# Patient Record
Sex: FEMALE | Race: WHITE | Employment: OTHER | URBAN - METROPOLITAN AREA
[De-identification: names, ages, dates, MRNs, and addresses within clinical notes are randomized per-mention and may not be internally consistent; named-entity substitution may affect disease eponyms.]

---

## 2017-02-28 DIAGNOSIS — E03.9 HYPOTHYROIDISM, UNSPECIFIED TYPE: ICD-10-CM

## 2017-02-28 RX ORDER — LEVOTHYROXINE SODIUM 0.05 MG/1
TABLET ORAL
Qty: 30 TABLET | Refills: 2 | Status: SHIPPED | OUTPATIENT
Start: 2017-02-28 | End: 2017-05-23 | Stop reason: SDUPTHER

## 2017-03-10 RX ORDER — PANTOPRAZOLE SODIUM 40 MG/1
TABLET, DELAYED RELEASE ORAL
Qty: 90 TABLET | Refills: 3 | Status: SHIPPED | OUTPATIENT
Start: 2017-03-10 | End: 2018-01-03 | Stop reason: SDUPTHER

## 2017-03-17 DIAGNOSIS — M54.16 LUMBAR RADICULOPATHY: ICD-10-CM

## 2017-03-17 RX ORDER — GABAPENTIN 600 MG/1
TABLET ORAL
Qty: 270 TABLET | Refills: 3 | Status: SHIPPED | OUTPATIENT
Start: 2017-03-17 | End: 2017-06-27 | Stop reason: SDUPTHER

## 2017-03-27 RX ORDER — VALSARTAN AND HYDROCHLOROTHIAZIDE 160; 12.5 MG/1; MG/1
TABLET, FILM COATED ORAL
Qty: 90 TABLET | Refills: 2 | Status: SHIPPED | OUTPATIENT
Start: 2017-03-27 | End: 2017-11-29 | Stop reason: SDUPTHER

## 2017-03-31 ENCOUNTER — OFFICE VISIT (OUTPATIENT)
Dept: FAMILY MEDICINE CLINIC | Age: 82
End: 2017-03-31

## 2017-03-31 VITALS
RESPIRATION RATE: 16 BRPM | HEIGHT: 59 IN | HEART RATE: 68 BPM | DIASTOLIC BLOOD PRESSURE: 52 MMHG | TEMPERATURE: 97.9 F | WEIGHT: 139.7 LBS | SYSTOLIC BLOOD PRESSURE: 102 MMHG | BODY MASS INDEX: 28.16 KG/M2

## 2017-03-31 DIAGNOSIS — N30.01 ACUTE CYSTITIS WITH HEMATURIA: Primary | ICD-10-CM

## 2017-03-31 LAB
BILIRUBIN, POC: NEGATIVE
BLOOD URINE, POC: ABNORMAL
CLARITY, POC: ABNORMAL
COLOR, POC: YELLOW
GLUCOSE URINE, POC: NEGATIVE
KETONES, POC: NEGATIVE
LEUKOCYTE EST, POC: ABNORMAL
NITRITE, POC: NEGATIVE
PH, POC: 6
PROTEIN, POC: NEGATIVE
SPECIFIC GRAVITY, POC: 1.01
UROBILINOGEN, POC: 0.2

## 2017-03-31 PROCEDURE — G8427 DOCREV CUR MEDS BY ELIG CLIN: HCPCS | Performed by: NURSE PRACTITIONER

## 2017-03-31 PROCEDURE — 1123F ACP DISCUSS/DSCN MKR DOCD: CPT | Performed by: NURSE PRACTITIONER

## 2017-03-31 PROCEDURE — 99213 OFFICE O/P EST LOW 20 MIN: CPT | Performed by: NURSE PRACTITIONER

## 2017-03-31 PROCEDURE — G8400 PT W/DXA NO RESULTS DOC: HCPCS | Performed by: NURSE PRACTITIONER

## 2017-03-31 PROCEDURE — 81003 URINALYSIS AUTO W/O SCOPE: CPT | Performed by: NURSE PRACTITIONER

## 2017-03-31 PROCEDURE — 1036F TOBACCO NON-USER: CPT | Performed by: NURSE PRACTITIONER

## 2017-03-31 PROCEDURE — G8484 FLU IMMUNIZE NO ADMIN: HCPCS | Performed by: NURSE PRACTITIONER

## 2017-03-31 PROCEDURE — G8420 CALC BMI NORM PARAMETERS: HCPCS | Performed by: NURSE PRACTITIONER

## 2017-03-31 PROCEDURE — 4040F PNEUMOC VAC/ADMIN/RCVD: CPT | Performed by: NURSE PRACTITIONER

## 2017-03-31 PROCEDURE — 1090F PRES/ABSN URINE INCON ASSESS: CPT | Performed by: NURSE PRACTITIONER

## 2017-03-31 RX ORDER — CIPROFLOXACIN 250 MG/1
250 TABLET, FILM COATED ORAL 2 TIMES DAILY
Qty: 10 TABLET | Refills: 0 | Status: SHIPPED | OUTPATIENT
Start: 2017-03-31 | End: 2017-04-05

## 2017-03-31 ASSESSMENT — ENCOUNTER SYMPTOMS
RESPIRATORY NEGATIVE: 1
GASTROINTESTINAL NEGATIVE: 1

## 2017-03-31 ASSESSMENT — PATIENT HEALTH QUESTIONNAIRE - PHQ9
1. LITTLE INTEREST OR PLEASURE IN DOING THINGS: 0
SUM OF ALL RESPONSES TO PHQ QUESTIONS 1-9: 0
SUM OF ALL RESPONSES TO PHQ9 QUESTIONS 1 & 2: 0
2. FEELING DOWN, DEPRESSED OR HOPELESS: 0

## 2017-04-03 LAB — URINE CULTURE, ROUTINE: NORMAL

## 2017-04-12 RX ORDER — PRAVASTATIN SODIUM 20 MG
TABLET ORAL
Qty: 90 TABLET | Refills: 2 | Status: SHIPPED | OUTPATIENT
Start: 2017-04-12 | End: 2017-11-29 | Stop reason: SDUPTHER

## 2017-05-23 DIAGNOSIS — E03.9 HYPOTHYROIDISM, UNSPECIFIED TYPE: ICD-10-CM

## 2017-05-23 RX ORDER — LEVOTHYROXINE SODIUM 0.05 MG/1
TABLET ORAL
Qty: 30 TABLET | Refills: 2 | Status: SHIPPED | OUTPATIENT
Start: 2017-05-23 | End: 2017-09-11 | Stop reason: SDUPTHER

## 2017-06-27 DIAGNOSIS — M54.16 LUMBAR RADICULOPATHY: ICD-10-CM

## 2017-06-27 RX ORDER — GABAPENTIN 600 MG/1
TABLET ORAL
Qty: 270 TABLET | Refills: 3 | Status: SHIPPED | OUTPATIENT
Start: 2017-06-27 | End: 2018-02-28 | Stop reason: SDUPTHER

## 2017-08-08 ENCOUNTER — HOSPITAL ENCOUNTER (OUTPATIENT)
Dept: MRI IMAGING | Age: 82
Discharge: HOME | End: 2017-08-08
Payer: MEDICARE

## 2017-08-08 DIAGNOSIS — M54.16 RADICULOPATHY, LUMBAR REGION: ICD-10-CM

## 2017-08-08 PROCEDURE — 72148 MRI LUMBAR SPINE W/O DYE: CPT

## 2017-09-11 DIAGNOSIS — E03.9 HYPOTHYROIDISM, UNSPECIFIED TYPE: ICD-10-CM

## 2017-09-11 RX ORDER — LEVOTHYROXINE SODIUM 0.05 MG/1
TABLET ORAL
Qty: 30 TABLET | Refills: 2 | Status: SHIPPED | OUTPATIENT
Start: 2017-09-11 | End: 2017-11-29 | Stop reason: SDUPTHER

## 2017-10-08 ENCOUNTER — HOSPITAL ENCOUNTER (EMERGENCY)
Age: 82
Discharge: HOME | End: 2017-10-08
Payer: MEDICARE

## 2017-10-08 VITALS
HEIGHT: 59 IN | WEIGHT: 135 LBS | HEART RATE: 87 BPM | OXYGEN SATURATION: 98 % | DIASTOLIC BLOOD PRESSURE: 86 MMHG | SYSTOLIC BLOOD PRESSURE: 129 MMHG | TEMPERATURE: 98.1 F | BODY MASS INDEX: 27.21 KG/M2 | RESPIRATION RATE: 16 BRPM

## 2017-10-08 DIAGNOSIS — N30.01 ACUTE CYSTITIS WITH HEMATURIA: Primary | ICD-10-CM

## 2017-10-08 LAB
BILIRUBIN URINE: ABNORMAL
BLOOD, URINE: ABNORMAL
CHARACTER, URINE: ABNORMAL
COLOR: ABNORMAL
GLUCOSE, URINE: NEGATIVE MG/DL
KETONES, URINE: NEGATIVE
LEUKOCYTES, UA: ABNORMAL
NITRATE, UA: NEGATIVE
PH UA: 5.5 (ref 5–9)
PROTEIN UA: >= 300 MG/DL
REFLEX TO URINE C & S: ABNORMAL
SPECIFIC GRAVITY UA: >= 1.03 (ref 1–1.03)
UROBILINOGEN, URINE: 1 EU/DL (ref 0–1)

## 2017-10-08 PROCEDURE — 99213 OFFICE O/P EST LOW 20 MIN: CPT

## 2017-10-08 PROCEDURE — 81003 URINALYSIS AUTO W/O SCOPE: CPT

## 2017-10-08 PROCEDURE — 87086 URINE CULTURE/COLONY COUNT: CPT

## 2017-10-08 PROCEDURE — 99213 OFFICE O/P EST LOW 20 MIN: CPT | Performed by: NURSE PRACTITIONER

## 2017-10-08 RX ORDER — PHENAZOPYRIDINE HYDROCHLORIDE 200 MG/1
200 TABLET, FILM COATED ORAL 3 TIMES DAILY PRN
Qty: 6 TABLET | Refills: 0 | Status: SHIPPED | OUTPATIENT
Start: 2017-10-08 | End: 2017-10-10

## 2017-10-08 RX ORDER — CIPROFLOXACIN 250 MG/1
250 TABLET, FILM COATED ORAL 2 TIMES DAILY
Qty: 10 TABLET | Refills: 0 | Status: SHIPPED | OUTPATIENT
Start: 2017-10-08 | End: 2017-10-13

## 2017-10-08 ASSESSMENT — ENCOUNTER SYMPTOMS
DIARRHEA: 0
TROUBLE SWALLOWING: 0
CONSTIPATION: 0
NAUSEA: 0
COUGH: 0
SORE THROAT: 0
WHEEZING: 0
EYE ITCHING: 0
CHEST TIGHTNESS: 0
RHINORRHEA: 0
EYE REDNESS: 0
BACK PAIN: 0
VOMITING: 0
EYE DISCHARGE: 0
EYE PAIN: 0
ABDOMINAL PAIN: 0
SHORTNESS OF BREATH: 0
SINUS PRESSURE: 0

## 2017-10-08 NOTE — ED TRIAGE NOTES
PT arrives ambulatory by self  to room with complaint of freq urination with burning symptoms started 3 days ago.

## 2017-10-08 NOTE — ED PROVIDER NOTES
[facility]  Urgent Care Encounter      CHIEF COMPLAINT       Chief Complaint   Patient presents with    Dysuria       Nurses Notes reviewed and I agree except as noted in the HPI. HISTORY OF PRESENT ILLNESS   Carissa Ugarte is a 80 y.o. female who presents To urgent care complaints of dysuria. Patient states her symptoms started 3 days ago and appeared to be worsening. She reports having urinary frequency. She denies fever, chills, abdominal pain, nausea, vomiting, diarrhea. She denies decreased urination and hematuria. She states that she has had several UTIs in the past and this feels similar. She denies any worsening or alleviating factors. REVIEW OF SYSTEMS     Review of Systems   Constitutional: Positive for chills. Negative for activity change, appetite change, fatigue and fever. HENT: Negative for congestion, ear discharge, ear pain, hearing loss, postnasal drip, rhinorrhea, sinus pressure, sore throat and trouble swallowing. Eyes: Negative for pain, discharge, redness and itching. Respiratory: Negative for cough, chest tightness, shortness of breath and wheezing. Cardiovascular: Negative for chest pain, palpitations and leg swelling. Gastrointestinal: Negative for abdominal pain, constipation, diarrhea, nausea and vomiting. Endocrine: Negative. Genitourinary: Positive for dysuria and frequency. Negative for difficulty urinating, flank pain and hematuria. Musculoskeletal: Negative for arthralgias, back pain, joint swelling and myalgias. Skin: Negative. Neurological: Negative for dizziness, light-headedness and headaches. Hematological: Negative. PAST MEDICAL HISTORY         Diagnosis Date    GERD (gastroesophageal reflux disease)     Hyperlipidemia     Hypertension     Hypothyroidism     Osteoarthritis        SURGICAL HISTORY     Patient  has a past surgical history that includes back surgery (2003); Neck surgery (1980s);  Knee arthroscopy Normal range of motion. Neck supple. No JVD present. No tracheal deviation present. No thyromegaly present. Cardiovascular: Normal rate, regular rhythm and normal heart sounds. Exam reveals no gallop and no friction rub. No murmur heard. Pulmonary/Chest: Effort normal and breath sounds normal. No stridor. No respiratory distress. She has no wheezes. She has no rales. She exhibits no tenderness. Abdominal: Soft. Bowel sounds are normal. There is no tenderness. No CVA tenderness   Lymphadenopathy:     She has no cervical adenopathy. Neurological: She is alert and oriented to person, place, and time. Skin: Skin is warm and dry. Psychiatric: She has a normal mood and affect. Her behavior is normal. Judgment normal.   Nursing note and vitals reviewed. DIAGNOSTIC RESULTS   Labs:  Results for orders placed or performed during the hospital encounter of 10/08/17   UA without Microscopic, Reflex C&S   Result Value Ref Range    Glucose, Urine Negative NEGATIVE mg/dl    Bilirubin Urine Small (A) NEGATIVE    Ketones, Urine Negative NEGATIVE    Specific Gravity, UA >=1.030 1.002 - 1.03    Blood, Urine Large (A) NEGATIVE    pH, UA 5.50 5.0 - 9.0    Protein, UA >= 300 (A) NEGATIVE mg/dl    Urobilinogen, Urine 1.00 0.0 - 1.0 eu/dl    Nitrate, UA Negative NEGATIVE    LEUKOCYTES, UA Small (A) NEGATIVE    Color, UA Dark yellow (A) STRAW-YELL    Character, Urine Cloudy (A) CLEAR-SL C    REFLEX TO URINE C & S INDICATED        IMAGING:  No orders to display     URGENT CARE COURSE:     Vitals:    10/08/17 1104   BP: 129/86   Pulse: 87   Resp: 16   Temp: 98.1 °F (36.7 °C)   TempSrc: Oral   SpO2: 98%   Weight: 135 lb (61.2 kg)   Height: 4' 11\" (1.499 m)       Medications - No data to display  PROCEDURES:  None  FINAL IMPRESSION      1. Acute cystitis with hematuria        DISPOSITION/PLAN   DISPOSITION   Discussed with the patient the results of her urine test in detail. She is to increase her fluids.   Take medications as prescribed. She can take Tylenol for pain as needed.   Follow-up with her family doctor if symptoms worsen or go to the emergency room if she develops new or worsening symptoms  PATIENT REFERRED TO:  Cooper Sagastume [address] [place] [address]  [phone]    In 2 days  If symptoms worsen    DISCHARGE MEDICATIONS:  New Prescriptions    CIPROFLOXACIN (CIPRO) 250 MG TABLET    Take 1 tablet by mouth 2 times daily for 5 days    PHENAZOPYRIDINE (PYRIDIUM) 200 MG TABLET    Take 1 tablet by mouth 3 times daily as needed for Pain     Current Discharge Medication List          Chuck Frias, [address], [facility]  10/08/17 2205

## 2017-10-09 ENCOUNTER — CARE COORDINATION (OUTPATIENT)
Dept: CASE MANAGEMENT | Age: 82
End: 2017-10-09

## 2017-10-10 ENCOUNTER — IMMUNIZATION (OUTPATIENT)
Dept: FAMILY MEDICINE CLINIC | Age: 82
End: 2017-10-10
Payer: MEDICARE

## 2017-10-10 DIAGNOSIS — Z23 INFLUENZA VACCINE NEEDED: Primary | ICD-10-CM

## 2017-10-10 LAB — URINE CULTURE REFLEX: NORMAL

## 2017-10-10 PROCEDURE — 90662 IIV NO PRSV INCREASED AG IM: CPT | Performed by: NURSE PRACTITIONER

## 2017-10-10 PROCEDURE — G0008 ADMIN INFLUENZA VIRUS VAC: HCPCS | Performed by: NURSE PRACTITIONER

## 2017-10-13 ENCOUNTER — TELEPHONE (OUTPATIENT)
Dept: FAMILY MEDICINE CLINIC | Age: 82
End: 2017-10-13

## 2017-10-13 NOTE — TELEPHONE ENCOUNTER
Patient called was seen at [address] was told to call office for results of urine culture. Patient would like a call back with advice.

## 2017-11-29 DIAGNOSIS — E03.9 HYPOTHYROIDISM, UNSPECIFIED TYPE: ICD-10-CM

## 2017-11-29 RX ORDER — LEVOTHYROXINE SODIUM 0.05 MG/1
TABLET ORAL
Qty: 90 TABLET | Refills: 3 | Status: SHIPPED | OUTPATIENT
Start: 2017-11-29 | End: 2018-10-24 | Stop reason: SDUPTHER

## 2017-11-29 RX ORDER — VALSARTAN AND HYDROCHLOROTHIAZIDE 160; 12.5 MG/1; MG/1
TABLET, FILM COATED ORAL
Qty: 90 TABLET | Refills: 3 | Status: SHIPPED | OUTPATIENT
Start: 2017-11-29 | End: 2018-05-02 | Stop reason: SDUPTHER

## 2017-11-29 RX ORDER — PRAVASTATIN SODIUM 20 MG
TABLET ORAL
Qty: 90 TABLET | Refills: 3 | Status: SHIPPED | OUTPATIENT
Start: 2017-11-29 | End: 2018-02-12 | Stop reason: SDUPTHER

## 2017-11-29 NOTE — TELEPHONE ENCOUNTER
Max Aquino called requesting a refill on the following medications:  Requested Prescriptions     Pending Prescriptions Disp Refills    pravastatin (PRAVACHOL) 20 MG tablet 90 tablet 2    levothyroxine (SYNTHROID) 50 MCG tablet 30 tablet 2    valsartan-hydrochlorothiazide (DIOVAN-HCT) 160-12.5 MG per tablet 90 tablet 2     Pharmacy verified:  .pv    90 day supply    Date of last visit: 03/31/17  Date of next visit (if applicable): Visit date not found    [address] [place] [address]

## 2017-12-13 ENCOUNTER — OFFICE VISIT (OUTPATIENT)
Dept: FAMILY MEDICINE CLINIC | Age: 82
End: 2017-12-13
Payer: MEDICARE

## 2017-12-13 VITALS
SYSTOLIC BLOOD PRESSURE: 126 MMHG | OXYGEN SATURATION: 96 % | BODY MASS INDEX: 26.21 KG/M2 | HEIGHT: 59 IN | RESPIRATION RATE: 14 BRPM | HEART RATE: 70 BPM | DIASTOLIC BLOOD PRESSURE: 56 MMHG | WEIGHT: 130 LBS

## 2017-12-13 DIAGNOSIS — M48.061 SPINAL STENOSIS OF LUMBAR REGION AT MULTIPLE LEVELS: Primary | ICD-10-CM

## 2017-12-13 DIAGNOSIS — M54.16 LUMBAR RADICULOPATHY, CHRONIC: ICD-10-CM

## 2017-12-13 PROCEDURE — 99213 OFFICE O/P EST LOW 20 MIN: CPT | Performed by: FAMILY MEDICINE

## 2017-12-13 PROCEDURE — 1036F TOBACCO NON-USER: CPT | Performed by: FAMILY MEDICINE

## 2017-12-13 PROCEDURE — 1123F ACP DISCUSS/DSCN MKR DOCD: CPT | Performed by: FAMILY MEDICINE

## 2017-12-13 PROCEDURE — G8427 DOCREV CUR MEDS BY ELIG CLIN: HCPCS | Performed by: FAMILY MEDICINE

## 2017-12-13 PROCEDURE — G8419 CALC BMI OUT NRM PARAM NOF/U: HCPCS | Performed by: FAMILY MEDICINE

## 2017-12-13 PROCEDURE — 4040F PNEUMOC VAC/ADMIN/RCVD: CPT | Performed by: FAMILY MEDICINE

## 2017-12-13 PROCEDURE — 1090F PRES/ABSN URINE INCON ASSESS: CPT | Performed by: FAMILY MEDICINE

## 2017-12-13 PROCEDURE — G8400 PT W/DXA NO RESULTS DOC: HCPCS | Performed by: FAMILY MEDICINE

## 2017-12-13 PROCEDURE — G8484 FLU IMMUNIZE NO ADMIN: HCPCS | Performed by: FAMILY MEDICINE

## 2017-12-13 RX ORDER — ASPIRIN 81 MG/1
81 TABLET ORAL DAILY
COMMUNITY

## 2017-12-13 ASSESSMENT — ENCOUNTER SYMPTOMS
GASTROINTESTINAL NEGATIVE: 1
RESPIRATORY NEGATIVE: 1
BACK PAIN: 1

## 2017-12-13 NOTE — PROGRESS NOTES
Multiple Vitamins-Minerals (THERAPEUTIC MULTIVITAMIN-MINERALS) tablet Take 1 tablet by mouth daily. Yes Historical Provider, MD   Cholecalciferol (VITAMIN D) 2000 UNITS CAPS capsule Take 1 capsule by mouth daily. Yes Historical Provider, MD         Review of Systems   HENT: Negative. Respiratory: Negative. Cardiovascular: Negative. Gastrointestinal: Negative. Musculoskeletal: Positive for back pain and gait problem. Neurological: Positive for weakness and numbness. All other systems reviewed and are negative. Objective:   Physical Exam   Constitutional: She is oriented to person, place, and time. She appears well-developed and well-nourished. HENT:   Head: Normocephalic and atraumatic. Right Ear: Tympanic membrane normal.   Left Ear: Tympanic membrane normal.   Mouth/Throat: Oropharynx is clear and moist and mucous membranes are normal.   Cardiovascular: Normal rate, regular rhythm and normal heart sounds. No murmur heard. Pulmonary/Chest: Effort normal and breath sounds normal.   Abdominal: Soft. Bowel sounds are normal.   Musculoskeletal: She exhibits no edema. Lumbar back: She exhibits decreased range of motion, tenderness and pain. She exhibits no spasm. Back:    Neurological: She is alert and oriented to person, place, and time. She displays atrophy. No sensory deficit. Gait (antalgic gait) abnormal.   Reflex Scores:       Patellar reflexes are 1+ on the right side and 1+ on the left side. Achilles reflexes are 1+ on the right side and 1+ on the left side. Skin: Skin is warm and dry. Psychiatric: She has a normal mood and affect. Her behavior is normal.   Nursing note and vitals reviewed. Assessment:      1. Spinal stenosis of lumbar region at multiple levels  External Referral To Orthopedic Surgery    CANCELED: External Referral To Orthopedic Surgery   2.  Lumbar radiculopathy, chronic  External Referral To Orthopedic Surgery    CANCELED: External

## 2017-12-18 RX ORDER — VALSARTAN AND HYDROCHLOROTHIAZIDE 160; 12.5 MG/1; MG/1
TABLET, FILM COATED ORAL
Qty: 90 TABLET | Refills: 2 | Status: ON HOLD | OUTPATIENT
Start: 2017-12-18 | End: 2019-03-12

## 2018-01-03 RX ORDER — PANTOPRAZOLE SODIUM 40 MG/1
TABLET, DELAYED RELEASE ORAL
Qty: 90 TABLET | Refills: 3 | Status: SHIPPED | OUTPATIENT
Start: 2018-01-03 | End: 2018-04-03 | Stop reason: SDUPTHER

## 2018-02-12 RX ORDER — PRAVASTATIN SODIUM 20 MG
TABLET ORAL
Qty: 90 TABLET | Refills: 3 | Status: SHIPPED | OUTPATIENT
Start: 2018-02-12 | End: 2018-06-14

## 2018-02-28 ENCOUNTER — OFFICE VISIT (OUTPATIENT)
Dept: FAMILY MEDICINE CLINIC | Age: 83
End: 2018-02-28
Payer: MEDICARE

## 2018-02-28 VITALS
DIASTOLIC BLOOD PRESSURE: 60 MMHG | BODY MASS INDEX: 25.04 KG/M2 | HEART RATE: 76 BPM | RESPIRATION RATE: 12 BRPM | HEIGHT: 59 IN | SYSTOLIC BLOOD PRESSURE: 112 MMHG | WEIGHT: 124.2 LBS

## 2018-02-28 DIAGNOSIS — M54.16 LUMBAR RADICULOPATHY: ICD-10-CM

## 2018-02-28 DIAGNOSIS — M48.062 SPINAL STENOSIS OF LUMBAR REGION WITH NEUROGENIC CLAUDICATION: Primary | ICD-10-CM

## 2018-02-28 PROCEDURE — 99213 OFFICE O/P EST LOW 20 MIN: CPT | Performed by: FAMILY MEDICINE

## 2018-02-28 PROCEDURE — G8427 DOCREV CUR MEDS BY ELIG CLIN: HCPCS | Performed by: FAMILY MEDICINE

## 2018-02-28 PROCEDURE — G8419 CALC BMI OUT NRM PARAM NOF/U: HCPCS | Performed by: FAMILY MEDICINE

## 2018-02-28 PROCEDURE — 1036F TOBACCO NON-USER: CPT | Performed by: FAMILY MEDICINE

## 2018-02-28 PROCEDURE — 1090F PRES/ABSN URINE INCON ASSESS: CPT | Performed by: FAMILY MEDICINE

## 2018-02-28 PROCEDURE — G8484 FLU IMMUNIZE NO ADMIN: HCPCS | Performed by: FAMILY MEDICINE

## 2018-02-28 PROCEDURE — 4040F PNEUMOC VAC/ADMIN/RCVD: CPT | Performed by: FAMILY MEDICINE

## 2018-02-28 PROCEDURE — 1123F ACP DISCUSS/DSCN MKR DOCD: CPT | Performed by: FAMILY MEDICINE

## 2018-02-28 PROCEDURE — G8400 PT W/DXA NO RESULTS DOC: HCPCS | Performed by: FAMILY MEDICINE

## 2018-02-28 RX ORDER — GABAPENTIN 600 MG/1
TABLET ORAL
Qty: 360 TABLET | Refills: 3 | Status: SHIPPED | OUTPATIENT
Start: 2018-02-28 | End: 2018-10-24 | Stop reason: DRUGHIGH

## 2018-02-28 ASSESSMENT — ENCOUNTER SYMPTOMS: BACK PAIN: 1

## 2018-02-28 NOTE — PROGRESS NOTES
Subjective:      Patient ID: Ge Kapadia is a 80 y.o. female. HPI:    Chief Complaint   Patient presents with   Jona Seen Dr. Jeovany Belle and he cannot help her    Leg Pain     Pt here for follow up on her back. Was seen by Dr. Jeovany Belle who did not feel that she was a good surgical candidate. At that time, pt states that her pain was mainly at night. She did not stress her radicular symptoms during the day. Patient Active Problem List   Diagnosis    Hypertension    Hyperlipidemia    GERD (gastroesophageal reflux disease)    Osteoarthritis    Hypothyroidism    Vertebro basilar insufficiency    Cervical spondylosis with myelopathy    Light headed    Stenosis, spinal, lumbar    TIA involving vertebral artery     Past Surgical History:   Procedure Laterality Date    BACK SURGERY  2003    JOINT REPLACEMENT      left    KNEE ARTHROSCOPY  2010    left    NECK SURGERY  1980s     Prior to Admission medications    Medication Sig Start Date End Date Taking? Authorizing Provider   pravastatin (PRAVACHOL) 20 MG tablet TAKE 1 TABLET BY MOUTH NIGHTLY 2/12/18  Yes Elfego Zamora DO   pantoprazole (PROTONIX) 40 MG tablet TAKE 1 TABLET BY MOUTH DAILY.  1/3/18  Yes Elfego Zamora DO   valsartan-hydrochlorothiazide (DIOVAN-HCT) 160-12.5 MG per tablet TAKE 1 TABLET BY MOUTH EVERY DAY 12/18/17  Yes Elfego Zamora DO   aspirin 81 MG EC tablet Take 81 mg by mouth daily   Yes Historical Provider, MD   levothyroxine (SYNTHROID) 50 MCG tablet TAKE 1 TABLET BY MOUTH DAILY 11/29/17  Yes Elfeog Zamora DO   valsartan-hydrochlorothiazide (DIOVAN-HCT) 160-12.5 MG per tablet TAKE 1 TABLET BY MOUTH EVERY DAY 11/29/17  Yes Elfego Zamora DO   gabapentin (NEURONTIN) 600 MG tablet TAKE 1 TABLET BY MOUTH 3 TIMES DAILY 6/27/17  Yes Elfego Zamora DO   Acetaminophen (TYLENOL ARTHRITIS EXT RELIEF PO) Take by mouth   Yes Historical Provider, MD   calcium carbonate-vitamin D (CALCIUM 600+D) 600-200 MG-UNIT TABS Take 1 tablet by mouth daily. Yes Historical Provider, MD   Multiple Vitamins-Minerals (THERAPEUTIC MULTIVITAMIN-MINERALS) tablet Take 1 tablet by mouth daily. Yes Historical Provider, MD   Cholecalciferol (VITAMIN D) 2000 UNITS CAPS capsule Take 1 capsule by mouth daily. Yes Historical Provider, MD          Review of Systems   Musculoskeletal: Positive for back pain (with radiation to back of legs). Neurological: Negative for weakness. Psychiatric/Behavioral: Positive for sleep disturbance. Objective:   Physical Exam   Constitutional: She is oriented to person, place, and time. She appears well-developed and well-nourished. HENT:   Head: Normocephalic and atraumatic. Right Ear: Tympanic membrane normal.   Left Ear: Tympanic membrane normal.   Mouth/Throat: Oropharynx is clear and moist and mucous membranes are normal.   Cardiovascular: Normal rate, regular rhythm and normal heart sounds. No murmur heard. Pulmonary/Chest: Effort normal and breath sounds normal.   Abdominal: Soft. Bowel sounds are normal.   Musculoskeletal: She exhibits no edema. Lumbar back: She exhibits decreased range of motion, tenderness and pain. Back:    Neurological: She is alert and oriented to person, place, and time. She displays atrophy. No sensory deficit. Gait (antalgic gait) abnormal.   Skin: Skin is warm and dry. Psychiatric: She has a normal mood and affect. Her behavior is normal.   Nursing note and vitals reviewed. Assessment:      1. Spinal stenosis of lumbar region with neurogenic claudication  Judy Taylor MD   2.  Lumbar radiculopathy  gabapentin (NEURONTIN) 600 MG tablet           Plan:      -  Orders above  -  Will increase gabapentin  -  RTO prn

## 2018-04-03 RX ORDER — PANTOPRAZOLE SODIUM 40 MG/1
TABLET, DELAYED RELEASE ORAL
Qty: 90 TABLET | Refills: 3 | Status: SHIPPED | OUTPATIENT
Start: 2018-04-03 | End: 2018-06-25

## 2018-05-02 ENCOUNTER — OFFICE VISIT (OUTPATIENT)
Dept: PHYSICAL MEDICINE AND REHAB | Age: 83
End: 2018-05-02
Payer: MEDICARE

## 2018-05-02 ENCOUNTER — TELEPHONE (OUTPATIENT)
Dept: PHYSICAL MEDICINE AND REHAB | Age: 83
End: 2018-05-02

## 2018-05-02 VITALS
HEART RATE: 74 BPM | WEIGHT: 123.2 LBS | BODY MASS INDEX: 24.84 KG/M2 | SYSTOLIC BLOOD PRESSURE: 108 MMHG | DIASTOLIC BLOOD PRESSURE: 58 MMHG | HEIGHT: 59 IN

## 2018-05-02 DIAGNOSIS — M48.02 CERVICAL SPINAL STENOSIS: ICD-10-CM

## 2018-05-02 DIAGNOSIS — M48.061 SPINAL STENOSIS OF LUMBAR REGION WITHOUT NEUROGENIC CLAUDICATION: ICD-10-CM

## 2018-05-02 DIAGNOSIS — M47.816 SPONDYLOSIS OF LUMBAR REGION WITHOUT MYELOPATHY OR RADICULOPATHY: Primary | ICD-10-CM

## 2018-05-02 DIAGNOSIS — M46.1 SI (SACROILIAC) JOINT INFLAMMATION (HCC): ICD-10-CM

## 2018-05-02 DIAGNOSIS — G89.4 CHRONIC PAIN SYNDROME: ICD-10-CM

## 2018-05-02 DIAGNOSIS — M47.22 CERVICAL SPONDYLOSIS WITH RADICULOPATHY: ICD-10-CM

## 2018-05-02 PROCEDURE — G8427 DOCREV CUR MEDS BY ELIG CLIN: HCPCS | Performed by: NURSE PRACTITIONER

## 2018-05-02 PROCEDURE — 99215 OFFICE O/P EST HI 40 MIN: CPT | Performed by: NURSE PRACTITIONER

## 2018-05-02 PROCEDURE — 1090F PRES/ABSN URINE INCON ASSESS: CPT | Performed by: NURSE PRACTITIONER

## 2018-05-02 PROCEDURE — 4040F PNEUMOC VAC/ADMIN/RCVD: CPT | Performed by: NURSE PRACTITIONER

## 2018-05-02 PROCEDURE — G8400 PT W/DXA NO RESULTS DOC: HCPCS | Performed by: NURSE PRACTITIONER

## 2018-05-02 PROCEDURE — 1123F ACP DISCUSS/DSCN MKR DOCD: CPT | Performed by: NURSE PRACTITIONER

## 2018-05-02 PROCEDURE — 1036F TOBACCO NON-USER: CPT | Performed by: NURSE PRACTITIONER

## 2018-05-02 PROCEDURE — G8420 CALC BMI NORM PARAMETERS: HCPCS | Performed by: NURSE PRACTITIONER

## 2018-05-02 ASSESSMENT — ENCOUNTER SYMPTOMS
ABDOMINAL PAIN: 0
VOMITING: 0
PHOTOPHOBIA: 0
WHEEZING: 0
RHINORRHEA: 0
CHEST TIGHTNESS: 0
SINUS PRESSURE: 0
EYE PAIN: 0
NAUSEA: 0
CONSTIPATION: 0
SHORTNESS OF BREATH: 0
SORE THROAT: 0
COLOR CHANGE: 0
DIARRHEA: 0
BACK PAIN: 1
COUGH: 0

## 2018-05-07 ENCOUNTER — TELEPHONE (OUTPATIENT)
Dept: FAMILY MEDICINE CLINIC | Age: 83
End: 2018-05-07

## 2018-05-22 ENCOUNTER — HOSPITAL ENCOUNTER (OUTPATIENT)
Age: 83
Setting detail: OUTPATIENT SURGERY
Discharge: HOME | End: 2018-05-22
Attending: PAIN MEDICINE | Admitting: PAIN MEDICINE
Payer: MEDICARE

## 2018-05-22 ENCOUNTER — ANESTHESIA EVENT (OUTPATIENT)
Dept: OPERATING ROOM | Age: 83
End: 2018-05-22
Payer: MEDICARE

## 2018-05-22 ENCOUNTER — APPOINTMENT (OUTPATIENT)
Dept: GENERAL RADIOLOGY | Age: 83
End: 2018-05-22
Attending: PAIN MEDICINE
Payer: MEDICARE

## 2018-05-22 ENCOUNTER — ANESTHESIA (OUTPATIENT)
Dept: OPERATING ROOM | Age: 83
End: 2018-05-22
Payer: MEDICARE

## 2018-05-22 VITALS
RESPIRATION RATE: 16 BRPM | DIASTOLIC BLOOD PRESSURE: 56 MMHG | TEMPERATURE: 98.6 F | SYSTOLIC BLOOD PRESSURE: 115 MMHG | WEIGHT: 118.6 LBS | HEART RATE: 56 BPM | BODY MASS INDEX: 23.91 KG/M2 | OXYGEN SATURATION: 96 % | HEIGHT: 59 IN

## 2018-05-22 VITALS
RESPIRATION RATE: 8 BRPM | SYSTOLIC BLOOD PRESSURE: 137 MMHG | DIASTOLIC BLOOD PRESSURE: 63 MMHG | OXYGEN SATURATION: 99 %

## 2018-05-22 PROCEDURE — 7100000010 HC PHASE II RECOVERY - FIRST 15 MIN: Performed by: PAIN MEDICINE

## 2018-05-22 PROCEDURE — 3700000000 HC ANESTHESIA ATTENDED CARE: Performed by: PAIN MEDICINE

## 2018-05-22 PROCEDURE — 3600000056 HC PAIN LEVEL 4 BASE: Performed by: PAIN MEDICINE

## 2018-05-22 PROCEDURE — 3209999900 FLUORO FOR SURGICAL PROCEDURES

## 2018-05-22 PROCEDURE — 3600000057 HC PAIN LEVEL 4 ADDL 15 MIN: Performed by: PAIN MEDICINE

## 2018-05-22 PROCEDURE — 3700000001 HC ADD 15 MINUTES (ANESTHESIA): Performed by: PAIN MEDICINE

## 2018-05-22 PROCEDURE — 2500000003 HC RX 250 WO HCPCS: Performed by: PAIN MEDICINE

## 2018-05-22 PROCEDURE — 6360000002 HC RX W HCPCS: Performed by: PAIN MEDICINE

## 2018-05-22 PROCEDURE — 64635 DESTROY LUMB/SAC FACET JNT: CPT | Performed by: PAIN MEDICINE

## 2018-05-22 PROCEDURE — 7100000011 HC PHASE II RECOVERY - ADDTL 15 MIN: Performed by: PAIN MEDICINE

## 2018-05-22 PROCEDURE — 6360000002 HC RX W HCPCS: Performed by: NURSE ANESTHETIST, CERTIFIED REGISTERED

## 2018-05-22 PROCEDURE — 64636 DESTROY L/S FACET JNT ADDL: CPT | Performed by: PAIN MEDICINE

## 2018-05-22 RX ORDER — BETAMETHASONE SODIUM PHOSPHATE AND BETAMETHASONE ACETATE 3; 3 MG/ML; MG/ML
INJECTION, SUSPENSION INTRA-ARTICULAR; INTRALESIONAL; INTRAMUSCULAR; SOFT TISSUE PRN
Status: DISCONTINUED | OUTPATIENT
Start: 2018-05-22 | End: 2018-05-22 | Stop reason: HOSPADM

## 2018-05-22 RX ORDER — BUPIVACAINE HYDROCHLORIDE 2.5 MG/ML
INJECTION, SOLUTION EPIDURAL; INFILTRATION; INTRACAUDAL PRN
Status: DISCONTINUED | OUTPATIENT
Start: 2018-05-22 | End: 2018-05-22 | Stop reason: HOSPADM

## 2018-05-22 RX ORDER — LIDOCAINE HYDROCHLORIDE 10 MG/ML
INJECTION, SOLUTION EPIDURAL; INFILTRATION; INTRACAUDAL; PERINEURAL PRN
Status: DISCONTINUED | OUTPATIENT
Start: 2018-05-22 | End: 2018-05-22 | Stop reason: HOSPADM

## 2018-05-22 RX ORDER — FENTANYL CITRATE 50 UG/ML
INJECTION, SOLUTION INTRAMUSCULAR; INTRAVENOUS PRN
Status: DISCONTINUED | OUTPATIENT
Start: 2018-05-22 | End: 2018-05-22 | Stop reason: SDUPTHER

## 2018-05-22 RX ORDER — PROPOFOL 10 MG/ML
INJECTION, EMULSION INTRAVENOUS PRN
Status: DISCONTINUED | OUTPATIENT
Start: 2018-05-22 | End: 2018-05-22 | Stop reason: SDUPTHER

## 2018-05-22 RX ADMIN — PROPOFOL 10 MG: 10 INJECTION, EMULSION INTRAVENOUS at 12:05

## 2018-05-22 RX ADMIN — FENTANYL CITRATE 50 MCG: 50 INJECTION INTRAMUSCULAR; INTRAVENOUS at 12:05

## 2018-05-22 RX ADMIN — PROPOFOL 30 MG: 10 INJECTION, EMULSION INTRAVENOUS at 12:02

## 2018-05-22 RX ADMIN — FENTANYL CITRATE 25 MCG: 50 INJECTION INTRAMUSCULAR; INTRAVENOUS at 11:45

## 2018-05-22 RX ADMIN — FENTANYL CITRATE 25 MCG: 50 INJECTION INTRAMUSCULAR; INTRAVENOUS at 11:47

## 2018-05-22 ASSESSMENT — PULMONARY FUNCTION TESTS
PIF_VALUE: 0

## 2018-05-22 ASSESSMENT — PAIN - FUNCTIONAL ASSESSMENT: PAIN_FUNCTIONAL_ASSESSMENT: 0-10

## 2018-05-22 ASSESSMENT — PAIN SCALES - GENERAL: PAINLEVEL_OUTOF10: 0

## 2018-05-22 ASSESSMENT — PAIN DESCRIPTION - DESCRIPTORS: DESCRIPTORS: ACHING

## 2018-06-13 ENCOUNTER — OFFICE VISIT (OUTPATIENT)
Dept: PHYSICAL MEDICINE AND REHAB | Age: 83
End: 2018-06-13
Payer: MEDICARE

## 2018-06-13 ENCOUNTER — TELEPHONE (OUTPATIENT)
Dept: PHYSICAL MEDICINE AND REHAB | Age: 83
End: 2018-06-13

## 2018-06-13 VITALS
HEART RATE: 73 BPM | SYSTOLIC BLOOD PRESSURE: 129 MMHG | BODY MASS INDEX: 23.79 KG/M2 | WEIGHT: 118 LBS | HEIGHT: 59 IN | DIASTOLIC BLOOD PRESSURE: 70 MMHG

## 2018-06-13 DIAGNOSIS — G89.4 CHRONIC PAIN SYNDROME: ICD-10-CM

## 2018-06-13 DIAGNOSIS — M46.1 SI (SACROILIAC) JOINT INFLAMMATION (HCC): ICD-10-CM

## 2018-06-13 DIAGNOSIS — M47.22 CERVICAL SPONDYLOSIS WITH RADICULOPATHY: ICD-10-CM

## 2018-06-13 DIAGNOSIS — M48.02 CERVICAL SPINAL STENOSIS: ICD-10-CM

## 2018-06-13 DIAGNOSIS — M47.816 SPONDYLOSIS OF LUMBAR REGION WITHOUT MYELOPATHY OR RADICULOPATHY: Primary | ICD-10-CM

## 2018-06-13 DIAGNOSIS — M48.061 SPINAL STENOSIS OF LUMBAR REGION WITHOUT NEUROGENIC CLAUDICATION: ICD-10-CM

## 2018-06-13 PROCEDURE — G8420 CALC BMI NORM PARAMETERS: HCPCS | Performed by: NURSE PRACTITIONER

## 2018-06-13 PROCEDURE — G8427 DOCREV CUR MEDS BY ELIG CLIN: HCPCS | Performed by: NURSE PRACTITIONER

## 2018-06-13 PROCEDURE — G8400 PT W/DXA NO RESULTS DOC: HCPCS | Performed by: NURSE PRACTITIONER

## 2018-06-13 PROCEDURE — 99213 OFFICE O/P EST LOW 20 MIN: CPT | Performed by: NURSE PRACTITIONER

## 2018-06-13 PROCEDURE — 1036F TOBACCO NON-USER: CPT | Performed by: NURSE PRACTITIONER

## 2018-06-13 PROCEDURE — 1090F PRES/ABSN URINE INCON ASSESS: CPT | Performed by: NURSE PRACTITIONER

## 2018-06-13 PROCEDURE — 4040F PNEUMOC VAC/ADMIN/RCVD: CPT | Performed by: NURSE PRACTITIONER

## 2018-06-13 PROCEDURE — 1123F ACP DISCUSS/DSCN MKR DOCD: CPT | Performed by: NURSE PRACTITIONER

## 2018-06-13 ASSESSMENT — ENCOUNTER SYMPTOMS
SHORTNESS OF BREATH: 0
CHEST TIGHTNESS: 0
BACK PAIN: 1
NAUSEA: 0
SINUS PRESSURE: 0
WHEEZING: 0
RHINORRHEA: 0
SORE THROAT: 0
ABDOMINAL PAIN: 0
DIARRHEA: 0
EYE PAIN: 0
PHOTOPHOBIA: 0
COLOR CHANGE: 0
COUGH: 0
CONSTIPATION: 0
VOMITING: 0

## 2018-06-14 RX ORDER — LOVASTATIN 20 MG/1
20 TABLET ORAL NIGHTLY
Qty: 90 TABLET | Refills: 3 | Status: SHIPPED | OUTPATIENT
Start: 2018-06-14 | End: 2019-06-15 | Stop reason: SDUPTHER

## 2018-06-19 ENCOUNTER — HOSPITAL ENCOUNTER (OUTPATIENT)
Dept: WOMENS IMAGING | Age: 83
Discharge: HOME | End: 2018-06-19
Payer: MEDICARE

## 2018-06-19 DIAGNOSIS — Z13.9 VISIT FOR SCREENING: ICD-10-CM

## 2018-06-19 PROCEDURE — 77063 BREAST TOMOSYNTHESIS BI: CPT

## 2018-06-25 ENCOUNTER — TELEPHONE (OUTPATIENT)
Dept: FAMILY MEDICINE CLINIC | Age: 83
End: 2018-06-25

## 2018-06-25 ENCOUNTER — ANESTHESIA (OUTPATIENT)
Dept: OPERATING ROOM | Age: 83
End: 2018-06-25
Payer: MEDICARE

## 2018-06-25 ENCOUNTER — APPOINTMENT (OUTPATIENT)
Dept: GENERAL RADIOLOGY | Age: 83
End: 2018-06-25
Attending: PAIN MEDICINE
Payer: MEDICARE

## 2018-06-25 ENCOUNTER — HOSPITAL ENCOUNTER (OUTPATIENT)
Age: 83
Setting detail: OUTPATIENT SURGERY
Discharge: HOME | End: 2018-06-25
Attending: PAIN MEDICINE | Admitting: PAIN MEDICINE
Payer: MEDICARE

## 2018-06-25 ENCOUNTER — ANESTHESIA EVENT (OUTPATIENT)
Dept: OPERATING ROOM | Age: 83
End: 2018-06-25
Payer: MEDICARE

## 2018-06-25 VITALS
TEMPERATURE: 97.5 F | RESPIRATION RATE: 16 BRPM | WEIGHT: 116 LBS | SYSTOLIC BLOOD PRESSURE: 146 MMHG | BODY MASS INDEX: 23.39 KG/M2 | OXYGEN SATURATION: 97 % | DIASTOLIC BLOOD PRESSURE: 68 MMHG | HEART RATE: 64 BPM | HEIGHT: 59 IN

## 2018-06-25 VITALS
OXYGEN SATURATION: 99 % | DIASTOLIC BLOOD PRESSURE: 63 MMHG | SYSTOLIC BLOOD PRESSURE: 140 MMHG | RESPIRATION RATE: 6 BRPM

## 2018-06-25 PROCEDURE — 2500000003 HC RX 250 WO HCPCS: Performed by: PAIN MEDICINE

## 2018-06-25 PROCEDURE — 6360000002 HC RX W HCPCS: Performed by: REGISTERED NURSE

## 2018-06-25 PROCEDURE — 7100000011 HC PHASE II RECOVERY - ADDTL 15 MIN: Performed by: PAIN MEDICINE

## 2018-06-25 PROCEDURE — 3600000056 HC PAIN LEVEL 4 BASE: Performed by: PAIN MEDICINE

## 2018-06-25 PROCEDURE — 3700000001 HC ADD 15 MINUTES (ANESTHESIA): Performed by: PAIN MEDICINE

## 2018-06-25 PROCEDURE — 6360000002 HC RX W HCPCS: Performed by: PAIN MEDICINE

## 2018-06-25 PROCEDURE — 3209999900 FLUORO FOR SURGICAL PROCEDURES

## 2018-06-25 PROCEDURE — 7100000010 HC PHASE II RECOVERY - FIRST 15 MIN: Performed by: PAIN MEDICINE

## 2018-06-25 PROCEDURE — 64635 DESTROY LUMB/SAC FACET JNT: CPT | Performed by: PAIN MEDICINE

## 2018-06-25 PROCEDURE — 64636 DESTROY L/S FACET JNT ADDL: CPT | Performed by: PAIN MEDICINE

## 2018-06-25 PROCEDURE — 3600000057 HC PAIN LEVEL 4 ADDL 15 MIN: Performed by: PAIN MEDICINE

## 2018-06-25 PROCEDURE — 3700000000 HC ANESTHESIA ATTENDED CARE: Performed by: PAIN MEDICINE

## 2018-06-25 RX ORDER — OMEPRAZOLE 40 MG/1
40 CAPSULE, DELAYED RELEASE ORAL
Qty: 90 CAPSULE | Refills: 3 | Status: SHIPPED | OUTPATIENT
Start: 2018-06-25 | End: 2019-06-28 | Stop reason: SDUPTHER

## 2018-06-25 RX ORDER — LIDOCAINE HYDROCHLORIDE 10 MG/ML
INJECTION, SOLUTION EPIDURAL; INFILTRATION; INTRACAUDAL; PERINEURAL PRN
Status: DISCONTINUED | OUTPATIENT
Start: 2018-06-25 | End: 2018-06-25 | Stop reason: HOSPADM

## 2018-06-25 RX ORDER — PROPOFOL 10 MG/ML
INJECTION, EMULSION INTRAVENOUS PRN
Status: DISCONTINUED | OUTPATIENT
Start: 2018-06-25 | End: 2018-06-25 | Stop reason: SDUPTHER

## 2018-06-25 RX ORDER — FENTANYL CITRATE 50 UG/ML
INJECTION, SOLUTION INTRAMUSCULAR; INTRAVENOUS PRN
Status: DISCONTINUED | OUTPATIENT
Start: 2018-06-25 | End: 2018-06-25 | Stop reason: SDUPTHER

## 2018-06-25 RX ORDER — BUPIVACAINE HYDROCHLORIDE 2.5 MG/ML
INJECTION, SOLUTION EPIDURAL; INFILTRATION; INTRACAUDAL PRN
Status: DISCONTINUED | OUTPATIENT
Start: 2018-06-25 | End: 2018-06-25 | Stop reason: HOSPADM

## 2018-06-25 RX ORDER — BETAMETHASONE SODIUM PHOSPHATE AND BETAMETHASONE ACETATE 3; 3 MG/ML; MG/ML
INJECTION, SUSPENSION INTRA-ARTICULAR; INTRALESIONAL; INTRAMUSCULAR; SOFT TISSUE PRN
Status: DISCONTINUED | OUTPATIENT
Start: 2018-06-25 | End: 2018-06-25 | Stop reason: HOSPADM

## 2018-06-25 RX ADMIN — PROPOFOL 30 MG: 10 INJECTION, EMULSION INTRAVENOUS at 11:55

## 2018-06-25 RX ADMIN — FENTANYL CITRATE 50 MCG: 50 INJECTION INTRAMUSCULAR; INTRAVENOUS at 11:48

## 2018-06-25 RX ADMIN — FENTANYL CITRATE 50 MCG: 50 INJECTION INTRAMUSCULAR; INTRAVENOUS at 11:43

## 2018-06-25 ASSESSMENT — PAIN - FUNCTIONAL ASSESSMENT: PAIN_FUNCTIONAL_ASSESSMENT: 0-10

## 2018-06-26 ENCOUNTER — APPOINTMENT (OUTPATIENT)
Dept: WOMENS IMAGING | Age: 83
End: 2018-06-26
Payer: MEDICARE

## 2018-06-26 ENCOUNTER — HOSPITAL ENCOUNTER (OUTPATIENT)
Dept: WOMENS IMAGING | Age: 83
Discharge: HOME | End: 2018-06-26
Payer: MEDICARE

## 2018-06-26 DIAGNOSIS — R92.2 BREAST DENSITY: ICD-10-CM

## 2018-06-26 PROCEDURE — 77065 DX MAMMO INCL CAD UNI: CPT

## 2018-07-31 ENCOUNTER — OFFICE VISIT (OUTPATIENT)
Dept: PHYSICAL MEDICINE AND REHAB | Age: 83
End: 2018-07-31
Payer: MEDICARE

## 2018-07-31 VITALS
SYSTOLIC BLOOD PRESSURE: 133 MMHG | BODY MASS INDEX: 23.79 KG/M2 | DIASTOLIC BLOOD PRESSURE: 66 MMHG | WEIGHT: 118 LBS | HEIGHT: 59 IN | HEART RATE: 71 BPM

## 2018-07-31 DIAGNOSIS — M47.22 CERVICAL SPONDYLOSIS WITH RADICULOPATHY: ICD-10-CM

## 2018-07-31 DIAGNOSIS — M48.02 CERVICAL SPINAL STENOSIS: ICD-10-CM

## 2018-07-31 DIAGNOSIS — G89.4 CHRONIC PAIN SYNDROME: ICD-10-CM

## 2018-07-31 DIAGNOSIS — M48.061 SPINAL STENOSIS OF LUMBAR REGION WITHOUT NEUROGENIC CLAUDICATION: ICD-10-CM

## 2018-07-31 DIAGNOSIS — M47.816 SPONDYLOSIS OF LUMBAR REGION WITHOUT MYELOPATHY OR RADICULOPATHY: ICD-10-CM

## 2018-07-31 DIAGNOSIS — M46.1 SI (SACROILIAC) JOINT INFLAMMATION (HCC): Primary | ICD-10-CM

## 2018-07-31 PROCEDURE — G8427 DOCREV CUR MEDS BY ELIG CLIN: HCPCS | Performed by: NURSE PRACTITIONER

## 2018-07-31 PROCEDURE — 1123F ACP DISCUSS/DSCN MKR DOCD: CPT | Performed by: NURSE PRACTITIONER

## 2018-07-31 PROCEDURE — G8420 CALC BMI NORM PARAMETERS: HCPCS | Performed by: NURSE PRACTITIONER

## 2018-07-31 PROCEDURE — 1090F PRES/ABSN URINE INCON ASSESS: CPT | Performed by: NURSE PRACTITIONER

## 2018-07-31 PROCEDURE — 99214 OFFICE O/P EST MOD 30 MIN: CPT | Performed by: NURSE PRACTITIONER

## 2018-07-31 PROCEDURE — G8400 PT W/DXA NO RESULTS DOC: HCPCS | Performed by: NURSE PRACTITIONER

## 2018-07-31 PROCEDURE — 1101F PT FALLS ASSESS-DOCD LE1/YR: CPT | Performed by: NURSE PRACTITIONER

## 2018-07-31 PROCEDURE — 4040F PNEUMOC VAC/ADMIN/RCVD: CPT | Performed by: NURSE PRACTITIONER

## 2018-07-31 PROCEDURE — 1036F TOBACCO NON-USER: CPT | Performed by: NURSE PRACTITIONER

## 2018-07-31 ASSESSMENT — ENCOUNTER SYMPTOMS
RHINORRHEA: 0
EYE PAIN: 0
CHEST TIGHTNESS: 0
CONSTIPATION: 0
SHORTNESS OF BREATH: 0
COUGH: 0
NAUSEA: 0
WHEEZING: 0
VOMITING: 0
BACK PAIN: 1
COLOR CHANGE: 0
SINUS PRESSURE: 0
PHOTOPHOBIA: 0
DIARRHEA: 0
ABDOMINAL PAIN: 0
SORE THROAT: 0

## 2018-07-31 NOTE — PROGRESS NOTES
reflux disease); Hyperlipidemia; Hypertension; Hypothyroidism; and Osteoarthritis. Past Surgical History  The patient  has a past surgical history that includes back surgery (2003); Neck surgery (1980s); Knee arthroscopy (2010); joint replacement; Colonoscopy; Endoscopy, colon, diagnostic; other surgical history; pr inject rx other periph nerve (Right, 5/22/2018); and pr inject rx other periph nerve (Left, 6/25/2018). Family History  This patient's family history includes Cancer in her mother; Heart Disease in her father; Stroke in her sister. Social History  Venkata Trevino  reports that she has never smoked. She has never used smokeless tobacco. She reports that she does not drink alcohol or use drugs. Medications    Current Outpatient Prescriptions:     omeprazole (PRILOSEC) 40 MG delayed release capsule, Take 1 capsule by mouth daily (with breakfast), Disp: 90 capsule, Rfl: 3    lovastatin (MEVACOR) 20 MG tablet, Take 1 tablet by mouth nightly, Disp: 90 tablet, Rfl: 3    gabapentin (NEURONTIN) 600 MG tablet, TAKE 1 TABLET BY MOUTH IN THE AM AND AFTERNOON, 2 AT BEDTIME. (Patient taking differently: TAKE 1 TABLET BY MOUTH IN THE AM AND AFTERNOON, 1 AT BEDTIME.), Disp: 360 tablet, Rfl: 3    valsartan-hydrochlorothiazide (DIOVAN-HCT) 160-12.5 MG per tablet, TAKE 1 TABLET BY MOUTH EVERY DAY, Disp: 90 tablet, Rfl: 2    aspirin 81 MG EC tablet, Take 81 mg by mouth daily, Disp: , Rfl:     levothyroxine (SYNTHROID) 50 MCG tablet, TAKE 1 TABLET BY MOUTH DAILY, Disp: 90 tablet, Rfl: 3    Acetaminophen (TYLENOL ARTHRITIS EXT RELIEF PO), Take by mouth, Disp: , Rfl:     calcium carbonate-vitamin D (CALCIUM 600+D) 600-200 MG-UNIT TABS, Take 1 tablet by mouth daily. , Disp: , Rfl:     Multiple Vitamins-Minerals (THERAPEUTIC MULTIVITAMIN-MINERALS) tablet, Take 1 tablet by mouth daily. , Disp: , Rfl:     Cholecalciferol (VITAMIN D) 2000 UNITS CAPS capsule, Take 1 capsule by mouth daily. , Disp: , Rfl:     Subjective: pain syndrome            Plan:      · OARRS reviewed. · Discussed long term side effects of medications. · Discussed tolerance, dependency and addiction. · Previous UDS reviewed  · UDS preformed today for compliance. · Patient told can not receive any pain medications from any other source. · Discussed with patient that they may not be pain free. · No evidence of abuse, diversion or aberrant behavior. · Medications and/or procedures improve function and quality of life. · Plan Bilateral SI RFA right side first, risk and procedure reviewed. Has had before at [address] [place] [address]  · Lumbar MRI reviewed  · Discussed LESI if need. · Continue Neurontin per PCP  · Continue tylenol and Motrin    Meds. Prescribed:   No orders of the defined types were placed in this encounter. Return Bilateral SI RFA RIGHT SIDE FIRST, for Follow up after procedure.          Electronically signed by Lawton Mcburney, APRN - CNP on 7/31/2018 at 10:26 AM

## 2018-08-21 ENCOUNTER — OFFICE VISIT (OUTPATIENT)
Dept: FAMILY MEDICINE CLINIC | Age: 83
End: 2018-08-21
Payer: MEDICARE

## 2018-08-21 VITALS
DIASTOLIC BLOOD PRESSURE: 60 MMHG | OXYGEN SATURATION: 98 % | HEIGHT: 58 IN | HEART RATE: 68 BPM | WEIGHT: 115.6 LBS | TEMPERATURE: 97.9 F | BODY MASS INDEX: 24.27 KG/M2 | SYSTOLIC BLOOD PRESSURE: 120 MMHG | RESPIRATION RATE: 14 BRPM

## 2018-08-21 DIAGNOSIS — Z23 NEED FOR VACCINATION WITH 13-POLYVALENT PNEUMOCOCCAL CONJUGATE VACCINE: ICD-10-CM

## 2018-08-21 DIAGNOSIS — M54.2 CERVICALGIA: Primary | ICD-10-CM

## 2018-08-21 DIAGNOSIS — I10 ESSENTIAL HYPERTENSION: ICD-10-CM

## 2018-08-21 DIAGNOSIS — K21.9 GASTROESOPHAGEAL REFLUX DISEASE, ESOPHAGITIS PRESENCE NOT SPECIFIED: ICD-10-CM

## 2018-08-21 DIAGNOSIS — M43.6 ACUTE MUSCLE STIFFNESS OF NECK: ICD-10-CM

## 2018-08-21 DIAGNOSIS — E78.49 OTHER HYPERLIPIDEMIA: ICD-10-CM

## 2018-08-21 DIAGNOSIS — E03.9 HYPOTHYROIDISM, UNSPECIFIED TYPE: ICD-10-CM

## 2018-08-21 DIAGNOSIS — R73.01 IFG (IMPAIRED FASTING GLUCOSE): ICD-10-CM

## 2018-08-21 PROCEDURE — 4040F PNEUMOC VAC/ADMIN/RCVD: CPT | Performed by: FAMILY MEDICINE

## 2018-08-21 PROCEDURE — 1090F PRES/ABSN URINE INCON ASSESS: CPT | Performed by: FAMILY MEDICINE

## 2018-08-21 PROCEDURE — 1123F ACP DISCUSS/DSCN MKR DOCD: CPT | Performed by: FAMILY MEDICINE

## 2018-08-21 PROCEDURE — G8420 CALC BMI NORM PARAMETERS: HCPCS | Performed by: FAMILY MEDICINE

## 2018-08-21 PROCEDURE — 90670 PCV13 VACCINE IM: CPT | Performed by: FAMILY MEDICINE

## 2018-08-21 PROCEDURE — G0009 ADMIN PNEUMOCOCCAL VACCINE: HCPCS | Performed by: FAMILY MEDICINE

## 2018-08-21 PROCEDURE — 1101F PT FALLS ASSESS-DOCD LE1/YR: CPT | Performed by: FAMILY MEDICINE

## 2018-08-21 PROCEDURE — G8400 PT W/DXA NO RESULTS DOC: HCPCS | Performed by: FAMILY MEDICINE

## 2018-08-21 PROCEDURE — 1036F TOBACCO NON-USER: CPT | Performed by: FAMILY MEDICINE

## 2018-08-21 PROCEDURE — 99214 OFFICE O/P EST MOD 30 MIN: CPT | Performed by: FAMILY MEDICINE

## 2018-08-21 PROCEDURE — G8427 DOCREV CUR MEDS BY ELIG CLIN: HCPCS | Performed by: FAMILY MEDICINE

## 2018-08-21 RX ORDER — NAPROXEN 500 MG/1
500 TABLET ORAL 2 TIMES DAILY WITH MEALS
Qty: 20 TABLET | Refills: 0 | Status: SHIPPED | OUTPATIENT
Start: 2018-08-21 | End: 2018-10-24 | Stop reason: ALTCHOICE

## 2018-08-21 RX ORDER — ALPRAZOLAM 0.25 MG/1
0.25 TABLET ORAL NIGHTLY PRN
Qty: 5 TABLET | Refills: 0 | Status: SHIPPED | OUTPATIENT
Start: 2018-08-21 | End: 2018-08-26

## 2018-08-21 ASSESSMENT — ENCOUNTER SYMPTOMS
GASTROINTESTINAL NEGATIVE: 1
PHOTOPHOBIA: 0
VOMITING: 0
RESPIRATORY NEGATIVE: 1
NAUSEA: 0

## 2018-08-21 ASSESSMENT — PATIENT HEALTH QUESTIONNAIRE - PHQ9
SUM OF ALL RESPONSES TO PHQ9 QUESTIONS 1 & 2: 0
SUM OF ALL RESPONSES TO PHQ QUESTIONS 1-9: 0
2. FEELING DOWN, DEPRESSED OR HOPELESS: 0
1. LITTLE INTEREST OR PLEASURE IN DOING THINGS: 0
SUM OF ALL RESPONSES TO PHQ QUESTIONS 1-9: 0

## 2018-08-21 NOTE — PATIENT INSTRUCTIONS
You may receive a survey about your visit with us today. The feedback from our patients helps us identify what is working well and where the service to all patients can be enhanced. Thank you! Patient Education        Neck Spasm: Exercises  Your Care Instructions  Here are some examples of typical rehabilitation exercises for your condition. Start each exercise slowly. Ease off the exercise if you start to have pain. Your doctor or physical therapist will tell you when you can start these exercises and which ones will work best for you. How to do the exercises  Levator scapula stretch    1. Sit in a firm chair, or stand up straight. 2. Gently tilt your head toward your left shoulder. 3. Turn your head to look down into your armpit, bending your head slightly forward. Let the weight of your head stretch your neck muscles. 4. Hold for 15 to 30 seconds. 5. Return to your starting position. 6. Follow the same instructions above, but tilt your head toward your right shoulder. 7. Repeat 2 to 4 times toward each shoulder. Upper trapezius stretch    1. Sit in a firm chair, or stand up straight. 2. This stretch works best if you keep your shoulder down as you lean away from it. To help you remember to do this, start by relaxing your shoulders and lightly holding on to your thighs or your chair. 3. Tilt your head toward your shoulder and hold for 15 to 30 seconds. Let the weight of your head stretch your muscles. 4. If you would like a little added stretch, place your arm behind your back. Use the arm opposite of the direction you are tilting your head. For example, if you are tilting your head to the left, place your right arm behind your back. 5. Repeat 2 to 4 times toward each shoulder. Neck rotation    1. Sit in a firm chair, or stand up straight. 2. Keeping your chin level, turn your head to the right, and hold for 15 to 30 seconds.   3. Turn your head to the left, and hold for 15 to 30 seconds. 4. Repeat 2 to 4 times to each side. Chin tuck    1. Lie on the floor with a rolled-up towel under your neck. Your head should be touching the floor. 2. Slowly bring your chin toward the front of your neck. 3. Hold for a count of 6, and then relax for up to 10 seconds. 4. Repeat 8 to 12 times. Forward neck flexion    1. Sit in a firm chair, or stand up straight. 2. Bend your head forward. 3. Hold for 15 to 30 seconds, then return to your starting position. 4. Repeat 2 to 4 times. Follow-up care is a key part of your treatment and safety. Be sure to make and go to all appointments, and call your doctor if you are having problems. It's also a good idea to know your test results and keep a list of the medicines you take. Where can you learn more? Go to [email or web address] org and sign in to your [employer] account. Enter P962 in the [employer] box to learn more about \"Neck Spasm: Exercises. \"     If you do not have an account, please click on the \"Sign Up Now\" link. Current as of: November 29, 2017  Content Version: 11.7  © 5649-7327 [employer]. Care instructions adapted under license by [facility] ([facility]). If you have questions about a medical condition or this instruction, always ask your healthcare professional. [place] [address] any warranty or liability for your use of this information.

## 2018-08-21 NOTE — PROGRESS NOTES
Subjective:      Patient ID: Caitlin Noland is a 80 y.o. female. HPI:    Chief Complaint   Patient presents with    Neck Pain     radiates forward into top of head to forehead, stiffness  present for 1 day  using heat     Nasal Congestion     present for 3 months OTC Generic allergy relief     Other     patient will be seeing Dr Jonathan Gibbs next week for eye exam     Immunizations     discuss Shingrix, Prevnar 13     Other     discuss Potassium, Creatinine monitoring, TSH testing      Pt here with c/o stiff neck and HA that started yesterday. NKI. Just woke up yesterday with these symptoms. No NV. She does admit to some blurred vision for the last few weeks, has appt with Dr. Jonathan Gibbs next week. Also has some sinus congestion, taking generic allergy medication. Has also tried Tylenol and Motrin which does not seem to help. Patient Active Problem List   Diagnosis    Hypertension    Hyperlipidemia    GERD (gastroesophageal reflux disease)    Lumbar spondylosis    Hypothyroidism    Vertebro basilar insufficiency    Cervical spondylosis with myelopathy    Light headed    Stenosis, spinal, lumbar    TIA involving vertebral artery     Past Surgical History:   Procedure Laterality Date    BACK SURGERY  2003    COLONOSCOPY      ENDOSCOPY, COLON, DIAGNOSTIC      JOINT REPLACEMENT      left    KNEE ARTHROSCOPY  2010    left    NECK SURGERY  1980s    OTHER SURGICAL HISTORY      [facility] 5/22/2018    LUMBAR RFA RIGHT SIDE FIRST L3-4,4-5,5-S1 performed by Ines Pérez MD at [address] [facility] Left 6/25/2018    LUMBAR RFA  LEFT SIDE @ L3-4,4-5,5-S1, performed by Ines Pérez MD at [facility]     Prior to Admission medications    Medication Sig Start Date End Date Taking?  Authorizing Provider   omeprazole (PRILOSEC) 40 MG delayed release capsule Take 1 capsule by mouth daily (with breakfast) 6/25/18  Yes Gurmeet Quevedo DO   lovastatin (MEVACOR) 20 MG tablet Take 1 tablet by mouth nightly 6/14/18  Yes Gurmeet Quevedo DO   gabapentin (NEURONTIN) 600 MG tablet TAKE 1 TABLET BY MOUTH IN THE AM AND AFTERNOON, 2 AT BEDTIME. Patient taking differently: TAKE 1 TABLET BY MOUTH IN THE AM AND AFTERNOON, 1 AT BEDTIME. 2/28/18 2/28/19 Yes Gurmeet Quevedo DO   valsartan-hydrochlorothiazide (DIOVAN-HCT) 160-12.5 MG per tablet TAKE 1 TABLET BY MOUTH EVERY DAY 12/18/17  Yes Gurmeet Quevedo DO   aspirin 81 MG EC tablet Take 81 mg by mouth daily   Yes Historical Provider, MD   levothyroxine (SYNTHROID) 50 MCG tablet TAKE 1 TABLET BY MOUTH DAILY 11/29/17  Yes Gurmeet Quevedo DO   Acetaminophen (TYLENOL ARTHRITIS EXT RELIEF PO) Take by mouth   Yes Historical Provider, MD   calcium carbonate-vitamin D (CALCIUM 600+D) 600-200 MG-UNIT TABS Take 1 tablet by mouth daily. Yes Historical Provider, MD   Multiple Vitamins-Minerals (THERAPEUTIC MULTIVITAMIN-MINERALS) tablet Take 1 tablet by mouth daily. Yes Historical Provider, MD   Cholecalciferol (VITAMIN D) 2000 UNITS CAPS capsule Take 1 capsule by mouth daily. Yes Historical Provider, MD         Review of Systems   Constitutional: Negative. HENT: Positive for congestion. Eyes: Negative for photophobia. Respiratory: Negative. Cardiovascular: Negative. Gastrointestinal: Negative. Negative for nausea and vomiting. Musculoskeletal: Positive for neck pain and neck stiffness. Neurological: Positive for headaches. All other systems reviewed and are negative. Objective:   Physical Exam   Constitutional: She is oriented to person, place, and time. She appears well-developed and well-nourished. HENT:   Head: Normocephalic and atraumatic.    Right Ear: Tympanic membrane normal.   Left Ear: Tympanic membrane normal.   Mouth/Throat: Oropharynx is clear and moist and mucous membranes are normal.

## 2018-08-27 ENCOUNTER — APPOINTMENT (OUTPATIENT)
Dept: GENERAL RADIOLOGY | Age: 83
End: 2018-08-27
Attending: PAIN MEDICINE
Payer: MEDICARE

## 2018-08-27 ENCOUNTER — ANESTHESIA (OUTPATIENT)
Dept: OPERATING ROOM | Age: 83
End: 2018-08-27
Payer: MEDICARE

## 2018-08-27 ENCOUNTER — ANESTHESIA EVENT (OUTPATIENT)
Dept: OPERATING ROOM | Age: 83
End: 2018-08-27
Payer: MEDICARE

## 2018-08-27 ENCOUNTER — HOSPITAL ENCOUNTER (OUTPATIENT)
Age: 83
Setting detail: OUTPATIENT SURGERY
Discharge: HOME | End: 2018-08-27
Attending: PAIN MEDICINE | Admitting: PAIN MEDICINE
Payer: MEDICARE

## 2018-08-27 VITALS
SYSTOLIC BLOOD PRESSURE: 107 MMHG | HEIGHT: 59 IN | HEART RATE: 76 BPM | RESPIRATION RATE: 16 BRPM | BODY MASS INDEX: 23.43 KG/M2 | WEIGHT: 116.2 LBS | OXYGEN SATURATION: 97 % | TEMPERATURE: 97.5 F | DIASTOLIC BLOOD PRESSURE: 53 MMHG

## 2018-08-27 VITALS
DIASTOLIC BLOOD PRESSURE: 55 MMHG | OXYGEN SATURATION: 99 % | SYSTOLIC BLOOD PRESSURE: 115 MMHG | RESPIRATION RATE: 6 BRPM

## 2018-08-27 PROCEDURE — 6360000002 HC RX W HCPCS: Performed by: NURSE ANESTHETIST, CERTIFIED REGISTERED

## 2018-08-27 PROCEDURE — 3600000057 HC PAIN LEVEL 4 ADDL 15 MIN: Performed by: PAIN MEDICINE

## 2018-08-27 PROCEDURE — 2580000003 HC RX 258: Performed by: NURSE ANESTHETIST, CERTIFIED REGISTERED

## 2018-08-27 PROCEDURE — 2720000010 HC SURG SUPPLY STERILE: Performed by: PAIN MEDICINE

## 2018-08-27 PROCEDURE — 3700000001 HC ADD 15 MINUTES (ANESTHESIA): Performed by: PAIN MEDICINE

## 2018-08-27 PROCEDURE — 64635 DESTROY LUMB/SAC FACET JNT: CPT | Performed by: PAIN MEDICINE

## 2018-08-27 PROCEDURE — 3209999900 FLUORO FOR SURGICAL PROCEDURES

## 2018-08-27 PROCEDURE — 6360000002 HC RX W HCPCS: Performed by: PAIN MEDICINE

## 2018-08-27 PROCEDURE — 64640 INJECTION TREATMENT OF NERVE: CPT | Performed by: PAIN MEDICINE

## 2018-08-27 PROCEDURE — 2500000003 HC RX 250 WO HCPCS: Performed by: NURSE ANESTHETIST, CERTIFIED REGISTERED

## 2018-08-27 PROCEDURE — 2709999900 HC NON-CHARGEABLE SUPPLY: Performed by: PAIN MEDICINE

## 2018-08-27 PROCEDURE — 3700000000 HC ANESTHESIA ATTENDED CARE: Performed by: PAIN MEDICINE

## 2018-08-27 PROCEDURE — 3600000056 HC PAIN LEVEL 4 BASE: Performed by: PAIN MEDICINE

## 2018-08-27 PROCEDURE — 7100000011 HC PHASE II RECOVERY - ADDTL 15 MIN: Performed by: PAIN MEDICINE

## 2018-08-27 PROCEDURE — 7100000010 HC PHASE II RECOVERY - FIRST 15 MIN: Performed by: PAIN MEDICINE

## 2018-08-27 PROCEDURE — 2500000003 HC RX 250 WO HCPCS: Performed by: PAIN MEDICINE

## 2018-08-27 RX ORDER — DEXAMETHASONE SODIUM PHOSPHATE 4 MG/ML
INJECTION, SOLUTION INTRA-ARTICULAR; INTRALESIONAL; INTRAMUSCULAR; INTRAVENOUS; SOFT TISSUE PRN
Status: DISCONTINUED | OUTPATIENT
Start: 2018-08-27 | End: 2018-08-27 | Stop reason: HOSPADM

## 2018-08-27 RX ORDER — 0.9 % SODIUM CHLORIDE 0.9 %
VIAL (ML) INJECTION PRN
Status: DISCONTINUED | OUTPATIENT
Start: 2018-08-27 | End: 2018-08-27 | Stop reason: SDUPTHER

## 2018-08-27 RX ORDER — FENTANYL CITRATE 50 UG/ML
INJECTION, SOLUTION INTRAMUSCULAR; INTRAVENOUS PRN
Status: DISCONTINUED | OUTPATIENT
Start: 2018-08-27 | End: 2018-08-27 | Stop reason: SDUPTHER

## 2018-08-27 RX ORDER — LIDOCAINE HYDROCHLORIDE 10 MG/ML
INJECTION, SOLUTION INFILTRATION; PERINEURAL PRN
Status: DISCONTINUED | OUTPATIENT
Start: 2018-08-27 | End: 2018-08-27 | Stop reason: SDUPTHER

## 2018-08-27 RX ORDER — PROPOFOL 10 MG/ML
INJECTION, EMULSION INTRAVENOUS PRN
Status: DISCONTINUED | OUTPATIENT
Start: 2018-08-27 | End: 2018-08-27 | Stop reason: SDUPTHER

## 2018-08-27 RX ORDER — EPHEDRINE SULFATE 50 MG/ML
INJECTION INTRAVENOUS PRN
Status: DISCONTINUED | OUTPATIENT
Start: 2018-08-27 | End: 2018-08-27 | Stop reason: SDUPTHER

## 2018-08-27 RX ORDER — LIDOCAINE HYDROCHLORIDE 10 MG/ML
INJECTION, SOLUTION EPIDURAL; INFILTRATION; INTRACAUDAL; PERINEURAL PRN
Status: DISCONTINUED | OUTPATIENT
Start: 2018-08-27 | End: 2018-08-27 | Stop reason: HOSPADM

## 2018-08-27 RX ORDER — BUPIVACAINE HYDROCHLORIDE 2.5 MG/ML
INJECTION, SOLUTION EPIDURAL; INFILTRATION; INTRACAUDAL PRN
Status: DISCONTINUED | OUTPATIENT
Start: 2018-08-27 | End: 2018-08-27 | Stop reason: HOSPADM

## 2018-08-27 RX ADMIN — SODIUM CHLORIDE 5 ML: 9 INJECTION, SOLUTION INTRAMUSCULAR; INTRAVENOUS; SUBCUTANEOUS at 11:09

## 2018-08-27 RX ADMIN — SODIUM CHLORIDE 5 ML: 9 INJECTION, SOLUTION INTRAMUSCULAR; INTRAVENOUS; SUBCUTANEOUS at 11:19

## 2018-08-27 RX ADMIN — PROPOFOL 30 MG: 10 INJECTION, EMULSION INTRAVENOUS at 11:19

## 2018-08-27 RX ADMIN — PROPOFOL 50 MG: 10 INJECTION, EMULSION INTRAVENOUS at 11:04

## 2018-08-27 RX ADMIN — LIDOCAINE HYDROCHLORIDE 20 MG: 10 INJECTION, SOLUTION INFILTRATION; PERINEURAL at 10:57

## 2018-08-27 RX ADMIN — EPHEDRINE SULFATE 10 MG: 50 INJECTION, SOLUTION INTRAVENOUS at 11:09

## 2018-08-27 RX ADMIN — FENTANYL CITRATE 50 MCG: 50 INJECTION INTRAMUSCULAR; INTRAVENOUS at 11:12

## 2018-08-27 RX ADMIN — SODIUM CHLORIDE 5 ML: 9 INJECTION, SOLUTION INTRAMUSCULAR; INTRAVENOUS; SUBCUTANEOUS at 11:04

## 2018-08-27 RX ADMIN — PROPOFOL 50 MG: 10 INJECTION, EMULSION INTRAVENOUS at 10:57

## 2018-08-27 RX ADMIN — SODIUM CHLORIDE 5 ML: 9 INJECTION, SOLUTION INTRAMUSCULAR; INTRAVENOUS; SUBCUTANEOUS at 10:51

## 2018-08-27 RX ADMIN — SODIUM CHLORIDE 5 ML: 9 INJECTION, SOLUTION INTRAMUSCULAR; INTRAVENOUS; SUBCUTANEOUS at 11:12

## 2018-08-27 RX ADMIN — FENTANYL CITRATE 50 MCG: 50 INJECTION INTRAMUSCULAR; INTRAVENOUS at 10:51

## 2018-08-27 RX ADMIN — SODIUM CHLORIDE 5 ML: 9 INJECTION, SOLUTION INTRAMUSCULAR; INTRAVENOUS; SUBCUTANEOUS at 10:57

## 2018-08-27 ASSESSMENT — PULMONARY FUNCTION TESTS
PIF_VALUE: 0

## 2018-08-27 ASSESSMENT — PAIN DESCRIPTION - PAIN TYPE
TYPE: CHRONIC PAIN
TYPE: CHRONIC PAIN

## 2018-08-27 ASSESSMENT — PAIN SCALES - GENERAL
PAINLEVEL_OUTOF10: 8
PAINLEVEL_OUTOF10: 3

## 2018-08-27 ASSESSMENT — PAIN DESCRIPTION - LOCATION
LOCATION: BACK
LOCATION: BACK

## 2018-08-27 ASSESSMENT — PAIN DESCRIPTION - ORIENTATION
ORIENTATION: LOWER
ORIENTATION: LOWER

## 2018-08-27 ASSESSMENT — PAIN DESCRIPTION - DIRECTION: RADIATING_TOWARDS: BIL LEGS

## 2018-08-27 NOTE — ANESTHESIA POSTPROCEDURE EVALUATION
Department of Anesthesiology  Postprocedure Note    Patient: Say Levine  MRN: [id number]  YOB: 1934  Date of evaluation: 8/27/2018  Time:  1:34 PM     Procedure Summary     Date:  08/27/18 Room / Location:  [facility] 03  [address] [facility]    Anesthesia Start:  1048 Anesthesia Stop:  1124    Procedure:  SI RFA  RIGHT SIDE (Right ) Diagnosis:  (SI JOINT INFLAMMATION)    Surgeon:  Angel Jones MD Responsible Provider:  Katherine Blair MD    Anesthesia Type:  MAC ASA Status:  3          Anesthesia Type: MAC    Magnus Phase I:      Magnus Phase II: Magnus Score: 10    Last vitals: Reviewed and per EMR flowsheets. Anesthesia Post Evaluation    Patient location during evaluation: PACU  Patient participation: complete - patient participated  Level of consciousness: awake and alert  Airway patency: patent  Nausea & Vomiting: no nausea and no vomiting  Complications: no  Cardiovascular status: hemodynamically stable  Respiratory status: acceptable  Hydration status: euvolemic      [address] [facility]  POST-ANESTHESIA NOTE       Name:  Say Levine                                         Age:  80 y.o.   MRN:  [id number]      Last Vitals:  BP (!) 107/53   Pulse 76   Temp 97.5 °F (36.4 °C) (Temporal)   Resp 16   Ht 4' 11\" (1.499 m)   Wt 116 lb 3.2 oz (52.7 kg)   LMP  (Exact Date)   SpO2 97%   BMI 23.47 kg/m²   Patient Vitals for the past 4 hrs:   BP Temp Temp src Pulse Resp SpO2   08/27/18 1126 (!) 107/53 97.5 °F (36.4 °C) Temporal 76 16 97 %       Level of Consciousness:  Awake    Respiratory:  Stable    Oxygen Saturation:  Stable    Cardiovascular:  Stable    Hydration:  Adequate    PONV:  Stable    Post-op Pain:  Adequate analgesia    Post-op Assessment:  No apparent anesthetic complications    Additional Follow-Up / Treatment / Comment:  Jitendra Brown MD  August 27, 2018   1:34 PM

## 2018-08-27 NOTE — ANESTHESIA PRE PROCEDURE
BMI:   Wt Readings from Last 3 Encounters:   08/27/18 116 lb 3.2 oz (52.7 kg)   08/21/18 115 lb 9.6 oz (52.4 kg)   07/31/18 118 lb (53.5 kg)     There is no height or weight on file to calculate BMI.    CBC:   Lab Results   Component Value Date    WBC 5.9 03/21/2015    RBC 4.69 03/21/2015    RBC 4.87 03/06/2012    HGB 11.7 03/21/2015    HCT 37.3 03/21/2015    MCV 79.4 03/21/2015    RDW 16.7 03/21/2015     03/21/2015       CMP:   Lab Results   Component Value Date     03/21/2015    K 4.0 03/21/2015     03/21/2015    CO2 26 03/21/2015    BUN 22 03/21/2015    CREATININE 0.8 03/21/2015    LABGLOM 69 03/21/2015    GLUCOSE 104 03/21/2015    PROT 7.7 03/06/2012    CALCIUM 9.9 03/21/2015    BILITOT 0.5 03/06/2012    ALKPHOS 102 03/06/2012    AST 29 03/06/2012    ALT 22 03/06/2012       POC Tests: No results for input(s): POCGLU, POCNA, POCK, POCCL, POCBUN, POCHEMO, POCHCT in the last 72 hours. Coags: No results found for: PROTIME, INR, APTT    HCG (If Applicable): No results found for: PREGTESTUR, PREGSERUM, HCG, HCGQUANT     ABGs: No results found for: PHART, PO2ART, XVX7XNA, HKF3NIL, BEART, F4TSGHTX     Type & Screen (If Applicable):  No results found for: LABABO, [address]    Anesthesia Evaluation  Patient summary reviewed  Airway: Mallampati: II       Mouth opening: > = 3 FB Dental:          Pulmonary:       (-) COPD                           Cardiovascular:    (+) hypertension:,                   Neuro/Psych:   (+) CVA:,             GI/Hepatic/Renal:   (+) GERD:,           Endo/Other:    (+) hypothyroidism::., .                 Abdominal:           Vascular:                                          Anesthesia Plan      MAC     ASA 3             Anesthetic plan and risks discussed with patient. Plan discussed with CRNA.                   Adina Chao MD   8/27/2018

## 2018-08-27 NOTE — H&P
[address] [address]  History and Physical Update    Pt Name: Song Durham  MRN: [id number]  YOB: 1934  Date of evaluation: 8/27/2018      I have examined the patient and reviewed the H&P/Consult and there are no changes to the patient or plans.         Electronically signed by Jeremiah Molina MD on 8/27/2018 at 9:41 AM

## 2018-08-29 LAB
AVERAGE GLUCOSE: NORMAL
CHOLESTEROL, TOTAL: 148 MG/DL
CHOLESTEROL/HDL RATIO: NORMAL
HBA1C MFR BLD: 5.6 %
HDLC SERPL-MCNC: 57 MG/DL (ref 35–70)
LDL CHOLESTEROL CALCULATED: 54 MG/DL (ref 0–160)
TRIGL SERPL-MCNC: 183 MG/DL
TSH SERPL DL<=0.05 MIU/L-ACNC: 0.83 UIU/ML
VLDLC SERPL CALC-MCNC: 37 MG/DL

## 2018-09-11 ENCOUNTER — ANESTHESIA EVENT (OUTPATIENT)
Dept: OPERATING ROOM | Age: 83
End: 2018-09-11
Payer: MEDICARE

## 2018-09-11 ENCOUNTER — APPOINTMENT (OUTPATIENT)
Dept: GENERAL RADIOLOGY | Age: 83
End: 2018-09-11
Attending: PAIN MEDICINE
Payer: MEDICARE

## 2018-09-11 ENCOUNTER — ANESTHESIA (OUTPATIENT)
Dept: OPERATING ROOM | Age: 83
End: 2018-09-11
Payer: MEDICARE

## 2018-09-11 ENCOUNTER — HOSPITAL ENCOUNTER (OUTPATIENT)
Age: 83
Setting detail: OUTPATIENT SURGERY
Discharge: HOME | End: 2018-09-11
Attending: PAIN MEDICINE | Admitting: PAIN MEDICINE
Payer: MEDICARE

## 2018-09-11 VITALS
DIASTOLIC BLOOD PRESSURE: 46 MMHG | SYSTOLIC BLOOD PRESSURE: 84 MMHG | OXYGEN SATURATION: 99 % | RESPIRATION RATE: 9 BRPM

## 2018-09-11 VITALS
TEMPERATURE: 96.4 F | OXYGEN SATURATION: 98 % | HEART RATE: 62 BPM | WEIGHT: 117 LBS | HEIGHT: 59 IN | RESPIRATION RATE: 15 BRPM | BODY MASS INDEX: 23.59 KG/M2 | DIASTOLIC BLOOD PRESSURE: 52 MMHG | SYSTOLIC BLOOD PRESSURE: 100 MMHG

## 2018-09-11 PROCEDURE — 2500000003 HC RX 250 WO HCPCS: Performed by: PAIN MEDICINE

## 2018-09-11 PROCEDURE — 64635 DESTROY LUMB/SAC FACET JNT: CPT | Performed by: PAIN MEDICINE

## 2018-09-11 PROCEDURE — 64640 INJECTION TREATMENT OF NERVE: CPT | Performed by: PAIN MEDICINE

## 2018-09-11 PROCEDURE — 3700000000 HC ANESTHESIA ATTENDED CARE: Performed by: PAIN MEDICINE

## 2018-09-11 PROCEDURE — 2709999900 HC NON-CHARGEABLE SUPPLY: Performed by: PAIN MEDICINE

## 2018-09-11 PROCEDURE — 6360000002 HC RX W HCPCS: Performed by: NURSE ANESTHETIST, CERTIFIED REGISTERED

## 2018-09-11 PROCEDURE — 2500000003 HC RX 250 WO HCPCS: Performed by: NURSE ANESTHETIST, CERTIFIED REGISTERED

## 2018-09-11 PROCEDURE — 2720000010 HC SURG SUPPLY STERILE: Performed by: PAIN MEDICINE

## 2018-09-11 PROCEDURE — 3600000056 HC PAIN LEVEL 4 BASE: Performed by: PAIN MEDICINE

## 2018-09-11 PROCEDURE — 3600000057 HC PAIN LEVEL 4 ADDL 15 MIN: Performed by: PAIN MEDICINE

## 2018-09-11 PROCEDURE — 3700000001 HC ADD 15 MINUTES (ANESTHESIA): Performed by: PAIN MEDICINE

## 2018-09-11 PROCEDURE — 7100000011 HC PHASE II RECOVERY - ADDTL 15 MIN: Performed by: PAIN MEDICINE

## 2018-09-11 PROCEDURE — 6360000002 HC RX W HCPCS: Performed by: PAIN MEDICINE

## 2018-09-11 PROCEDURE — 7100000010 HC PHASE II RECOVERY - FIRST 15 MIN: Performed by: PAIN MEDICINE

## 2018-09-11 PROCEDURE — 3209999900 FLUORO FOR SURGICAL PROCEDURES

## 2018-09-11 RX ORDER — BETAMETHASONE SODIUM PHOSPHATE AND BETAMETHASONE ACETATE 3; 3 MG/ML; MG/ML
INJECTION, SUSPENSION INTRA-ARTICULAR; INTRALESIONAL; INTRAMUSCULAR; SOFT TISSUE PRN
Status: DISCONTINUED | OUTPATIENT
Start: 2018-09-11 | End: 2018-09-11 | Stop reason: HOSPADM

## 2018-09-11 RX ORDER — BUPIVACAINE HYDROCHLORIDE 2.5 MG/ML
INJECTION, SOLUTION EPIDURAL; INFILTRATION; INTRACAUDAL PRN
Status: DISCONTINUED | OUTPATIENT
Start: 2018-09-11 | End: 2018-09-11 | Stop reason: HOSPADM

## 2018-09-11 RX ORDER — PROPOFOL 10 MG/ML
INJECTION, EMULSION INTRAVENOUS PRN
Status: DISCONTINUED | OUTPATIENT
Start: 2018-09-11 | End: 2018-09-11 | Stop reason: SDUPTHER

## 2018-09-11 RX ORDER — FENTANYL CITRATE 50 UG/ML
INJECTION, SOLUTION INTRAMUSCULAR; INTRAVENOUS PRN
Status: DISCONTINUED | OUTPATIENT
Start: 2018-09-11 | End: 2018-09-11 | Stop reason: SDUPTHER

## 2018-09-11 RX ORDER — LIDOCAINE HYDROCHLORIDE 10 MG/ML
INJECTION, SOLUTION EPIDURAL; INFILTRATION; INTRACAUDAL; PERINEURAL PRN
Status: DISCONTINUED | OUTPATIENT
Start: 2018-09-11 | End: 2018-09-11 | Stop reason: HOSPADM

## 2018-09-11 RX ORDER — LIDOCAINE HYDROCHLORIDE 20 MG/ML
INJECTION, SOLUTION INFILTRATION; PERINEURAL PRN
Status: DISCONTINUED | OUTPATIENT
Start: 2018-09-11 | End: 2018-09-11 | Stop reason: SDUPTHER

## 2018-09-11 RX ADMIN — PROPOFOL 20 MG: 10 INJECTION, EMULSION INTRAVENOUS at 10:55

## 2018-09-11 RX ADMIN — PROPOFOL 50 MG: 10 INJECTION, EMULSION INTRAVENOUS at 11:00

## 2018-09-11 RX ADMIN — LIDOCAINE HYDROCHLORIDE 20 MG: 20 INJECTION, SOLUTION INFILTRATION; PERINEURAL at 11:00

## 2018-09-11 RX ADMIN — FENTANYL CITRATE 50 MCG: 50 INJECTION INTRAMUSCULAR; INTRAVENOUS at 10:48

## 2018-09-11 RX ADMIN — PROPOFOL 20 MG: 10 INJECTION, EMULSION INTRAVENOUS at 11:10

## 2018-09-11 ASSESSMENT — PULMONARY FUNCTION TESTS
PIF_VALUE: 0

## 2018-09-11 ASSESSMENT — PAIN - FUNCTIONAL ASSESSMENT: PAIN_FUNCTIONAL_ASSESSMENT: 0-10

## 2018-09-11 ASSESSMENT — PAIN DESCRIPTION - PAIN TYPE: TYPE: CHRONIC PAIN

## 2018-09-11 ASSESSMENT — PAIN SCALES - GENERAL: PAINLEVEL_OUTOF10: 2

## 2018-09-11 ASSESSMENT — PAIN DESCRIPTION - DESCRIPTORS: DESCRIPTORS: ACHING

## 2018-09-11 NOTE — PROGRESS NOTES
1119 Friend to side, drowsy. Enc DB-does well, denies pain or needs. 1125 Snacks given. [address] for discharge. 1151 Discharge instructions given, understanding voiced, questions answered.

## 2018-09-11 NOTE — ANESTHESIA PRE PROCEDURE
Department of Anesthesiology  Preprocedure Note       Name:  Kirstin Millard   Age:  80 y.o.  :  1934                                          MRN:  [id number]         Date:  2018      Surgeon: Whitney Stevens):  Jackalyn Galeazzi, MD    Procedure: Procedure(s):  SI RFA  LEFT SIDE    Medications prior to admission:   Prior to Admission medications    Medication Sig Start Date End Date Taking? Authorizing Provider   omeprazole (PRILOSEC) 40 MG delayed release capsule Take 1 capsule by mouth daily (with breakfast) 18  Yes Noel Abdullahi, DO   lovastatin (MEVACOR) 20 MG tablet Take 1 tablet by mouth nightly 18  Yes Noel Abdullahi DO   gabapentin (NEURONTIN) 600 MG tablet TAKE 1 TABLET BY MOUTH IN THE AM AND AFTERNOON, 2 AT BEDTIME. Patient taking differently: TAKE 1 TABLET BY MOUTH IN THE AM AND AFTERNOON, 1 AT BEDTIME. 18 Yes Noel Abdullahi DO   valsartan-hydrochlorothiazide (DIOVAN-HCT) 160-12.5 MG per tablet TAKE 1 TABLET BY MOUTH EVERY DAY 17  Yes Noel Abdullahi DO   aspirin 81 MG EC tablet Take 81 mg by mouth daily   Yes Historical Provider, MD   levothyroxine (SYNTHROID) 50 MCG tablet TAKE 1 TABLET BY MOUTH DAILY 17  Yes Noel Abdullahi DO   Acetaminophen (TYLENOL ARTHRITIS EXT RELIEF PO) Take by mouth   Yes Historical Provider, MD   naproxen (NAPROSYN) 500 MG tablet Take 1 tablet by mouth 2 times daily (with meals) 18   Noel Abdullahi DO   calcium carbonate-vitamin D (CALCIUM 600+D) 600-200 MG-UNIT TABS Take 1 tablet by mouth daily. Historical Provider, MD   Multiple Vitamins-Minerals (THERAPEUTIC MULTIVITAMIN-MINERALS) tablet Take 1 tablet by mouth daily. Historical Provider, MD   Cholecalciferol (VITAMIN D) 2000 UNITS CAPS capsule Take 1 capsule by mouth daily. Historical Provider, MD       Current medications:    No current facility-administered medications for this encounter. Allergies:     Allergies   Allergen Reactions    Cataflam [Diclofenac Potassium] Shortness Of Breath    Phenergan [Promethazine Hcl]      Confusion \"didn't know where she was at\"       Problem List:    Patient Active Problem List   Diagnosis Code    Hypertension I10    Hyperlipidemia E78.5    GERD (gastroesophageal reflux disease) K21.9    Lumbar spondylosis M47.816    Hypothyroidism E03.9    Vertebro basilar insufficiency G45.0    Cervical spondylosis with myelopathy M47.12    Light headed R42    Stenosis, spinal, lumbar M48.061    TIA involving vertebral artery G45.0    Sacroiliac inflammation ([facility] M46.1       Past Medical History:        Diagnosis Date    Cerebral artery occlusion with cerebral infarction ([facility] 2012    tia    GERD (gastroesophageal reflux disease)     Hyperlipidemia     Hypertension     Hypothyroidism     Osteoarthritis        Past Surgical History:        Procedure Laterality Date    BACK SURGERY  2003    COLONOSCOPY      ENDOSCOPY, COLON, DIAGNOSTIC      JOINT REPLACEMENT      left    KNEE ARTHROSCOPY  2010    left    NECK SURGERY  1980s    NERVE SURGERY Right 08/27/2018    SI RFA    OTHER SURGICAL HISTORY      previous nerve blocks at [address] Right 5/22/2018    LUMBAR RFA RIGHT SIDE FIRST L3-4,4-5,5-S1 performed by Natan Martin MD at [address] [facility] [address] Left 6/25/2018    LUMBAR RFA  LEFT SIDE @ L3-4,4-5,5-S1, performed by Natan Martin MD at [address] [place] [address] IMAGE GUIDANCE, [facility] Right 8/27/2018    SI RFA  RIGHT SIDE performed by Natan Martin MD at [address] [facility]       Social History:    Social History   Substance Use Topics    Smoking status: Never Smoker    Smokeless tobacco: Never Used    Alcohol use No                                Counseling given: Not Answered      Vital Signs (Current):   Vitals:    09/11/18 1010 BP: 132/60   Pulse: 70   Resp: 16   Temp: 97 °F (36.1 °C)   TempSrc: Temporal   SpO2: 95%   Weight: 117 lb (53.1 kg)   Height: 4' 11\" (1.499 m)                                              BP Readings from Last 3 Encounters:   09/11/18 132/60   08/27/18 (!) 107/53   08/27/18 (!) 115/55       NPO Status: Time of last liquid consumption: 0800 (sip of water with med)                        Time of last solid consumption: 2000                        Date of last liquid consumption: 09/11/18                        Date of last solid food consumption: 09/10/18    BMI:   Wt Readings from Last 3 Encounters:   09/11/18 117 lb (53.1 kg)   08/27/18 116 lb 3.2 oz (52.7 kg)   08/21/18 115 lb 9.6 oz (52.4 kg)     Body mass index is 23.63 kg/m². CBC:   Lab Results   Component Value Date    WBC 5.9 03/21/2015    RBC 4.69 03/21/2015    RBC 4.87 03/06/2012    HGB 11.7 03/21/2015    HCT 37.3 03/21/2015    MCV 79.4 03/21/2015    RDW 16.7 03/21/2015     03/21/2015       CMP:   Lab Results   Component Value Date     03/21/2015    K 4.0 03/21/2015     03/21/2015    CO2 26 03/21/2015    BUN 22 03/21/2015    CREATININE 0.8 03/21/2015    LABGLOM 69 03/21/2015    GLUCOSE 104 03/21/2015    PROT 7.7 03/06/2012    CALCIUM 9.9 03/21/2015    BILITOT 0.5 03/06/2012    ALKPHOS 102 03/06/2012    AST 29 03/06/2012    ALT 22 03/06/2012       POC Tests: No results for input(s): POCGLU, POCNA, POCK, POCCL, POCBUN, POCHEMO, POCHCT in the last 72 hours.     Coags: No results found for: PROTIME, INR, APTT    HCG (If Applicable): No results found for: PREGTESTUR, PREGSERUM, HCG, HCGQUANT     ABGs: No results found for: PHART, PO2ART, ANS8MYO, DXX4MLI, BEART, I0XRXTFV     Type & Screen (If Applicable):  No results found for: LABABO, LABRH    Anesthesia Evaluation    Airway: Mallampati: II       Mouth opening: > = 3 FB Dental:          Pulmonary:                              Cardiovascular:    (+) hypertension:, Neuro/Psych:   (+) CVA:, TIA,             GI/Hepatic/Renal:   (+) GERD:,           Endo/Other:    (+) hypothyroidism: arthritis:., .                 Abdominal:           Vascular:                                        Anesthesia Plan      MAC     ASA 4             Anesthetic plan and risks discussed with patient. Plan discussed with CRNA.                   Malik Correia MD   9/11/2018

## 2018-09-11 NOTE — H&P
[address] [address]  History and Physical Update    Pt Name: Dane Fitzgerald  MRN: [id number]  YOB: 1934  Date of evaluation: 9/11/2018      I have examined the patient and reviewed the H&P/Consult and there are no changes to the patient or plans.         Electronically signed by Owen Negrete MD on 9/11/2018 at 10:18 AM

## 2018-09-11 NOTE — OP NOTE
consent was obtained.     Procedure: Left side  The patient was brought into the OR and carefully assisted into the prone position on the table and allowed to adjust to a position of comfort. A grounding pad was placed on the right thigh. The low back and buttocks were widely prepped with chloroprep solution, allowed to air dry and draped in the standard sterile surgical fashion. Local anesthesia was provided by 8 ml of 1% Xylocaine delivered with a 25 g needle. PROCEDURE #1: Radiofrequency Ablation of Doral Ramus of L5, medical branch of L4. A 17g 100mm radiofrequency introducer needle was placed to the planned anatomic target, guided with intermittent fluoroscopy with a perpendicular approach, to terminally place at the left sacral ala and the superior articular process at the transverse process for the left L4 medial branch nerve. The stylets were removed and the radiofrequency probes with a 4 mm active tip were then inserted. Needle tip position of the probes were verified in the AP, oblique and lateral views. At each site, the medical branch nerve was stimulated at [facility] to a maximum of 1-2 volts determined to finalize safe needle and electrode placement. The patient was awake and responsive during this portion of the procedure. Each target was anesthetized with 1ml of 0.25% marcaine anesthesia for lesioning and then each target was lesioned at 80 degrees Celsius for 2 minutes and 30 seconds. Tissue impedance were noted to be between 250 and 500 Ohms. PROCEDURE #2: Radiofrequency Ablation of S1, S2, S3 Lateral Branches. Using the AP fluoroscopic view for visualization of the lateral PSFA as defined by the pre-placed 27-gauge Quincke needles, appropriate skin starting positions were defined. Using the PSFA as a \"clock-face\", the positions were:     S1:left = 1 o'clock, 3 o'clock, and 5 o'clock. S2:left = 1 o'clock, 3 o'clock, and 5 o'clock. S3: left = 1 o'clock and 5 o'clock.     Using fluoroscopic guidance, a 17g introducer needle was inserted Sequentially into the target positions described above until the introducer tip touched the bony surface of the sacrum. The stylet was withdrawn from the introducer and the radiofrequency probe with a 4 mm active tip was fully inserted into the introducer. A lateral view was obtained for standard reference. At each of the targets, needle placement was verified with the use of the multi-planar fluoroscopy. The needle tip position was approximately 7-10 mm lateral to the PSFA as determined by using the Epsilon ruler. At each site, the lateral branch nerve was stimulated at 2Hz to a maximum of 1-2 volts determined to finalize safe needle and electrode placement. The patient was awake and responsive during this portion of the procedure. Each electrode was anesthetized with 1-2 ml of 1% Xylocaine anesthesia for lesioning and then each target was lesioned at 80 degrees Celsius for 2 minutes and 30 seconds. Tissues impedance were noted to be between 250-500 Ohms. At the conclusion of the lesioning the needles were removed and bandages placed over the needle placement sites and the patient returned to the supine position on a stretcher and transported to the recovery room without hemodynamic, neurologic, or allergic reactions. Patient's vital signs and neurological status remained stable throughout the procedure and post procedural period. The patient tolerated the procedure well and was discharged home in stable condition.      Electronically signed by . MARIXA

## 2018-09-13 NOTE — H&P
AFTERNOON, 1 AT BEDTIME.), Disp: 360 tablet, Rfl: 3    valsartan-hydrochlorothiazide (DIOVAN-HCT) 160-12.5 MG per tablet, TAKE 1 TABLET BY MOUTH EVERY DAY, Disp: 90 tablet, Rfl: 2    aspirin 81 MG EC tablet, Take 81 mg by mouth daily, Disp: , Rfl:     levothyroxine (SYNTHROID) 50 MCG tablet, TAKE 1 TABLET BY MOUTH DAILY, Disp: 90 tablet, Rfl: 3    Acetaminophen (TYLENOL ARTHRITIS EXT RELIEF PO), Take by mouth, Disp: , Rfl:     calcium carbonate-vitamin D (CALCIUM 600+D) 600-200 MG-UNIT TABS, Take 1 tablet by mouth daily. , Disp: , Rfl:     Multiple Vitamins-Minerals (THERAPEUTIC MULTIVITAMIN-MINERALS) tablet, Take 1 tablet by mouth daily. , Disp: , Rfl:     Cholecalciferol (VITAMIN D) 2000 UNITS CAPS capsule, Take 1 capsule by mouth daily. , Disp: , Rfl:         Subjective:      Review of Systems   Constitutional: Positive for activity change. Negative for appetite change, chills, diaphoresis, fatigue, fever and unexpected weight change. HENT: Negative for congestion, ear pain, hearing loss, mouth sores, nosebleeds, rhinorrhea, sinus pressure and sore throat. Eyes: Negative for photophobia, pain and visual disturbance. Respiratory: Negative for cough, chest tightness, shortness of breath and wheezing. Cardiovascular: Negative for chest pain and palpitations. Gastrointestinal: Negative for abdominal pain, constipation, diarrhea, nausea and vomiting. Endocrine: Negative for cold intolerance, heat intolerance, polydipsia, polyphagia and polyuria. Genitourinary: Negative for decreased urine volume, difficulty urinating, frequency and hematuria. Musculoskeletal: Positive for arthralgias, back pain, gait problem, joint swelling, myalgias, neck pain and neck stiffness. Skin: Negative for color change and rash. Allergic/Immunologic: Negative for food allergies and immunocompromised state.    Neurological: Negative for dizziness, tremors, seizures, syncope, facial asymmetry, speech difficulty, weakness, light-headedness, numbness and headaches. Hematological: Does not bruise/bleed easily. Psychiatric/Behavioral: Negative for agitation, behavioral problems, confusion, decreased concentration, dysphoric mood, hallucinations, self-injury, sleep disturbance and suicidal ideas. The patient is not nervous/anxious and is not hyperactive.          Objective:      Vitals       Vitals:       BP: 133/66   Pulse: 71   Weight: 118 lb (53.5 kg)   Height: 4' 11\" (1.499 m)            Physical Exam   Constitutional: She is oriented to person, place, and time. She appears well-developed and well-nourished. No distress. HENT:   Head: Normocephalic and atraumatic. Right Ear: External ear normal.   Left Ear: External ear normal.   Nose: Nose normal.   Mouth/Throat: Oropharynx is clear and moist. No oropharyngeal exudate. Eyes: Conjunctivae and EOM are normal. Pupils are equal, round, and reactive to light. Right eye exhibits no discharge. Left eye exhibits no discharge. No scleral icterus. Neck: Normal range of motion and full passive range of motion without pain. Neck supple. No muscular tenderness present. No neck rigidity. No edema, no erythema and normal range of motion present. No thyromegaly present. Cardiovascular: Normal rate, regular rhythm, normal heart sounds and intact distal pulses. Exam reveals no gallop and no friction rub. No murmur heard. Pulmonary/Chest: Effort normal and breath sounds normal. No respiratory distress. She has no wheezes. She has no rales. She exhibits no tenderness. Abdominal: Soft. Bowel sounds are normal. She exhibits no distension. There is no tenderness. There is no rebound and no guarding. Musculoskeletal: She exhibits tenderness. Right shoulder: Normal.        Left shoulder: Normal.        Right elbow: Normal.       Left elbow: Normal.        Right wrist: Normal.        Left wrist: Normal.        Right hip: She exhibits tenderness and bony tenderness.

## 2018-10-24 ENCOUNTER — OFFICE VISIT (OUTPATIENT)
Dept: FAMILY MEDICINE CLINIC | Age: 83
End: 2018-10-24
Payer: MEDICARE

## 2018-10-24 VITALS
OXYGEN SATURATION: 98 % | SYSTOLIC BLOOD PRESSURE: 120 MMHG | WEIGHT: 115.1 LBS | BODY MASS INDEX: 23.2 KG/M2 | DIASTOLIC BLOOD PRESSURE: 60 MMHG | HEART RATE: 64 BPM | RESPIRATION RATE: 16 BRPM | HEIGHT: 59 IN

## 2018-10-24 DIAGNOSIS — M47.816 LUMBAR SPONDYLOSIS: ICD-10-CM

## 2018-10-24 DIAGNOSIS — M46.1 SACROILIAC INFLAMMATION (HCC): ICD-10-CM

## 2018-10-24 DIAGNOSIS — M54.16 LUMBAR RADICULOPATHY: Primary | ICD-10-CM

## 2018-10-24 DIAGNOSIS — Z23 NEED FOR INFLUENZA VACCINATION: ICD-10-CM

## 2018-10-24 PROCEDURE — 1036F TOBACCO NON-USER: CPT | Performed by: FAMILY MEDICINE

## 2018-10-24 PROCEDURE — 1090F PRES/ABSN URINE INCON ASSESS: CPT | Performed by: FAMILY MEDICINE

## 2018-10-24 PROCEDURE — 1101F PT FALLS ASSESS-DOCD LE1/YR: CPT | Performed by: FAMILY MEDICINE

## 2018-10-24 PROCEDURE — 99214 OFFICE O/P EST MOD 30 MIN: CPT | Performed by: FAMILY MEDICINE

## 2018-10-24 PROCEDURE — G8400 PT W/DXA NO RESULTS DOC: HCPCS | Performed by: FAMILY MEDICINE

## 2018-10-24 PROCEDURE — G8420 CALC BMI NORM PARAMETERS: HCPCS | Performed by: FAMILY MEDICINE

## 2018-10-24 PROCEDURE — 1123F ACP DISCUSS/DSCN MKR DOCD: CPT | Performed by: FAMILY MEDICINE

## 2018-10-24 PROCEDURE — 90662 IIV NO PRSV INCREASED AG IM: CPT | Performed by: FAMILY MEDICINE

## 2018-10-24 PROCEDURE — G8482 FLU IMMUNIZE ORDER/ADMIN: HCPCS | Performed by: FAMILY MEDICINE

## 2018-10-24 PROCEDURE — 4040F PNEUMOC VAC/ADMIN/RCVD: CPT | Performed by: FAMILY MEDICINE

## 2018-10-24 PROCEDURE — G0008 ADMIN INFLUENZA VIRUS VAC: HCPCS | Performed by: FAMILY MEDICINE

## 2018-10-24 PROCEDURE — G8427 DOCREV CUR MEDS BY ELIG CLIN: HCPCS | Performed by: FAMILY MEDICINE

## 2018-10-24 RX ORDER — TRAMADOL HYDROCHLORIDE 50 MG/1
50 TABLET ORAL EVERY 8 HOURS PRN
Qty: 21 TABLET | Refills: 0 | Status: SHIPPED | OUTPATIENT
Start: 2018-10-24 | End: 2018-10-31

## 2018-10-24 RX ORDER — GABAPENTIN 600 MG/1
TABLET ORAL
Qty: 360 TABLET | Refills: 3 | Status: SHIPPED
Start: 2018-10-24 | End: 2019-03-13 | Stop reason: SDUPTHER

## 2018-10-24 RX ORDER — LEVOTHYROXINE SODIUM 0.05 MG/1
TABLET ORAL
Qty: 90 TABLET | Refills: 3 | Status: SHIPPED | OUTPATIENT
Start: 2018-10-24 | End: 2019-10-28 | Stop reason: SDUPTHER

## 2018-10-24 ASSESSMENT — ENCOUNTER SYMPTOMS
BACK PAIN: 1
RESPIRATORY NEGATIVE: 1
GASTROINTESTINAL NEGATIVE: 1

## 2018-10-24 NOTE — PROGRESS NOTES
and mucous membranes are normal.   Cardiovascular: Normal rate, regular rhythm and normal heart sounds. No murmur heard. Pulmonary/Chest: Effort normal and breath sounds normal.   Abdominal: Soft. Bowel sounds are normal.   Musculoskeletal: She exhibits no edema. Lumbar back: She exhibits decreased range of motion, tenderness and pain. Back:    Neurological: She is alert and oriented to person, place, and time. She has normal strength. No sensory deficit. Gait (antalgic gait) abnormal.   Reflex Scores:       Patellar reflexes are 1+ on the right side and 1+ on the left side. Achilles reflexes are 1+ on the right side and 1+ on the left side. Skin: Skin is warm and dry. Psychiatric: She has a normal mood and affect. Her behavior is normal.   Nursing note and vitals reviewed. Assessment:       Diagnosis Orders   1. Lumbar radiculopathy  gabapentin (NEURONTIN) 600 MG tablet   2. Sacroiliac inflammation ([address]     3. Lumbar spondylosis  traMADol (ULTRAM) 50 MG tablet   4.  Need for influenza vaccination  INFLUENZA, HIGH DOSE, 65 YRS +, IM, PF, PREFILL SYR, 0.5ML (FLUZONE HD)           Plan:      -  Continue gabapentin  -  rx tramadol given to take prn  -  Flu shot today  -  Discussed PT for which she declines  -  Recommend re-eval by Dr. Parada Remedies if no improvement with the tramadol        Daylin He, DO

## 2018-11-14 ENCOUNTER — OFFICE VISIT (OUTPATIENT)
Dept: PHYSICAL MEDICINE AND REHAB | Age: 83
End: 2018-11-14
Payer: MEDICARE

## 2018-11-14 VITALS
SYSTOLIC BLOOD PRESSURE: 128 MMHG | DIASTOLIC BLOOD PRESSURE: 68 MMHG | BODY MASS INDEX: 23.2 KG/M2 | HEART RATE: 74 BPM | WEIGHT: 115.08 LBS | HEIGHT: 59 IN

## 2018-11-14 DIAGNOSIS — M48.061 SPINAL STENOSIS OF LUMBAR REGION WITHOUT NEUROGENIC CLAUDICATION: Primary | ICD-10-CM

## 2018-11-14 DIAGNOSIS — M47.26 OSTEOARTHRITIS OF SPINE WITH RADICULOPATHY, LUMBAR REGION: ICD-10-CM

## 2018-11-14 DIAGNOSIS — M46.1 SI (SACROILIAC) JOINT INFLAMMATION (HCC): ICD-10-CM

## 2018-11-14 DIAGNOSIS — G89.4 CHRONIC PAIN SYNDROME: ICD-10-CM

## 2018-11-14 PROCEDURE — 1123F ACP DISCUSS/DSCN MKR DOCD: CPT | Performed by: NURSE PRACTITIONER

## 2018-11-14 PROCEDURE — G8482 FLU IMMUNIZE ORDER/ADMIN: HCPCS | Performed by: NURSE PRACTITIONER

## 2018-11-14 PROCEDURE — 99214 OFFICE O/P EST MOD 30 MIN: CPT | Performed by: NURSE PRACTITIONER

## 2018-11-14 PROCEDURE — G8427 DOCREV CUR MEDS BY ELIG CLIN: HCPCS | Performed by: NURSE PRACTITIONER

## 2018-11-14 PROCEDURE — 1090F PRES/ABSN URINE INCON ASSESS: CPT | Performed by: NURSE PRACTITIONER

## 2018-11-14 PROCEDURE — G8400 PT W/DXA NO RESULTS DOC: HCPCS | Performed by: NURSE PRACTITIONER

## 2018-11-14 PROCEDURE — 4040F PNEUMOC VAC/ADMIN/RCVD: CPT | Performed by: NURSE PRACTITIONER

## 2018-11-14 PROCEDURE — 1036F TOBACCO NON-USER: CPT | Performed by: NURSE PRACTITIONER

## 2018-11-14 PROCEDURE — 1101F PT FALLS ASSESS-DOCD LE1/YR: CPT | Performed by: NURSE PRACTITIONER

## 2018-11-14 PROCEDURE — G8420 CALC BMI NORM PARAMETERS: HCPCS | Performed by: NURSE PRACTITIONER

## 2018-11-14 RX ORDER — TRAMADOL HYDROCHLORIDE 50 MG/1
50 TABLET ORAL EVERY 6 HOURS PRN
COMMUNITY
End: 2019-07-16 | Stop reason: ALTCHOICE

## 2018-11-14 ASSESSMENT — ENCOUNTER SYMPTOMS
CONSTIPATION: 0
ABDOMINAL PAIN: 0
VOMITING: 0
SINUS PRESSURE: 0
BACK PAIN: 1
CHEST TIGHTNESS: 0
SORE THROAT: 0
NAUSEA: 0
PHOTOPHOBIA: 0
COUGH: 0
WHEEZING: 0
EYE PAIN: 0
RHINORRHEA: 0
COLOR CHANGE: 0
SHORTNESS OF BREATH: 0
DIARRHEA: 0

## 2018-11-14 NOTE — PROGRESS NOTES
[facility] [address] [facility] [place]  [address] [facility] [address]  Dept: [phone]  Dept Fax: [phone]: [phone]    Visit Date: 11/14/2018    Functionality Assessment/Goals Worksheet     On a scale of 0 (Does not Interfere) to 10 (Completely Interferes)     1. Which number describes how during the past week pain has interfered with       the following:  A. General Activity:  6  B. Mood: 2  C. Walking Ability:  3  D. Normal Work (Includes both work outside the home and housework):  2  E. Relations with Other People:   8  F. Sleep:   3  G. Enjoyment of Life:   3    2. Patient Prefers to Take their Pain Medications:     [x]  On a regular basis   []  Only when necessary    []  Does not take pain medications    3. What are the Patient's Goals/Expectations for Visiting Pain Management? [x]  Learn about my pain    [x]  Receive Medication   []  Physical Therapy     []  Treat Depression   []  Receive Injections    []  Treat Sleep   [x]  Deal with Anxiety and Stress   []  Treat Opoid Dependence/Addiction   []  Other:    HPI:   Uyen Braun is a 80 y.o. female is here today for    Chief Complaint: Low back pain  SI pain bilateral leg pain  HPI   F/U Bilateral SI RFA Left 9/11/2018 and right 8/27/2018 states  She did not receive any relief or benefit at all. S/P Lumbar RFA Bilateral L3-S1  May and June 2018 only lasted 4 weeks. She continues to have low back pain that radiates down both legs and into her feet at times. The pain increases with long term sitting standing walking reaching and even laying flat. . She continues to take Tramadol and Neurontin per PCP. She denies any ER visits since last visit. Pain scale with out pain medications is 8/10. Pain scale with pain medications is  3/10. Lumbar MRI  FINDINGS:           There is a levoscoliosis. There are degenerative marrow changes.  This includes bone marrow DAY, Disp: 90 tablet, Rfl: 2    aspirin 81 MG EC tablet, Take 81 mg by mouth daily, Disp: , Rfl:     Acetaminophen (TYLENOL ARTHRITIS EXT RELIEF PO), Take by mouth, Disp: , Rfl:     calcium carbonate-vitamin D (CALCIUM 600+D) 600-200 MG-UNIT TABS, Take 1 tablet by mouth daily. , Disp: , Rfl:     Multiple Vitamins-Minerals (THERAPEUTIC MULTIVITAMIN-MINERALS) tablet, Take 1 tablet by mouth daily. , Disp: , Rfl:     Cholecalciferol (VITAMIN D) 2000 UNITS CAPS capsule, Take 1 capsule by mouth daily. , Disp: , Rfl:     Subjective:      Review of Systems   Constitutional: Positive for activity change. Negative for appetite change, chills, diaphoresis, fatigue, fever and unexpected weight change. HENT: Negative for congestion, ear pain, hearing loss, mouth sores, nosebleeds, rhinorrhea, sinus pressure and sore throat. Eyes: Negative for photophobia, pain and visual disturbance. Respiratory: Negative for cough, chest tightness, shortness of breath and wheezing. Cardiovascular: Negative for chest pain and palpitations. Gastrointestinal: Negative for abdominal pain, constipation, diarrhea, nausea and vomiting. Endocrine: Negative for cold intolerance, heat intolerance, polydipsia, polyphagia and polyuria. Genitourinary: Negative for decreased urine volume, difficulty urinating, frequency and hematuria. Musculoskeletal: Positive for arthralgias, back pain, gait problem, joint swelling, myalgias, neck pain and neck stiffness. Skin: Negative for color change and rash. Allergic/Immunologic: Negative for food allergies and immunocompromised state. Neurological: Negative for dizziness, tremors, seizures, syncope, facial asymmetry, speech difficulty, weakness, light-headedness, numbness and headaches. Hematological: Does not bruise/bleed easily.    Psychiatric/Behavioral: Negative for agitation, behavioral problems, confusion, decreased concentration, dysphoric mood, hallucinations, self-injury, sleep

## 2018-12-06 ENCOUNTER — PROCEDURE VISIT (OUTPATIENT)
Dept: NEUROLOGY | Age: 83
End: 2018-12-06
Payer: MEDICARE

## 2018-12-06 DIAGNOSIS — M54.16 LUMBAR RADICULOPATHY: Primary | ICD-10-CM

## 2018-12-06 DIAGNOSIS — M79.604 BILATERAL LEG PAIN: ICD-10-CM

## 2018-12-06 DIAGNOSIS — M54.30 BACK PAIN WITH SCIATICA: ICD-10-CM

## 2018-12-06 DIAGNOSIS — M54.9 BACK PAIN WITH SCIATICA: ICD-10-CM

## 2018-12-06 DIAGNOSIS — M79.605 BILATERAL LEG PAIN: ICD-10-CM

## 2018-12-06 PROCEDURE — 95910 NRV CNDJ TEST 7-8 STUDIES: CPT | Performed by: PSYCHIATRY & NEUROLOGY

## 2018-12-06 PROCEDURE — 95886 MUSC TEST DONE W/N TEST COMP: CPT | Performed by: PSYCHIATRY & NEUROLOGY

## 2018-12-11 ENCOUNTER — ANESTHESIA EVENT (OUTPATIENT)
Dept: OPERATING ROOM | Age: 83
End: 2018-12-11
Payer: MEDICARE

## 2018-12-11 ENCOUNTER — APPOINTMENT (OUTPATIENT)
Dept: GENERAL RADIOLOGY | Age: 83
End: 2018-12-11
Attending: PAIN MEDICINE
Payer: MEDICARE

## 2018-12-11 ENCOUNTER — ANESTHESIA (OUTPATIENT)
Dept: OPERATING ROOM | Age: 83
End: 2018-12-11
Payer: MEDICARE

## 2018-12-11 ENCOUNTER — HOSPITAL ENCOUNTER (OUTPATIENT)
Age: 83
Setting detail: OUTPATIENT SURGERY
Discharge: HOME | End: 2018-12-11
Attending: PAIN MEDICINE | Admitting: PAIN MEDICINE
Payer: MEDICARE

## 2018-12-11 VITALS
SYSTOLIC BLOOD PRESSURE: 117 MMHG | WEIGHT: 113 LBS | BODY MASS INDEX: 22.78 KG/M2 | DIASTOLIC BLOOD PRESSURE: 87 MMHG | HEART RATE: 83 BPM | TEMPERATURE: 97.6 F | HEIGHT: 59 IN | RESPIRATION RATE: 16 BRPM | OXYGEN SATURATION: 96 %

## 2018-12-11 VITALS
RESPIRATION RATE: 12 BRPM | SYSTOLIC BLOOD PRESSURE: 127 MMHG | DIASTOLIC BLOOD PRESSURE: 58 MMHG | OXYGEN SATURATION: 100 %

## 2018-12-11 PROCEDURE — 2580000003 HC RX 258: Performed by: PAIN MEDICINE

## 2018-12-11 PROCEDURE — 62323 NJX INTERLAMINAR LMBR/SAC: CPT | Performed by: PAIN MEDICINE

## 2018-12-11 PROCEDURE — 6360000004 HC RX CONTRAST MEDICATION: Performed by: PAIN MEDICINE

## 2018-12-11 PROCEDURE — 3700000000 HC ANESTHESIA ATTENDED CARE: Performed by: PAIN MEDICINE

## 2018-12-11 PROCEDURE — 6360000002 HC RX W HCPCS: Performed by: NURSE ANESTHETIST, CERTIFIED REGISTERED

## 2018-12-11 PROCEDURE — 6360000002 HC RX W HCPCS: Performed by: PAIN MEDICINE

## 2018-12-11 PROCEDURE — 3600000054 HC PAIN LEVEL 3 BASE: Performed by: PAIN MEDICINE

## 2018-12-11 PROCEDURE — 7100000011 HC PHASE II RECOVERY - ADDTL 15 MIN: Performed by: PAIN MEDICINE

## 2018-12-11 PROCEDURE — 2709999900 HC NON-CHARGEABLE SUPPLY: Performed by: PAIN MEDICINE

## 2018-12-11 PROCEDURE — 3209999900 FLUORO FOR SURGICAL PROCEDURES

## 2018-12-11 PROCEDURE — 7100000010 HC PHASE II RECOVERY - FIRST 15 MIN: Performed by: PAIN MEDICINE

## 2018-12-11 PROCEDURE — 2500000003 HC RX 250 WO HCPCS: Performed by: PAIN MEDICINE

## 2018-12-11 RX ORDER — DEXAMETHASONE SODIUM PHOSPHATE 4 MG/ML
INJECTION, SOLUTION INTRA-ARTICULAR; INTRALESIONAL; INTRAMUSCULAR; INTRAVENOUS; SOFT TISSUE PRN
Status: DISCONTINUED | OUTPATIENT
Start: 2018-12-11 | End: 2018-12-11 | Stop reason: HOSPADM

## 2018-12-11 RX ORDER — 0.9 % SODIUM CHLORIDE 0.9 %
VIAL (ML) INJECTION PRN
Status: DISCONTINUED | OUTPATIENT
Start: 2018-12-11 | End: 2018-12-11 | Stop reason: HOSPADM

## 2018-12-11 RX ORDER — LIDOCAINE HYDROCHLORIDE 10 MG/ML
INJECTION, SOLUTION INFILTRATION; PERINEURAL PRN
Status: DISCONTINUED | OUTPATIENT
Start: 2018-12-11 | End: 2018-12-11 | Stop reason: HOSPADM

## 2018-12-11 RX ORDER — PROPOFOL 10 MG/ML
INJECTION, EMULSION INTRAVENOUS PRN
Status: DISCONTINUED | OUTPATIENT
Start: 2018-12-11 | End: 2018-12-11 | Stop reason: SDUPTHER

## 2018-12-11 RX ADMIN — PROPOFOL 70 MG: 10 INJECTION, EMULSION INTRAVENOUS at 12:55

## 2018-12-11 ASSESSMENT — PAIN DESCRIPTION - DESCRIPTORS: DESCRIPTORS: CONSTANT

## 2018-12-11 ASSESSMENT — PAIN - FUNCTIONAL ASSESSMENT: PAIN_FUNCTIONAL_ASSESSMENT: 0-10

## 2018-12-11 ASSESSMENT — PAIN SCALES - GENERAL: PAINLEVEL_OUTOF10: 0

## 2018-12-11 NOTE — H&P
[address] [address]  History and Physical Update    Pt Name: Basim Kim  MRN: [id number]  YOB: 1934  Date of evaluation: 12/11/2018      I have examined the patient and reviewed the H&P/Consult and there are no changes to the patient or plans.         Electronically signed by Susana Mccann MD on 12/11/2018 at 11:50 AM

## 2018-12-11 NOTE — PROGRESS NOTES
1308  Pt received into phase 2 recovery via cart in supine position, pt awake, alert and oriented, no c/o pain or nausea, connected to monitor, nbp and pulse ox  Report received from surgical RN INT site without redness or edema, MAEx4 no c/o numbness or tingling     1311  pts daughter brought back to the pts bedside, pt requested muffin and coffee, given     1325  Snack finished, INT removed without diff, psi held band aid applied, pt hunter well discharge instructions given to pt and daughter, clothes given for dressing,     1330  Sent daughter to go get the car    80  RN escorts pt out to private car, remains pain free gait steady

## 2018-12-24 RX ORDER — VALSARTAN AND HYDROCHLOROTHIAZIDE 160; 12.5 MG/1; MG/1
TABLET, FILM COATED ORAL
Qty: 90 TABLET | Refills: 3 | Status: SHIPPED | OUTPATIENT
Start: 2018-12-24 | End: 2019-01-23 | Stop reason: ALTCHOICE

## 2018-12-27 ENCOUNTER — HOSPITAL ENCOUNTER (OUTPATIENT)
Dept: WOMENS IMAGING | Age: 83
Discharge: HOME | End: 2018-12-27
Payer: MEDICARE

## 2018-12-27 DIAGNOSIS — Z09 FOLLOW-UP EXAM, 3-6 MONTHS SINCE PREVIOUS EXAM: ICD-10-CM

## 2018-12-27 DIAGNOSIS — R92.2 BREAST DENSITY: ICD-10-CM

## 2018-12-27 PROCEDURE — G0279 TOMOSYNTHESIS, MAMMO: HCPCS

## 2019-01-23 ENCOUNTER — TELEPHONE (OUTPATIENT)
Dept: FAMILY MEDICINE CLINIC | Age: 84
End: 2019-01-23

## 2019-01-23 DIAGNOSIS — I10 ESSENTIAL HYPERTENSION: Primary | ICD-10-CM

## 2019-01-23 RX ORDER — OLMESARTAN MEDOXOMIL AND HYDROCHLOROTHIAZIDE 20/12.5 20; 12.5 MG/1; MG/1
1 TABLET ORAL DAILY
Qty: 90 TABLET | Refills: 3 | Status: SHIPPED | OUTPATIENT
Start: 2019-01-23 | End: 2019-01-25

## 2019-01-25 ENCOUNTER — TELEPHONE (OUTPATIENT)
Dept: FAMILY MEDICINE CLINIC | Age: 84
End: 2019-01-25

## 2019-01-25 RX ORDER — VALSARTAN AND HYDROCHLOROTHIAZIDE 160; 12.5 MG/1; MG/1
TABLET, FILM COATED ORAL
Qty: 90 TABLET | Refills: 3
Start: 2019-01-25 | End: 2019-09-24

## 2019-02-06 ENCOUNTER — OFFICE VISIT (OUTPATIENT)
Dept: PHYSICAL MEDICINE AND REHAB | Age: 84
End: 2019-02-06
Payer: MEDICARE

## 2019-02-06 VITALS
HEART RATE: 72 BPM | HEIGHT: 59 IN | WEIGHT: 113 LBS | DIASTOLIC BLOOD PRESSURE: 64 MMHG | BODY MASS INDEX: 22.78 KG/M2 | SYSTOLIC BLOOD PRESSURE: 118 MMHG

## 2019-02-06 DIAGNOSIS — M46.1 SI (SACROILIAC) JOINT INFLAMMATION (HCC): ICD-10-CM

## 2019-02-06 DIAGNOSIS — M48.02 CERVICAL SPINAL STENOSIS: ICD-10-CM

## 2019-02-06 DIAGNOSIS — M25.551 BILATERAL HIP PAIN: ICD-10-CM

## 2019-02-06 DIAGNOSIS — M48.061 SPINAL STENOSIS OF LUMBAR REGION WITHOUT NEUROGENIC CLAUDICATION: Primary | ICD-10-CM

## 2019-02-06 DIAGNOSIS — M47.22 CERVICAL SPONDYLOSIS WITH RADICULOPATHY: ICD-10-CM

## 2019-02-06 DIAGNOSIS — M25.552 BILATERAL HIP PAIN: ICD-10-CM

## 2019-02-06 DIAGNOSIS — M47.816 SPONDYLOSIS OF LUMBAR REGION WITHOUT MYELOPATHY OR RADICULOPATHY: ICD-10-CM

## 2019-02-06 PROCEDURE — 1101F PT FALLS ASSESS-DOCD LE1/YR: CPT | Performed by: NURSE PRACTITIONER

## 2019-02-06 PROCEDURE — G8427 DOCREV CUR MEDS BY ELIG CLIN: HCPCS | Performed by: NURSE PRACTITIONER

## 2019-02-06 PROCEDURE — G8400 PT W/DXA NO RESULTS DOC: HCPCS | Performed by: NURSE PRACTITIONER

## 2019-02-06 PROCEDURE — 1123F ACP DISCUSS/DSCN MKR DOCD: CPT | Performed by: NURSE PRACTITIONER

## 2019-02-06 PROCEDURE — 4040F PNEUMOC VAC/ADMIN/RCVD: CPT | Performed by: NURSE PRACTITIONER

## 2019-02-06 PROCEDURE — 99214 OFFICE O/P EST MOD 30 MIN: CPT | Performed by: NURSE PRACTITIONER

## 2019-02-06 PROCEDURE — 1090F PRES/ABSN URINE INCON ASSESS: CPT | Performed by: NURSE PRACTITIONER

## 2019-02-06 PROCEDURE — 1036F TOBACCO NON-USER: CPT | Performed by: NURSE PRACTITIONER

## 2019-02-06 PROCEDURE — G8420 CALC BMI NORM PARAMETERS: HCPCS | Performed by: NURSE PRACTITIONER

## 2019-02-06 PROCEDURE — G8482 FLU IMMUNIZE ORDER/ADMIN: HCPCS | Performed by: NURSE PRACTITIONER

## 2019-02-06 ASSESSMENT — ENCOUNTER SYMPTOMS
COUGH: 0
SORE THROAT: 0
DIARRHEA: 0
RHINORRHEA: 0
EYE PAIN: 0
SINUS PRESSURE: 0
PHOTOPHOBIA: 0
ABDOMINAL PAIN: 0
WHEEZING: 0
BACK PAIN: 1
CONSTIPATION: 0
VOMITING: 0
SHORTNESS OF BREATH: 0
CHEST TIGHTNESS: 0
COLOR CHANGE: 0
NAUSEA: 0

## 2019-02-13 ENCOUNTER — HOSPITAL ENCOUNTER (OUTPATIENT)
Age: 84
Discharge: HOME | End: 2019-02-13
Payer: MEDICARE

## 2019-02-13 ENCOUNTER — HOSPITAL ENCOUNTER (OUTPATIENT)
Dept: GENERAL RADIOLOGY | Age: 84
Discharge: HOME | End: 2019-02-13
Payer: MEDICARE

## 2019-02-13 DIAGNOSIS — M25.552 BILATERAL HIP PAIN: ICD-10-CM

## 2019-02-13 DIAGNOSIS — M25.551 BILATERAL HIP PAIN: ICD-10-CM

## 2019-02-13 PROCEDURE — 73523 X-RAY EXAM HIPS BI 5/> VIEWS: CPT

## 2019-02-19 ENCOUNTER — PREP FOR PROCEDURE (OUTPATIENT)
Dept: PHYSICAL MEDICINE AND REHAB | Age: 84
End: 2019-02-19

## 2019-03-12 ENCOUNTER — HOSPITAL ENCOUNTER (OUTPATIENT)
Age: 84
Setting detail: OUTPATIENT SURGERY
Discharge: HOME | End: 2019-03-12
Attending: PAIN MEDICINE | Admitting: PAIN MEDICINE
Payer: MEDICARE

## 2019-03-12 ENCOUNTER — APPOINTMENT (OUTPATIENT)
Dept: GENERAL RADIOLOGY | Age: 84
End: 2019-03-12
Attending: PAIN MEDICINE
Payer: MEDICARE

## 2019-03-12 ENCOUNTER — ANESTHESIA EVENT (OUTPATIENT)
Dept: OPERATING ROOM | Age: 84
End: 2019-03-12
Payer: MEDICARE

## 2019-03-12 ENCOUNTER — ANESTHESIA (OUTPATIENT)
Dept: OPERATING ROOM | Age: 84
End: 2019-03-12
Payer: MEDICARE

## 2019-03-12 VITALS
SYSTOLIC BLOOD PRESSURE: 111 MMHG | WEIGHT: 113 LBS | DIASTOLIC BLOOD PRESSURE: 56 MMHG | TEMPERATURE: 97.9 F | RESPIRATION RATE: 16 BRPM | HEART RATE: 67 BPM | HEIGHT: 60 IN | OXYGEN SATURATION: 98 % | BODY MASS INDEX: 22.19 KG/M2

## 2019-03-12 VITALS
OXYGEN SATURATION: 100 % | DIASTOLIC BLOOD PRESSURE: 63 MMHG | SYSTOLIC BLOOD PRESSURE: 135 MMHG | RESPIRATION RATE: 14 BRPM

## 2019-03-12 PROCEDURE — 62323 NJX INTERLAMINAR LMBR/SAC: CPT | Performed by: PAIN MEDICINE

## 2019-03-12 PROCEDURE — 3700000000 HC ANESTHESIA ATTENDED CARE: Performed by: PAIN MEDICINE

## 2019-03-12 PROCEDURE — 3209999900 FLUORO FOR SURGICAL PROCEDURES

## 2019-03-12 PROCEDURE — 2580000003 HC RX 258: Performed by: PAIN MEDICINE

## 2019-03-12 PROCEDURE — 7100000010 HC PHASE II RECOVERY - FIRST 15 MIN: Performed by: PAIN MEDICINE

## 2019-03-12 PROCEDURE — 6360000002 HC RX W HCPCS: Performed by: NURSE ANESTHETIST, CERTIFIED REGISTERED

## 2019-03-12 PROCEDURE — 2500000003 HC RX 250 WO HCPCS: Performed by: PAIN MEDICINE

## 2019-03-12 PROCEDURE — 2709999900 HC NON-CHARGEABLE SUPPLY: Performed by: PAIN MEDICINE

## 2019-03-12 PROCEDURE — 6360000002 HC RX W HCPCS: Performed by: PAIN MEDICINE

## 2019-03-12 PROCEDURE — 3600000054 HC PAIN LEVEL 3 BASE: Performed by: PAIN MEDICINE

## 2019-03-12 PROCEDURE — 6360000004 HC RX CONTRAST MEDICATION: Performed by: PAIN MEDICINE

## 2019-03-12 PROCEDURE — 7100000011 HC PHASE II RECOVERY - ADDTL 15 MIN: Performed by: PAIN MEDICINE

## 2019-03-12 RX ORDER — PROPOFOL 10 MG/ML
INJECTION, EMULSION INTRAVENOUS PRN
Status: DISCONTINUED | OUTPATIENT
Start: 2019-03-12 | End: 2019-03-12 | Stop reason: SDUPTHER

## 2019-03-12 RX ORDER — DEXAMETHASONE SODIUM PHOSPHATE 4 MG/ML
INJECTION, SOLUTION INTRA-ARTICULAR; INTRALESIONAL; INTRAMUSCULAR; INTRAVENOUS; SOFT TISSUE PRN
Status: DISCONTINUED | OUTPATIENT
Start: 2019-03-12 | End: 2019-03-12 | Stop reason: ALTCHOICE

## 2019-03-12 RX ORDER — LIDOCAINE HYDROCHLORIDE 10 MG/ML
INJECTION, SOLUTION INFILTRATION; PERINEURAL PRN
Status: DISCONTINUED | OUTPATIENT
Start: 2019-03-12 | End: 2019-03-12 | Stop reason: ALTCHOICE

## 2019-03-12 RX ORDER — 0.9 % SODIUM CHLORIDE 0.9 %
VIAL (ML) INJECTION PRN
Status: DISCONTINUED | OUTPATIENT
Start: 2019-03-12 | End: 2019-03-12 | Stop reason: ALTCHOICE

## 2019-03-12 RX ADMIN — PROPOFOL 50 MG: 10 INJECTION, EMULSION INTRAVENOUS at 14:12

## 2019-03-12 ASSESSMENT — PULMONARY FUNCTION TESTS
PIF_VALUE: 0

## 2019-03-12 ASSESSMENT — PAIN SCALES - GENERAL
PAINLEVEL_OUTOF10: 8
PAINLEVEL_OUTOF10: 0

## 2019-03-12 ASSESSMENT — PAIN DESCRIPTION - PAIN TYPE: TYPE: CHRONIC PAIN

## 2019-03-12 ASSESSMENT — PAIN DESCRIPTION - DIRECTION: RADIATING_TOWARDS: BIL LEGS

## 2019-03-12 ASSESSMENT — PAIN DESCRIPTION - ORIENTATION: ORIENTATION: LOWER

## 2019-03-12 ASSESSMENT — PAIN DESCRIPTION - LOCATION: LOCATION: BACK

## 2019-03-13 DIAGNOSIS — M54.16 LUMBAR RADICULOPATHY: ICD-10-CM

## 2019-03-13 RX ORDER — GABAPENTIN 600 MG/1
TABLET ORAL
Qty: 360 TABLET | Refills: 3 | Status: SHIPPED | OUTPATIENT
Start: 2019-03-13 | End: 2020-04-29 | Stop reason: SDUPTHER

## 2019-03-26 ENCOUNTER — OFFICE VISIT (OUTPATIENT)
Dept: PHYSICAL MEDICINE AND REHAB | Age: 84
End: 2019-03-26
Payer: MEDICARE

## 2019-03-26 VITALS
BODY MASS INDEX: 22.19 KG/M2 | HEART RATE: 84 BPM | DIASTOLIC BLOOD PRESSURE: 63 MMHG | HEIGHT: 60 IN | SYSTOLIC BLOOD PRESSURE: 112 MMHG | WEIGHT: 113 LBS

## 2019-03-26 DIAGNOSIS — M48.02 CERVICAL SPINAL STENOSIS: ICD-10-CM

## 2019-03-26 DIAGNOSIS — M70.61 TROCHANTERIC BURSITIS OF RIGHT HIP: Primary | ICD-10-CM

## 2019-03-26 DIAGNOSIS — M47.22 CERVICAL SPONDYLOSIS WITH RADICULOPATHY: ICD-10-CM

## 2019-03-26 DIAGNOSIS — M47.816 SPONDYLOSIS OF LUMBAR REGION WITHOUT MYELOPATHY OR RADICULOPATHY: ICD-10-CM

## 2019-03-26 DIAGNOSIS — G89.4 CHRONIC PAIN SYNDROME: ICD-10-CM

## 2019-03-26 DIAGNOSIS — M48.061 SPINAL STENOSIS OF LUMBAR REGION WITHOUT NEUROGENIC CLAUDICATION: ICD-10-CM

## 2019-03-26 DIAGNOSIS — M25.551 BILATERAL HIP PAIN: ICD-10-CM

## 2019-03-26 DIAGNOSIS — M25.552 BILATERAL HIP PAIN: ICD-10-CM

## 2019-03-26 DIAGNOSIS — M46.1 SI (SACROILIAC) JOINT INFLAMMATION (HCC): ICD-10-CM

## 2019-03-26 PROCEDURE — G8482 FLU IMMUNIZE ORDER/ADMIN: HCPCS | Performed by: NURSE PRACTITIONER

## 2019-03-26 PROCEDURE — 99214 OFFICE O/P EST MOD 30 MIN: CPT | Performed by: NURSE PRACTITIONER

## 2019-03-26 PROCEDURE — G8427 DOCREV CUR MEDS BY ELIG CLIN: HCPCS | Performed by: NURSE PRACTITIONER

## 2019-03-26 PROCEDURE — G8420 CALC BMI NORM PARAMETERS: HCPCS | Performed by: NURSE PRACTITIONER

## 2019-03-26 PROCEDURE — 1036F TOBACCO NON-USER: CPT | Performed by: NURSE PRACTITIONER

## 2019-03-26 PROCEDURE — G8400 PT W/DXA NO RESULTS DOC: HCPCS | Performed by: NURSE PRACTITIONER

## 2019-03-26 PROCEDURE — 1123F ACP DISCUSS/DSCN MKR DOCD: CPT | Performed by: NURSE PRACTITIONER

## 2019-03-26 PROCEDURE — 1090F PRES/ABSN URINE INCON ASSESS: CPT | Performed by: NURSE PRACTITIONER

## 2019-03-26 PROCEDURE — 4040F PNEUMOC VAC/ADMIN/RCVD: CPT | Performed by: NURSE PRACTITIONER

## 2019-03-26 ASSESSMENT — ENCOUNTER SYMPTOMS
PHOTOPHOBIA: 0
COLOR CHANGE: 0
BACK PAIN: 1
RHINORRHEA: 0
NAUSEA: 0
DIARRHEA: 0
ABDOMINAL PAIN: 0
VOMITING: 0
SINUS PRESSURE: 0
COUGH: 0
CHEST TIGHTNESS: 0
WHEEZING: 0
CONSTIPATION: 0
SORE THROAT: 0
RESPIRATORY NEGATIVE: 1
SHORTNESS OF BREATH: 0
EYE PAIN: 0

## 2019-04-02 ENCOUNTER — PREP FOR PROCEDURE (OUTPATIENT)
Dept: PHYSICAL MEDICINE AND REHAB | Age: 84
End: 2019-04-02

## 2019-04-02 NOTE — H&P
concentration, dysphoric mood, hallucinations, self-injury, sleep disturbance and suicidal ideas. The patient is not nervous/anxious and is not hyperactive. Objective:      Vitals                                  Weight: 113 lb (51.3 kg)   Height: 5' (1.524 m)            Physical Exam   Constitutional: She is oriented to person, place, and time. She appears well-developed and well-nourished. No distress. HENT:   Head: Normocephalic and atraumatic. Right Ear: External ear normal.   Left Ear: External ear normal.   Nose: Nose normal.   Mouth/Throat: Oropharynx is clear and moist. No oropharyngeal exudate. Eyes: Pupils are equal, round, and reactive to light. Conjunctivae and EOM are normal. Right eye exhibits no discharge. Left eye exhibits no discharge. No scleral icterus. Neck: Normal range of motion and full passive range of motion without pain. Neck supple. No muscular tenderness present. No neck rigidity. No edema, no erythema and normal range of motion present. No thyromegaly present. Cardiovascular: Normal rate, regular rhythm, normal heart sounds and intact distal pulses. Exam reveals no gallop and no friction rub. No murmur heard. Pulmonary/Chest: Effort normal and breath sounds normal. No respiratory distress. She has no wheezes. She has no rales. She exhibits no tenderness. Abdominal: Soft. Bowel sounds are normal. She exhibits no distension. There is no tenderness. There is no rebound and no guarding. Musculoskeletal: She exhibits tenderness. Right shoulder: Normal.        Left shoulder: Normal.        Right elbow: Normal.       Left elbow: Normal.        Right wrist: Normal.        Left wrist: Normal.        Right hip: She exhibits decreased range of motion, decreased strength, tenderness and bony tenderness. Left hip: She exhibits decreased range of motion, decreased strength, tenderness and bony tenderness. Right knee: She exhibits swelling.  Tenderness found. Left knee: She exhibits swelling. Tenderness found. Right ankle: Normal.        Left ankle: Normal.        Cervical back: She exhibits bony tenderness, pain and spasm. Thoracic back: She exhibits tenderness. Lumbar back: She exhibits tenderness, pain and spasm. Back:         Right hand: Normal.        Left hand: Normal.        Right upper leg: She exhibits tenderness. Left upper leg: She exhibits tenderness. Right lower leg: She exhibits tenderness. Left lower leg: She exhibits tenderness. Right foot: Normal.        Left foot: Normal.   Neurological: She is alert and oriented to person, place, and time. She has normal strength. She is not disoriented. She displays no atrophy. No cranial nerve deficit or sensory deficit. She exhibits normal muscle tone. She displays a negative Romberg sign. Coordination and gait normal. She displays no Babinski's sign on the right side. Reflex Scores:       Tricep reflexes are 1+ on the right side and 1+ on the left side. Bicep reflexes are 1+ on the right side and 1+ on the left side. Brachioradialis reflexes are 1+ on the right side and 1+ on the left side. Patellar reflexes are 1+ on the right side and 1+ on the left side. Achilles reflexes are 1+ on the right side and 1+ on the left side. SLR-  Motor 5/5 BLE BUE   Skin: Skin is warm. No rash noted. She is not diaphoretic. No erythema. No pallor. Psychiatric: She has a normal mood and affect. Her speech is normal and behavior is normal. Judgment and thought content normal. Her mood appears not anxious. Her affect is not angry, not blunt, not labile and not inappropriate. She is not agitated, not aggressive, not hyperactive, not slowed, not withdrawn, not actively hallucinating and not combative. Thought content is not paranoid and not delusional. Cognition and memory are normal. Cognition and memory are not impaired.  She does not express impulsivity or inappropriate judgment. She does not exhibit a depressed mood. She expresses no homicidal and no suicidal ideation. She expresses no suicidal plans and no homicidal plans. She exhibits normal recent memory and normal remote memory. She is attentive. Nursing note and vitals reviewed. HANNAH test:positive bilateral  Yeomans test: positive bilateral  Gaenslen test: positive bilateral  Assessment:      1. Trochanteric bursitis of right hip    2. SI (sacroiliac) joint inflammation (HCC)    3. Spondylosis of lumbar region without myelopathy or radiculopathy    4. Spinal stenosis of lumbar region without neurogenic claudication    5. Cervical spinal stenosis    6. Cervical spondylosis with radiculopathy    7. Bilateral hip pain    8.  Chronic pain syndrome          Plan:  Right hip bursa steroid injection NO SEDATION       TANNER De La Fuente - CNP  4/2/2019

## 2019-04-02 NOTE — H&P (VIEW-ONLY)
Carmen Parkinson is a 80 y.o. female is here today for     Chief Complaint:  right hip pain       The patientis allergic to cataflam [diclofenac potassium] and phenergan [promethazine hcl]. Past Medical History  Jayden Jc  has a past medical history of Cerebral artery occlusion with cerebral infarction ([facility] [address] GERD (gastroesophageal reflux disease), Hyperlipidemia, Hypertension, Hypothyroidism, and Osteoarthritis. Past Surgical History  The patient  has a past surgical history that includes back surgery (2003); Neck surgery (1980s); Knee arthroscopy (2010); joint replacement; Colonoscopy; Endoscopy, colon, diagnostic; other surgical history; pr inject rx other periph nerve (Right, 5/22/2018); pr inject rx other periph nerve (Left, 6/25/2018); Nerve Surgery (Right, 08/27/2018); pr radiofrequency neurotomy lumbar or sacral, w image guidance, single (Right, 8/27/2018); Nerve Surgery (Left, 09/11/2018); pr radiofrequency neurotomy lumbar or sacral, w image guidance, single (Left, 9/11/2018); lumbar nerve block (N/A, 12/11/2018); and lumbar nerve block (N/A, 3/12/2019). Family History  This patient's family history includes Cancer in her mother; Heart Disease in her father; Stroke in her sister. Social History  Jayden Jc  reports that she has never smoked. She has never used smokeless tobacco. She reports that she does not drink alcohol or use drugs. Medications    Current Medication      Current Outpatient Medications:     gabapentin (NEURONTIN) 600 MG tablet, TAKE 1 TABLET IN  THE      MORNING, 1 TABLET IN THE   AFTERNOON,  AND2 TABLETS  AT BEDTIME, Disp: 360 tablet, Rfl: 3    valsartan-hydrochlorothiazide (DIOVAN-HCT) 160-12.5 MG per tablet, TAKE 1 TABLET BY MOUTH EVERY DAY, Disp: 90 tablet, Rfl: 3    traMADol (ULTRAM) 50 MG tablet, Take 50 mg by mouth every 6 hours as needed for Pain. ., Disp: , Rfl:     levothyroxine (SYNTHROID) 50 MCG tablet, TAKE 1 TABLET BY MOUTH DAILY, Disp: 90 tablet, Rfl: 3   found. Left knee: She exhibits swelling. Tenderness found. Right ankle: Normal.        Left ankle: Normal.        Cervical back: She exhibits bony tenderness, pain and spasm. Thoracic back: She exhibits tenderness. Lumbar back: She exhibits tenderness, pain and spasm. Back:         Right hand: Normal.        Left hand: Normal.        Right upper leg: She exhibits tenderness. Left upper leg: She exhibits tenderness. Right lower leg: She exhibits tenderness. Left lower leg: She exhibits tenderness. Right foot: Normal.        Left foot: Normal.   Neurological: She is alert and oriented to person, place, and time. She has normal strength. She is not disoriented. She displays no atrophy. No cranial nerve deficit or sensory deficit. She exhibits normal muscle tone. She displays a negative Romberg sign. Coordination and gait normal. She displays no Babinski's sign on the right side. Reflex Scores:       Tricep reflexes are 1+ on the right side and 1+ on the left side. Bicep reflexes are 1+ on the right side and 1+ on the left side. Brachioradialis reflexes are 1+ on the right side and 1+ on the left side. Patellar reflexes are 1+ on the right side and 1+ on the left side. Achilles reflexes are 1+ on the right side and 1+ on the left side. SLR-  Motor 5/5 BLE BUE   Skin: Skin is warm. No rash noted. She is not diaphoretic. No erythema. No pallor. Psychiatric: She has a normal mood and affect. Her speech is normal and behavior is normal. Judgment and thought content normal. Her mood appears not anxious. Her affect is not angry, not blunt, not labile and not inappropriate. She is not agitated, not aggressive, not hyperactive, not slowed, not withdrawn, not actively hallucinating and not combative. Thought content is not paranoid and not delusional. Cognition and memory are normal. Cognition and memory are not impaired.  She does not express impulsivity or inappropriate judgment. She does not exhibit a depressed mood. She expresses no homicidal and no suicidal ideation. She expresses no suicidal plans and no homicidal plans. She exhibits normal recent memory and normal remote memory. She is attentive. Nursing note and vitals reviewed. HANNAH test:positive bilateral  Yeomans test: positive bilateral  Gaenslen test: positive bilateral  Assessment:      1. Trochanteric bursitis of right hip    2. SI (sacroiliac) joint inflammation (HCC)    3. Spondylosis of lumbar region without myelopathy or radiculopathy    4. Spinal stenosis of lumbar region without neurogenic claudication    5. Cervical spinal stenosis    6. Cervical spondylosis with radiculopathy    7. Bilateral hip pain    8.  Chronic pain syndrome          Plan:  Right hip bursa steroid injection NO SEDATION       TANNER Colon - CNP  4/2/2019

## 2019-04-12 ENCOUNTER — HOSPITAL ENCOUNTER (OUTPATIENT)
Age: 84
Setting detail: OUTPATIENT SURGERY
Discharge: HOME | End: 2019-04-12
Attending: PAIN MEDICINE | Admitting: PAIN MEDICINE
Payer: MEDICARE

## 2019-04-12 ENCOUNTER — APPOINTMENT (OUTPATIENT)
Dept: GENERAL RADIOLOGY | Age: 84
End: 2019-04-12
Attending: PAIN MEDICINE
Payer: MEDICARE

## 2019-04-12 VITALS
DIASTOLIC BLOOD PRESSURE: 56 MMHG | OXYGEN SATURATION: 97 % | TEMPERATURE: 98.2 F | SYSTOLIC BLOOD PRESSURE: 117 MMHG | WEIGHT: 112.6 LBS | HEIGHT: 60 IN | HEART RATE: 64 BPM | RESPIRATION RATE: 19 BRPM | BODY MASS INDEX: 22.1 KG/M2

## 2019-04-12 PROCEDURE — 3209999900 FLUORO FOR SURGICAL PROCEDURES

## 2019-04-12 PROCEDURE — 3600000052 HC PAIN LEVEL 2 BASE: Performed by: PAIN MEDICINE

## 2019-04-12 PROCEDURE — 2500000003 HC RX 250 WO HCPCS: Performed by: PAIN MEDICINE

## 2019-04-12 PROCEDURE — 2709999900 HC NON-CHARGEABLE SUPPLY: Performed by: PAIN MEDICINE

## 2019-04-12 PROCEDURE — 7100000010 HC PHASE II RECOVERY - FIRST 15 MIN: Performed by: PAIN MEDICINE

## 2019-04-12 PROCEDURE — 77002 NEEDLE LOCALIZATION BY XRAY: CPT | Performed by: PAIN MEDICINE

## 2019-04-12 PROCEDURE — 7100000011 HC PHASE II RECOVERY - ADDTL 15 MIN: Performed by: PAIN MEDICINE

## 2019-04-12 PROCEDURE — 20610 DRAIN/INJ JOINT/BURSA W/O US: CPT | Performed by: PAIN MEDICINE

## 2019-04-12 PROCEDURE — 6360000002 HC RX W HCPCS: Performed by: PAIN MEDICINE

## 2019-04-12 RX ORDER — ROPIVACAINE HYDROCHLORIDE 2 MG/ML
INJECTION, SOLUTION EPIDURAL; INFILTRATION; PERINEURAL PRN
Status: DISCONTINUED | OUTPATIENT
Start: 2019-04-12 | End: 2019-04-12 | Stop reason: ALTCHOICE

## 2019-04-12 RX ORDER — METHYLPREDNISOLONE ACETATE 80 MG/ML
INJECTION, SUSPENSION INTRA-ARTICULAR; INTRALESIONAL; INTRAMUSCULAR; SOFT TISSUE PRN
Status: DISCONTINUED | OUTPATIENT
Start: 2019-04-12 | End: 2019-04-12 | Stop reason: ALTCHOICE

## 2019-04-12 RX ORDER — LIDOCAINE HYDROCHLORIDE 10 MG/ML
INJECTION, SOLUTION INFILTRATION; PERINEURAL PRN
Status: DISCONTINUED | OUTPATIENT
Start: 2019-04-12 | End: 2019-04-12 | Stop reason: ALTCHOICE

## 2019-04-12 ASSESSMENT — PAIN DESCRIPTION - DESCRIPTORS: DESCRIPTORS: ACHING

## 2019-04-12 ASSESSMENT — PAIN - FUNCTIONAL ASSESSMENT: PAIN_FUNCTIONAL_ASSESSMENT: 0-10

## 2019-04-12 ASSESSMENT — PAIN SCALES - GENERAL: PAINLEVEL_OUTOF10: 0

## 2019-04-12 NOTE — OP NOTE
Pre-Procedure Note    Patient Name: Kaleb Hyde   YOB: 1934  Medical Record Number: [id number]  Date: 3/26/2019       Indication:  Right  Hip pain    Consent: On file. Vital Signs:   Vitals:    04/12/19 1053   BP: (!) 103/51   Pulse: 74   Resp: 13   Temp:    SpO2: 97%       Past Medical History:   has a past medical history of Cerebral artery occlusion with cerebral infarction ([address] GERD (gastroesophageal reflux disease), Hyperlipidemia, Hypertension, Hypothyroidism, and Osteoarthritis. Past Surgical History:   has a past surgical history that includes back surgery (2003); Neck surgery (1980s); Knee arthroscopy (2010); joint replacement; Colonoscopy; Endoscopy, colon, diagnostic; other surgical history; pr inject rx other periph nerve (Right, 5/22/2018); pr inject rx other periph nerve (Left, 6/25/2018); Nerve Surgery (Right, 08/27/2018); pr radiofrequency neurotomy lumbar or sacral, w image guidance, single (Right, 8/27/2018); Nerve Surgery (Left, 09/11/2018); pr radiofrequency neurotomy lumbar or sacral, w image guidance, single (Left, 9/11/2018); lumbar nerve block (N/A, 12/11/2018); and lumbar nerve block (N/A, 3/12/2019). Pre-Sedation Documentation and Exam:   Vital signs have been reviewed (see flow sheet for vitals). Sedation/ Anesthesia Plan:   LOCAL    Patient is an appropriate candidate for plan of sedation: yes    Preoperative Diagnosis:   .  1.  right greater trochantric bursitis. Postoperative Diagnosis:   As above    Procedure Performed:  1..  right greater trochantric bursa injection under fluroscopic guidance. Indication for the Procedure: The patient is complaining of pain in the right hip area of longstanding duration. Patient's pain is not responding to conservative measures and is interfering with activities of daily living. Physical examination revealed tenderness over the joint and movements of the joint are painful.   Deshawn's test is positive with

## 2019-04-12 NOTE — INTERVAL H&P NOTE
H&P Update    Patient's History and Physical from April 2, 2019   was reviewed. Patient examined. There has been no change.     Víctor Blum

## 2019-05-01 ENCOUNTER — OFFICE VISIT (OUTPATIENT)
Dept: PHYSICAL MEDICINE AND REHAB | Age: 84
End: 2019-05-01
Payer: MEDICARE

## 2019-05-01 VITALS
SYSTOLIC BLOOD PRESSURE: 116 MMHG | HEIGHT: 60 IN | WEIGHT: 112 LBS | BODY MASS INDEX: 21.99 KG/M2 | HEART RATE: 74 BPM | DIASTOLIC BLOOD PRESSURE: 60 MMHG

## 2019-05-01 DIAGNOSIS — M25.552 BILATERAL HIP PAIN: ICD-10-CM

## 2019-05-01 DIAGNOSIS — G89.4 CHRONIC PAIN SYNDROME: ICD-10-CM

## 2019-05-01 DIAGNOSIS — M48.061 SPINAL STENOSIS OF LUMBAR REGION WITHOUT NEUROGENIC CLAUDICATION: ICD-10-CM

## 2019-05-01 DIAGNOSIS — M70.61 TROCHANTERIC BURSITIS OF RIGHT HIP: ICD-10-CM

## 2019-05-01 DIAGNOSIS — M25.551 BILATERAL HIP PAIN: ICD-10-CM

## 2019-05-01 DIAGNOSIS — M47.816 SPONDYLOSIS OF LUMBAR REGION WITHOUT MYELOPATHY OR RADICULOPATHY: ICD-10-CM

## 2019-05-01 DIAGNOSIS — M46.1 SI (SACROILIAC) JOINT INFLAMMATION (HCC): Primary | ICD-10-CM

## 2019-05-01 PROCEDURE — G8420 CALC BMI NORM PARAMETERS: HCPCS | Performed by: NURSE PRACTITIONER

## 2019-05-01 PROCEDURE — G8400 PT W/DXA NO RESULTS DOC: HCPCS | Performed by: NURSE PRACTITIONER

## 2019-05-01 PROCEDURE — 1123F ACP DISCUSS/DSCN MKR DOCD: CPT | Performed by: NURSE PRACTITIONER

## 2019-05-01 PROCEDURE — 1090F PRES/ABSN URINE INCON ASSESS: CPT | Performed by: NURSE PRACTITIONER

## 2019-05-01 PROCEDURE — G8427 DOCREV CUR MEDS BY ELIG CLIN: HCPCS | Performed by: NURSE PRACTITIONER

## 2019-05-01 PROCEDURE — 1036F TOBACCO NON-USER: CPT | Performed by: NURSE PRACTITIONER

## 2019-05-01 PROCEDURE — 4040F PNEUMOC VAC/ADMIN/RCVD: CPT | Performed by: NURSE PRACTITIONER

## 2019-05-01 PROCEDURE — 99213 OFFICE O/P EST LOW 20 MIN: CPT | Performed by: NURSE PRACTITIONER

## 2019-05-01 ASSESSMENT — ENCOUNTER SYMPTOMS
SINUS PRESSURE: 0
RHINORRHEA: 0
BACK PAIN: 1
DIARRHEA: 0
SHORTNESS OF BREATH: 0
CONSTIPATION: 0
VOMITING: 0
SORE THROAT: 0
NAUSEA: 0
COUGH: 0
WHEEZING: 0
CHEST TIGHTNESS: 0
RESPIRATORY NEGATIVE: 1
PHOTOPHOBIA: 0
EYE PAIN: 0
ABDOMINAL PAIN: 0
COLOR CHANGE: 0

## 2019-05-01 NOTE — PROGRESS NOTES
[address] [facility]  [address] [facility] [address]  Dept: [phone]  Dept Fax: [phone]  Loc: [phone]    Visit Date: 5/1/2019    Functionality Assessment/Goals Worksheet     On a scale of 0 (Does not Interfere) to 10 (Completely Interferes)     1. Which number describes how during the past week pain has interfered with       the following:  A. General Activity:  4  B. Mood: 4  C. Walking Ability:  3  D. Normal Work (Includes both work outside the home and housework):  3  E. Relations with Other People:   1  F. Sleep:   5  G. Enjoyment of Life:   3    2. Patient Prefers to Take their Pain Medications:     [x]  On a regular basis   []  Only when necessary    []  Does not take pain medications    3. What are the Patient's Goals/Expectations for Visiting Pain Management? [x]  Learn about my pain    []  Receive Medication   []  Physical Therapy     []  Treat Depression   []  Receive Injections    []  Treat Sleep   []  Deal with Anxiety and Stress   []  Treat Opoid Dependence/Addiction   []  Other:      HPI:   Bharti Farr is a 80 y.o. female is here today for    Chief Complaint: Low back pain  SI hip pain  HPI   F/U Right hip bursa steroid injection 4/12/2019 states it was painful at first and now it is starting to kick in. She is receiving about 50% pain relief or benefit. She continues to have low back pain and bilateral SI pain. EMG showed L5 left radiculopathy have discussed TFLESI if need. She denies BLE radicular s/s. Her pain pain complaint is her bilateral So pain but states it is not that bad right now. She has had Bilateral SI RFA 4 years ago at [facility] with great relief. She has had Lumbar RFA in past L3-4 in Spring 2018 with 75% pain relief or benefit for 5-6 months. She continues to take Neurontin Tylenol ES. Medications reviewed. Patient denies side effects with medications.  Patient states she is taking medications as prescribed. Shedenies receiving pain medications from other sources. She denies any ER visits since last visit. Pain scale with out pain medications or at its worst is 6/10. Pain scale with pain medications or at its best is 2/10. The patientis allergic to cataflam [diclofenac potassium] and phenergan [promethazine hcl]. Past Medical History  Jossy Minor  has a past medical history of Cerebral artery occlusion with cerebral infarction ([facility] [address] GERD (gastroesophageal reflux disease), Hyperlipidemia, Hypertension, Hypothyroidism, and Osteoarthritis. Past Surgical History  The patient  has a past surgical history that includes back surgery (2003); Neck surgery (1980s); Knee arthroscopy (2010); joint replacement; Colonoscopy; Endoscopy, colon, diagnostic; other surgical history; pr inject rx other periph nerve (Right, 5/22/2018); pr inject rx other periph nerve (Left, 6/25/2018); Nerve Surgery (Right, 08/27/2018); pr radiofrequency neurotomy lumbar or sacral, w image guidance, single (Right, 8/27/2018); Nerve Surgery (Left, 09/11/2018); pr radiofrequency neurotomy lumbar or sacral, w image guidance, single (Left, 9/11/2018); lumbar nerve block (N/A, 12/11/2018); lumbar nerve block (N/A, 3/12/2019); and arthrocentesis (Right, 4/12/2019). Family History  This patient's family history includes Cancer in her mother; Heart Disease in her father; Stroke in her sister. Social History  Jossy Minor  reports that she has never smoked. She has never used smokeless tobacco. She reports that she does not drink alcohol or use drugs.     Medications    Current Outpatient Medications:     gabapentin (NEURONTIN) 600 MG tablet, TAKE 1 TABLET IN  THE      MORNING, 1 TABLET IN THE   AFTERNOON,  AND2 TABLETS  AT BEDTIME, Disp: 360 tablet, Rfl: 3    valsartan-hydrochlorothiazide (DIOVAN-HCT) 160-12.5 MG per tablet, TAKE 1 TABLET BY MOUTH EVERY DAY, Disp: 90 tablet, Rfl: 3    traMADol (ULTRAM) 50 MG tablet, Take 50 mg by mouth every 6 hours as needed for Pain. ., Disp: , Rfl:     levothyroxine (SYNTHROID) 50 MCG tablet, TAKE 1 TABLET BY MOUTH DAILY, Disp: 90 tablet, Rfl: 3    omeprazole (PRILOSEC) 40 MG delayed release capsule, Take 1 capsule by mouth daily (with breakfast), Disp: 90 capsule, Rfl: 3    lovastatin (MEVACOR) 20 MG tablet, Take 1 tablet by mouth nightly, Disp: 90 tablet, Rfl: 3    aspirin 81 MG EC tablet, Take 81 mg by mouth daily, Disp: , Rfl:     Acetaminophen (TYLENOL ARTHRITIS EXT RELIEF PO), Take by mouth, Disp: , Rfl:     calcium carbonate-vitamin D (CALCIUM 600+D) 600-200 MG-UNIT TABS, Take 1 tablet by mouth daily. , Disp: , Rfl:     Multiple Vitamins-Minerals (THERAPEUTIC MULTIVITAMIN-MINERALS) tablet, Take 1 tablet by mouth daily. , Disp: , Rfl:     Cholecalciferol (VITAMIN D) 2000 UNITS CAPS capsule, Take 1 capsule by mouth daily. , Disp: , Rfl:     Subjective:      Review of Systems   Constitutional: Positive for activity change. Negative for appetite change, chills, diaphoresis, fatigue, fever and unexpected weight change. HENT: Negative for congestion, ear pain, hearing loss, mouth sores, nosebleeds, rhinorrhea, sinus pressure and sore throat. Eyes: Negative for photophobia, pain and visual disturbance. Respiratory: Negative. Negative for cough, chest tightness, shortness of breath and wheezing. Cardiovascular: Negative. Negative for chest pain and palpitations. Gastrointestinal: Negative for abdominal pain, constipation, diarrhea, nausea and vomiting. Endocrine: Negative for cold intolerance, heat intolerance, polydipsia, polyphagia and polyuria. Genitourinary: Negative for decreased urine volume, difficulty urinating, frequency and hematuria. Musculoskeletal: Positive for arthralgias, back pain, gait problem, joint swelling, myalgias, neck pain and neck stiffness. Skin: Negative for color change and rash.    Allergic/Immunologic: Negative for food allergies and immunocompromised state. Hematological: Does not bruise/bleed easily. Psychiatric/Behavioral: Negative for agitation, behavioral problems, confusion, decreased concentration, dysphoric mood, hallucinations, self-injury, sleep disturbance and suicidal ideas. The patient is not nervous/anxious and is not hyperactive. Objective:     Vitals:    05/01/19 0959   BP: 116/60   Site: Left Upper Arm   Position: Sitting   Cuff Size: Medium Adult   Pulse: 74   Weight: 112 lb (50.8 kg)   Height: 5' (1.524 m)       Physical Exam   Constitutional: She is oriented to person, place, and time. She appears well-developed and well-nourished. No distress. HENT:   Head: Normocephalic and atraumatic. Right Ear: External ear normal.   Left Ear: External ear normal.   Nose: Nose normal.   Mouth/Throat: Oropharynx is clear and moist. No oropharyngeal exudate. Eyes: Pupils are equal, round, and reactive to light. Conjunctivae and EOM are normal. Right eye exhibits no discharge. Left eye exhibits no discharge. No scleral icterus. Neck: Normal range of motion and full passive range of motion without pain. Neck supple. No muscular tenderness present. No neck rigidity. No edema, no erythema and normal range of motion present. No thyromegaly present. Cardiovascular: Normal rate, regular rhythm, normal heart sounds and intact distal pulses. Exam reveals no gallop and no friction rub. No murmur heard. Pulmonary/Chest: Effort normal and breath sounds normal. No respiratory distress. She has no wheezes. She has no rales. She exhibits no tenderness. Abdominal: Soft. Bowel sounds are normal. She exhibits no distension. There is no tenderness. There is no rebound and no guarding. Musculoskeletal: She exhibits tenderness.         Right shoulder: Normal.        Left shoulder: Normal.        Right elbow: Normal.       Left elbow: Normal.        Right wrist: Normal.        Left wrist: Normal.        Right hip: She exhibits decreased range of motion, decreased strength, tenderness and bony tenderness. Left hip: She exhibits decreased range of motion, decreased strength, tenderness and bony tenderness. Right knee: She exhibits swelling. Tenderness found. Left knee: She exhibits swelling. Tenderness found. Right ankle: Normal.        Left ankle: Normal.        Cervical back: She exhibits bony tenderness, pain and spasm. Thoracic back: She exhibits tenderness. Lumbar back: She exhibits tenderness, bony tenderness, pain and spasm. Back:         Right hand: Normal.        Left hand: Normal.        Right upper leg: She exhibits tenderness. Left upper leg: She exhibits tenderness. Right lower leg: She exhibits tenderness. Left lower leg: She exhibits tenderness. Right foot: Normal.        Left foot: Normal.   Neurological: She is alert and oriented to person, place, and time. She has normal strength. She is not disoriented. She displays no atrophy. No cranial nerve deficit or sensory deficit. She exhibits normal muscle tone. She displays a negative Romberg sign. Coordination and gait normal. She displays no Babinski's sign on the right side. Reflex Scores:       Tricep reflexes are 1+ on the right side and 1+ on the left side. Bicep reflexes are 1+ on the right side and 1+ on the left side. Brachioradialis reflexes are 1+ on the right side and 1+ on the left side. Patellar reflexes are 1+ on the right side and 1+ on the left side. Achilles reflexes are 1+ on the right side and 1+ on the left side. SLR-  Motor 5/5 BLE BUE   Skin: Skin is warm. No rash noted. She is not diaphoretic. No erythema. No pallor. Psychiatric: She has a normal mood and affect. Her speech is normal and behavior is normal. Judgment and thought content normal. Her mood appears not anxious. Her affect is not angry, not blunt, not labile and not inappropriate.  She bleeding.  Medications:Continue Neurontin Tylenol ES      Meds. Prescribed:   No orders of the defined types were placed in this encounter. Return in about 3 months (around 8/1/2019) for Follow up after procedure.          Electronically signed by TANNER Grewal CNP on5/1/2019 at 10:16 AM

## 2019-06-17 RX ORDER — LOVASTATIN 20 MG/1
20 TABLET ORAL NIGHTLY
Qty: 90 TABLET | Refills: 3 | Status: SHIPPED | OUTPATIENT
Start: 2019-06-17 | End: 2020-06-08

## 2019-06-28 RX ORDER — OMEPRAZOLE 40 MG/1
40 CAPSULE, DELAYED RELEASE ORAL
Qty: 90 CAPSULE | Refills: 3 | Status: SHIPPED | OUTPATIENT
Start: 2019-06-28 | End: 2020-06-22

## 2019-07-16 ENCOUNTER — OFFICE VISIT (OUTPATIENT)
Dept: FAMILY MEDICINE CLINIC | Age: 84
End: 2019-07-16
Payer: MEDICARE

## 2019-07-16 VITALS
BODY MASS INDEX: 21.91 KG/M2 | SYSTOLIC BLOOD PRESSURE: 116 MMHG | OXYGEN SATURATION: 98 % | HEART RATE: 68 BPM | RESPIRATION RATE: 16 BRPM | WEIGHT: 112.2 LBS | DIASTOLIC BLOOD PRESSURE: 58 MMHG

## 2019-07-16 DIAGNOSIS — M51.36 DDD (DEGENERATIVE DISC DISEASE), LUMBAR: Primary | ICD-10-CM

## 2019-07-16 DIAGNOSIS — M54.16 LUMBAR RADICULOPATHY: ICD-10-CM

## 2019-07-16 PROCEDURE — G8420 CALC BMI NORM PARAMETERS: HCPCS | Performed by: FAMILY MEDICINE

## 2019-07-16 PROCEDURE — 1090F PRES/ABSN URINE INCON ASSESS: CPT | Performed by: FAMILY MEDICINE

## 2019-07-16 PROCEDURE — G8400 PT W/DXA NO RESULTS DOC: HCPCS | Performed by: FAMILY MEDICINE

## 2019-07-16 PROCEDURE — 1036F TOBACCO NON-USER: CPT | Performed by: FAMILY MEDICINE

## 2019-07-16 PROCEDURE — 4040F PNEUMOC VAC/ADMIN/RCVD: CPT | Performed by: FAMILY MEDICINE

## 2019-07-16 PROCEDURE — 99213 OFFICE O/P EST LOW 20 MIN: CPT | Performed by: FAMILY MEDICINE

## 2019-07-16 PROCEDURE — 1123F ACP DISCUSS/DSCN MKR DOCD: CPT | Performed by: FAMILY MEDICINE

## 2019-07-16 PROCEDURE — G8427 DOCREV CUR MEDS BY ELIG CLIN: HCPCS | Performed by: FAMILY MEDICINE

## 2019-07-16 RX ORDER — CELECOXIB 200 MG/1
200 CAPSULE ORAL DAILY
Qty: 90 CAPSULE | Refills: 3 | Status: SHIPPED | OUTPATIENT
Start: 2019-07-16 | End: 2020-10-08

## 2019-07-16 ASSESSMENT — PATIENT HEALTH QUESTIONNAIRE - PHQ9
1. LITTLE INTEREST OR PLEASURE IN DOING THINGS: 0
SUM OF ALL RESPONSES TO PHQ QUESTIONS 1-9: 0
SUM OF ALL RESPONSES TO PHQ9 QUESTIONS 1 & 2: 0
2. FEELING DOWN, DEPRESSED OR HOPELESS: 0
SUM OF ALL RESPONSES TO PHQ QUESTIONS 1-9: 0

## 2019-07-16 ASSESSMENT — ENCOUNTER SYMPTOMS
GASTROINTESTINAL NEGATIVE: 1
BACK PAIN: 1
RESPIRATORY NEGATIVE: 1

## 2019-07-16 NOTE — PROGRESS NOTES
Subjective:      Patient ID: Florinda Wood is a 80 y.o. female. HPI:    Chief Complaint   Patient presents with    Back Pain     nothing working     Pt here to discuss her back pain. She is having pain shooting down both legs, R > L. Continues to follow with Pain Management but does not seem to be helping. Last seen in May. No b-b changes. Has seen Dr. Lila Richardson in the past, not a good surgical candidate at that time but radicular symptoms are worse now.       Patient Active Problem List   Diagnosis    Hypertension    Hyperlipidemia    GERD (gastroesophageal reflux disease)    Lumbar spondylosis    Hypothyroidism    Vertebro basilar insufficiency    Cervical spondylosis with myelopathy    Light headed    Stenosis, spinal, lumbar    TIA involving vertebral artery    Sacroiliac inflammation ([facility] [address]    Trochanteric bursitis of right hip     Past Surgical History:   Procedure Laterality Date    ARTHROCENTESIS Right 4/12/2019    Right hip bursa steroid INJECTION  NO SEDATION performed by Albertina Kaur MD at [address] [place] [address]  2003    COLONOSCOPY      ENDOSCOPY, COLON, DIAGNOSTIC      JOINT REPLACEMENT      left    KNEE ARTHROSCOPY  2010    left    LUMBAR NERVE BLOCK N/A 12/11/2018    LUMBAR INTER LAMINAR DAYANARA LESI #1@ L5 performed by [facility]ina Kaur [facility] at [facility] N/A 3/12/2019    LUMBAR INTER LAMINAR DAYANARA LESI @L5 performed by Albertina Kaur MD at [address] [place] [address]    NERVE SURGERY Right 08/27/2018    SI RFA    NERVE SURGERY Left 09/11/2018    SI RFA    OTHER SURGICAL HISTORY      [facility]    LUMBAR RFA RIGHT SIDE FIRST L3-4,4-5,5-S1 performed by Albertina Kaur MD at [address] [facility] Left 6/25/2018    LUMBAR RFA  LEFT SIDE @ L3-4,4-5,5-S1, performed by

## 2019-07-31 ENCOUNTER — OFFICE VISIT (OUTPATIENT)
Dept: PHYSICAL MEDICINE AND REHAB | Age: 84
End: 2019-07-31
Payer: MEDICARE

## 2019-07-31 VITALS
SYSTOLIC BLOOD PRESSURE: 116 MMHG | BODY MASS INDEX: 21.64 KG/M2 | WEIGHT: 110.23 LBS | HEART RATE: 70 BPM | HEIGHT: 60 IN | DIASTOLIC BLOOD PRESSURE: 60 MMHG

## 2019-07-31 DIAGNOSIS — M48.061 SPINAL STENOSIS OF LUMBAR REGION WITHOUT NEUROGENIC CLAUDICATION: ICD-10-CM

## 2019-07-31 DIAGNOSIS — M46.1 SI (SACROILIAC) JOINT INFLAMMATION (HCC): Primary | ICD-10-CM

## 2019-07-31 DIAGNOSIS — M47.816 SPONDYLOSIS OF LUMBAR REGION WITHOUT MYELOPATHY OR RADICULOPATHY: ICD-10-CM

## 2019-07-31 DIAGNOSIS — G89.4 CHRONIC PAIN SYNDROME: ICD-10-CM

## 2019-07-31 DIAGNOSIS — M48.02 CERVICAL SPINAL STENOSIS: ICD-10-CM

## 2019-07-31 DIAGNOSIS — M70.61 TROCHANTERIC BURSITIS OF RIGHT HIP: ICD-10-CM

## 2019-07-31 DIAGNOSIS — M47.22 CERVICAL SPONDYLOSIS WITH RADICULOPATHY: ICD-10-CM

## 2019-07-31 PROCEDURE — 4040F PNEUMOC VAC/ADMIN/RCVD: CPT | Performed by: NURSE PRACTITIONER

## 2019-07-31 PROCEDURE — 99213 OFFICE O/P EST LOW 20 MIN: CPT | Performed by: NURSE PRACTITIONER

## 2019-07-31 PROCEDURE — 1090F PRES/ABSN URINE INCON ASSESS: CPT | Performed by: NURSE PRACTITIONER

## 2019-07-31 PROCEDURE — 1123F ACP DISCUSS/DSCN MKR DOCD: CPT | Performed by: NURSE PRACTITIONER

## 2019-07-31 PROCEDURE — G8400 PT W/DXA NO RESULTS DOC: HCPCS | Performed by: NURSE PRACTITIONER

## 2019-07-31 PROCEDURE — G8427 DOCREV CUR MEDS BY ELIG CLIN: HCPCS | Performed by: NURSE PRACTITIONER

## 2019-07-31 PROCEDURE — G8420 CALC BMI NORM PARAMETERS: HCPCS | Performed by: NURSE PRACTITIONER

## 2019-07-31 PROCEDURE — 1036F TOBACCO NON-USER: CPT | Performed by: NURSE PRACTITIONER

## 2019-07-31 ASSESSMENT — ENCOUNTER SYMPTOMS
CONSTIPATION: 0
DIARRHEA: 0
VOMITING: 0
EYE PAIN: 0
SORE THROAT: 0
PHOTOPHOBIA: 0
SHORTNESS OF BREATH: 0
COLOR CHANGE: 0
ABDOMINAL PAIN: 0
COUGH: 0
CHEST TIGHTNESS: 0
BACK PAIN: 1
RHINORRHEA: 0
NAUSEA: 0
WHEEZING: 0
SINUS PRESSURE: 0
RESPIRATORY NEGATIVE: 1

## 2019-07-31 NOTE — PROGRESS NOTES
smokeless tobacco. She reports that she does not drink alcohol or use drugs. Medications    Current Outpatient Medications:     celecoxib (CELEBREX) 200 MG capsule, Take 1 capsule by mouth daily, Disp: 90 capsule, Rfl: 3    omeprazole (PRILOSEC) 40 MG delayed release capsule, TAKE 1 CAPSULE BY MOUTH DAILY (WITH BREAKFAST), Disp: 90 capsule, Rfl: 3    lovastatin (MEVACOR) 20 MG tablet, TAKE 1 TABLET BY MOUTH NIGHTLY, Disp: 90 tablet, Rfl: 3    gabapentin (NEURONTIN) 600 MG tablet, TAKE 1 TABLET IN  THE      MORNING, 1 TABLET IN THE   AFTERNOON,  AND2 TABLETS  AT BEDTIME, Disp: 360 tablet, Rfl: 3    valsartan-hydrochlorothiazide (DIOVAN-HCT) 160-12.5 MG per tablet, TAKE 1 TABLET BY MOUTH EVERY DAY, Disp: 90 tablet, Rfl: 3    levothyroxine (SYNTHROID) 50 MCG tablet, TAKE 1 TABLET BY MOUTH DAILY, Disp: 90 tablet, Rfl: 3    aspirin 81 MG EC tablet, Take 81 mg by mouth daily, Disp: , Rfl:     Acetaminophen (TYLENOL ARTHRITIS EXT RELIEF PO), Take by mouth, Disp: , Rfl:     calcium carbonate-vitamin D (CALCIUM 600+D) 600-200 MG-UNIT TABS, Take 1 tablet by mouth daily. , Disp: , Rfl:     Multiple Vitamins-Minerals (THERAPEUTIC MULTIVITAMIN-MINERALS) tablet, Take 1 tablet by mouth daily. , Disp: , Rfl:     Cholecalciferol (VITAMIN D) 2000 UNITS CAPS capsule, Take 1 capsule by mouth daily. , Disp: , Rfl:     Subjective:      Review of Systems   Constitutional: Positive for activity change. Negative for appetite change, chills, diaphoresis, fatigue, fever and unexpected weight change. HENT: Negative for congestion, ear pain, hearing loss, mouth sores, nosebleeds, rhinorrhea, sinus pressure and sore throat. Eyes: Negative for photophobia, pain and visual disturbance. Respiratory: Negative. Negative for cough, chest tightness, shortness of breath and wheezing. Cardiovascular: Negative. Negative for chest pain and palpitations.    Gastrointestinal: Negative for abdominal pain, constipation, diarrhea, nausea

## 2019-08-15 ENCOUNTER — TELEPHONE (OUTPATIENT)
Dept: FAMILY MEDICINE CLINIC | Age: 84
End: 2019-08-15

## 2019-08-27 ENCOUNTER — PREP FOR PROCEDURE (OUTPATIENT)
Dept: PHYSICAL MEDICINE AND REHAB | Age: 84
End: 2019-08-27

## 2019-08-27 NOTE — H&P (VIEW-ONLY)
drugs.     Medications    Current Medication      Current Outpatient Medications:     celecoxib (CELEBREX) 200 MG capsule, Take 1 capsule by mouth daily, Disp: 90 capsule, Rfl: 3    omeprazole (PRILOSEC) 40 MG delayed release capsule, TAKE 1 CAPSULE BY MOUTH DAILY (WITH BREAKFAST), Disp: 90 capsule, Rfl: 3    lovastatin (MEVACOR) 20 MG tablet, TAKE 1 TABLET BY MOUTH NIGHTLY, Disp: 90 tablet, Rfl: 3    gabapentin (NEURONTIN) 600 MG tablet, TAKE 1 TABLET IN  THE      MORNING, 1 TABLET IN THE   AFTERNOON,  AND2 TABLETS  AT BEDTIME, Disp: 360 tablet, Rfl: 3    valsartan-hydrochlorothiazide (DIOVAN-HCT) 160-12.5 MG per tablet, TAKE 1 TABLET BY MOUTH EVERY DAY, Disp: 90 tablet, Rfl: 3    levothyroxine (SYNTHROID) 50 MCG tablet, TAKE 1 TABLET BY MOUTH DAILY, Disp: 90 tablet, Rfl: 3    aspirin 81 MG EC tablet, Take 81 mg by mouth daily, Disp: , Rfl:     Acetaminophen (TYLENOL ARTHRITIS EXT RELIEF PO), Take by mouth, Disp: , Rfl:     calcium carbonate-vitamin D (CALCIUM 600+D) 600-200 MG-UNIT TABS, Take 1 tablet by mouth daily. , Disp: , Rfl:     Multiple Vitamins-Minerals (THERAPEUTIC MULTIVITAMIN-MINERALS) tablet, Take 1 tablet by mouth daily. , Disp: , Rfl:     Cholecalciferol (VITAMIN D) 2000 UNITS CAPS capsule, Take 1 capsule by mouth daily. , Disp: , Rfl:         Subjective:      Review of Systems   Constitutional: Positive for activity change. Negative for appetite change, chills, diaphoresis, fatigue, fever and unexpected weight change. HENT: Negative for congestion, ear pain, hearing loss, mouth sores, nosebleeds, rhinorrhea, sinus pressure and sore throat. Eyes: Negative for photophobia, pain and visual disturbance. Respiratory: Negative. Negative for cough, chest tightness, shortness of breath and wheezing. Cardiovascular: Negative. Negative for chest pain and palpitations. Gastrointestinal: Negative for abdominal pain, constipation, diarrhea, nausea and vomiting.    Endocrine: Negative for

## 2019-08-30 ENCOUNTER — ANESTHESIA (OUTPATIENT)
Dept: OPERATING ROOM | Age: 84
End: 2019-08-30
Payer: MEDICARE

## 2019-08-30 ENCOUNTER — HOSPITAL ENCOUNTER (OUTPATIENT)
Age: 84
Setting detail: OUTPATIENT SURGERY
Discharge: HOME | End: 2019-08-30
Attending: PAIN MEDICINE | Admitting: PAIN MEDICINE
Payer: MEDICARE

## 2019-08-30 ENCOUNTER — ANESTHESIA EVENT (OUTPATIENT)
Dept: OPERATING ROOM | Age: 84
End: 2019-08-30
Payer: MEDICARE

## 2019-08-30 ENCOUNTER — APPOINTMENT (OUTPATIENT)
Dept: GENERAL RADIOLOGY | Age: 84
End: 2019-08-30
Attending: PAIN MEDICINE
Payer: MEDICARE

## 2019-08-30 VITALS
HEART RATE: 68 BPM | TEMPERATURE: 97.9 F | BODY MASS INDEX: 21.55 KG/M2 | DIASTOLIC BLOOD PRESSURE: 52 MMHG | WEIGHT: 109.8 LBS | SYSTOLIC BLOOD PRESSURE: 101 MMHG | RESPIRATION RATE: 16 BRPM | HEIGHT: 60 IN | OXYGEN SATURATION: 95 %

## 2019-08-30 VITALS
SYSTOLIC BLOOD PRESSURE: 86 MMHG | DIASTOLIC BLOOD PRESSURE: 42 MMHG | OXYGEN SATURATION: 98 % | RESPIRATION RATE: 11 BRPM

## 2019-08-30 PROCEDURE — 7100000010 HC PHASE II RECOVERY - FIRST 15 MIN: Performed by: PAIN MEDICINE

## 2019-08-30 PROCEDURE — 2709999900 HC NON-CHARGEABLE SUPPLY: Performed by: PAIN MEDICINE

## 2019-08-30 PROCEDURE — 6360000002 HC RX W HCPCS: Performed by: REGISTERED NURSE

## 2019-08-30 PROCEDURE — 3600000056 HC PAIN LEVEL 4 BASE: Performed by: PAIN MEDICINE

## 2019-08-30 PROCEDURE — 3600000057 HC PAIN LEVEL 4 ADDL 15 MIN: Performed by: PAIN MEDICINE

## 2019-08-30 PROCEDURE — 2500000003 HC RX 250 WO HCPCS: Performed by: PAIN MEDICINE

## 2019-08-30 PROCEDURE — 3209999900 FLUORO FOR SURGICAL PROCEDURES

## 2019-08-30 PROCEDURE — 6360000002 HC RX W HCPCS: Performed by: PAIN MEDICINE

## 2019-08-30 PROCEDURE — 3700000001 HC ADD 15 MINUTES (ANESTHESIA): Performed by: PAIN MEDICINE

## 2019-08-30 PROCEDURE — 2720000010 HC SURG SUPPLY STERILE: Performed by: PAIN MEDICINE

## 2019-08-30 PROCEDURE — 3700000000 HC ANESTHESIA ATTENDED CARE: Performed by: PAIN MEDICINE

## 2019-08-30 PROCEDURE — 64640 INJECTION TREATMENT OF NERVE: CPT | Performed by: PAIN MEDICINE

## 2019-08-30 PROCEDURE — 7100000011 HC PHASE II RECOVERY - ADDTL 15 MIN: Performed by: PAIN MEDICINE

## 2019-08-30 RX ORDER — LIDOCAINE HYDROCHLORIDE 10 MG/ML
INJECTION, SOLUTION EPIDURAL; INFILTRATION; INTRACAUDAL; PERINEURAL PRN
Status: DISCONTINUED | OUTPATIENT
Start: 2019-08-30 | End: 2019-08-30 | Stop reason: ALTCHOICE

## 2019-08-30 RX ORDER — ROPIVACAINE HYDROCHLORIDE 2 MG/ML
INJECTION, SOLUTION EPIDURAL; INFILTRATION; PERINEURAL PRN
Status: DISCONTINUED | OUTPATIENT
Start: 2019-08-30 | End: 2019-08-30 | Stop reason: ALTCHOICE

## 2019-08-30 RX ORDER — PROPOFOL 10 MG/ML
INJECTION, EMULSION INTRAVENOUS PRN
Status: DISCONTINUED | OUTPATIENT
Start: 2019-08-30 | End: 2019-08-30 | Stop reason: SDUPTHER

## 2019-08-30 RX ORDER — FENTANYL CITRATE 50 UG/ML
INJECTION, SOLUTION INTRAMUSCULAR; INTRAVENOUS PRN
Status: DISCONTINUED | OUTPATIENT
Start: 2019-08-30 | End: 2019-08-30 | Stop reason: SDUPTHER

## 2019-08-30 RX ADMIN — PROPOFOL 40 MG: 10 INJECTION, EMULSION INTRAVENOUS at 11:22

## 2019-08-30 RX ADMIN — FENTANYL CITRATE 25 MCG: 50 INJECTION INTRAMUSCULAR; INTRAVENOUS at 11:18

## 2019-08-30 RX ADMIN — PROPOFOL 10 MG: 10 INJECTION, EMULSION INTRAVENOUS at 11:27

## 2019-08-30 RX ADMIN — PROPOFOL 20 MG: 10 INJECTION, EMULSION INTRAVENOUS at 11:30

## 2019-08-30 RX ADMIN — FENTANYL CITRATE 50 MCG: 50 INJECTION INTRAMUSCULAR; INTRAVENOUS at 11:16

## 2019-08-30 RX ADMIN — FENTANYL CITRATE 25 MCG: 50 INJECTION INTRAMUSCULAR; INTRAVENOUS at 11:22

## 2019-08-30 RX ADMIN — PROPOFOL 10 MG: 10 INJECTION, EMULSION INTRAVENOUS at 11:33

## 2019-08-30 ASSESSMENT — PAIN DESCRIPTION - LOCATION: LOCATION: BACK

## 2019-08-30 ASSESSMENT — PAIN DESCRIPTION - DESCRIPTORS
DESCRIPTORS: ACHING
DESCRIPTORS: ACHING

## 2019-08-30 ASSESSMENT — PAIN SCALES - GENERAL: PAINLEVEL_OUTOF10: 4

## 2019-08-30 ASSESSMENT — PAIN DESCRIPTION - PAIN TYPE: TYPE: CHRONIC PAIN

## 2019-08-30 ASSESSMENT — PAIN DESCRIPTION - ORIENTATION: ORIENTATION: LOWER;RIGHT

## 2019-08-30 ASSESSMENT — PAIN - FUNCTIONAL ASSESSMENT: PAIN_FUNCTIONAL_ASSESSMENT: 0-10

## 2019-08-30 NOTE — OP NOTE
Patient Name: Kendal Query  YOB: 1934  Medical Record Number: [id number]  Date: 8/30/19     Indication: Lower back and Right SI pain   Consent: On file. Vital Signs:   @     Past Medical History:    has a past medical history of Cerebral artery occlusion with cerebral infarction ([facility] [address] GERD (gastroesophageal reflux disease), Hyperlipidemia, Hypertension, Hypothyroidism, and Osteoarthritis. Past Surgical History:   has a past surgical history that includes back surgery (2003); Neck surgery (1980s); Knee arthroscopy (2010); joint replacement; Colonoscopy; Endoscopy, colon, diagnostic; other surgical history; pr inject rx other periph nerve (Right, 5/22/2018); pr inject rx other periph nerve (Left, 6/25/2018); Nerve Surgery (Right, 08/27/2018); pr radiofrequency neurotomy lumbar or sacral, w image guidance, single (Right, 8/27/2018); Nerve Surgery (Left, 09/11/2018); pr radiofrequency neurotomy lumbar or sacral, w image guidance, single (Left, 9/11/2018); lumbar nerve block (N/A, 12/11/2018); lumbar nerve block (N/A, 3/12/2019); and arthrocentesis (Right, 4/12/2019). Pre-Sedation Documentation and Exam:   Vital signs have been reviewed (see flow sheet for vitals). Sedation/ Anesthesia Plan:   MAC     Patient is an appropriate candidate for plan of sedation: yes        Preoperative Diagnosis: L-spondylosis,Right Sacroiliac Inflammation     Post-Op Dx: as above     Procedure Performed: :Right Radiofrequency ablation of median branches at the levels of SI  under fluoroscopic guidance      Indication for the Procedure: The patient has ahistory of chronic left SI pain that is not responding well to the conservative treatment. Pain is interfering with the activities of daily living. Patient had undergone SI joint injections with pain relief that lasted for only a short period of time and had greater than 70% pain relief with confirmatory .  Hence we decided to do radiofrequency abalation for long term pain relief. The procedure and risks were discussed with the patient and an informed consent was obtained.     Procedure: Right side  The patient was brought into the OR and carefully assisted into the prone position on the table and allowed to adjust to a position of comfort. A grounding pad was placed on the right thigh. The low back and buttocks were widely prepped with chloroprep solution, allowed to air dry and draped in the standard sterile surgical fashion. Local anesthesia was provided by 8 ml of 1% Xylocaine delivered with a 25 g needle.       PROCEDURE #1: Radiofrequency Ablation of Doral Ramus of L5, medical branch of L4. A 17g 75mm radiofrequency introducer needle was placed to the planned anatomic target, guided with intermittent fluoroscopy with a perpendicular approach, to terminally place at the right sacral ala and the superior articular process at the transverse process for the right L4 medial branch nerve. The stylets were removed and the radiofrequency probes with a 4 mm active tip were then inserted. Needle tip position of the probes were verified in the AP, oblique and lateral views. At each site, the medical branch nerve was stimulated at [facility] to a maximum of 1-2 volts determined to finalize safe needle and electrode placement. The patient was awake and responsive during this portion of the procedure. Each target was anesthetized with 1ml of 0.25% marcaine anesthesia for lesioning and then each target was lesioned at 80 degrees Celsius for 2 minutes and 30 seconds. Tissue impedance were noted to be between 250 and 500 Ohms.     PROCEDURE #2: Radiofrequency Ablation of S1, S2, S3 Lateral Branches. Using the AP fluoroscopic view for visualization of the lateral PSFA as defined by the pre-placed 27-gauge Quincke needles, appropriate skin starting positions were defined.   Using the PSFA as a \"clock-face\", the positions were:      S1:RIGHT = 1 o'clock, 3 o'clock, and 5

## 2019-08-30 NOTE — ANESTHESIA PRE PROCEDURE
Department of Anesthesiology  Preprocedure Note       Name:  Edward Cody   Age:  80 y.o.  :  1934                                          MRN:  [id number]         Date:  2019      Surgeon: Justa Chairez):  Toshia Guo MD    Procedure: SI RFA  RIGHT SIDE (Right )    Medications prior to admission:   Prior to Admission medications    Medication Sig Start Date End Date Taking? Authorizing Provider   celecoxib (CELEBREX) 200 MG capsule Take 1 capsule by mouth daily 19   Marilia Fay, DO   omeprazole (PRILOSEC) 40 MG delayed release capsule TAKE 1 CAPSULE BY MOUTH DAILY (WITH BREAKFAST) 19   Marilia Fay, DO   lovastatin (MEVACOR) 20 MG tablet TAKE 1 TABLET BY MOUTH NIGHTLY 19   Marilia Fay, DO   gabapentin (NEURONTIN) 600 MG tablet TAKE 1 TABLET IN  THE      MORNING, 1 TABLET IN THE   AFTERNOON,  AND2 TABLETS  AT BEDTIME 3/13/19 3/13/20  Marilia Fay, DO   valsartan-hydrochlorothiazide (DIOVAN-HCT) 160-12.5 MG per tablet TAKE 1 TABLET BY MOUTH EVERY DAY 19   Marilia Fay, DO   levothyroxine (SYNTHROID) 50 MCG tablet TAKE 1 TABLET BY MOUTH DAILY 10/24/18   Marilia Fay, DO   aspirin 81 MG EC tablet Take 81 mg by mouth daily    Historical Provider, MD   Acetaminophen (TYLENOL ARTHRITIS EXT RELIEF PO) Take by mouth    Historical Provider, MD   calcium carbonate-vitamin D (CALCIUM 600+D) 600-200 MG-UNIT TABS Take 1 tablet by mouth daily. Historical Provider, MD   Multiple Vitamins-Minerals (THERAPEUTIC MULTIVITAMIN-MINERALS) tablet Take 1 tablet by mouth daily. Historical Provider, MD   Cholecalciferol (VITAMIN D) 2000 UNITS CAPS capsule Take 1 capsule by mouth daily. Historical Provider, MD       Current medications:    No current outpatient medications on file. No current facility-administered medications for this visit. Allergies:     Allergies   Allergen Reactions    Cataflam [Diclofenac Potassium] Shortness Of

## 2019-08-30 NOTE — INTERVAL H&P NOTE
H&P Update    Patient's History and Physical from August 27, 2019 was reviewed. Patient examined. There has been no change.     Electronically signed by Fletcher Denis MD on 8/30/19 at 10:05 AM

## 2019-09-17 ENCOUNTER — OFFICE VISIT (OUTPATIENT)
Dept: PHYSICAL MEDICINE AND REHAB | Age: 84
End: 2019-09-17
Payer: MEDICARE

## 2019-09-17 VITALS
WEIGHT: 108.03 LBS | SYSTOLIC BLOOD PRESSURE: 104 MMHG | BODY MASS INDEX: 21.21 KG/M2 | HEIGHT: 60 IN | HEART RATE: 70 BPM | DIASTOLIC BLOOD PRESSURE: 58 MMHG

## 2019-09-17 DIAGNOSIS — M48.02 CERVICAL SPINAL STENOSIS: ICD-10-CM

## 2019-09-17 DIAGNOSIS — M46.1 SI (SACROILIAC) JOINT INFLAMMATION (HCC): Primary | ICD-10-CM

## 2019-09-17 DIAGNOSIS — M48.061 SPINAL STENOSIS OF LUMBAR REGION WITHOUT NEUROGENIC CLAUDICATION: ICD-10-CM

## 2019-09-17 DIAGNOSIS — M47.816 SPONDYLOSIS OF LUMBAR REGION WITHOUT MYELOPATHY OR RADICULOPATHY: ICD-10-CM

## 2019-09-17 DIAGNOSIS — M70.61 TROCHANTERIC BURSITIS OF RIGHT HIP: ICD-10-CM

## 2019-09-17 DIAGNOSIS — M47.22 CERVICAL SPONDYLOSIS WITH RADICULOPATHY: ICD-10-CM

## 2019-09-17 DIAGNOSIS — G89.4 CHRONIC PAIN SYNDROME: ICD-10-CM

## 2019-09-17 PROCEDURE — 4040F PNEUMOC VAC/ADMIN/RCVD: CPT | Performed by: NURSE PRACTITIONER

## 2019-09-17 PROCEDURE — 1090F PRES/ABSN URINE INCON ASSESS: CPT | Performed by: NURSE PRACTITIONER

## 2019-09-17 PROCEDURE — G8420 CALC BMI NORM PARAMETERS: HCPCS | Performed by: NURSE PRACTITIONER

## 2019-09-17 PROCEDURE — G8427 DOCREV CUR MEDS BY ELIG CLIN: HCPCS | Performed by: NURSE PRACTITIONER

## 2019-09-17 PROCEDURE — G8400 PT W/DXA NO RESULTS DOC: HCPCS | Performed by: NURSE PRACTITIONER

## 2019-09-17 PROCEDURE — 1036F TOBACCO NON-USER: CPT | Performed by: NURSE PRACTITIONER

## 2019-09-17 PROCEDURE — 99213 OFFICE O/P EST LOW 20 MIN: CPT | Performed by: NURSE PRACTITIONER

## 2019-09-17 PROCEDURE — 1123F ACP DISCUSS/DSCN MKR DOCD: CPT | Performed by: NURSE PRACTITIONER

## 2019-09-17 ASSESSMENT — ENCOUNTER SYMPTOMS
SHORTNESS OF BREATH: 0
SINUS PRESSURE: 0
COUGH: 0
PHOTOPHOBIA: 0
RESPIRATORY NEGATIVE: 1
RHINORRHEA: 0
NAUSEA: 0
VOMITING: 0
BACK PAIN: 1
ABDOMINAL PAIN: 0
WHEEZING: 0
CHEST TIGHTNESS: 0
COLOR CHANGE: 0
SORE THROAT: 0
CONSTIPATION: 0
EYE PAIN: 0
DIARRHEA: 0

## 2019-09-17 NOTE — PROGRESS NOTES
and is not hyperactive. Objective:     Vitals:    09/17/19 1455   BP: (!) 104/58   Site: Left Upper Arm   Position: Sitting   Cuff Size: Medium Adult   Pulse: 70   Weight: 108 lb 0.4 oz (49 kg)   Height: 5' (1.524 m)       Physical Exam   Constitutional: She is oriented to person, place, and time. She appears well-developed and well-nourished. No distress. HENT:   Head: Normocephalic and atraumatic. Right Ear: External ear normal.   Left Ear: External ear normal.   Nose: Nose normal.   Mouth/Throat: Oropharynx is clear and moist. No oropharyngeal exudate. Eyes: Pupils are equal, round, and reactive to light. Conjunctivae and EOM are normal. Right eye exhibits no discharge. Left eye exhibits no discharge. No scleral icterus. Neck: Normal range of motion and full passive range of motion without pain. Neck supple. No muscular tenderness present. No neck rigidity. No edema, no erythema and normal range of motion present. No thyromegaly present. Cardiovascular: Normal rate, regular rhythm, normal heart sounds and intact distal pulses. Exam reveals no gallop and no friction rub. No murmur heard. Pulmonary/Chest: Effort normal and breath sounds normal. No respiratory distress. She has no wheezes. She has no rales. She exhibits no tenderness. Abdominal: Soft. Bowel sounds are normal. She exhibits no distension. There is no tenderness. There is no rebound and no guarding. Musculoskeletal: She exhibits tenderness. Right shoulder: Normal.        Left shoulder: Normal.        Right elbow: Normal.       Left elbow: Normal.        Right wrist: Normal.        Left wrist: Normal.        Right hip: She exhibits decreased range of motion, decreased strength, tenderness and bony tenderness. Left hip: She exhibits decreased range of motion, decreased strength, tenderness and bony tenderness. Right knee: She exhibits swelling. Tenderness found. Left knee: She exhibits swelling. inappropriate judgment. She does not exhibit a depressed mood. She expresses no homicidal and no suicidal ideation. She expresses no suicidal plans and no homicidal plans. She exhibits normal recent memory and normal remote memory. She is attentive. Nursing note and vitals reviewed. HANNAH test: +  Yeomans test: +  Gaenslen test: +     Assessment:     1. SI (sacroiliac) joint inflammation (HCC)    2. Trochanteric bursitis of right hip    3. Spondylosis of lumbar region without myelopathy or radiculopathy    4. Spinal stenosis of lumbar region without neurogenic claudication    5. Cervical spinal stenosis    6. Cervical spondylosis with radiculopathy    7. Chronic pain syndrome            Plan:      · OARRS reviewed. Current MED: 1  · Patient was not offered naloxone for home. · Discussed long term side effects of medications, tolerance, dependency and addiction. · Previous UDS reviewed  · UDS preformed today for compliance. · Patient told can not receive any pain medications from any other source. · No evidence of abuse, diversion or aberrant behavior.  Medications and/or procedures to improve function and quality of life- patient understanding with this and that may not be pain free   Discussed with patient about safe storage of medications at home   Discussed possible weaning of medication dosing dependent on treatment/procedure results.  Testing:none. Discussed EMG for possible TFLESI if need    Procedures: Left Si RFA   Discussed with patient about risks with procedure including infection, reaction to medication, increased pain, or bleeding.  Medications:Neurontin Tylenol ES from PCP   May repeat Hip steroid injection or Lumbar RFA anytime. Meds. Prescribed:   No orders of the defined types were placed in this encounter. Return Left SI RFA already scheduled.          Electronically signed by TANNER Conrad CNP on9/17/2019 at 3:13 PM

## 2019-09-24 ENCOUNTER — OFFICE VISIT (OUTPATIENT)
Dept: FAMILY MEDICINE CLINIC | Age: 84
End: 2019-09-24
Payer: MEDICARE

## 2019-09-24 VITALS
RESPIRATION RATE: 16 BRPM | WEIGHT: 112.8 LBS | DIASTOLIC BLOOD PRESSURE: 54 MMHG | SYSTOLIC BLOOD PRESSURE: 104 MMHG | BODY MASS INDEX: 22.03 KG/M2 | HEART RATE: 72 BPM

## 2019-09-24 DIAGNOSIS — I95.2 HYPOTENSION DUE TO DRUGS: Primary | ICD-10-CM

## 2019-09-24 PROCEDURE — 1036F TOBACCO NON-USER: CPT | Performed by: FAMILY MEDICINE

## 2019-09-24 PROCEDURE — G8420 CALC BMI NORM PARAMETERS: HCPCS | Performed by: FAMILY MEDICINE

## 2019-09-24 PROCEDURE — 99213 OFFICE O/P EST LOW 20 MIN: CPT | Performed by: FAMILY MEDICINE

## 2019-09-24 PROCEDURE — 1123F ACP DISCUSS/DSCN MKR DOCD: CPT | Performed by: FAMILY MEDICINE

## 2019-09-24 PROCEDURE — G8427 DOCREV CUR MEDS BY ELIG CLIN: HCPCS | Performed by: FAMILY MEDICINE

## 2019-09-24 PROCEDURE — 4040F PNEUMOC VAC/ADMIN/RCVD: CPT | Performed by: FAMILY MEDICINE

## 2019-09-24 PROCEDURE — 1090F PRES/ABSN URINE INCON ASSESS: CPT | Performed by: FAMILY MEDICINE

## 2019-09-24 PROCEDURE — G8400 PT W/DXA NO RESULTS DOC: HCPCS | Performed by: FAMILY MEDICINE

## 2019-09-24 ASSESSMENT — ENCOUNTER SYMPTOMS
GASTROINTESTINAL NEGATIVE: 1
RESPIRATORY NEGATIVE: 1

## 2019-09-24 NOTE — PROGRESS NOTES
MD Tawnya at [address] Left 6/25/2018    LUMBAR RFA  LEFT SIDE @ L3-4,4-5,5-S1, performed by Su Torres MD at [address] [place] [address] IMAGE GUIDANCE, SINGLE Right 8/27/2018    SI RFA  RIGHT SIDE performed by Su Torres MD at [address] [place] [address] IMAGE GUIDANCE, SINGLE Left 9/11/2018    SI RFA  LEFT SIDE performed by Su Torres MD at [address] [facility]     Prior to Admission medications    Medication Sig Start Date End Date Taking? Authorizing Provider   celecoxib (CELEBREX) 200 MG capsule Take 1 capsule by mouth daily 7/16/19  Yes Josesito Chicas DO   omeprazole (PRILOSEC) 40 MG delayed release capsule TAKE 1 CAPSULE BY MOUTH DAILY (WITH BREAKFAST) 6/28/19  Yes Josesito Chicas DO   lovastatin (MEVACOR) 20 MG tablet TAKE 1 TABLET BY MOUTH NIGHTLY 6/17/19  Yes Josesito Chicas DO   gabapentin (NEURONTIN) 600 MG tablet TAKE 1 TABLET IN  THE      MORNING, 1 TABLET IN THE   AFTERNOON,  AND2 TABLETS  AT BEDTIME 3/13/19 3/13/20 Yes Josesito Chicas DO   valsartan-hydrochlorothiazide (DIOVAN-HCT) 160-12.5 MG per tablet TAKE 1 TABLET BY MOUTH EVERY DAY 1/25/19  Yes Josesito Chicas DO   levothyroxine (SYNTHROID) 50 MCG tablet TAKE 1 TABLET BY MOUTH DAILY 10/24/18  Yes Josesito Chicas DO   aspirin 81 MG EC tablet Take 81 mg by mouth daily   Yes Historical Provider, MD   Acetaminophen (TYLENOL ARTHRITIS EXT RELIEF PO) Take by mouth   Yes Historical Provider, MD   calcium carbonate-vitamin D (CALCIUM 600+D) 600-200 MG-UNIT TABS Take 1 tablet by mouth daily. Yes Historical Provider, MD   Multiple Vitamins-Minerals (THERAPEUTIC MULTIVITAMIN-MINERALS) tablet Take 1 tablet by mouth daily. Yes Historical Provider, MD   Cholecalciferol (VITAMIN D) 2000 UNITS CAPS capsule Take 1 capsule by mouth daily.    Yes

## 2019-09-26 ENCOUNTER — PREP FOR PROCEDURE (OUTPATIENT)
Dept: PHYSICAL MEDICINE AND REHAB | Age: 84
End: 2019-09-26

## 2019-09-26 NOTE — H&P
Donald Aguilar is a 80 y.o. female is here today for     Chief Complaint:  SI pain             The patientis allergic to cataflam [diclofenac potassium] and phenergan [promethazine hcl].     Past Medical History  Vitaliy Harley  has a past medical history of Cerebral artery occlusion with cerebral infarction ([facility] [address] GERD (gastroesophageal reflux disease), Hyperlipidemia, Hypertension, Hypothyroidism, and Osteoarthritis.     Past Surgical History  The patient  has a past surgical history that includes back surgery (2003); Neck surgery (1980s); Knee arthroscopy (2010); joint replacement; Colonoscopy; Endoscopy, colon, diagnostic; other surgical history; pr inject rx other periph nerve (Right, 5/22/2018); pr inject rx other periph nerve (Left, 6/25/2018); Nerve Surgery (Right, 08/27/2018); pr radiofrequency neurotomy lumbar or sacral, w image guidance, single (Right, 8/27/2018); Nerve Surgery (Left, 09/11/2018); pr radiofrequency neurotomy lumbar or sacral, w image guidance, single (Left, 9/11/2018); lumbar nerve block (N/A, 12/11/2018); lumbar nerve block (N/A, 3/12/2019); arthrocentesis (Right, 4/12/2019); and Lumbar spine surgery (Right, 8/30/2019).    Family History  This patient's family history includes Cancer in her mother; Heart Disease in her father; Stroke in her sister.  [address]  reports that she has never smoked.  She has never used smokeless tobacco. She reports that she does not drink alcohol or use drugs.     Medications    Current Medication      Current Outpatient Medications:     celecoxib (CELEBREX) 200 MG capsule, Take 1 capsule by mouth daily, Disp: 90 capsule, Rfl: 3    omeprazole (PRILOSEC) 40 MG delayed release capsule, TAKE 1 CAPSULE BY MOUTH DAILY (WITH BREAKFAST), Disp: 90 capsule, Rfl: 3    lovastatin (MEVACOR) 20 MG tablet, TAKE 1 TABLET BY MOUTH NIGHTLY, Disp: 90 tablet, Rfl: 3    gabapentin (NEURONTIN) 600 MG tablet, TAKE 1 TABLET IN  THE      MORNING, 1 TABLET IN THE AFTERNOON,  AND2 TABLETS  AT BEDTIME, Disp: 360 tablet, Rfl: 3    valsartan-hydrochlorothiazide (DIOVAN-HCT) 160-12.5 MG per tablet, TAKE 1 TABLET BY MOUTH EVERY DAY, Disp: 90 tablet, Rfl: 3    levothyroxine (SYNTHROID) 50 MCG tablet, TAKE 1 TABLET BY MOUTH DAILY, Disp: 90 tablet, Rfl: 3    aspirin 81 MG EC tablet, Take 81 mg by mouth daily, Disp: , Rfl:     Acetaminophen (TYLENOL ARTHRITIS EXT RELIEF PO), Take by mouth, Disp: , Rfl:     calcium carbonate-vitamin D (CALCIUM 600+D) 600-200 MG-UNIT TABS, Take 1 tablet by mouth daily. , Disp: , Rfl:     Multiple Vitamins-Minerals (THERAPEUTIC MULTIVITAMIN-MINERALS) tablet, Take 1 tablet by mouth daily. , Disp: , Rfl:     Cholecalciferol (VITAMIN D) 2000 UNITS CAPS capsule, Take 1 capsule by mouth daily. , Disp: , Rfl:         Subjective:      Review of Systems   Constitutional: Positive for activity change. Negative for appetite change, chills, diaphoresis, fatigue, fever and unexpected weight change. HENT: Negative for congestion, ear pain, hearing loss, mouth sores, nosebleeds, rhinorrhea, sinus pressure and sore throat. Eyes: Negative for photophobia, pain and visual disturbance. Respiratory: Negative. Negative for cough, chest tightness, shortness of breath and wheezing. Cardiovascular: Negative. Negative for chest pain and palpitations. Gastrointestinal: Negative for abdominal pain, constipation, diarrhea, nausea and vomiting. Endocrine: Negative for cold intolerance, heat intolerance, polydipsia, polyphagia and polyuria. Genitourinary: Negative for decreased urine volume, difficulty urinating, frequency and hematuria. Musculoskeletal: Positive for arthralgias, back pain, gait problem, joint swelling, myalgias, neck pain and neck stiffness. Skin: Negative for color change and rash. Allergic/Immunologic: Negative for food allergies and immunocompromised state. Hematological: Does not bruise/bleed easily.    Psychiatric/Behavioral: Negative for agitation, behavioral problems, confusion, decreased concentration, dysphoric mood, hallucinations, self-injury, sleep disturbance and suicidal ideas. The patient is not nervous/anxious and is not hyperactive.          Objective:      Vitals                                  Weight: 108 lb 0.4 oz (49 kg)   Height: 5' (1.524 m)            Physical Exam   Constitutional: She is oriented to person, place, and time. She appears well-developed and well-nourished. No distress. HENT:   Head: Normocephalic and atraumatic. Right Ear: External ear normal.   Left Ear: External ear normal.   Nose: Nose normal.   Mouth/Throat: Oropharynx is clear and moist. No oropharyngeal exudate. Eyes: Pupils are equal, round, and reactive to light. Conjunctivae and EOM are normal. Right eye exhibits no discharge. Left eye exhibits no discharge. No scleral icterus. Neck: Normal range of motion and full passive range of motion without pain. Neck supple. No muscular tenderness present. No neck rigidity. No edema, no erythema and normal range of motion present. No thyromegaly present. Cardiovascular: Normal rate, regular rhythm, normal heart sounds and intact distal pulses. Exam reveals no gallop and no friction rub. No murmur heard. Pulmonary/Chest: Effort normal and breath sounds normal. No respiratory distress. She has no wheezes. She has no rales. She exhibits no tenderness. Abdominal: Soft. Bowel sounds are normal. She exhibits no distension. There is no tenderness. There is no rebound and no guarding. Musculoskeletal: She exhibits tenderness. Right shoulder: Normal.        Left shoulder: Normal.        Right elbow: Normal.       Left elbow: Normal.        Right wrist: Normal.        Left wrist: Normal.        Right hip: She exhibits decreased range of motion, decreased strength, tenderness and bony tenderness.         Left hip: She exhibits decreased range of motion, decreased strength, tenderness and bony tenderness. Right knee: She exhibits swelling. Tenderness found. Left knee: She exhibits swelling. Tenderness found. Right ankle: Normal.        Left ankle: Normal.        Cervical back: She exhibits bony tenderness, pain and spasm. Thoracic back: She exhibits tenderness. Lumbar back: She exhibits tenderness, bony tenderness, pain and spasm. Back:         Right hand: Normal.        Left hand: Normal.        Right upper leg: She exhibits tenderness. Left upper leg: She exhibits tenderness. Right lower leg: She exhibits tenderness. Left lower leg: She exhibits tenderness. Right foot: Normal.        Left foot: Normal.   Neurological: She is alert and oriented to person, place, and time. She has normal strength. She is not disoriented. She displays no atrophy. No cranial nerve deficit or sensory deficit. She exhibits normal muscle tone. She displays a negative Romberg sign. Coordination and gait normal. She displays no Babinski's sign on the right side. Reflex Scores:       Tricep reflexes are 1+ on the right side and 1+ on the left side. Bicep reflexes are 1+ on the right side and 1+ on the left side. Brachioradialis reflexes are 1+ on the right side and 1+ on the left side. Patellar reflexes are 1+ on the right side and 1+ on the left side. Achilles reflexes are 1+ on the right side and 1+ on the left side. SLR-  Motor 5/5 BLE BUE   Skin: Skin is warm. No rash noted. She is not diaphoretic. No erythema. No pallor. Psychiatric: She has a normal mood and affect. Her speech is normal and behavior is normal. Judgment and thought content normal. Her mood appears not anxious. Her affect is not angry, not blunt, not labile and not inappropriate. She is not agitated, not aggressive, not hyperactive, not slowed, not withdrawn, not actively hallucinating and not combative.  Thought content is not paranoid and not

## 2019-10-01 ENCOUNTER — HOSPITAL ENCOUNTER (OUTPATIENT)
Age: 84
Setting detail: OUTPATIENT SURGERY
Discharge: HOME | End: 2019-10-01
Attending: PAIN MEDICINE | Admitting: PAIN MEDICINE
Payer: MEDICARE

## 2019-10-01 ENCOUNTER — ANESTHESIA (OUTPATIENT)
Dept: OPERATING ROOM | Age: 84
End: 2019-10-01
Payer: MEDICARE

## 2019-10-01 ENCOUNTER — APPOINTMENT (OUTPATIENT)
Dept: GENERAL RADIOLOGY | Age: 84
End: 2019-10-01
Attending: PAIN MEDICINE
Payer: MEDICARE

## 2019-10-01 ENCOUNTER — ANESTHESIA EVENT (OUTPATIENT)
Dept: OPERATING ROOM | Age: 84
End: 2019-10-01
Payer: MEDICARE

## 2019-10-01 VITALS
RESPIRATION RATE: 15 BRPM | HEART RATE: 60 BPM | HEIGHT: 60 IN | TEMPERATURE: 98.7 F | OXYGEN SATURATION: 98 % | SYSTOLIC BLOOD PRESSURE: 99 MMHG | BODY MASS INDEX: 21.83 KG/M2 | WEIGHT: 111.2 LBS | DIASTOLIC BLOOD PRESSURE: 48 MMHG

## 2019-10-01 VITALS
RESPIRATION RATE: 10 BRPM | DIASTOLIC BLOOD PRESSURE: 52 MMHG | OXYGEN SATURATION: 99 % | SYSTOLIC BLOOD PRESSURE: 102 MMHG

## 2019-10-01 PROCEDURE — 6360000002 HC RX W HCPCS: Performed by: REGISTERED NURSE

## 2019-10-01 PROCEDURE — 7100000011 HC PHASE II RECOVERY - ADDTL 15 MIN: Performed by: PAIN MEDICINE

## 2019-10-01 PROCEDURE — 6360000002 HC RX W HCPCS: Performed by: PAIN MEDICINE

## 2019-10-01 PROCEDURE — 2709999900 HC NON-CHARGEABLE SUPPLY: Performed by: PAIN MEDICINE

## 2019-10-01 PROCEDURE — 3600000057 HC PAIN LEVEL 4 ADDL 15 MIN: Performed by: PAIN MEDICINE

## 2019-10-01 PROCEDURE — 3209999900 FLUORO FOR SURGICAL PROCEDURES

## 2019-10-01 PROCEDURE — 3600000056 HC PAIN LEVEL 4 BASE: Performed by: PAIN MEDICINE

## 2019-10-01 PROCEDURE — 2720000010 HC SURG SUPPLY STERILE: Performed by: PAIN MEDICINE

## 2019-10-01 PROCEDURE — 7100000010 HC PHASE II RECOVERY - FIRST 15 MIN: Performed by: PAIN MEDICINE

## 2019-10-01 PROCEDURE — 2500000003 HC RX 250 WO HCPCS: Performed by: PAIN MEDICINE

## 2019-10-01 PROCEDURE — 64640 INJECTION TREATMENT OF NERVE: CPT | Performed by: PAIN MEDICINE

## 2019-10-01 PROCEDURE — 3700000001 HC ADD 15 MINUTES (ANESTHESIA): Performed by: PAIN MEDICINE

## 2019-10-01 PROCEDURE — 3700000000 HC ANESTHESIA ATTENDED CARE: Performed by: PAIN MEDICINE

## 2019-10-01 RX ORDER — KETOROLAC TROMETHAMINE 30 MG/ML
INJECTION, SOLUTION INTRAMUSCULAR; INTRAVENOUS PRN
Status: DISCONTINUED | OUTPATIENT
Start: 2019-10-01 | End: 2019-10-01 | Stop reason: SDUPTHER

## 2019-10-01 RX ORDER — LIDOCAINE HYDROCHLORIDE 10 MG/ML
INJECTION, SOLUTION EPIDURAL; INFILTRATION; INTRACAUDAL; PERINEURAL PRN
Status: DISCONTINUED | OUTPATIENT
Start: 2019-10-01 | End: 2019-10-01 | Stop reason: ALTCHOICE

## 2019-10-01 RX ORDER — PROPOFOL 10 MG/ML
INJECTION, EMULSION INTRAVENOUS PRN
Status: DISCONTINUED | OUTPATIENT
Start: 2019-10-01 | End: 2019-10-01 | Stop reason: SDUPTHER

## 2019-10-01 RX ORDER — ROPIVACAINE HYDROCHLORIDE 2 MG/ML
INJECTION, SOLUTION EPIDURAL; INFILTRATION; PERINEURAL PRN
Status: DISCONTINUED | OUTPATIENT
Start: 2019-10-01 | End: 2019-10-01 | Stop reason: ALTCHOICE

## 2019-10-01 RX ADMIN — PROPOFOL 150 MG: 10 INJECTION, EMULSION INTRAVENOUS at 11:13

## 2019-10-01 RX ADMIN — KETOROLAC TROMETHAMINE 15 MG: 30 INJECTION, SOLUTION INTRAMUSCULAR; INTRAVENOUS at 11:30

## 2019-10-01 ASSESSMENT — PULMONARY FUNCTION TESTS
PIF_VALUE: 0

## 2019-10-01 ASSESSMENT — PAIN DESCRIPTION - DESCRIPTORS: DESCRIPTORS: ACHING

## 2019-10-01 ASSESSMENT — PAIN - FUNCTIONAL ASSESSMENT: PAIN_FUNCTIONAL_ASSESSMENT: 0-10

## 2019-10-01 ASSESSMENT — PAIN DESCRIPTION - PAIN TYPE: TYPE: CHRONIC PAIN

## 2019-10-01 ASSESSMENT — PAIN DESCRIPTION - ORIENTATION: ORIENTATION: LOWER

## 2019-10-01 ASSESSMENT — PAIN DESCRIPTION - LOCATION: LOCATION: BACK

## 2019-10-01 ASSESSMENT — PAIN SCALES - GENERAL: PAINLEVEL_OUTOF10: 7

## 2019-10-02 ENCOUNTER — TELEPHONE (OUTPATIENT)
Dept: FAMILY MEDICINE CLINIC | Age: 84
End: 2019-10-02

## 2019-10-28 RX ORDER — LEVOTHYROXINE SODIUM 0.05 MG/1
TABLET ORAL
Qty: 90 TABLET | Refills: 3 | Status: SHIPPED | OUTPATIENT
Start: 2019-10-28 | End: 2020-10-20

## 2019-10-29 ENCOUNTER — OFFICE VISIT (OUTPATIENT)
Dept: FAMILY MEDICINE CLINIC | Age: 84
End: 2019-10-29
Payer: MEDICARE

## 2019-10-29 VITALS
WEIGHT: 113.2 LBS | BODY MASS INDEX: 22.11 KG/M2 | OXYGEN SATURATION: 96 % | HEART RATE: 74 BPM | DIASTOLIC BLOOD PRESSURE: 68 MMHG | SYSTOLIC BLOOD PRESSURE: 130 MMHG | RESPIRATION RATE: 16 BRPM

## 2019-10-29 DIAGNOSIS — Z23 NEED FOR INFLUENZA VACCINATION: ICD-10-CM

## 2019-10-29 DIAGNOSIS — R26.81 UNSTABLE GAIT: ICD-10-CM

## 2019-10-29 DIAGNOSIS — M48.062 LUMBAR STENOSIS WITH NEUROGENIC CLAUDICATION: Primary | ICD-10-CM

## 2019-10-29 PROCEDURE — 90653 IIV ADJUVANT VACCINE IM: CPT | Performed by: FAMILY MEDICINE

## 2019-10-29 PROCEDURE — G0008 ADMIN INFLUENZA VIRUS VAC: HCPCS | Performed by: FAMILY MEDICINE

## 2019-10-29 PROCEDURE — G8427 DOCREV CUR MEDS BY ELIG CLIN: HCPCS | Performed by: FAMILY MEDICINE

## 2019-10-29 PROCEDURE — G8420 CALC BMI NORM PARAMETERS: HCPCS | Performed by: FAMILY MEDICINE

## 2019-10-29 PROCEDURE — 1123F ACP DISCUSS/DSCN MKR DOCD: CPT | Performed by: FAMILY MEDICINE

## 2019-10-29 PROCEDURE — 99213 OFFICE O/P EST LOW 20 MIN: CPT | Performed by: FAMILY MEDICINE

## 2019-10-29 PROCEDURE — G8400 PT W/DXA NO RESULTS DOC: HCPCS | Performed by: FAMILY MEDICINE

## 2019-10-29 PROCEDURE — 1036F TOBACCO NON-USER: CPT | Performed by: FAMILY MEDICINE

## 2019-10-29 PROCEDURE — 1090F PRES/ABSN URINE INCON ASSESS: CPT | Performed by: FAMILY MEDICINE

## 2019-10-29 PROCEDURE — 4040F PNEUMOC VAC/ADMIN/RCVD: CPT | Performed by: FAMILY MEDICINE

## 2019-10-29 PROCEDURE — G8482 FLU IMMUNIZE ORDER/ADMIN: HCPCS | Performed by: FAMILY MEDICINE

## 2019-10-29 RX ORDER — VALSARTAN AND HYDROCHLOROTHIAZIDE 160; 12.5 MG/1; MG/1
TABLET, FILM COATED ORAL
COMMUNITY
Start: 2019-01-25 | End: 2019-10-29 | Stop reason: ALTCHOICE

## 2019-10-29 ASSESSMENT — ENCOUNTER SYMPTOMS
GASTROINTESTINAL NEGATIVE: 1
BACK PAIN: 1
RESPIRATORY NEGATIVE: 1

## 2019-11-26 ENCOUNTER — OFFICE VISIT (OUTPATIENT)
Dept: FAMILY MEDICINE CLINIC | Age: 84
End: 2019-11-26
Payer: MEDICARE

## 2019-11-26 VITALS
HEART RATE: 66 BPM | BODY MASS INDEX: 22.56 KG/M2 | DIASTOLIC BLOOD PRESSURE: 62 MMHG | RESPIRATION RATE: 12 BRPM | OXYGEN SATURATION: 94 % | SYSTOLIC BLOOD PRESSURE: 124 MMHG | WEIGHT: 115.5 LBS

## 2019-11-26 DIAGNOSIS — M48.062 LUMBAR STENOSIS WITH NEUROGENIC CLAUDICATION: Primary | ICD-10-CM

## 2019-11-26 PROCEDURE — 4040F PNEUMOC VAC/ADMIN/RCVD: CPT | Performed by: FAMILY MEDICINE

## 2019-11-26 PROCEDURE — 1123F ACP DISCUSS/DSCN MKR DOCD: CPT | Performed by: FAMILY MEDICINE

## 2019-11-26 PROCEDURE — 1036F TOBACCO NON-USER: CPT | Performed by: FAMILY MEDICINE

## 2019-11-26 PROCEDURE — 1090F PRES/ABSN URINE INCON ASSESS: CPT | Performed by: FAMILY MEDICINE

## 2019-11-26 PROCEDURE — G8427 DOCREV CUR MEDS BY ELIG CLIN: HCPCS | Performed by: FAMILY MEDICINE

## 2019-11-26 PROCEDURE — G8420 CALC BMI NORM PARAMETERS: HCPCS | Performed by: FAMILY MEDICINE

## 2019-11-26 PROCEDURE — G8482 FLU IMMUNIZE ORDER/ADMIN: HCPCS | Performed by: FAMILY MEDICINE

## 2019-11-26 PROCEDURE — G8400 PT W/DXA NO RESULTS DOC: HCPCS | Performed by: FAMILY MEDICINE

## 2019-11-26 PROCEDURE — 99213 OFFICE O/P EST LOW 20 MIN: CPT | Performed by: FAMILY MEDICINE

## 2019-11-26 ASSESSMENT — ENCOUNTER SYMPTOMS
RESPIRATORY NEGATIVE: 1
GASTROINTESTINAL NEGATIVE: 1
BACK PAIN: 1

## 2019-12-23 ENCOUNTER — NURSE TRIAGE (OUTPATIENT)
Dept: OTHER | Facility: CLINIC | Age: 84
End: 2019-12-23

## 2020-01-06 ENCOUNTER — OFFICE VISIT (OUTPATIENT)
Dept: FAMILY MEDICINE CLINIC | Age: 85
End: 2020-01-06
Payer: MEDICARE

## 2020-01-06 VITALS
SYSTOLIC BLOOD PRESSURE: 122 MMHG | WEIGHT: 112 LBS | BODY MASS INDEX: 21.87 KG/M2 | RESPIRATION RATE: 12 BRPM | DIASTOLIC BLOOD PRESSURE: 60 MMHG | HEART RATE: 68 BPM

## 2020-01-06 PROCEDURE — 1036F TOBACCO NON-USER: CPT | Performed by: FAMILY MEDICINE

## 2020-01-06 PROCEDURE — G8427 DOCREV CUR MEDS BY ELIG CLIN: HCPCS | Performed by: FAMILY MEDICINE

## 2020-01-06 PROCEDURE — 1090F PRES/ABSN URINE INCON ASSESS: CPT | Performed by: FAMILY MEDICINE

## 2020-01-06 PROCEDURE — 1123F ACP DISCUSS/DSCN MKR DOCD: CPT | Performed by: FAMILY MEDICINE

## 2020-01-06 PROCEDURE — G8482 FLU IMMUNIZE ORDER/ADMIN: HCPCS | Performed by: FAMILY MEDICINE

## 2020-01-06 PROCEDURE — 4040F PNEUMOC VAC/ADMIN/RCVD: CPT | Performed by: FAMILY MEDICINE

## 2020-01-06 PROCEDURE — 99213 OFFICE O/P EST LOW 20 MIN: CPT | Performed by: FAMILY MEDICINE

## 2020-01-06 PROCEDURE — G8420 CALC BMI NORM PARAMETERS: HCPCS | Performed by: FAMILY MEDICINE

## 2020-01-06 PROCEDURE — G8400 PT W/DXA NO RESULTS DOC: HCPCS | Performed by: FAMILY MEDICINE

## 2020-01-06 RX ORDER — PREDNISONE 20 MG/1
20 TABLET ORAL 2 TIMES DAILY
Qty: 14 TABLET | Refills: 0 | Status: SHIPPED | OUTPATIENT
Start: 2020-01-06 | End: 2020-01-13

## 2020-01-06 ASSESSMENT — PATIENT HEALTH QUESTIONNAIRE - PHQ9
2. FEELING DOWN, DEPRESSED OR HOPELESS: 0
SUM OF ALL RESPONSES TO PHQ QUESTIONS 1-9: 0
1. LITTLE INTEREST OR PLEASURE IN DOING THINGS: 0
SUM OF ALL RESPONSES TO PHQ9 QUESTIONS 1 & 2: 0
SUM OF ALL RESPONSES TO PHQ QUESTIONS 1-9: 0

## 2020-01-06 ASSESSMENT — ENCOUNTER SYMPTOMS
RESPIRATORY NEGATIVE: 1
GASTROINTESTINAL NEGATIVE: 1

## 2020-01-06 NOTE — PROGRESS NOTES
at [address] Left 6/25/2018    LUMBAR RFA  LEFT SIDE @ L3-4,4-5,5-S1, performed by Mihaela Beaulieu MD at [address] [place] [address] IMAGE GUIDANCE, SINGLE Right 8/27/2018    SI RFA  RIGHT SIDE performed by Mihaela Beaulieu MD at [address] [place] [address] IMAGE GUIDANCE, SINGLE Left 9/11/2018    SI RFA  LEFT SIDE performed by Mihaela Beaulieu MD at [address] [place] [address]     Prior to Admission medications    Medication Sig Start Date End Date Taking? Authorizing Provider   levothyroxine (SYNTHROID) 50 MCG tablet TAKE 1 TABLET BY MOUTH EVERY DAY 10/28/19  Yes Margrette Quest, DO   celecoxib (CELEBREX) 200 MG capsule Take 1 capsule by mouth daily 7/16/19  Yes Margrette Quest, DO   omeprazole (PRILOSEC) 40 MG delayed release capsule TAKE 1 CAPSULE BY MOUTH DAILY (WITH BREAKFAST) 6/28/19  Yes Margrette Quest, DO   lovastatin (MEVACOR) 20 MG tablet TAKE 1 TABLET BY MOUTH NIGHTLY 6/17/19  Yes Margrette Quest, DO   gabapentin (NEURONTIN) 600 MG tablet TAKE 1 TABLET IN  THE      MORNING, 1 TABLET IN THE   AFTERNOON,  AND2 TABLETS  AT BEDTIME 3/13/19 3/13/20 Yes Jaqueline Newton, DO   aspirin 81 MG EC tablet Take 81 mg by mouth daily   Yes Historical Provider, MD   Acetaminophen (TYLENOL ARTHRITIS EXT RELIEF PO) Take by mouth   Yes Historical Provider, MD   calcium carbonate-vitamin D (CALCIUM 600+D) 600-200 MG-UNIT TABS Take 1 tablet by mouth daily. Yes Historical Provider, MD   Multiple Vitamins-Minerals (THERAPEUTIC MULTIVITAMIN-MINERALS) tablet Take 1 tablet by mouth daily. Yes Historical Provider, MD   Cholecalciferol (VITAMIN D) 2000 UNITS CAPS capsule Take 1 capsule by mouth daily. Yes Historical Provider, MD         Review of Systems   Constitutional: Negative. HENT: Negative. Respiratory: Negative. Cardiovascular: Negative.

## 2020-03-09 ENCOUNTER — OFFICE VISIT (OUTPATIENT)
Dept: FAMILY MEDICINE CLINIC | Age: 85
End: 2020-03-09
Payer: MEDICARE

## 2020-03-09 VITALS
HEART RATE: 72 BPM | BODY MASS INDEX: 22.96 KG/M2 | HEIGHT: 59 IN | SYSTOLIC BLOOD PRESSURE: 128 MMHG | RESPIRATION RATE: 16 BRPM | DIASTOLIC BLOOD PRESSURE: 66 MMHG | WEIGHT: 113.9 LBS

## 2020-03-09 PROCEDURE — G8427 DOCREV CUR MEDS BY ELIG CLIN: HCPCS | Performed by: FAMILY MEDICINE

## 2020-03-09 PROCEDURE — G8482 FLU IMMUNIZE ORDER/ADMIN: HCPCS | Performed by: FAMILY MEDICINE

## 2020-03-09 PROCEDURE — 1090F PRES/ABSN URINE INCON ASSESS: CPT | Performed by: FAMILY MEDICINE

## 2020-03-09 PROCEDURE — 4040F PNEUMOC VAC/ADMIN/RCVD: CPT | Performed by: FAMILY MEDICINE

## 2020-03-09 PROCEDURE — 1123F ACP DISCUSS/DSCN MKR DOCD: CPT | Performed by: FAMILY MEDICINE

## 2020-03-09 PROCEDURE — 99213 OFFICE O/P EST LOW 20 MIN: CPT | Performed by: FAMILY MEDICINE

## 2020-03-09 PROCEDURE — 1036F TOBACCO NON-USER: CPT | Performed by: FAMILY MEDICINE

## 2020-03-09 PROCEDURE — G8400 PT W/DXA NO RESULTS DOC: HCPCS | Performed by: FAMILY MEDICINE

## 2020-03-09 PROCEDURE — G8420 CALC BMI NORM PARAMETERS: HCPCS | Performed by: FAMILY MEDICINE

## 2020-03-09 ASSESSMENT — ENCOUNTER SYMPTOMS
GASTROINTESTINAL NEGATIVE: 1
RESPIRATORY NEGATIVE: 1

## 2020-03-09 NOTE — PROGRESS NOTES
Subjective:      Patient ID: Talia Murphy is a 80 y.o. female. HPI:    Chief Complaint   Patient presents with    Diplopia     for the past 1wk, saw an eye doctor last Tuesday and the double vision is caused by the right eye. Dx with 6th nerve palsy     Pt here with concern about some double vision. Was recently seen by Dr. Braulio Alexander who dx'd her with left 6th nerve palsy and was concerned about her BP. BP Readings from Last 3 Encounters:   03/09/20 128/66   01/06/20 122/60   11/26/19 124/62     Her BP at Dr. Pimentel [place] office was high so was instructed to follow up with me. He did not put her on any new medications and no imaging performed. Denies HA's. No balance changes. No slurred speech or ext weakness appreciated.     Patient Active Problem List   Diagnosis    Hypertension    Hyperlipidemia    GERD (gastroesophageal reflux disease)    Lumbar spondylosis    Hypothyroidism    Vertebro basilar insufficiency    Cervical spondylosis with myelopathy    Light headed    Stenosis, spinal, lumbar    TIA involving vertebral artery    Sacroiliac inflammation ([address]    Trochanteric bursitis of right hip     Past Surgical History:   Procedure Laterality Date    ARTHROCENTESIS Right 4/12/2019    Right hip bursa steroid INJECTION  NO SEDATION performed by Alice Navarro MD at [address] [place] [address]  2003    COLONOSCOPY      ENDOSCOPY, COLON, DIAGNOSTIC      JOINT REPLACEMENT      left    KNEE ARTHROSCOPY  2010    left    LUMBAR NERVE BLOCK N/A 12/11/2018    LUMBAR INTER LAMINAR DAYANARA LESI #1@ L5 performed by Alice Navarro MD at [facility] N/A 3/12/2019    LUMBAR INTER LAMINAR DAYANARA LESI @L5 performed by Alice Navarro MD at [address] [place] [address] Right 8/30/2019    SI RFA  RIGHT SIDE performed by Alice Navarro MD at [address] [place] [address] Left 10/1/2019    SI RFA  LEFT SIDE Provider, MD   Multiple Vitamins-Minerals (THERAPEUTIC MULTIVITAMIN-MINERALS) tablet Take 1 tablet by mouth daily. Yes Historical Provider, MD   Cholecalciferol (VITAMIN D) 2000 UNITS CAPS capsule Take 1 capsule by mouth daily. Yes Historical Provider, MD         Review of Systems   Constitutional: Negative. HENT: Negative. Eyes: Positive for visual disturbance. Respiratory: Negative. Cardiovascular: Negative. Gastrointestinal: Negative. Musculoskeletal: Negative. All other systems reviewed and are negative. Objective:   Physical Exam  Vitals signs and nursing note reviewed. Constitutional:       Appearance: She is well-developed. HENT:      Head: Normocephalic and atraumatic. Right Ear: Tympanic membrane normal.      Left Ear: Tympanic membrane normal.   Eyes:      General: Visual field deficit present. Cardiovascular:      Rate and Rhythm: Normal rate and regular rhythm. Heart sounds: Normal heart sounds. No murmur. Pulmonary:      Effort: Pulmonary effort is normal.      Breath sounds: Normal breath sounds. Abdominal:      General: Bowel sounds are normal.      Palpations: Abdomen is soft. Skin:     General: Skin is warm and dry. Neurological:      Mental Status: She is alert and oriented to person, place, and time. Cranial Nerves: Cranial nerve deficit (6th nerve palsy present) present. No facial asymmetry. Psychiatric:         Behavior: Behavior normal.         Assessment:       Diagnosis Orders   1. Double vision     2. 6th nerve palsy, left     3.  Essential hypertension             Plan:      -  BPs look fine, do not feel that her BP had anything to do with the palsy  -  Follow up with Dr. Avelino Duarte as scheduled  -  RTO prn        Burnice Silvana, DO

## 2020-04-29 RX ORDER — GABAPENTIN 600 MG/1
TABLET ORAL
Qty: 360 TABLET | Refills: 3 | Status: SHIPPED | OUTPATIENT
Start: 2020-04-29 | End: 2020-04-30

## 2020-04-29 NOTE — TELEPHONE ENCOUNTER
Isabel Trimble called requesting a refill on the following medications:  Requested Prescriptions     Pending Prescriptions Disp Refills    gabapentin (NEURONTIN) 600 MG tablet 360 tablet 3     Sig: TAKE 1 TABLET IN  THE      MORNING, 1 TABLET IN THE   AFTERNOON,  AND2 TABLETS  AT BEDTIME     [facility] [id number]      Date of last visit: 3-9-20  Date of next visit (if applicable): Visit date not found

## 2020-04-30 ENCOUNTER — TELEPHONE (OUTPATIENT)
Dept: FAMILY MEDICINE CLINIC | Age: 85
End: 2020-04-30

## 2020-04-30 RX ORDER — GABAPENTIN 300 MG/1
CAPSULE ORAL
Qty: 720 CAPSULE | Refills: 0 | Status: SHIPPED | OUTPATIENT
Start: 2020-04-30 | End: 2021-01-29

## 2020-04-30 NOTE — TELEPHONE ENCOUNTER
[facility] fax, Gabapentin 600 mg tablets there is a manufacture back order. Asking if you will send a script for 300 mg , adjusted to the same dose, day supply and refills. Please review and advise. Thanks !

## 2020-06-08 RX ORDER — LOVASTATIN 20 MG/1
TABLET ORAL
Qty: 90 TABLET | Refills: 3 | Status: SHIPPED | OUTPATIENT
Start: 2020-06-08 | End: 2021-06-16

## 2020-06-22 RX ORDER — OMEPRAZOLE 40 MG/1
40 CAPSULE, DELAYED RELEASE ORAL
Qty: 90 CAPSULE | Refills: 3 | Status: SHIPPED | OUTPATIENT
Start: 2020-06-22 | End: 2020-11-12

## 2020-09-09 RX ORDER — GABAPENTIN 600 MG/1
TABLET ORAL
Qty: 360 TABLET | Refills: 3 | Status: SHIPPED | OUTPATIENT
Start: 2020-09-09 | End: 2021-06-16 | Stop reason: SDUPTHER

## 2020-09-09 NOTE — TELEPHONE ENCOUNTER
Gennaro Swanson called requesting a refill on the following medications:  Requested Prescriptions     Pending Prescriptions Disp Refills    gabapentin (NEURONTIN) 600 MG tablet 360 tablet 3     Sig: TAKE 1 TABLET IN  THE      MORNING, 1 TABLET IN THE   AFTERNOON,  AND2 TABLETS  AT BEDTIME     Pharmacy verified:  .pv  [facility]    90 DAY SUPPLY    Date of last visit: 03/09/20  Date of next visit (if applicable): Visit date not found

## 2020-10-04 ENCOUNTER — HOSPITAL ENCOUNTER (EMERGENCY)
Age: 85
Discharge: HOME | End: 2020-10-04
Attending: FAMILY MEDICINE
Payer: MEDICARE

## 2020-10-04 ENCOUNTER — APPOINTMENT (OUTPATIENT)
Dept: GENERAL RADIOLOGY | Age: 85
End: 2020-10-04
Payer: MEDICARE

## 2020-10-04 ENCOUNTER — APPOINTMENT (OUTPATIENT)
Dept: CT IMAGING | Age: 85
End: 2020-10-04
Payer: MEDICARE

## 2020-10-04 VITALS
HEIGHT: 58 IN | HEART RATE: 82 BPM | BODY MASS INDEX: 23.09 KG/M2 | DIASTOLIC BLOOD PRESSURE: 67 MMHG | OXYGEN SATURATION: 99 % | SYSTOLIC BLOOD PRESSURE: 135 MMHG | WEIGHT: 110 LBS | RESPIRATION RATE: 16 BRPM | TEMPERATURE: 97.8 F

## 2020-10-04 PROCEDURE — 99284 EMERGENCY DEPT VISIT MOD MDM: CPT

## 2020-10-04 PROCEDURE — 73502 X-RAY EXAM HIP UNI 2-3 VIEWS: CPT

## 2020-10-04 PROCEDURE — 72100 X-RAY EXAM L-S SPINE 2/3 VWS: CPT

## 2020-10-04 PROCEDURE — 70450 CT HEAD/BRAIN W/O DYE: CPT

## 2020-10-04 PROCEDURE — 99283 EMERGENCY DEPT VISIT LOW MDM: CPT

## 2020-10-04 PROCEDURE — 72125 CT NECK SPINE W/O DYE: CPT

## 2020-10-04 PROCEDURE — 71046 X-RAY EXAM CHEST 2 VIEWS: CPT

## 2020-10-04 PROCEDURE — 72192 CT PELVIS W/O DYE: CPT

## 2020-10-04 PROCEDURE — 72072 X-RAY EXAM THORAC SPINE 3VWS: CPT

## 2020-10-04 ASSESSMENT — ENCOUNTER SYMPTOMS
VOMITING: 0
SHORTNESS OF BREATH: 0
ABDOMINAL PAIN: 0
BLOOD IN STOOL: 0
NAUSEA: 0
FACIAL SWELLING: 0
COLOR CHANGE: 1

## 2020-10-04 ASSESSMENT — PAIN DESCRIPTION - FREQUENCY: FREQUENCY: INTERMITTENT

## 2020-10-04 ASSESSMENT — PAIN DESCRIPTION - DURATION: DURATION_2: CONTINUOUS

## 2020-10-04 ASSESSMENT — PAIN SCALES - GENERAL: PAINLEVEL_OUTOF10: 8

## 2020-10-04 ASSESSMENT — PAIN DESCRIPTION - ONSET
ONSET_2: ON-GOING
ONSET: ON-GOING

## 2020-10-04 ASSESSMENT — PAIN DESCRIPTION - LOCATION
LOCATION: HIP
LOCATION_2: HEAD

## 2020-10-04 ASSESSMENT — PAIN DESCRIPTION - PROGRESSION
CLINICAL_PROGRESSION_2: GRADUALLY WORSENING
CLINICAL_PROGRESSION: GRADUALLY WORSENING

## 2020-10-04 ASSESSMENT — PAIN DESCRIPTION - PAIN TYPE
TYPE: ACUTE PAIN
TYPE_2: ACUTE PAIN

## 2020-10-04 ASSESSMENT — PAIN DESCRIPTION - INTENSITY: RATING_2: 6

## 2020-10-04 ASSESSMENT — PAIN DESCRIPTION - DESCRIPTORS
DESCRIPTORS: SHARP
DESCRIPTORS_2: ACHING

## 2020-10-04 ASSESSMENT — PAIN DESCRIPTION - ORIENTATION
ORIENTATION: RIGHT
ORIENTATION_2: POSTERIOR

## 2020-10-04 NOTE — ED PROVIDER NOTES
[facility]  eMERGENCY dEPARTMENT eNCOUnter          [address] [facility]       Chief Complaint   Patient presents with    Hip Pain    Headache    Fall     10/2/2020 afternoon        Nurses Notes reviewed and I agree except as noted in the HPI. HISTORY OF PRESENT ILLNESS    Kaylah Alonzo is a 80 y.o. female who presents from a fall    Location/Symptom: Fall  Timing/Onset: 10/2/2020  Context/Setting: Slip and fall in the bathroom on 10/2/2020  She landed on her right buttock region  She did hit her head  No LOC  Not taking anticoagulants  Quality: Some pain to weightbearing on the right hip groin region  Some headache  Impact on the head from the fall was left occipital  Some pain at the base of the neck  No pain in the arms  She does have chronic back pain which does not appear worse than baseline  No distal lower extremity pain  Duration: 2 days  Modifying Factors: None  Severity: 5/10    REVIEW OF SYSTEMS     Review of Systems   Constitutional: Negative for chills, diaphoresis and fever. HENT: Negative for facial swelling. Eyes: Negative for visual disturbance. Respiratory: Negative for shortness of breath. Cardiovascular: Negative for chest pain. Gastrointestinal: Negative for abdominal pain, blood in stool, nausea and vomiting. Genitourinary: Negative for difficulty urinating and hematuria. Musculoskeletal: Positive for arthralgias. Some pain toward right groin with standing or weightbearing    Some pain in the base of the neck    Chronic low back pain tolerable no worse than previous   Skin: Positive for color change. Some resolving bruises on the left posterior thoracic region just medial to the scapular border   Neurological: Positive for headaches. Negative for seizures, speech difficulty, weakness and numbness. Hematological: Does not bruise/bleed easily. Psychiatric/Behavioral: Negative for confusion.           PAST MEDICAL HISTORY    has a past medical history of Cerebral artery occlusion with cerebral infarction ([facility] [address] GERD (gastroesophageal reflux disease), Hyperlipidemia, Hypertension, Hypothyroidism, Osteoarthritis, and Sixth nerve palsy. SURGICAL HISTORY      has a past surgical history that includes back surgery (2003); Neck surgery (1980s); Knee arthroscopy (2010); joint replacement; Colonoscopy; Endoscopy, colon, diagnostic; other surgical history; pr inject rx other periph nerve (Right, 5/22/2018); pr inject rx other periph nerve (Left, 6/25/2018); Nerve Surgery (Right, 08/27/2018); pr radiofrequency neurotomy lumbar or sacral, w image guidance, single (Right, 8/27/2018); Nerve Surgery (Left, 09/11/2018); pr radiofrequency neurotomy lumbar or sacral, w image guidance, single (Left, 9/11/2018); lumbar nerve block (N/A, 12/11/2018); lumbar nerve block (N/A, 3/12/2019); arthrocentesis (Right, 4/12/2019); Lumbar spine surgery (Right, 8/30/2019); and Lumbar spine surgery (Left, 10/1/2019). CURRENT MEDICATIONS       Previous Medications    ACETAMINOPHEN (TYLENOL ARTHRITIS EXT RELIEF PO)    Take by mouth    ASPIRIN 81 MG EC TABLET    Take 81 mg by mouth daily    CALCIUM CARBONATE-VITAMIN D (CALCIUM 600+D) 600-200 MG-UNIT TABS    Take 1 tablet by mouth daily. CELECOXIB (CELEBREX) 200 MG CAPSULE    Take 1 capsule by mouth daily    CHOLECALCIFEROL (VITAMIN D) 2000 UNITS CAPS CAPSULE    Take 1 capsule by mouth daily. GABAPENTIN (NEURONTIN) 300 MG CAPSULE    Take 2 caps in the am, 2 in the afternoon, 4 at bedtime    GABAPENTIN (NEURONTIN) 600 MG TABLET    TAKE 1 TABLET IN  THE      MORNING, 1 TABLET IN THE   AFTERNOON,  AND2 TABLETS  AT BEDTIME    LEVOTHYROXINE (SYNTHROID) 50 MCG TABLET    TAKE 1 TABLET BY MOUTH EVERY DAY    LOVASTATIN (MEVACOR) 20 MG TABLET    TAKE 1 TABLET BY MOUTH EVERY DAY AT NIGHT    MULTIPLE VITAMINS-MINERALS (THERAPEUTIC MULTIVITAMIN-MINERALS) TABLET    Take 1 tablet by mouth daily.     OMEPRAZOLE (PRILOSEC) 40 MG DELAYED RELEASE CAPSULE    TAKE 1 CAPSULE BY MOUTH DAILY (WITH BREAKFAST)       ALLERGIES     is allergic to cataflam [diclofenac potassium] and phenergan [promethazine hcl]. FAMILY HISTORY     She indicated that her mother is . She indicated that her father is . She indicated that her sister is alive. family history includes Cancer in her mother; Heart Disease in her father; Stroke in her sister. SOCIAL HISTORY      reports that she has never smoked. She has never used smokeless tobacco. She reports that she does not drink alcohol or use drugs. PHYSICAL EXAM     INITIAL VITALS:  height is 4' 10\" (1.473 m) and weight is 110 lb (49.9 kg). Her oral temperature is 97.8 °F (36.6 °C). Her blood pressure is 135/67 and her pulse is 82. Her respiration is 16 and oxygen saturation is 99%. Physical Exam  Vitals signs and nursing note reviewed. Constitutional:       Comments: GCS 15   HENT:      Head: Normocephalic. Comments:  tenderness left occipital without soft tissue swelling    No facial swelling or tenderness    Eyes:      Extraocular Movements: Extraocular movements intact. Pupils: Pupils are equal, round, and reactive to light. Neck:      Comments: Neck is nontender with good range of motion    She has more occipital discomfort left-sided from the fall  Cardiovascular:      Rate and Rhythm: Normal rate and regular rhythm. Pulses: Normal pulses. Heart sounds: Normal heart sounds. Pulmonary:      Effort: Pulmonary effort is normal.      Breath sounds: Normal breath sounds. Comments: Lumbar tenderness on the posterior thoracic area there is some bruises near her medial border left scapula. Abdominal:      Palpations: Abdomen is soft. Tenderness: There is no abdominal tenderness. There is no guarding or rebound. Musculoskeletal: Normal range of motion.       Comments: She has some tenderness in the right inguinal region    Logrolling the right hip causes minimal discomfort    No tenderness distal right leg    Left knee total replacement scar well-healed  Logrolling left hip no pain  No direct tenderness along the left leg    Arms are nontender to palpate    Thoracic and lumbar spine not directly tender to palpate     Skin:     General: Skin is warm and dry. Comments: Some yellowish bruises of the posterior thorax without hematoma medial to the left scapular border   Neurological:      General: No focal deficit present. Mental Status: She is alert. Motor: No weakness. Psychiatric:         Mood and Affect: Mood normal.         Behavior: Behavior normal.           DIFFERENTIAL DIAGNOSIS:     Fall 2 days ago with no LOC but still has persistent headache    We will check CT of the brain and cervical spine    Some left chest contusions posteriorly though breathing is unlabored lungs clear    Right inguinal pain possible pelvic fracture versus acetabular fracture versus hip fracture    She has pain with weightbearing        DIAGNOSTIC RESULTS       RADIOLOGY: non-plain film images(s) such as CT, Ultrasound and MRI are read by the radiologist.  The patient had a 3 view X-ray of the pelvis and right hip which demonstrates degenerative change but no acute fracture    Lumbar spine x-ray 2 view shows kyphosis and degenerative disc disease but no fracture    Chest x-ray, 2 view shows lungs clear normal heart mediastinum no pneumonia or pneumothorax. CT scan x-ray degenerative changes with kyphoscoliosis    CT pelvis obtained was negative for any acetabular/pelvic/hip fracture. Thoracic spine x-ray, 3 view shows kyphoscoliosis but no acute fracture. Extensive degenerative changes    CT scan of the head was negative for skull fracture or intracranial bleeding.       [x] Visualized and interpreted by me   [x] Radiologist's Wet Read Report Reviewed   [] Discussed with Radiologist.    LABS:   Labs Reviewed - No data to display    EMERGENCY DEPARTMENT COURSE:   Vitals:

## 2020-10-04 NOTE — ED TRIAGE NOTES
Patient arrived to room 22 with c/o fall on Oct. 2, headache, and hip pain. Patient stated she fell in the bathroom on Friday trying to put her pants on. Patient stated she has been feeling dizzy but doesn't think it caused the fall. Patient stated she has headache and thinks she hit the back of her head. Patient is alert and oriented x4. Patient's family members are at bedside.

## 2020-10-04 NOTE — ED NOTES
ED nurse-to-nurse bedside report    Chief Complaint   Patient presents with    Hip Pain    Headache    Fall     10/2/2020 afternoon       LOC: alert and orientated to name, place, date  Vital signs   Vitals:    10/04/20 1041 10/04/20 1248   BP: (!) 152/66 135/67   Pulse: 87 82   Resp: 16 16   Temp: 97.8 °F (36.6 °C)    TempSrc: Oral    SpO2: 97% 99%   Weight: 110 lb (49.9 kg)    Height: 4' 10\" (1.473 m)       Pain: Headache and hip pain   Pain Interventions: n/a   Pain Goal: no pain  Oxygen: No    Current needs required none    Telemetry: No  LDAs:    Continuous Infusions:   Mobility: Requires assistance * 1  Barrios Fall Risk Score: Fall Risk 10/29/2019 8/21/2018 3/31/2017 3/20/2015   2 or more falls in past year? no no no no   Fall with injury in past year? no no no no     Fall Interventions: call light at bedside, fall precautions dicussed   Report given to:  Minor Posey RN  10/04/20 9319

## 2020-10-05 ENCOUNTER — TELEPHONE (OUTPATIENT)
Dept: FAMILY MEDICINE CLINIC | Age: 85
End: 2020-10-05

## 2020-10-07 ENCOUNTER — OFFICE VISIT (OUTPATIENT)
Dept: FAMILY MEDICINE CLINIC | Age: 85
End: 2020-10-07
Payer: MEDICARE

## 2020-10-07 VITALS
HEART RATE: 80 BPM | WEIGHT: 113.1 LBS | BODY MASS INDEX: 23.64 KG/M2 | DIASTOLIC BLOOD PRESSURE: 60 MMHG | SYSTOLIC BLOOD PRESSURE: 98 MMHG | RESPIRATION RATE: 16 BRPM | TEMPERATURE: 97.3 F

## 2020-10-07 PROCEDURE — 1090F PRES/ABSN URINE INCON ASSESS: CPT | Performed by: FAMILY MEDICINE

## 2020-10-07 PROCEDURE — 99214 OFFICE O/P EST MOD 30 MIN: CPT | Performed by: FAMILY MEDICINE

## 2020-10-07 PROCEDURE — G8484 FLU IMMUNIZE NO ADMIN: HCPCS | Performed by: FAMILY MEDICINE

## 2020-10-07 PROCEDURE — G8420 CALC BMI NORM PARAMETERS: HCPCS | Performed by: FAMILY MEDICINE

## 2020-10-07 PROCEDURE — 90694 VACC AIIV4 NO PRSRV 0.5ML IM: CPT | Performed by: FAMILY MEDICINE

## 2020-10-07 PROCEDURE — 1036F TOBACCO NON-USER: CPT | Performed by: FAMILY MEDICINE

## 2020-10-07 PROCEDURE — 4040F PNEUMOC VAC/ADMIN/RCVD: CPT | Performed by: FAMILY MEDICINE

## 2020-10-07 PROCEDURE — G0008 ADMIN INFLUENZA VIRUS VAC: HCPCS | Performed by: FAMILY MEDICINE

## 2020-10-07 PROCEDURE — G8400 PT W/DXA NO RESULTS DOC: HCPCS | Performed by: FAMILY MEDICINE

## 2020-10-07 PROCEDURE — 1123F ACP DISCUSS/DSCN MKR DOCD: CPT | Performed by: FAMILY MEDICINE

## 2020-10-07 PROCEDURE — G8427 DOCREV CUR MEDS BY ELIG CLIN: HCPCS | Performed by: FAMILY MEDICINE

## 2020-10-07 SDOH — ECONOMIC STABILITY: TRANSPORTATION INSECURITY
IN THE PAST 12 MONTHS, HAS THE LACK OF TRANSPORTATION KEPT YOU FROM MEDICAL APPOINTMENTS OR FROM GETTING MEDICATIONS?: NO

## 2020-10-07 SDOH — ECONOMIC STABILITY: FOOD INSECURITY: WITHIN THE PAST 12 MONTHS, YOU WORRIED THAT YOUR FOOD WOULD RUN OUT BEFORE YOU GOT MONEY TO BUY MORE.: NEVER TRUE

## 2020-10-07 SDOH — ECONOMIC STABILITY: INCOME INSECURITY: HOW HARD IS IT FOR YOU TO PAY FOR THE VERY BASICS LIKE FOOD, HOUSING, MEDICAL CARE, AND HEATING?: NOT HARD AT ALL

## 2020-10-07 SDOH — ECONOMIC STABILITY: TRANSPORTATION INSECURITY
IN THE PAST 12 MONTHS, HAS LACK OF TRANSPORTATION KEPT YOU FROM MEETINGS, WORK, OR FROM GETTING THINGS NEEDED FOR DAILY LIVING?: NO

## 2020-10-07 SDOH — ECONOMIC STABILITY: FOOD INSECURITY: WITHIN THE PAST 12 MONTHS, THE FOOD YOU BOUGHT JUST DIDN'T LAST AND YOU DIDN'T HAVE MONEY TO GET MORE.: NEVER TRUE

## 2020-10-07 ASSESSMENT — ENCOUNTER SYMPTOMS
GASTROINTESTINAL NEGATIVE: 1
PHOTOPHOBIA: 0
RESPIRATORY NEGATIVE: 1
VOMITING: 0
BACK PAIN: 1
NAUSEA: 0

## 2020-10-07 NOTE — PROGRESS NOTES
Visit Information    Have you changed or started any medications since your last visit including any over-the-counter medicines, vitamins, or herbal medicines? no   Are you having any side effects from any of your medications? -  no  Have you stopped taking any of your medications? Is so, why? -  yes - tx complete    Have you seen any other physician or provider since your last visit? No  Have you had any other diagnostic tests since your last visit? Yes - Records Obtained  Have you been seen in the emergency room and/or had an admission to a hospital since we last saw you? Yes - Records Obtained  Have you had your routine dental cleaning in the past 6 months? no    Have you activated your [employer] account? If not, what are your barriers? Yes      Patient Care Team:  Margie Rivera DO as PCP - General (Family Medicine)  Margie Rivera DO as PCP - [facility] Provider    Medical History Review  Past Medical, Family, and Social History reviewed and does contribute to the patient presenting condition    Health Maintenance   Topic Date Due    Shingles Vaccine (1 of 2) 12/21/1984    Annual Wellness Visit (AWV)  06/20/2019    Lipid screen  08/29/2019    TSH testing  08/29/2019    Flu vaccine (1) 09/01/2020    DTaP/Tdap/Td vaccine (2 - Td) 12/07/2022    DEXA (modify frequency per FRAX score)  Completed    Pneumococcal 65+ years Vaccine  Completed    Hepatitis A vaccine  Aged Out    Hepatitis B vaccine  Aged Out    Hib vaccine  Aged Out    Meningococcal (ACWY) vaccine  Aged Out       After obtaining consent, and per orders of Dr. Niko Lambert, injection of Fluad 0.5ml given in Left deltoid by Addison Mosqueda CMA(AAMA). Patient instructed to report any adverse reaction to me immediately. Pt tolerated injection well.     Immunizations Administered     Name Date Dose Route    Influenza, Quadv, adjuvanted, 65 yrs +, IM, PF (Fluad) 10/7/2020 0.5 mL Intramuscular    Site: Deltoid- Left    Lot: [id number]    NDC: 88347-019-92

## 2020-10-07 NOTE — PROGRESS NOTES
Subjective:      Patient ID: Boyd Gonzalez is a 80 y.o. female. HPI:    Chief Complaint   Patient presents with    Follow-up     [facility] ED 10/4/2020 fall     ED follow up. CHIEF COMPLAINT             Chief Complaint   Patient presents with    Hip Pain    Headache    Fall       10/2/2020 afternoon          Nurses Notes reviewed and I agree except as noted in the HPI.        HISTORY OF PRESENT ILLNESS    Boyd Gonzalez is a 80 y.o. female who presents from a fall     Location/Symptom: Fall  Timing/Onset: 10/2/2020  Context/Setting: Slip and fall in the bathroom on 10/2/2020  She landed on her right buttock region  She did hit her head  No LOC  Not taking anticoagulants  Quality: Some pain to weightbearing on the right hip groin region  Some headache  Impact on the head from the fall was left occipital  Some pain at the base of the neck  No pain in the arms  She does have chronic back pain which does not appear worse than baseline  No distal lower extremity pain  Duration: 2 days  Modifying Factors: None  Severity: 5/10    CTs showed no acute fracture. She continues to have some muscle and joint pains as well as HA. Denies double vision, NV. She is taking Tylenol which is helping. Pt has longstanding DDD in her neck and lumbar spine. Questions regarding management.   She has seen Dr. Merline Necessary for this in the past and would like another referral to a local pain specialist.      Patient Active Problem List   Diagnosis    Hypertension    Hyperlipidemia    GERD (gastroesophageal reflux disease)    Lumbar spondylosis    Hypothyroidism    Vertebro basilar insufficiency    Cervical spondylosis with myelopathy    Light headed    Stenosis, spinal, lumbar    TIA involving vertebral artery    Sacroiliac inflammation (HCC)    Trochanteric bursitis of right hip     Past Surgical History:   Procedure Laterality Date    ARTHROCENTESIS Right 4/12/2019    Right hip bursa steroid INJECTION  NO SEDATION DAILY (WITH BREAKFAST) 6/22/20  Yes Diego Alonso DO   lovastatin (MEVACOR) 20 MG tablet TAKE 1 TABLET BY MOUTH EVERY DAY AT NIGHT 6/8/20  Yes Diego Alonso DO   levothyroxine (SYNTHROID) 50 MCG tablet TAKE 1 TABLET BY MOUTH EVERY DAY 10/28/19  Yes Diego Alonso DO   celecoxib (CELEBREX) 200 MG capsule Take 1 capsule by mouth daily 7/16/19  Yes Diego Alonso DO   aspirin 81 MG EC tablet Take 81 mg by mouth daily   Yes Historical Provider, MD   Acetaminophen (TYLENOL ARTHRITIS EXT RELIEF PO) Take by mouth   Yes Historical Provider, MD   calcium carbonate-vitamin D (CALCIUM 600+D) 600-200 MG-UNIT TABS Take 1 tablet by mouth daily. Yes Historical Provider, MD   Multiple Vitamins-Minerals (THERAPEUTIC MULTIVITAMIN-MINERALS) tablet Take 1 tablet by mouth daily. Yes Historical Provider, MD   Cholecalciferol (VITAMIN D) 2000 UNITS CAPS capsule Take 1 capsule by mouth daily. Yes Historical Provider, MD   gabapentin (NEURONTIN) 300 MG capsule Take 2 caps in the am, 2 in the afternoon, 4 at bedtime 4/30/20 7/30/20  Diego Alonso DO       Review of Systems   Constitutional: Negative. HENT: Negative. Eyes: Negative for photophobia and visual disturbance. Respiratory: Negative. Cardiovascular: Negative. Gastrointestinal: Negative. Negative for nausea and vomiting. Musculoskeletal: Positive for back pain, neck pain and neck stiffness. Neurological: Positive for headaches. Negative for dizziness and light-headedness. All other systems reviewed and are negative. Objective:   Physical Exam  Vitals signs and nursing note reviewed. Constitutional:       Appearance: She is well-developed. HENT:      Head: Normocephalic and atraumatic. Right Ear: Tympanic membrane normal.      Left Ear: Tympanic membrane normal.   Neck:      Musculoskeletal: Neck supple. Vascular: No carotid bruit. Cardiovascular:      Rate and Rhythm: Normal rate and regular rhythm. Heart sounds: Normal heart sounds. No murmur. Pulmonary:      Effort: Pulmonary effort is normal.      Breath sounds: Normal breath sounds. Abdominal:      General: Bowel sounds are normal.      Palpations: Abdomen is soft. Musculoskeletal:      Cervical back: She exhibits decreased range of motion, tenderness and pain. She exhibits no bony tenderness. Lumbar back: She exhibits decreased range of motion, tenderness and pain. She exhibits no bony tenderness. Skin:     General: Skin is warm and dry. Neurological:      Mental Status: She is alert and oriented to person, place, and time. Psychiatric:         Behavior: Behavior normal.         Assessment:       Diagnosis Orders   1. Cervicogenic headache  [address] [facility], DO, Physical Medicine and [address], [place]   2. History of recent fall     3. Sacroiliitis, not elsewhere classified ([facility] [address]  [address] [facility], DO, Physical Medicine and [address], [place]   4. DDD (degenerative disc disease), cervical  [address] [facility], DO, Physical Medicine and [address], [place]   5. DDD (degenerative disc disease), lumbar  [address] [facility], DO, Physical Medicine and [address], [place]   6. Lumbar spondylosis  [facility], DO, Physical Medicine and [address], [place]   7. Cervical spondylosis with myelopathy  [address] [facility], DO, Physical Medicine and [address], [place]   8.  Need for influenza vaccination  INFLUENZA, QUADV, ADJUVANTED, 72 YRS =, IM, PF, PREFILL SYR, 0.5ML (FLUAD)           Plan:      -  ED reports reviewed  -  Continue current medications  -  Refer to PM&R for chronic pain concerns  -  Flu shot today  -  RTO prn        Masha Beltre DO

## 2020-10-08 ENCOUNTER — OFFICE VISIT (OUTPATIENT)
Dept: PHYSICAL MEDICINE AND REHAB | Age: 85
End: 2020-10-08
Payer: MEDICARE

## 2020-10-08 VITALS
SYSTOLIC BLOOD PRESSURE: 110 MMHG | TEMPERATURE: 97.1 F | DIASTOLIC BLOOD PRESSURE: 60 MMHG | WEIGHT: 113 LBS | HEIGHT: 58 IN | BODY MASS INDEX: 23.72 KG/M2

## 2020-10-08 PROCEDURE — G8420 CALC BMI NORM PARAMETERS: HCPCS | Performed by: ANESTHESIOLOGY

## 2020-10-08 PROCEDURE — G8427 DOCREV CUR MEDS BY ELIG CLIN: HCPCS | Performed by: ANESTHESIOLOGY

## 2020-10-08 PROCEDURE — 4040F PNEUMOC VAC/ADMIN/RCVD: CPT | Performed by: ANESTHESIOLOGY

## 2020-10-08 PROCEDURE — 1123F ACP DISCUSS/DSCN MKR DOCD: CPT | Performed by: ANESTHESIOLOGY

## 2020-10-08 PROCEDURE — 1090F PRES/ABSN URINE INCON ASSESS: CPT | Performed by: ANESTHESIOLOGY

## 2020-10-08 PROCEDURE — G8400 PT W/DXA NO RESULTS DOC: HCPCS | Performed by: ANESTHESIOLOGY

## 2020-10-08 PROCEDURE — G8484 FLU IMMUNIZE NO ADMIN: HCPCS | Performed by: ANESTHESIOLOGY

## 2020-10-08 PROCEDURE — 1036F TOBACCO NON-USER: CPT | Performed by: ANESTHESIOLOGY

## 2020-10-08 PROCEDURE — 99204 OFFICE O/P NEW MOD 45 MIN: CPT | Performed by: ANESTHESIOLOGY

## 2020-10-08 NOTE — PROGRESS NOTES
Chronic Pain Clinic Note     Encounter Date: 10/8/20    Subjective:   Chief Complaint:   Chief Complaint   Patient presents with    Follow-up       History of Present Illness:   Spencer Maria is a 80 y.o. female seen in the Pain Clinic initially on 10/08/20 upon request from Zack Florentino DO (PCP) for her history of low back pain. She has a longstanding history of cervical neck and low back pain. However, she sustained a fall while trying to put her pants on 10/2/2021 where she fell on her right buttocks. She states that since this episode she has been dealing with severe low back pain with radiation down the posterior thigh and slightly down the posterior calf. She does admit the pain radiates around to her groin. She states she has numbness and tingling however these are more in the toes. He does endorse some right leg weakness which she feels like this pain limited. Her pain is sharp and stabbing in nature and 5/10. Her pain is exacerbated with any sitting or laying on the affected side. Her pain is alleviated with rest and medication. She denies any focal leg weakness, saddle anesthesia, or bowel/bladder incontinence. She states she is currently been managing her pain with over-the-counter Tylenol arthritis. She has seen a couple pain specialist in the past including receiving spinal injections with Dr. Preethi Trimble and Dr. Anjum Griard.     History of Intervention:   Surgery: No previous lumbar surgeries  Injections: Previous lumbar epidurals-significant relief    Current Treatment Medications:   Gabapentin 600 mg / 1200 mg  Tylenol PRN    Historical Treatment Medications:   None    Imaging:  MRI L-spine    Past Medical History:   Diagnosis Date    Cerebral artery occlusion with cerebral infarction [facility]) 2012    tia    GERD (gastroesophageal reflux disease)     Hyperlipidemia     Hypertension     Hypothyroidism     Osteoarthritis     Sixth nerve palsy 2020       Past Surgical History:   Procedure Laterality Date    ARTHROCENTESIS Right 4/12/2019    Right hip bursa steroid INJECTION  NO SEDATION performed by Jordi Murillo MD at [address]  2003    COLONOSCOPY      ENDOSCOPY, COLON, DIAGNOSTIC      JOINT REPLACEMENT      left    KNEE ARTHROSCOPY  2010    left    LUMBAR NERVE BLOCK N/A 12/11/2018    LUMBAR INTER LAMINAR DAYANARA LESI #1@ L5 performed by Jordi Murillo MD at [facility] N/A 3/12/2019    LUMBAR INTER LAMINAR DAYANARA LESI @L5 performed by Jordi Murillo MD at [address] [facility] Right 8/30/2019    SI RFA  RIGHT SIDE performed by Jordi Murillo MD at [address] [facility] Left 10/1/2019    SI RFA  LEFT SIDE performed by Jordi Murillo MD at [address] [place] [address] Right 08/27/2018    SI RFA    NERVE SURGERY Left 09/11/2018    SI RFA    OTHER SURGICAL HISTORY      previous nerve blocks at [address] Right 5/22/2018    LUMBAR RFA RIGHT SIDE FIRST L3-4,4-5,5-S1 performed by Jordi Murillo MD at [address] [facility] Left 6/25/2018    LUMBAR RFA  LEFT SIDE @ L3-4,4-5,5-S1, performed by Jordi Murillo MD at [address] [place] [address],  IMAGE GUIDANCE, SINGLE Right 8/27/2018    SI RFA  RIGHT SIDE performed by Jordi Murillo MD at [address] [place] [address] IMAGE GUIDANCE, SINGLE Left 9/11/2018    SI RFA  LEFT SIDE performed by Jordi Murillo MD at [address] [facility]       Family History   Problem Relation Age of Onset    Cancer Mother         melanoma    Heart Disease Father     Stroke Sister        Social History     Socioeconomic History    Marital status:       Spouse name: Not on file    Number of children: 2    Years of education: 15    Highest education level: High school graduate   Occupational History    Not on file   Social Needs    Financial resource strain: Not hard at all   [employer] insecurity     Worry: Never true     Inability: Never true   [employer] needs     Medical: No     Non-medical: No   Tobacco Use    Smoking status: Never Smoker    Smokeless tobacco: Never Used   Substance and Sexual Activity    Alcohol use: No    Drug use: No    Sexual activity: Not on file   Lifestyle    Physical activity     Days per week: Not on file     Minutes per session: Not on file    Stress: Not on file   Relationships    Social connections     Talks on phone: Not on file     Gets together: Not on file     Attends Scientologist service: Not on file     Active member of club or organization: Not on file     Attends meetings of clubs or organizations: Not on file     Relationship status: Not on file    Intimate partner violence     Fear of current or ex partner: Not on file     Emotionally abused: Not on file     Physically abused: Not on file     Forced sexual activity: Not on file   Other Topics Concern    Not on file   Social History Narrative    Not on file       Medications & Allergies:   Current Outpatient Medications   Medication Instructions    Acetaminophen (TYLENOL ARTHRITIS EXT RELIEF PO) Oral    aspirin 81 mg, Oral, DAILY    calcium carbonate-vitamin D (CALCIUM 600+D) 600-200 MG-UNIT TABS 1 tablet, DAILY    Cholecalciferol (VITAMIN D) 2000 UNITS CAPS capsule 1 capsule, DAILY    gabapentin (NEURONTIN) 300 MG capsule Take 2 caps in the am, 2 in the afternoon, 4 at bedtime    gabapentin (NEURONTIN) 600 MG tablet TAKE 1 TABLET IN  THE      MORNING, 1 TABLET IN THE   AFTERNOON,  AND2 TABLETS  AT BEDTIME    levothyroxine (SYNTHROID) 50 MCG tablet TAKE 1 TABLET BY MOUTH EVERY DAY    lovastatin (MEVACOR) 20 MG tablet TAKE 1 TABLET BY MOUTH EVERY DAY AT NIGHT    Multiple Vitamins-Minerals (THERAPEUTIC MULTIVITAMIN-MINERALS) tablet 1 tablet, DAILY    omeprazole (PRILOSEC) 40 mg, Oral, DAILY WITH BREAKFAST       Allergies   Allergen Reactions    Cataflam [Diclofenac Potassium] Shortness Of Breath    Phenergan [Promethazine Hcl]      Confusion \"didn't know where she was at\"       Review of Systems:   Constitutional: negative   Eyes: negative   Ears, nose, mouth, throat, and face: negative   Respiratory: negative   Cardiovascular: negative   Gastrointestinal: negative for DM  Genitourinary: negative for bowel/bladder incontinence   Hematologic/lymphatic: negative   Musculoskeletal: low back pain  Neurological: numbness/tingling in toes of feet  Behavioral/Psych: negative for anxiety/depression   Allergic/Immunologic: negative     Objective:     Vitals:    10/08/20 1329   BP: 110/60   Temp: 97.1 °F (36.2 °C)       Constitutional: Pleasant, no acute distress   Head: Normocephalic, atraumatic   Eyes: Conjunctivae normal   Neck: Supple, symmetrical   Lungs: Normal respiratory effort, non-labored breathing   Cardiovascular: Limbs warm and well perfused   Abdomen: Non-protruded   Musculoskeletal: Muscle bulk symmetric, no atrophy, no gross deformities   · Lower Extremities: ROM WNL. · Thorax: Scoliosis appreciated on exam  · Lumbar Spine: Lumbar paraspinals tender bilaterally. SLR neg bilaterally. HANNAH positive on right. GAENSLEN positive on right. Negative facet loading bilaterally. Right SI joints tender to palpation. Bilateral greater trochanters non-tender to palpation. Neurological: Cranial nerves II-XII grossly intact. · Gait - Antalgic gait. Ambulates without assist device.    · Motor: 5/5 muscle strength in bilateral hip flexion, knee flexion, knee extension, dorsiflexion, and plantar flexion   · Sensory: LT sensation intact in lower limbs   · Reflexes: 1+ symmetrical in bilateral achilles, 1+ bilateral patellar, negative ankle clonus  Skin: No rashes or lesions present   Psychological: Cooperative, no exaggerated pain behaviors       Assessment:    Diagnosis Orders   1. Chronic right SI joint pain  CHG FLUOR NEEDLE/CATH SPINE/PARASPINAL DX/THER ADDON    IN INJECT SI JOINT ARTHRGRPHY&/ANES/STEROID W/IMAGE    FLUORO FOR SURGICAL PROCEDURES   2. Other chronic pain     3. Chronic right-sided low back pain with right-sided sciatica     4. Spinal stenosis of lumbar region without neurogenic claudication         Kyle Melton is a 80 y. o.female presenting to the pain clinic for evaluation of right-sided low back pain. Her clinical history physical examination are consistent with right SI joint dysfunction/pain. She also has a potential radicular pain component particularly in the L5 distribution. We will start with a right SI joint injection. She may have some residual radicular leg pain after the injection. I will follow-up in 4 weeks for early reevaluation for potential investigation of a transforaminal injection to help with this radicular leg pain. In addition we could potentially make some medication recommendations if she is not getting relief from injections. Plan: The following treatment recommendations and plan were discussed in detail with Kyle Melton and daughter    Imaging:   I have reviewed patients imaging of MRI L-spine and XRs and results were discussed with patient today. Analgesics:   Patient is taking Acetaminophen. Patient informed that the maximum amount of acetaminophen taken on a regular basis should only be 4000 mg per day. Adjuvants: In light of the presence of a neuropathic component of pain, the patient can continue her Gabapentin. Interventions: With physical examination consistent with right SI joint pain/dysfunction, we will proceed with a right SI joint injection. Patient wants to proceed with this injection. Anticoagulation/NPO Recommendations:   Patient does not need to hold any medications for the procedure.    Patient DOES NOT need to

## 2020-10-08 NOTE — PATIENT INSTRUCTIONS
The following treatment recommendations and plan were discussed in detail with Racquel Anderson and wife. Imaging:   I have reviewed patients imaging of MRI L-spine and XRs and results were discussed with patient today. Analgesics:   Patient is taking Acetaminophen. Patient informed that the maximum amount of acetaminophen taken on a regular basis should only be 4000 mg per day. Adjuvants: In light of the presence of a neuropathic component of pain, the patient can continue her Gabapentin. Interventions: With physical examination consistent with right SI joint pain/dysfunction, we will proceed with a right SI joint injection. Patient wants to proceed with this injection. Anticoagulation/NPO Recommendations:   Patient does not need to hold any medications for the procedure. Patient DOES NOT need to hold any medications for the procedure. Multidisciplinary Pain Management:   In the presence of complex, chronic, and multi-factorial pain, the importance of a multidisciplinary approach to pain management in the patients management regimen was emphasized and discussed in great detail.      Referrals:  None    Prescriptions Written This Visit:   None    Follow-up: For Right SI joint injection, 4 weeks

## 2020-10-12 ENCOUNTER — TELEPHONE (OUTPATIENT)
Dept: PHYSICAL MEDICINE AND REHAB | Age: 85
End: 2020-10-12

## 2020-10-12 NOTE — TELEPHONE ENCOUNTER
Pt's Daughter Pippa-(LATASHA) called office. Procedure and follow up cancelled. Currently in [place] [address] for a possible TIA. Will call back to reschedule.

## 2020-10-20 ENCOUNTER — TELEPHONE (OUTPATIENT)
Dept: FAMILY MEDICINE CLINIC | Age: 85
End: 2020-10-20

## 2020-10-20 RX ORDER — LEVOTHYROXINE SODIUM 0.05 MG/1
TABLET ORAL
Qty: 90 TABLET | Refills: 3 | Status: SHIPPED | OUTPATIENT
Start: 2020-10-20 | End: 2021-10-18

## 2020-10-20 NOTE — TELEPHONE ENCOUNTER
Attempted to notify pt, left vm. Anuj Clifford with [facility] [phone] was notified and voiced understanding.

## 2020-10-20 NOTE — TELEPHONE ENCOUNTER
Jamin with [facility] called pt was admitted to there services on Saturday 10/17/2020 for post TIA for PT and speech therapy. Pt tested positive for COVID on 10/13/2020. Speech therapy was ordered due to pt having swallowing difficulty post TIA, they do NOT work with any positive COVID pt's PT is currently working with pt. Pt was admitted to [facility] she was visiting her daughter at the time of symptom onset. Pt's quarantine ends 10/26/2020 speech therapy will start services then (ok to wait until then ?). [facility] would like a call back with advice.

## 2020-10-20 NOTE — TELEPHONE ENCOUNTER
Ok to wait on ST until quarantine is over, may not need at that time. Please notify pt regarding delay in services.

## 2020-11-02 ENCOUNTER — OFFICE VISIT (OUTPATIENT)
Dept: FAMILY MEDICINE CLINIC | Age: 85
End: 2020-11-02
Payer: MEDICARE

## 2020-11-02 ENCOUNTER — TELEPHONE (OUTPATIENT)
Dept: PHYSICAL MEDICINE AND REHAB | Age: 85
End: 2020-11-02

## 2020-11-02 VITALS
HEART RATE: 80 BPM | BODY MASS INDEX: 23.01 KG/M2 | TEMPERATURE: 98.2 F | WEIGHT: 110.1 LBS | RESPIRATION RATE: 12 BRPM | SYSTOLIC BLOOD PRESSURE: 112 MMHG | DIASTOLIC BLOOD PRESSURE: 60 MMHG

## 2020-11-02 PROCEDURE — G8484 FLU IMMUNIZE NO ADMIN: HCPCS | Performed by: FAMILY MEDICINE

## 2020-11-02 PROCEDURE — G8420 CALC BMI NORM PARAMETERS: HCPCS | Performed by: FAMILY MEDICINE

## 2020-11-02 PROCEDURE — 1036F TOBACCO NON-USER: CPT | Performed by: FAMILY MEDICINE

## 2020-11-02 PROCEDURE — 1090F PRES/ABSN URINE INCON ASSESS: CPT | Performed by: FAMILY MEDICINE

## 2020-11-02 PROCEDURE — G8400 PT W/DXA NO RESULTS DOC: HCPCS | Performed by: FAMILY MEDICINE

## 2020-11-02 PROCEDURE — G8427 DOCREV CUR MEDS BY ELIG CLIN: HCPCS | Performed by: FAMILY MEDICINE

## 2020-11-02 PROCEDURE — 1123F ACP DISCUSS/DSCN MKR DOCD: CPT | Performed by: FAMILY MEDICINE

## 2020-11-02 PROCEDURE — 99214 OFFICE O/P EST MOD 30 MIN: CPT | Performed by: FAMILY MEDICINE

## 2020-11-02 PROCEDURE — 4040F PNEUMOC VAC/ADMIN/RCVD: CPT | Performed by: FAMILY MEDICINE

## 2020-11-02 NOTE — PROGRESS NOTES
Subjective:      Patient ID: Kan Lopez is a 80 y.o. female. HPI:    Chief Complaint   Patient presents with    Follow-Up from [facility] up. She is feeling much better. Per pt, she was in the hospital from 10/11-10/16 for AMS. Work up essentially negative from neuro standpoint, dx'd with with TIA. Pt was also dx'd with COVID on 10/13 and treated with steroids, antiviral and plasma. Out of quarantine on 10/26. She is feeling better with the exception some fatigue and weakness. She is back home now and is more active. Weight is down 3 lbs. Wt Readings from Last 3 Encounters:   11/02/20 110 lb 1.6 oz (49.9 kg)   10/08/20 113 lb (51.3 kg)   10/07/20 113 lb 1.6 oz (51.3 kg)     BP controlled. BP Readings from Last 3 Encounters:   11/02/20 112/60   10/08/20 110/60   10/07/20 98/60     Denies recent fevers. No cough, chest tightness, SOB. She was seen by Dr. Will Caro who would like to do an injection. Per note this am, requests Neuro clearance prior to injection.       Patient Active Problem List   Diagnosis    Hypertension    Hyperlipidemia    GERD (gastroesophageal reflux disease)    Lumbar spondylosis    Hypothyroidism    Vertebro basilar insufficiency    Cervical spondylosis with myelopathy    Light headed    Stenosis, spinal, lumbar    TIA involving vertebral artery    [address]roiliac inflammation ([facility] [address]    Trochanteric bursitis of right hip     Past Surgical History:   Procedure Laterality Date    ARTHROCENTESIS Right 4/12/2019    Right hip bursa steroid INJECTION  NO SEDATION performed by Cammie Ahuja MD at [address] [place] [address]  2003    COLONOSCOPY      ENDOSCOPY, COLON, DIAGNOSTIC      JOINT REPLACEMENT      left    KNEE ARTHROSCOPY  2010    left    LUMBAR NERVE BLOCK N/A 12/11/2018    LUMBAR INTER LAMINAR DAYANARA LESI #1@ L5 performed by Cammie Ahuja MD at [facility] N/A 3/12/2019 LUMBAR INTER LAMINAR DAYANARA LESI @L5 performed by Rylie Jauregui MD at [address] Right 8/30/2019    SI RFA  RIGHT SIDE performed by Rylie Jauregui MD at [address] Left 10/1/2019    SI RFA  LEFT SIDE performed by Rylie Jauregui MD at [address] Right 08/27/2018    SI RFA    NERVE SURGERY Left 09/11/2018    SI RFA    OTHER SURGICAL HISTORY      previous nerve blocks at [address] Right 5/22/2018    LUMBAR RFA RIGHT SIDE FIRST L3-4,4-5,5-S1 performed by Rylie Jauregui MD at [address] Left 6/25/2018    LUMBAR RFA  LEFT SIDE @ L3-4,4-5,5-S1, performed by Rylie Jauregui MD at [address] [place] [address] IMAGE GUIDANCE, SINGLE Right 8/27/2018    SI RFA  RIGHT SIDE performed by Rylie Jauregui MD at [address] [place] [address] IMAGE GUIDANCE, SINGLE Left 9/11/2018    SI RFA  LEFT SIDE performed by Rylie Jauregui MD at [address] [place] [address]     Prior to Admission medications    Medication Sig Start Date End Date Taking?  Authorizing Provider   LISINOPRIL PO Take 1 tablet by mouth daily   Yes Historical Provider, MD   Clopidogrel Bisulfate (PLAVIX PO) Take by mouth   Yes Historical Provider, MD   levothyroxine (SYNTHROID) 50 MCG tablet TAKE 1 TABLET BY MOUTH EVERY DAY 10/20/20  Yes Sukumar Escalera, DO   gabapentin (NEURONTIN) 600 MG tablet TAKE 1 TABLET IN  THE      MORNING, 1 TABLET IN THE   AFTERNOON,  AND2 TABLETS  AT BEDTIME 9/9/20 9/10/21 Yes Sukumar Escalera, DO   omeprazole (PRILOSEC) 40 MG delayed release capsule TAKE 1 CAPSULE BY MOUTH DAILY (WITH BREAKFAST) 6/22/20  Yes Sukumar Escalera, DO   lovastatin (MEVACOR) 20 MG tablet TAKE 1 TABLET BY MOUTH EVERY DAY AT NIGHT 6/8/20  Yes Liliana Marsh, DO   aspirin 81 MG EC tablet Take 81 mg by mouth daily   Yes Historical Provider, MD   Acetaminophen (TYLENOL ARTHRITIS EXT RELIEF PO) Take by mouth   Yes Historical Provider, MD   calcium carbonate-vitamin D (CALCIUM 600+D) 600-200 MG-UNIT TABS Take 1 tablet by mouth daily. Yes Historical Provider, MD   Multiple Vitamins-Minerals (THERAPEUTIC MULTIVITAMIN-MINERALS) tablet Take 1 tablet by mouth daily. Yes Historical Provider, MD   Cholecalciferol (VITAMIN D) 2000 UNITS CAPS capsule Take 1 capsule by mouth daily. Yes Historical Provider, MD   gabapentin (NEURONTIN) 300 MG capsule Take 2 caps in the am, 2 in the afternoon, 4 at bedtime 4/30/20 7/30/20  Liliana Marsh, DO       Review of Systems   Constitutional: Positive for fatigue. HENT: Negative. Respiratory: Negative. Cardiovascular: Negative. Gastrointestinal: Negative. Musculoskeletal: Negative. All other systems reviewed and are negative. Objective:   Physical Exam  Vitals signs and nursing note reviewed. Constitutional:       General: She is not in acute distress. Appearance: Normal appearance. She is well-developed. HENT:      Head: Normocephalic and atraumatic. Right Ear: Tympanic membrane normal.      Left Ear: Tympanic membrane normal.   Eyes:      Conjunctiva/sclera: Conjunctivae normal.   Neck:      Musculoskeletal: Neck supple. Cardiovascular:      Rate and Rhythm: Normal rate and regular rhythm. Heart sounds: Normal heart sounds. No murmur. Pulmonary:      Effort: Pulmonary effort is normal.      Breath sounds: Normal breath sounds. No wheezing, rhonchi or rales. Abdominal:      General: There is no distension. Skin:     General: Skin is warm and dry. Findings: No rash (on exposed surfaces). Neurological:      General: No focal deficit present. Mental Status: She is alert.    Psychiatric:         Attention and Perception: Attention normal.         Mood and Affect: Mood normal.         Speech: Speech normal.         Behavior: Behavior normal. Behavior is cooperative. Thought Content: Thought content normal.         Judgment: Judgment normal.         Assessment:       Diagnosis Orders   1. Altered mental status, unspecified altered mental status type     2. TIA (transient ischemic attack)  Mike Tao MD, Neurology, [facility]   3. COVID-19     4. Sacroiliitis, not elsewhere classified ([facility] [address]     5. Lumbar spondylosis     6. Essential hypertension     7.  Hypothyroidism, unspecified type             Plan:      [facility] correspondence reviewed  -  Chronic medical problems stable  -  Continue current medications  -  Follow up with specialists as scheduled  -  Referral to Neuro placed for procedure clearance  -  RTO prn        Sam Murphy, DO

## 2020-11-02 NOTE — TELEPHONE ENCOUNTER
Spoke with pt regarding recent hospitalization. She stated she was in with a TIA and on 10- tested positive for covid 19. Per Dr. Ilda Mcarthur, need to get neuro clearance before scheduling procedure.

## 2020-11-02 NOTE — PROGRESS NOTES
Visit Information    Have you changed or started any medications since your last visit including any over-the-counter medicines, vitamins, or herbal medicines? yes - see updated med list   Are you having any side effects from any of your medications? -  no  Have you stopped taking any of your medications? Is so, why? -  no    Have you seen any other physician or provider since your last visit? Yes - Records Obtained  Have you had any other diagnostic tests since your last visit? Yes - Records Obtained  Have you been seen in the emergency room and/or had an admission to a hospital since we last saw you? Yes - Records Obtained  Have you had your routine dental cleaning in the past 6 months? n/a    Have you activated your [employer] account? If not, what are your barriers?  Yes     Patient Care Team:  Krystal Benavides DO as PCP - General (Family Medicine)  Krystal Benavides DO as PCP - [facility] Provider    Medical History Review  Past Medical, Family, and Social History reviewed and does contribute to the patient presenting condition    Health Maintenance   Topic Date Due    Shingles Vaccine (1 of 2) 12/21/1984    Potassium monitoring  03/21/2016    Creatinine monitoring  03/21/2016    Annual Wellness Visit (AWV)  06/20/2019    Lipid screen  08/29/2019    TSH testing  08/29/2019    DTaP/Tdap/Td vaccine (2 - Td) 12/07/2022    DEXA (modify frequency per FRAX score)  Completed    Flu vaccine  Completed    Pneumococcal 65+ years Vaccine  Completed    Hepatitis A vaccine  Aged Out    Hepatitis B vaccine  Aged Out    Hib vaccine  Aged Out    Meningococcal (ACWY) vaccine  Aged Out

## 2020-11-03 ASSESSMENT — ENCOUNTER SYMPTOMS
GASTROINTESTINAL NEGATIVE: 1
RESPIRATORY NEGATIVE: 1

## 2020-11-11 RX ORDER — CLOPIDOGREL BISULFATE 75 MG/1
75 TABLET ORAL DAILY
Qty: 90 TABLET | Refills: 3 | Status: SHIPPED | OUTPATIENT
Start: 2020-11-11 | End: 2021-10-27

## 2020-11-11 RX ORDER — LISINOPRIL 10 MG/1
10 TABLET ORAL DAILY
Qty: 90 TABLET | Refills: 3 | Status: SHIPPED | OUTPATIENT
Start: 2020-11-11 | End: 2021-10-18

## 2020-11-11 RX ORDER — LISINOPRIL 10 MG/1
10 TABLET ORAL DAILY
Qty: 30 TABLET | Refills: 3 | OUTPATIENT
Start: 2020-11-11

## 2020-11-11 RX ORDER — CLOPIDOGREL BISULFATE 75 MG/1
75 TABLET ORAL DAILY
Qty: 30 TABLET | Refills: 3 | OUTPATIENT
Start: 2020-11-11

## 2020-11-12 ENCOUNTER — TELEPHONE (OUTPATIENT)
Dept: FAMILY MEDICINE CLINIC | Age: 85
End: 2020-11-12

## 2020-11-12 RX ORDER — PANTOPRAZOLE SODIUM 40 MG/1
40 TABLET, DELAYED RELEASE ORAL
Qty: 90 TABLET | Refills: 3 | Status: SHIPPED | OUTPATIENT
Start: 2020-11-12 | End: 2021-11-03

## 2020-11-12 NOTE — TELEPHONE ENCOUNTER
Pt on VM stating that [facility] [address] states pt should NOT take prilosec 40 mg with plavix. Pt would like a call back with advice.

## 2020-12-01 ENCOUNTER — TELEPHONE (OUTPATIENT)
Dept: PHYSICAL MEDICINE AND REHAB | Age: 85
End: 2020-12-01

## 2020-12-01 ENCOUNTER — TELEPHONE (OUTPATIENT)
Dept: FAMILY MEDICINE CLINIC | Age: 85
End: 2020-12-01

## 2020-12-01 NOTE — TELEPHONE ENCOUNTER
Pt. Called office requesting that we schedule procedure. Advised her that per Dr. Ronald Tomas request we obtain a neuro clearance prior to scheduling. Upset because she is in a lot of pain and has to use a heating pad all night. Pt. has appointment with Neuro 12/16/2020 and I informed her that I will send a clearance at that time. Is there anything else we can offer until we receive the clearance?  Thanks

## 2020-12-01 NOTE — TELEPHONE ENCOUNTER
Mrs. Ariadna Mcdonnell called the neuro/pain office. States that she's in a lot of pain. She was told that she can't have another procedure with Dr Ami Caballero until she's cleared by the neuro office d/t the recent TIA. Per Dr Cornelio Haas office, the decision to wait until after a neuro consult is up to Dr Karl Pennington. When I told Sandiemyron Garcia this, she asked me to tell Dr Karl Pennington that she's in a lot of pain, would like a procedure, but her neuro appt isn't until  12/16/20. She'd like to talk to someone in his office about this message.  [phone]  Melida Pete doesn't have any sooner openings, but Sandie Radha is on the wait list)

## 2020-12-03 ENCOUNTER — OFFICE VISIT (OUTPATIENT)
Dept: PHYSICAL MEDICINE AND REHAB | Age: 85
End: 2020-12-03
Payer: MEDICARE

## 2020-12-03 VITALS
WEIGHT: 110 LBS | SYSTOLIC BLOOD PRESSURE: 110 MMHG | BODY MASS INDEX: 23.09 KG/M2 | DIASTOLIC BLOOD PRESSURE: 58 MMHG | HEIGHT: 58 IN

## 2020-12-03 PROCEDURE — 4040F PNEUMOC VAC/ADMIN/RCVD: CPT | Performed by: ANESTHESIOLOGY

## 2020-12-03 PROCEDURE — G8420 CALC BMI NORM PARAMETERS: HCPCS | Performed by: ANESTHESIOLOGY

## 2020-12-03 PROCEDURE — 1036F TOBACCO NON-USER: CPT | Performed by: ANESTHESIOLOGY

## 2020-12-03 PROCEDURE — G8427 DOCREV CUR MEDS BY ELIG CLIN: HCPCS | Performed by: ANESTHESIOLOGY

## 2020-12-03 PROCEDURE — 1090F PRES/ABSN URINE INCON ASSESS: CPT | Performed by: ANESTHESIOLOGY

## 2020-12-03 PROCEDURE — G8400 PT W/DXA NO RESULTS DOC: HCPCS | Performed by: ANESTHESIOLOGY

## 2020-12-03 PROCEDURE — 99213 OFFICE O/P EST LOW 20 MIN: CPT | Performed by: ANESTHESIOLOGY

## 2020-12-03 PROCEDURE — G8484 FLU IMMUNIZE NO ADMIN: HCPCS | Performed by: ANESTHESIOLOGY

## 2020-12-03 PROCEDURE — 1123F ACP DISCUSS/DSCN MKR DOCD: CPT | Performed by: ANESTHESIOLOGY

## 2020-12-03 NOTE — PROGRESS NOTES
Chronic Pain Clinic Note     Encounter Date: 12/3/20    Subjective:   Chief Complaint:   Chief Complaint   Patient presents with    Follow-up       History of Present Illness:   Rob Morelos is a 80 y.o. female seen in the Pain Clinic initially on 10/8/20 upon request from Vanessa Matute DO (PCP) for her history of low back pain. She has a longstanding history of cervical neck and low back pain. However, she sustained a fall while trying to put her pants on 10/2/2021 where she fell on her right buttocks. She states that since this episode she has been dealing with severe low back pain with radiation down the posterior thigh and slightly down the posterior calf. She does admit the pain radiates around to her groin. She states she has numbness and tingling however these are more in the toes. He does endorse some right leg weakness which she feels like this pain limited. Her pain is sharp and stabbing in nature and 5/10. Her pain is exacerbated with any sitting or laying on the affected side. Her pain is alleviated with rest and medication. She denies any focal leg weakness, saddle anesthesia, or bowel/bladder incontinence. She states she is currently been managing her pain with over-the-counter Tylenol arthritis. She has seen a couple pain specialist in the past including receiving spinal injections with Dr. Ansel Gottron and Dr. Kristy Hamman. Today, 12/8/2020, patient presents for plan follow-up for her chronic low back pain. She was slated to have a right SI joint injection with me on 10/13/2020 but had to cancel due to pain admitted to the hospital for concern of TIA. She was worked up by neurology and found to have a true CVA was placed on Plavix. She was also found to have pneumonia and Covid. she states that overall since her discharge she has been doing very well. She does have a follow-up with neurology. she is continuing to endorse severe right hip pain with radiation from the buttocks to the lateral hip and down the posterior thigh. She denies any shortness of breath or fevers over the last couple days. She does endorse a cough that she has been dealing with since discharge but this is remained stable. History of Intervention:   Surgery: No previous lumbar surgeries  Injections: Previous lumbar epidurals-significant relief    Current Treatment Medications:   Gabapentin 600 mg / 1200 mg  Tylenol PRN    Historical Treatment Medications:   None    Imaging:  MRI L-spine (10/4/20)    LUMBAR SPINE 2 VIEWS:         CLINICAL INFORMATION: fall         COMPARISON: No prior study.         TECHNIQUE: Standard AP and lateral views of lumbar spine were obtained.                   Impression    1. Mild thoracolumbar dextro scoliosis. Moderate lumbar levoscoliosis. Cannot exclude muscle spasm. 2. Multifocal marked disc space narrowing and degenerative disc disease throughout the lumbar spine. Moderately severe degenerative vertebral body spondylosis scattered throughout the lumbar spine. 3. No fracture acute is seen. Mild degenerative changes right sacroiliac joint.         Past Medical History:   Diagnosis Date    Cerebral artery occlusion with cerebral infarction [facility]) 2012    tia    GERD (gastroesophageal reflux disease)     Hyperlipidemia     Hypertension     Hypothyroidism     Osteoarthritis     Sixth nerve palsy 2020       Past Surgical History:   Procedure Laterality Date    ARTHROCENTESIS Right 4/12/2019    Right hip bursa steroid INJECTION  NO SEDATION performed by Daisy Mujica MD at [address] [place] [address]  2003    COLONOSCOPY      ENDOSCOPY, COLON, DIAGNOSTIC      JOINT REPLACEMENT      left    KNEE ARTHROSCOPY  2010    left    LUMBAR NERVE BLOCK N/A 12/11/2018    LUMBAR INTER LAMINAR DAYANARA LESI #1@ L5 performed by Daisy Mujica MD at [facility] N/A 3/12/2019    LUMBAR INTER LAMINAR DAYANARA LESI @L5 performed by Elizabeth Franco MD [place] at [address] [facility] Right 8/30/2019    SI RFA  RIGHT SIDE performed by Yvette Beaver MD at [address] [facility] Left 10/1/2019    SI RFA  LEFT SIDE performed by Yvette Beaver MD at [address] [place] [address] Right 08/27/2018    SI RFA    NERVE SURGERY Left 09/11/2018    SI RFA    OTHER SURGICAL HISTORY      previous nerve blocks at [address] Right 5/22/2018    LUMBAR RFA RIGHT SIDE FIRST L3-4,4-5,5-S1 performed by Yvette Beaver MD at [address] Left 6/25/2018    LUMBAR RFA  LEFT SIDE @ L3-4,4-5,5-S1, performed by Yvette Beaver MD at [facility],  IMAGE GUIDANCE, SINGLE Right 8/27/2018    SI RFA  RIGHT SIDE performed by Yvette Beaver MD at [facility] OR SACRAL, W IMAGE GUIDANCE, SINGLE Left 9/11/2018    SI RFA  LEFT SIDE performed by Yvette Beaver MD at [address] [place] [address]       Family History   Problem Relation Age of Onset    Cancer Mother         melanoma    Heart Disease Father     Stroke Sister        Social History     Socioeconomic History    Marital status:       Spouse name: Not on file    Number of children: 2    Years of education: 15    Highest education level: High school graduate   Occupational History    Not on file   Social Needs    Financial resource strain: Not hard at all   [employer] insecurity     Worry: Never true     Inability: Never true   [employer] needs     Medical: No     Non-medical: No   Tobacco Use    Smoking status: Never Smoker    Smokeless tobacco: Never Used   Substance and Sexual Activity    Alcohol use: No    Drug use: No    Sexual activity: Not on file   Lifestyle    Physical activity     Days per week: Not on file     Minutes per session: Not on file    Stress: Not on file   Relationships    Social connections     Talks on phone: Not on file     Gets together: Not on file     Attends Sabianism service: Not on file     Active member of club or organization: Not on file     Attends meetings of clubs or organizations: Not on file     Relationship status: Not on file    Intimate partner violence     Fear of current or ex partner: Not on file     Emotionally abused: Not on file     Physically abused: Not on file     Forced sexual activity: Not on file   Other Topics Concern    Not on file   Social History Narrative    Not on file       Medications & Allergies:   Current Outpatient Medications   Medication Instructions    Acetaminophen (TYLENOL ARTHRITIS EXT RELIEF PO) Oral    aspirin 81 mg, Oral, DAILY    calcium carbonate-vitamin D (CALCIUM 600+D) 600-200 MG-UNIT TABS 1 tablet, DAILY    Cholecalciferol (VITAMIN D) 2000 UNITS CAPS capsule 1 capsule, DAILY    clopidogrel (PLAVIX) 75 mg, Oral, DAILY    gabapentin (NEURONTIN) 300 MG capsule Take 2 caps in the am, 2 in the afternoon, 4 at bedtime    gabapentin (NEURONTIN) 600 MG tablet TAKE 1 TABLET IN  THE      MORNING, 1 TABLET IN THE   AFTERNOON,  AND2 TABLETS  AT BEDTIME    levothyroxine (SYNTHROID) 50 MCG tablet TAKE 1 TABLET BY MOUTH EVERY DAY    lisinopril (PRINIVIL;ZESTRIL) 10 mg, Oral, DAILY    lovastatin (MEVACOR) 20 MG tablet TAKE 1 TABLET BY MOUTH EVERY DAY AT NIGHT    Multiple Vitamins-Minerals (THERAPEUTIC MULTIVITAMIN-MINERALS) tablet 1 tablet, DAILY    pantoprazole (PROTONIX) 40 mg, Oral, DAILY WITH BREAKFAST       Allergies   Allergen Reactions    Cataflam [Diclofenac Potassium] Shortness Of Breath    Phenergan [Promethazine Hcl]      Confusion \"didn't know where she was at\"       Review of Systems:   Constitutional: denies any fevers or chills  Respiratory: admits to cough but denies SOB   Genitourinary: negative for bowel/bladder incontinence   Musculoskeletal: low back pain  Neurological: numbness/tingling in toes of feet  Behavioral/Psych: negative for anxiety/depression     Objective:     Vitals:    12/03/20 1610   BP: (!) 110/58       Constitutional: Pleasant, no acute distress   Head: Normocephalic, atraumatic   Eyes: Conjunctivae normal   Neck: Supple, symmetrical   Lungs: Normal respiratory effort, non-labored breathing   Cardiovascular: Limbs warm and well perfused   Abdomen: Non-protruded   Musculoskeletal: Muscle bulk symmetric, no atrophy, no gross deformities   · Lower Extremities: ROM WNL. · Thorax: Scoliosis appreciated on exam  · Lumbar Spine: Lumbar paraspinals tender bilaterally. SLR neg bilaterally. HANNAH positive on right. GAENSLEN positive on right. Negative facet loading bilaterally. Right SI joints tender to palpation. Neurological: Cranial nerves II-XII grossly intact. · Gait - Antalgic gait. Ambulates without assist device. · Motor: No focal motor deficit  · Sensory: LT sensation intact in lower limbs   · Reflexes: 1+ symmetrical in bilateral achilles, 1+ bilateral patellar, negative ankle clonus  Skin: No rashes or lesions visibile in lower limbs  Psychological: Cooperative, no exaggerated pain behaviors       Assessment:    Diagnosis Orders   1. Chronic right SI joint pain  CHG FLUOR NEEDLE/CATH SPINE/PARASPINAL DX/THER ADDON    FLUORO FOR SURGICAL PROCEDURES    ME INJECT SI JOINT ARTHRGRPHY&/ANES/STEROID W/IMAGE   2. Other chronic pain     3. Chronic right-sided low back pain with right-sided sciatica     4. Spinal stenosis of lumbar region without neurogenic claudication         Lyssa Keith is a 80 y. o.female presenting to the pain clinic for evaluation of right-sided low back pain. Her clinical history physical examination are consistent with right SI joint dysfunction/pain. She also has a potential radicular pain component particularly in the L5 distribution.   We will start with a right SI joint injection. She may have some residual radicular leg pain after the injection. She unfortunately missed her injection day with me due to being in the hospital for TIA/pneumonia secondary to Covid. She continues to have right SI joint pain/dysfunction, and she is neurologically intact with no signs of infection or recent antibiotic use. I have set her up for a right SI joint injection. Plan: The following treatment recommendations and plan were discussed in detail with Lei Renner. Imaging:   I have reviewed patients imaging of MRI L-spine and XRs and results were discussed with patient today. Analgesics:   Patient is taking Acetaminophen. Patient informed that the maximum amount of acetaminophen taken on a regular basis should only be 4000 mg per day. Adjuvants: In light of the presence of a neuropathic component of pain, the patient can continue her Gabapentin. Interventions: With physical examination consistent with right SI joint pain/dysfunction, we will proceed with a right SI joint injection. Patient wants to proceed with this injection. Anticoagulation/NPO Recommendations:   Patient does not need to hold any medications for the procedure. Patient DOES NOT need to be NPO for the procedure. Multidisciplinary Pain Management:   In the presence of complex, chronic, and multi-factorial pain, the importance of a multidisciplinary approach to pain management in the patients management regimen was emphasized and discussed in great detail.      Referrals:  None    Prescriptions Written This Visit:   None    Follow-up: For Right SI joint injection      Verónica Rosenberg, DO  Interventional Pain Management/PM&R   [place]

## 2020-12-03 NOTE — PATIENT INSTRUCTIONS
The following treatment recommendations and plan were discussed in detail with Mer Mendoza. Imaging:   I have reviewed patients imaging of MRI L-spine and XRs and results were discussed with patient today. Analgesics:   Patient is taking Acetaminophen. Patient informed that the maximum amount of acetaminophen taken on a regular basis should only be 4000 mg per day. Adjuvants: In light of the presence of a neuropathic component of pain, the patient can continue her Gabapentin. Interventions: With physical examination consistent with right SI joint pain/dysfunction, we will proceed with a right SI joint injection. Patient wants to proceed with this injection. Anticoagulation/NPO Recommendations:   Patient does not need to hold any medications for the procedure. Patient DOES NOT need to be NPO for the procedure. Multidisciplinary Pain Management:   In the presence of complex, chronic, and multi-factorial pain, the importance of a multidisciplinary approach to pain management in the patients management regimen was emphasized and discussed in great detail.      Referrals:  None    Prescriptions Written This Visit:   None    Follow-up: For Right SI joint injection

## 2020-12-03 NOTE — PROGRESS NOTES
[facility]  [address]  Dept: [phone]  Dept Fax: [phone]: [phone]    Visit Date: 12/3/2020    Functionality Assessment/Goals Worksheet     On a scale of 0 (Does not Interfere) to 10 (Completely Interferes)     1. Which number describes how during the past week pain has interfered with       the following:  A. General Activity:  6  B. Mood: 8  C. Walking Ability:  7  D. Normal Work (Includes both work outside the home and housework):  7  E. Relations with Other People:   8  F. Sleep:   3  G. Enjoyment of Life:   5    2. Patient Prefers to Take their Pain Medications:     [x]  On a regular basis   []  Only when necessary    []  Does not take pain medications    3. What are the Patient's Goals/Expectations for Visiting Pain Management?      []  Learn about my pain    []  Receive Medication   []  Physical Therapy     []  Treat Depression   [x]  Receive Injections    []  Treat Sleep   []  Deal with Anxiety and Stress   []  Treat Opoid Dependence/Addiction   [x]  Other:  Heating pad- night

## 2020-12-04 NOTE — TELEPHONE ENCOUNTER
We do not need neurological clearance for an SI joint injection, and the patient is doing very well from my standpoint and safe to proceed with an injection.       Srini King, DO  Interventional Pain Management/PM&R   [place]

## 2020-12-08 ENCOUNTER — APPOINTMENT (OUTPATIENT)
Dept: GENERAL RADIOLOGY | Age: 85
End: 2020-12-08
Attending: ANESTHESIOLOGY
Payer: MEDICARE

## 2020-12-08 ENCOUNTER — HOSPITAL ENCOUNTER (OUTPATIENT)
Age: 85
Setting detail: OUTPATIENT SURGERY
Discharge: HOME | End: 2020-12-08
Attending: ANESTHESIOLOGY | Admitting: ANESTHESIOLOGY
Payer: MEDICARE

## 2020-12-08 VITALS
BODY MASS INDEX: 23.09 KG/M2 | HEIGHT: 58 IN | TEMPERATURE: 97.3 F | OXYGEN SATURATION: 98 % | HEART RATE: 74 BPM | SYSTOLIC BLOOD PRESSURE: 136 MMHG | WEIGHT: 110 LBS | DIASTOLIC BLOOD PRESSURE: 61 MMHG | RESPIRATION RATE: 18 BRPM

## 2020-12-08 PROCEDURE — 7100000010 HC PHASE II RECOVERY - FIRST 15 MIN: Performed by: ANESTHESIOLOGY

## 2020-12-08 PROCEDURE — 6360000002 HC RX W HCPCS: Performed by: ANESTHESIOLOGY

## 2020-12-08 PROCEDURE — 6360000004 HC RX CONTRAST MEDICATION: Performed by: ANESTHESIOLOGY

## 2020-12-08 PROCEDURE — 3209999900 FLUORO FOR SURGICAL PROCEDURES

## 2020-12-08 PROCEDURE — 27096 INJECT SACROILIAC JOINT: CPT | Performed by: ANESTHESIOLOGY

## 2020-12-08 PROCEDURE — 2500000003 HC RX 250 WO HCPCS: Performed by: ANESTHESIOLOGY

## 2020-12-08 PROCEDURE — 3600000052 HC PAIN LEVEL 2 BASE: Performed by: ANESTHESIOLOGY

## 2020-12-08 PROCEDURE — 2709999900 HC NON-CHARGEABLE SUPPLY: Performed by: ANESTHESIOLOGY

## 2020-12-08 RX ORDER — METHYLPREDNISOLONE ACETATE 40 MG/ML
INJECTION, SUSPENSION INTRA-ARTICULAR; INTRALESIONAL; INTRAMUSCULAR; SOFT TISSUE PRN
Status: DISCONTINUED | OUTPATIENT
Start: 2020-12-08 | End: 2020-12-08 | Stop reason: ALTCHOICE

## 2020-12-08 RX ORDER — LIDOCAINE HYDROCHLORIDE 10 MG/ML
INJECTION, SOLUTION EPIDURAL; INFILTRATION; INTRACAUDAL; PERINEURAL PRN
Status: DISCONTINUED | OUTPATIENT
Start: 2020-12-08 | End: 2020-12-08 | Stop reason: ALTCHOICE

## 2020-12-08 RX ORDER — BUPIVACAINE HYDROCHLORIDE 5 MG/ML
INJECTION, SOLUTION PERINEURAL PRN
Status: DISCONTINUED | OUTPATIENT
Start: 2020-12-08 | End: 2020-12-08 | Stop reason: ALTCHOICE

## 2020-12-08 ASSESSMENT — PAIN DESCRIPTION - LOCATION: LOCATION: HIP

## 2020-12-08 ASSESSMENT — PAIN DESCRIPTION - PAIN TYPE: TYPE: CHRONIC PAIN

## 2020-12-08 ASSESSMENT — PAIN DESCRIPTION - ORIENTATION: ORIENTATION: RIGHT

## 2020-12-08 ASSESSMENT — PAIN SCALES - GENERAL: PAINLEVEL_OUTOF10: 5

## 2020-12-08 NOTE — PROGRESS NOTES
1414- Pt arrived to PACU phase 2 local, IV is in place though, Cullen Maurer RN at bedside  1415- Pt didn't want anything to eat or drink  1416- Ride called  1420- IV removed  1423- Pt discharged taken out to car with this RN.

## 2020-12-08 NOTE — H&P
Today, patient presents for planned right SI joint injection. This note is reflective of the patient's previous visit for evaluation. We will proceed with today's planned procedure. Since patient's last visit for evaluation, there have been no interval changes in medical history. Patient has no new numbness, weakness, or focal neurological deficit since evaluation. Patient has no contraindications to injection (no anticoagulation, recent antibiotic intake for active infections, allergies to latex / contrast dye / steroid medications), and has a  present. NPO necessity has been assessed and accepted based on procedure complexity. The risks and benefits of the procedure have been explained including but are not limited to infection, bleeding, paralysis, immediate post procedure weakness, and dizziness; the patient acknowledges understanding and desires to proceed with the procedure. Consented for same procedure. All other questions and concerns were addressed at bedside. See procedure note for full details. Vitals:    12/08/20 1410   BP:    Pulse: 79   Resp: 18   Temp:    SpO2: 98%      Diagnosis Orders   1. Chronic right SI joint pain  CHG FLUOR NEEDLE/CATH SPINE/PARASPINAL DX/THER ADDON    FLUORO FOR SURGICAL PROCEDURES    IN INJECT SI JOINT ARTHRGRPHY&/ANES/STEROID W/IMAGE   2. Other chronic pain     3. Chronic right-sided low back pain with right-sided sciatica     4. Spinal stenosis of lumbar region without neurogenic claudication         Follow up: 2/24/21    Post procedure Instructions: The patient was advised not to drive during the day of the procedure and not to engage in any significant decision making. The patient was also advised to be cautious with walking/activity for 24 hours following today's visit and asked not to engage in over-exertion. After this time, it is ok to resume pre-procedure level of activity.  Patient advised to apply ice to site of injection in situations of pain and discomfort. Patient advised to not submerge site of injection during bath or pool activities for approximately 48 hours post-procedure. Patient attested to understanding post procedure directions / restrictions. All other questions and concerns addressed before patient discharge in ambulatory fashion. Chronic Pain Clinic Note     Encounter Date: 12/3/20    Subjective:   Chief Complaint:   No chief complaint on file. History of Present Illness:   Kaylah Alonzo is a 80 y.o. female seen in the Pain Clinic initially on 10/8/20 upon request from Ivette Cox DO (PCP) for her history of low back pain. She has a longstanding history of cervical neck and low back pain. However, she sustained a fall while trying to put her pants on 10/2/2021 where she fell on her right buttocks. She states that since this episode she has been dealing with severe low back pain with radiation down the posterior thigh and slightly down the posterior calf. She does admit the pain radiates around to her groin. She states she has numbness and tingling however these are more in the toes. He does endorse some right leg weakness which she feels like this pain limited. Her pain is sharp and stabbing in nature and 5/10. Her pain is exacerbated with any sitting or laying on the affected side. Her pain is alleviated with rest and medication. She denies any focal leg weakness, saddle anesthesia, or bowel/bladder incontinence. She states she is currently been managing her pain with over-the-counter Tylenol arthritis. She has seen a couple pain specialist in the past including receiving spinal injections with Dr. Andrade Balderrama and Dr. Lu Iqbal. Today, 12/8/2020, patient presents for plan follow-up for her chronic low back pain. She was slated to have a right SI joint injection with me on 10/13/2020 but had to cancel due to pain admitted to the hospital for concern of TIA.   She was worked up by neurology and found to have a true CVA was placed on Plavix. She was also found to have pneumonia and Covid. she states that overall since her discharge she has been doing very well. She does have a follow-up with neurology. she is continuing to endorse severe right hip pain with radiation from the buttocks to the lateral hip and down the posterior thigh. She denies any shortness of breath or fevers over the last couple days. She does endorse a cough that she has been dealing with since discharge but this is remained stable. History of Intervention:   Surgery: No previous lumbar surgeries  Injections: Previous lumbar epidurals-significant relief    Current Treatment Medications:   Gabapentin 600 mg / 1200 mg  Tylenol PRN    Historical Treatment Medications:   None    Imaging:  MRI L-spine (10/4/20)    LUMBAR SPINE 2 VIEWS:         CLINICAL INFORMATION: fall         COMPARISON: No prior study.         TECHNIQUE: Standard AP and lateral views of lumbar spine were obtained.                   Impression    1. Mild thoracolumbar dextro scoliosis. Moderate lumbar levoscoliosis. Cannot exclude muscle spasm. 2. Multifocal marked disc space narrowing and degenerative disc disease throughout the lumbar spine. Moderately severe degenerative vertebral body spondylosis scattered throughout the lumbar spine. 3. No fracture acute is seen. Mild degenerative changes right sacroiliac joint.         Past Medical History:   Diagnosis Date    Cerebral artery occlusion with cerebral infarction [facility]) 2012    tia    GERD (gastroesophageal reflux disease)     Hyperlipidemia     Hypertension     Hypothyroidism     Osteoarthritis     Sixth nerve palsy 2020       Past Surgical History:   Procedure Laterality Date    ARTHROCENTESIS Right 4/12/2019    Right hip bursa steroid INJECTION  NO SEDATION performed by Sis Storey MD at [address] [place] [address]  2003    COLONOSCOPY      ENDOSCOPY, COLON, DIAGNOSTIC      JOINT REPLACEMENT      left  KNEE ARTHROSCOPY  2010    left    LUMBAR NERVE BLOCK N/A 12/11/2018    LUMBAR INTER LAMINAR DAYANARA LESI #1@ L5 performed by [facility]la Sierra MD at [facility] N/A 3/12/2019    LUMBAR INTER LAMINAR DAYANARA LESI @L5 performed by Cayla Sierra MD at [facility] Right 8/30/2019    SI RFA  RIGHT SIDE performed by Cayla Sierra MD at [address] [facility] Left 10/1/2019    SI RFA  LEFT SIDE performed by Cayla Sierra MD at [address] [place] [address] Right 08/27/2018    SI RFA    NERVE SURGERY Left 09/11/2018    SI RFA    OTHER SURGICAL HISTORY      previous nerve blocks at [address] [address]  Right 5/22/2018    LUMBAR RFA RIGHT SIDE FIRST L3-4,4-5,5-S1 performed by Cayla Sierra MD at [address] [facility] Left 6/25/2018    LUMBAR RFA  LEFT SIDE @ L3-4,4-5,5-S1, performed by Cayla Sierra MD at [address] [place] [address] IMAGE GUIDANCE, SINGLE Right 8/27/2018    SI RFA  RIGHT SIDE performed by Cayla Sierra MD at [address] [place] [address] IMAGE GUIDANCE, SINGLE Left 9/11/2018    SI RFA  LEFT SIDE performed by Cayla Sierra MD at [address] [place] [address]       Family History   Problem Relation Age of Onset    Cancer Mother         melanoma    Heart Disease Father     Stroke Sister        Social History     Socioeconomic History    Marital status:       Spouse name: Not on file    Number of children: 2    Years of education: 15    Highest education level: High school graduate   Occupational History    Not on file   Social Needs    Financial resource strain: Not hard at all   [employer] insecurity     Worry: Never true     Inability: Never true   Obdulia Her Transportation needs     Medical: No     Non-medical: No   Tobacco Use    Smoking status: Never Smoker    Smokeless tobacco: Never Used   Substance and Sexual Activity    Alcohol use: No    Drug use: No    Sexual activity: Not on file   Lifestyle    Physical activity     Days per week: Not on file     Minutes per session: Not on file    Stress: Not on file   Relationships    Social connections     Talks on phone: Not on file     Gets together: Not on file     Attends Mandaeism service: Not on file     Active member of club or organization: Not on file     Attends meetings of clubs or organizations: Not on file     Relationship status: Not on file    Intimate partner violence     Fear of current or ex partner: Not on file     Emotionally abused: Not on file     Physically abused: Not on file     Forced sexual activity: Not on file   Other Topics Concern    Not on file   Social History Narrative    Not on file       Medications & Allergies:   Current Outpatient Medications   Medication Instructions    Acetaminophen (TYLENOL ARTHRITIS EXT RELIEF PO) Oral    aspirin 81 mg, Oral, DAILY    calcium carbonate-vitamin D (CALCIUM 600+D) 600-200 MG-UNIT TABS 1 tablet, DAILY    Cholecalciferol (VITAMIN D) 2000 UNITS CAPS capsule 1 capsule, DAILY    clopidogrel (PLAVIX) 75 mg, Oral, DAILY    gabapentin (NEURONTIN) 300 MG capsule Take 2 caps in the am, 2 in the afternoon, 4 at bedtime    gabapentin (NEURONTIN) 600 MG tablet TAKE 1 TABLET IN  THE      MORNING, 1 TABLET IN THE   AFTERNOON,  AND2 TABLETS  AT BEDTIME    levothyroxine (SYNTHROID) 50 MCG tablet TAKE 1 TABLET BY MOUTH EVERY DAY    lisinopril (PRINIVIL;ZESTRIL) 10 mg, Oral, DAILY    lovastatin (MEVACOR) 20 MG tablet TAKE 1 TABLET BY MOUTH EVERY DAY AT NIGHT    Multiple Vitamins-Minerals (THERAPEUTIC MULTIVITAMIN-MINERALS) tablet 1 tablet, DAILY    pantoprazole (PROTONIX) 40 mg, Oral, DAILY WITH BREAKFAST       Allergies   Allergen Reactions    Cataflam [Diclofenac Potassium] Shortness Of Breath    Phenergan [Promethazine Hcl]      Confusion \"didn't know where she was at\"       Review of Systems:   Constitutional: denies any fevers or chills  Respiratory: admits to cough but denies SOB   Genitourinary: negative for bowel/bladder incontinence   Musculoskeletal: low back pain  Neurological: numbness/tingling in toes of feet  Behavioral/Psych: negative for anxiety/depression     Objective: There were no vitals filed for this visit. Constitutional: Pleasant, no acute distress   Head: Normocephalic, atraumatic   Eyes: Conjunctivae normal   Neck: Supple, symmetrical   Lungs: Normal respiratory effort, non-labored breathing   Cardiovascular: Limbs warm and well perfused   Abdomen: Non-protruded   Musculoskeletal: Muscle bulk symmetric, no atrophy, no gross deformities   · Lower Extremities: ROM WNL. · Thorax: Scoliosis appreciated on exam  · Lumbar Spine: Lumbar paraspinals tender bilaterally. SLR neg bilaterally. HANNAH positive on right. GAENSLEN positive on right. Negative facet loading bilaterally. Right SI joints tender to palpation. Neurological: Cranial nerves II-XII grossly intact. · Gait - Antalgic gait. Ambulates without assist device. · Motor: No focal motor deficit  · Sensory: LT sensation intact in lower limbs   · Reflexes: 1+ symmetrical in bilateral achilles, 1+ bilateral patellar, negative ankle clonus  Skin: No rashes or lesions visibile in lower limbs  Psychological: Cooperative, no exaggerated pain behaviors       Assessment:    Diagnosis Orders   1. Chronic right SI joint pain  CHG FLUOR NEEDLE/CATH SPINE/PARASPINAL DX/THER ADDON    FLUORO FOR SURGICAL PROCEDURES    DC INJECT SI JOINT ARTHRGRPHY&/ANES/STEROID W/IMAGE   2. Other chronic pain     3. Chronic right-sided low back pain with right-sided sciatica     4. Spinal stenosis of lumbar region without neurogenic claudication         Kan Lopez is a 80 y. o.female presenting to the pain clinic for evaluation of right-sided low back pain. Her clinical history physical examination are consistent with right SI joint dysfunction/pain. She also has a potential radicular pain component particularly in the L5 distribution. We will start with a right SI joint injection. She may have some residual radicular leg pain after the injection. She unfortunately missed her injection day with me due to being in the hospital for TIA/pneumonia secondary to Covid. She continues to have right SI joint pain/dysfunction, and she is neurologically intact with no signs of infection or recent antibiotic use. I have set her up for a right SI joint injection. Plan: The following treatment recommendations and plan were discussed in detail with Fausto Armstrong. Imaging:   I have reviewed patients imaging of MRI L-spine and XRs and results were discussed with patient today. Analgesics:   Patient is taking Acetaminophen. Patient informed that the maximum amount of acetaminophen taken on a regular basis should only be 4000 mg per day. Adjuvants: In light of the presence of a neuropathic component of pain, the patient can continue her Gabapentin. Interventions: With physical examination consistent with right SI joint pain/dysfunction, we will proceed with a right SI joint injection. Patient wants to proceed with this injection. Anticoagulation/NPO Recommendations:   Patient does not need to hold any medications for the procedure. Patient DOES NOT need to be NPO for the procedure. Multidisciplinary Pain Management:   In the presence of complex, chronic, and multi-factorial pain, the importance of a multidisciplinary approach to pain management in the patients management regimen was emphasized and discussed in great detail.      Referrals:  None    Prescriptions Written This Visit:   None    Follow-up: For Right SI joint injection      Hunter Mondragon DO  Interventional Pain Management/PM&R   [place]

## 2020-12-08 NOTE — PROCEDURES
Pre-operative Diagnosis:  Right SI joint pain     Post-operative Diagnosis: Right SI joint pain     Procedure: Right SI joint injection     Procedure Description:  After having signed the informed consent, the patient was placed in the prone position. The patient's back was prepped with chloraprep solution, and draped in a sterile fashion. A total of 1 ml of 1% lidocaine were used to anesthetize the skin and underlying tissues. Under fluoroscopic guidance a single 22-gauge, 3.5 inch spinal needle was advanced to lie within the inferior pole of the right sacroiliac joint. There were no paresthesias or heme aspiration. Needle placement was confirmed in the AP view. After negative aspiration, 0.5 ml of Omnipaque 300 contrast was injected with appropriate spread observed. A total of 4 ml of 0.5% bupivicaine mixed with 40 mg depo-medrol were injected into the sacroiliac joint. The needle was withdrawn without any complications. The patient tolerated the procedure well, was transported to the recovery room and observed for 15 minutes and discharged in an ambulatory fashion. No immediate reported complications.     Procedural Complications: None      Hunter Roldan, DO  Interventional Pain Management/PM&R   [place]

## 2021-01-26 ENCOUNTER — TELEPHONE (OUTPATIENT)
Dept: FAMILY MEDICINE CLINIC | Age: 86
End: 2021-01-26

## 2021-01-26 NOTE — TELEPHONE ENCOUNTER
The patient called in and stated that she had a TIA in October 2020 and was referred to [facility] Neurology. She stated that they attempted to do a VV with her in the fall and that she is not able to do those. She stated that she had a in person appt for tomorrow and they had just called her and changed it to a VV. She states that she is doing fine and is wondering if she needs to continue to see Neuro since they are not physically seeing her anyway Please advise.

## 2021-01-29 ENCOUNTER — OFFICE VISIT (OUTPATIENT)
Dept: FAMILY MEDICINE CLINIC | Age: 86
End: 2021-01-29
Payer: MEDICARE

## 2021-01-29 VITALS
HEART RATE: 71 BPM | SYSTOLIC BLOOD PRESSURE: 118 MMHG | RESPIRATION RATE: 16 BRPM | DIASTOLIC BLOOD PRESSURE: 60 MMHG | WEIGHT: 111.1 LBS | BODY MASS INDEX: 23.22 KG/M2 | TEMPERATURE: 96.6 F

## 2021-01-29 DIAGNOSIS — M53.3 CHRONIC SACROILIAC JOINT PAIN: ICD-10-CM

## 2021-01-29 DIAGNOSIS — M65.342 TRIGGER RING FINGER OF LEFT HAND: Primary | ICD-10-CM

## 2021-01-29 DIAGNOSIS — R41.3 MEMORY LOSS: ICD-10-CM

## 2021-01-29 DIAGNOSIS — M47.816 LUMBAR SPONDYLOSIS: ICD-10-CM

## 2021-01-29 DIAGNOSIS — M46.1 SACROILIITIS, NOT ELSEWHERE CLASSIFIED (HCC): ICD-10-CM

## 2021-01-29 DIAGNOSIS — G89.29 CHRONIC SACROILIAC JOINT PAIN: ICD-10-CM

## 2021-01-29 PROCEDURE — G8484 FLU IMMUNIZE NO ADMIN: HCPCS | Performed by: FAMILY MEDICINE

## 2021-01-29 PROCEDURE — 1123F ACP DISCUSS/DSCN MKR DOCD: CPT | Performed by: FAMILY MEDICINE

## 2021-01-29 PROCEDURE — G8420 CALC BMI NORM PARAMETERS: HCPCS | Performed by: FAMILY MEDICINE

## 2021-01-29 PROCEDURE — 4040F PNEUMOC VAC/ADMIN/RCVD: CPT | Performed by: FAMILY MEDICINE

## 2021-01-29 PROCEDURE — 1090F PRES/ABSN URINE INCON ASSESS: CPT | Performed by: FAMILY MEDICINE

## 2021-01-29 PROCEDURE — G8427 DOCREV CUR MEDS BY ELIG CLIN: HCPCS | Performed by: FAMILY MEDICINE

## 2021-01-29 PROCEDURE — 1036F TOBACCO NON-USER: CPT | Performed by: FAMILY MEDICINE

## 2021-01-29 PROCEDURE — 99214 OFFICE O/P EST MOD 30 MIN: CPT | Performed by: FAMILY MEDICINE

## 2021-01-29 ASSESSMENT — PATIENT HEALTH QUESTIONNAIRE - PHQ9
SUM OF ALL RESPONSES TO PHQ9 QUESTIONS 1 & 2: 0
2. FEELING DOWN, DEPRESSED OR HOPELESS: 0
SUM OF ALL RESPONSES TO PHQ QUESTIONS 1-9: 0
1. LITTLE INTEREST OR PLEASURE IN DOING THINGS: 0

## 2021-01-29 NOTE — PROGRESS NOTES
Pernell Coy (:  1934) is a 80 y.o. female,Established patient, here for evaluation of the following chief complaint(s):  Trigger finger (left hand digit 3 discuss treatment )      SUBJECTIVE/OBJECTIVE:  HPI:    Chief Complaint   Patient presents with    Trigger finger     left hand digit 3 discuss treatment      Pt presents today with multiple concerns. Pt c/o left 3rd digit pain for the last few months. NKI. No redness but has notice swelling. The finger will get stuck in the flexed position at times. It is affecting her ADLs. Pt also has questions about Prevagen. She is struggling with her memory over the last few years. Mainly short-term memory issues. She does have a hx of TIA which is likely the cause as well as age. 3rd concern today is her back. Continues to have chronic LBP/SI joint pain. S/P injection that did help but has not totally reliever her pain.     Patient Active Problem List   Diagnosis    Hypertension    Hyperlipidemia    GERD (gastroesophageal reflux disease)    Lumbar spondylosis    Hypothyroidism    Vertebro basilar insufficiency    Cervical spondylosis with myelopathy    Light headed    Stenosis, spinal, lumbar    TIA involving vertebral artery    Sacroiliac inflammation ([address]    Trochanteric bursitis of right hip     Past Surgical History:   Procedure Laterality Date    ARTHROCENTESIS Right 2019    Right hip bursa steroid INJECTION  NO SEDATION performed by Cristy Cordova MD at [facility]      COLONOSCOPY      ENDOSCOPY, COLON, DIAGNOSTIC      HC INJECTION PROCEDURE FOR SACROILIAC JOINT Right 2020    Right SI joint injection performed by Trell Brown DO at [address] [facility]      left    KNEE ARTHROSCOPY      left    LUMBAR NERVE BLOCK N/A 2018    LUMBAR INTER LAMINAR DAYANARA LESI #1@ L5 performed by Cristy Cordova MD at [facility] LUMBAR NERVE BLOCK N/A 3/12/2019    LUMBAR INTER LAMINAR DAYANARA LESI @L5 performed by Rehan Billingsley MD at [address] Right 8/30/2019    SI RFA  RIGHT SIDE performed by Rehan Billingsley MD at [address] Left 10/1/2019    SI RFA  LEFT SIDE performed by Rehan Billingsley MD at [address] Right 08/27/2018    SI RFA    NERVE SURGERY Left 09/11/2018    SI RFA    OTHER SURGICAL HISTORY      previous nerve blocks at [address] Right 5/22/2018    LUMBAR RFA RIGHT SIDE FIRST L3-4,4-5,5-S1 performed by Rehan Billingsley MD at [address] Left 6/25/2018    LUMBAR RFA  LEFT SIDE @ L3-4,4-5,5-S1, performed by Rehan Billingsley MD at [address] [place] [address] IMAGE GUIDANCE, SINGLE Right 8/27/2018    SI RFA  RIGHT SIDE performed by Rehan Billingsley MD at [address] [place] [address] IMAGE GUIDANCE, SINGLE Left 9/11/2018    SI RFA  LEFT SIDE performed by Rehan Billingsley MD at [address] [facility]     Prior to Admission medications    Medication Sig Start Date End Date Taking?  Authorizing Provider   pantoprazole (PROTONIX) 40 MG tablet Take 1 tablet by mouth daily (with breakfast) 11/12/20  Yes Mari Carlos,    clopidogrel (PLAVIX) 75 MG tablet Take 1 tablet by mouth daily 11/11/20  Yes Mari Carlos,    lisinopril (PRINIVIL;ZESTRIL) 10 MG tablet Take 1 tablet by mouth daily 11/11/20  Yes Mari Carlos DO   levothyroxine (SYNTHROID) 50 MCG tablet TAKE 1 TABLET BY MOUTH EVERY DAY 10/20/20  Yes Mari Carlos, DO   gabapentin (NEURONTIN) 600 MG tablet TAKE 1 TABLET IN  THE      MORNING, 1 TABLET IN THE   AFTERNOON,  AND2 TABLETS  AT BEDTIME 9/9/20 9/10/21 Yes Brian Coley, DO   lovastatin (MEVACOR) 20 MG tablet TAKE 1 TABLET BY MOUTH EVERY DAY AT NIGHT 6/8/20  Yes Brian Coley, DO   aspirin 81 MG EC tablet Take 81 mg by mouth daily   Yes Historical Provider, MD   Acetaminophen (TYLENOL ARTHRITIS EXT RELIEF PO) Take by mouth   Yes Historical Provider, MD   calcium carbonate-vitamin D (CALCIUM 600+D) 600-200 MG-UNIT TABS Take 1 tablet by mouth daily. Yes Historical Provider, MD   Multiple Vitamins-Minerals (THERAPEUTIC MULTIVITAMIN-MINERALS) tablet Take 1 tablet by mouth daily. Yes Historical Provider, MD   Cholecalciferol (VITAMIN D) 2000 UNITS CAPS capsule Take 1 capsule by mouth daily. Yes Historical Provider, MD       Review of Systems   Constitutional: Negative. HENT: Negative. Respiratory: Negative. Cardiovascular: Negative. Gastrointestinal: Negative. Musculoskeletal: Positive for arthralgias (left 3rd digit pain) and back pain. Neurological:        Memory loss   All other systems reviewed and are negative. Physical Exam  Vitals signs and nursing note reviewed. Constitutional:       General: She is not in acute distress. Appearance: Normal appearance. She is well-developed. HENT:      Head: Normocephalic and atraumatic. Right Ear: Tympanic membrane normal.      Left Ear: Tympanic membrane normal.   Eyes:      Conjunctiva/sclera: Conjunctivae normal.   Neck:      Musculoskeletal: Neck supple. Cardiovascular:      Rate and Rhythm: Normal rate and regular rhythm. Heart sounds: Normal heart sounds. No murmur. Pulmonary:      Effort: Pulmonary effort is normal.      Breath sounds: Normal breath sounds. No wheezing, rhonchi or rales. Abdominal:      General: There is no distension. Musculoskeletal:        Hands:    Skin:     General: Skin is warm and dry. Findings: No rash (on exposed surfaces). Neurological:      General: No focal deficit present. Mental Status: She is alert.    Psychiatric: Attention and Perception: Attention normal.         Mood and Affect: Mood normal.         Speech: Speech normal.         Behavior: Behavior normal. Behavior is cooperative. Thought Content: Thought content normal.         Judgment: Judgment normal.         ASSESSMENT/PLAN:  1. Trigger ring finger of left hand  -     AFL - Juan R Dorado DO, Orthopedic Surgery, [facility]  2. Memory loss  3. Lumbar spondylosis  4. Chronic sacroiliac joint pain  5. Sacroiliitis, not elsewhere classified ([facility] [address]    -  Multiple concerns today, each addressed at length  -  Chronic medical problems stable  -  Continue current medications  -  Follow up with specialists as scheduled  -  Refer to [facility] for #1  -  Memory games encouraged for #2      Return if symptoms worsen or fail to improve. An electronic signature was used to authenticate this note.     --Annie Green DO

## 2021-01-30 ASSESSMENT — ENCOUNTER SYMPTOMS
GASTROINTESTINAL NEGATIVE: 1
RESPIRATORY NEGATIVE: 1
BACK PAIN: 1

## 2021-02-24 ENCOUNTER — OFFICE VISIT (OUTPATIENT)
Dept: PHYSICAL MEDICINE AND REHAB | Age: 86
End: 2021-02-24
Payer: MEDICARE

## 2021-02-24 VITALS
HEIGHT: 58 IN | DIASTOLIC BLOOD PRESSURE: 56 MMHG | BODY MASS INDEX: 23.3 KG/M2 | TEMPERATURE: 97.5 F | WEIGHT: 111 LBS | SYSTOLIC BLOOD PRESSURE: 112 MMHG

## 2021-02-24 DIAGNOSIS — G89.29 CHRONIC RIGHT-SIDED LOW BACK PAIN WITH RIGHT-SIDED SCIATICA: ICD-10-CM

## 2021-02-24 DIAGNOSIS — M48.061 SPINAL STENOSIS OF LUMBAR REGION WITHOUT NEUROGENIC CLAUDICATION: ICD-10-CM

## 2021-02-24 DIAGNOSIS — G89.29 OTHER CHRONIC PAIN: ICD-10-CM

## 2021-02-24 DIAGNOSIS — M54.41 CHRONIC RIGHT-SIDED LOW BACK PAIN WITH RIGHT-SIDED SCIATICA: ICD-10-CM

## 2021-02-24 DIAGNOSIS — M70.71 ISCHIAL BURSITIS OF RIGHT SIDE: Primary | ICD-10-CM

## 2021-02-24 PROCEDURE — 1036F TOBACCO NON-USER: CPT | Performed by: ANESTHESIOLOGY

## 2021-02-24 PROCEDURE — G8427 DOCREV CUR MEDS BY ELIG CLIN: HCPCS | Performed by: ANESTHESIOLOGY

## 2021-02-24 PROCEDURE — 1123F ACP DISCUSS/DSCN MKR DOCD: CPT | Performed by: ANESTHESIOLOGY

## 2021-02-24 PROCEDURE — 4040F PNEUMOC VAC/ADMIN/RCVD: CPT | Performed by: ANESTHESIOLOGY

## 2021-02-24 PROCEDURE — 99214 OFFICE O/P EST MOD 30 MIN: CPT | Performed by: ANESTHESIOLOGY

## 2021-02-24 PROCEDURE — G8420 CALC BMI NORM PARAMETERS: HCPCS | Performed by: ANESTHESIOLOGY

## 2021-02-24 PROCEDURE — G8484 FLU IMMUNIZE NO ADMIN: HCPCS | Performed by: ANESTHESIOLOGY

## 2021-02-24 PROCEDURE — 1090F PRES/ABSN URINE INCON ASSESS: CPT | Performed by: ANESTHESIOLOGY

## 2021-02-24 NOTE — PATIENT INSTRUCTIONS
The following treatment recommendations and plan were discussed in detail with Finn Minor. Imaging:   I have reviewed patients imaging of MRI L-spine and XRs and results were discussed with patient today. Analgesics:   Patient is taking Acetaminophen. Patient informed that the maximum amount of acetaminophen taken on a regular basis should only be 4000 mg per day. Adjuvants: In light of the presence of a neuropathic component of pain, the patient can continue her Gabapentin. Interventions: With physical examination consistent with right ischial bursa pain, we will proceed with a right ischial bursa injection. Patient wants to proceed with this injection. Anticoagulation/NPO Recommendations:   Patient does not need to hold any medications for the procedure. Patient DOES NOT need to be NPO for the procedure. Multidisciplinary Pain Management:   In the presence of complex, chronic, and multi-factorial pain, the importance of a multidisciplinary approach to pain management in the patients management regimen was emphasized and discussed in great detail.      Referrals:  I have provided the patient with two gluteus mirian strengthening exercises - glute bridges and glute squeezes that should should perform 3 sets of 10 repetitions    Prescriptions Written This Visit:   None    Follow-up: For Right ischial bursa

## 2021-02-24 NOTE — PROGRESS NOTES
Chronic Pain Clinic Note     Encounter Date: 2/24/21    Subjective:   Chief Complaint:   Chief Complaint   Patient presents with    Follow Up After Procedure       History of Present Illness:   Sandi Iglesias is a 80 y.o. female seen in the Pain Clinic initially on 10/8/20 upon request from Beto Alegria DO (PCP) for her history of low back pain. She has a longstanding history of cervical neck and low back pain. However, she sustained a fall while trying to put her pants on 10/2/2021 where she fell on her right buttocks. She states that since this episode she has been dealing with severe low back pain with radiation down the posterior thigh and slightly down the posterior calf. She does admit the pain radiates around to her groin. She states she has numbness and tingling however these are more in the toes. He does endorse some right leg weakness which she feels like this pain limited. Her pain is sharp and stabbing in nature and 5/10. Her pain is exacerbated with any sitting or laying on the affected side. Her pain is alleviated with rest and medication. She denies any focal leg weakness, saddle anesthesia, or bowel/bladder incontinence. She states she is currently been managing her pain with over-the-counter Tylenol arthritis. She has seen a couple pain specialist in the past including receiving spinal injections with Dr. Ezekiel Lopez and Dr. Cale Doan. Today, 2/24/2021, patient presents for plan follow-up after right SI joint injection performed on 12/8/2020. She reports substantial benefit from the injection and even more benefit from her previous epidurals in the past.  She reports complete new relief in the right hip pain that she was having. She does report some set bone pain on the right side. She states that this is worse when she is sitting on it and some mild radiation down the posterior thigh.   She denies any new symptoms of focal leg weakness, new paresthesias, saddle anesthesia, or Activity    Alcohol use: No    Drug use: No    Sexual activity: Not on file   Lifestyle    Physical activity     Days per week: Not on file     Minutes per session: Not on file    Stress: Not on file   Relationships    Social connections     Talks on phone: Not on file     Gets together: Not on file     Attends Synagogue service: Not on file     Active member of club or organization: Not on file     Attends meetings of clubs or organizations: Not on file     Relationship status: Not on file    Intimate partner violence     Fear of current or ex partner: Not on file     Emotionally abused: Not on file     Physically abused: Not on file     Forced sexual activity: Not on file   Other Topics Concern    Not on file   Social History Narrative    Not on file       Medications & Allergies:   Current Outpatient Medications   Medication Instructions    Acetaminophen (TYLENOL ARTHRITIS EXT RELIEF PO) Oral    aspirin 81 mg, Oral, DAILY    calcium carbonate-vitamin D (CALCIUM 600+D) 600-200 MG-UNIT TABS 1 tablet, DAILY    Cholecalciferol (VITAMIN D) 2000 UNITS CAPS capsule 1 capsule, DAILY    clopidogrel (PLAVIX) 75 mg, Oral, DAILY    gabapentin (NEURONTIN) 600 MG tablet TAKE 1 TABLET IN  THE      MORNING, 1 TABLET IN THE   AFTERNOON,  AND2 TABLETS  AT BEDTIME    levothyroxine (SYNTHROID) 50 MCG tablet TAKE 1 TABLET BY MOUTH EVERY DAY    lisinopril (PRINIVIL;ZESTRIL) 10 mg, Oral, DAILY    lovastatin (MEVACOR) 20 MG tablet TAKE 1 TABLET BY MOUTH EVERY DAY AT NIGHT    Multiple Vitamins-Minerals (THERAPEUTIC MULTIVITAMIN-MINERALS) tablet 1 tablet, DAILY    pantoprazole (PROTONIX) 40 mg, Oral, DAILY WITH BREAKFAST       Allergies   Allergen Reactions    Cataflam [Diclofenac Potassium] Shortness Of Breath    Phenergan [Promethazine Hcl]      Confusion \"didn't know where she was at\"       Review of Systems:   Constitutional: denies any fevers or chills  Respiratory: admits to cough but denies SOB Genitourinary: negative for bowel/bladder incontinence   Musculoskeletal: Right buttocks pain  Neurological: numbness/tingling in toes of feet  Behavioral/Psych: negative for anxiety/depression     Objective:     Vitals:    02/24/21 1026   BP: (!) 112/56   Temp: 97.5 °F (36.4 °C)       Constitutional: Pleasant, no acute distress   Head: Normocephalic, atraumatic   Eyes: Conjunctivae normal   Neck: Supple, symmetrical   Lungs: Normal respiratory effort, non-labored breathing   Cardiovascular: Limbs warm and well perfused   Abdomen: Non-protruded   Musculoskeletal: Muscle bulk symmetric, no atrophy, no gross deformities   · Thorax: Scoliosis appreciated on exam  · Lumbar Spine: Lumbar paraspinals tender bilaterally. SLR neg bilaterally. Right ischial bursa tender to palpation. Neurological: Cranial nerves II-XII grossly intact. · Gait - Antalgic gait (improved). Ambulates without assist device. · Motor: No focal motor deficit  · Sensory: LT sensation intact in lower limbs   Skin: No rashes or lesions visibile in lower limbs  Psychological: Cooperative, no exaggerated pain behaviors       Assessment:    Diagnosis Orders   1. Ischial bursitis of right side  CHG FLUOR NEEDLE/CATH SPINE/PARASPINAL DX/THER ADDON    CO ARTHROCENTESIS ASPIR&/INJ MAJOR JT/BURSA W/O US   2. Other chronic pain     3. Chronic right-sided low back pain with right-sided sciatica     4. Spinal stenosis of lumbar region without neurogenic claudication         Greg Anaya is a 80 y. o.female presenting to the pain clinic for evaluation of right-sided low back pain. Her clinical history physical examination are consistent with right SI joint dysfunction/pain. She also has a potential radicular pain component particularly in the L5 distribution. We will start with a right SI joint injection. She may have some residual radicular leg pain after the injection.      She responded significantly well to her right SI joint injection performed in [address] [facility] [address]

## 2021-03-15 ENCOUNTER — TELEPHONE (OUTPATIENT)
Dept: PHYSICAL MEDICINE AND REHAB | Age: 86
End: 2021-03-15

## 2021-03-15 NOTE — H&P
Today, patient presents for planned right ischial bursa injection. This note is reflective of the patient's previous visit for evaluation. We will proceed with today's planned procedure. Since patient's last visit for evaluation, there have been no interval changes in medical history. Patient has no new numbness, weakness, or focal neurological deficit since evaluation. Patient has no contraindications to injection (no anticoagulation, recent antibiotic intake for active infections, allergies to latex / contrast dye / steroid medications), and has a  present. NPO necessity has been assessed and accepted based on procedure complexity. The risks and benefits of the procedure have been explained including but are not limited to infection, bleeding, paralysis, immediate post procedure weakness, and dizziness; the patient acknowledges understanding and desires to proceed with the procedure. Consented for same procedure. All other questions and concerns were addressed at bedside. See procedure note for full details. Vitals:    03/16/21 1206   BP: (!) 136/90   Pulse: 79   Resp: 12   Temp: 98.1 °F (36.7 °C)   SpO2: 98%        Diagnosis Orders   1. Ischial bursitis of right side  CHG FLUOR NEEDLE/CATH SPINE/PARASPINAL DX/THER ADDON    [place] ARTHROCENTESIS ASPIR&/INJ MAJOR JT/BURSA W/O US   2. Other chronic pain     3. Chronic right-sided low back pain with right-sided sciatica     4. Spinal stenosis of lumbar region without neurogenic claudication         Follow up: 5/17/21     Post procedure Instructions: The patient was advised not to drive during the day of the procedure and not to engage in any significant decision making. The patient was also advised to be cautious with walking/activity for 24 hours following today's visit and asked not to engage in over-exertion. After this time, it is ok to resume pre-procedure level of activity.  Patient advised to apply ice to site of injection in situations of pain and discomfort. Patient advised to not submerge site of injection during bath or pool activities for approximately 48 hours post-procedure. Patient attested to understanding post procedure directions / restrictions. All other questions and concerns addressed before patient discharge in ambulatory fashion. Chronic Pain Clinic Note     Encounter Date: 2/24/21    Subjective:   Chief Complaint:   No chief complaint on file. History of Present Illness:   Surya Griggs is a 80 y.o. female seen in the Pain Clinic initially on 10/8/20 upon request from Larissa Torres DO (PCP) for her history of low back pain. She has a longstanding history of cervical neck and low back pain. However, she sustained a fall while trying to put her pants on 10/2/2021 where she fell on her right buttocks. She states that since this episode she has been dealing with severe low back pain with radiation down the posterior thigh and slightly down the posterior calf. She does admit the pain radiates around to her groin. She states she has numbness and tingling however these are more in the toes. He does endorse some right leg weakness which she feels like this pain limited. Her pain is sharp and stabbing in nature and 5/10. Her pain is exacerbated with any sitting or laying on the affected side. Her pain is alleviated with rest and medication. She denies any focal leg weakness, saddle anesthesia, or bowel/bladder incontinence. She states she is currently been managing her pain with over-the-counter Tylenol arthritis. She has seen a couple pain specialist in the past including receiving spinal injections with Dr. Didier Regalado and Dr. Clarance Canavan. Today, 2/24/2021, patient presents for plan follow-up after right SI joint injection performed on 12/8/2020.   She reports substantial benefit from the injection and even more benefit from her previous epidurals in the past.  She reports complete new relief in the right hip pain that she was having. She does report some set bone pain on the right side. She states that this is worse when she is sitting on it and some mild radiation down the posterior thigh. She denies any new symptoms of focal leg weakness, new paresthesias, saddle anesthesia, or bowel/bladder incontinence. She ports no change on her medications and continues take her gabapentin and Tylenol. History of Intervention:   Surgery: No previous lumbar surgeries  Injections: Previous lumbar epiduralssignificant relief  R SI joint injection (12/8/20) - significant (100%) relief in hip pain    Current Treatment Medications:   Gabapentin 600 mg / 1200 mg  Tylenol PRN    Historical Treatment Medications:   None    Imaging:  MRI L-spine (10/4/20)    LUMBAR SPINE 2 VIEWS:         CLINICAL INFORMATION: fall         COMPARISON: No prior study.         TECHNIQUE: Standard AP and lateral views of lumbar spine were obtained.                   Impression    1. Mild thoracolumbar dextro scoliosis. Moderate lumbar levoscoliosis. Cannot exclude muscle spasm. 2. Multifocal marked disc space narrowing and degenerative disc disease throughout the lumbar spine. Moderately severe degenerative vertebral body spondylosis scattered throughout the lumbar spine. 3. No fracture acute is seen. Mild degenerative changes right sacroiliac joint.         Past Medical History:   Diagnosis Date    Cerebral artery occlusion with cerebral infarction [facility]) 2012    tia    GERD (gastroesophageal reflux disease)     Hyperlipidemia     Hypertension     Hypothyroidism     Osteoarthritis     Sixth nerve palsy 2020       Past Surgical History:   Procedure Laterality Date    ARTHROCENTESIS Right 4/12/2019    Right hip bursa steroid INJECTION  NO SEDATION performed by Roni Paredes MD at [address] [place] [address]  2003    COLONOSCOPY      ENDOSCOPY, COLON, DIAGNOSTIC      [facility]. INJECTION PROCEDURE FOR SACROILIAC JOINT Right 12/8/2020    Right SI joint injection performed by Bronson Jackson DO at [address]      left    KNEE ARTHROSCOPY  2010    left    LUMBAR NERVE BLOCK N/A 12/11/2018    LUMBAR INTER LAMINAR DAYANARA LESI #1@ L5 performed by Duncan Muñoz MD at [facility] N/A 3/12/2019    LUMBAR INTER LAMINAR DAYANARA LESI @L5 performed by Duncan Muñoz MD at [address] Right 8/30/2019    SI RFA  RIGHT SIDE performed by Duncan Muñoz MD at [address] [facility] Left 10/1/2019    SI RFA  LEFT SIDE performed by Duncan Muñoz MD at [address] [place] [address] Right 08/27/2018    SI RFA    NERVE SURGERY Left 09/11/2018    SI RFA    OTHER SURGICAL HISTORY      previous nerve blocks at / [place] [address] Right 5/22/2018    LUMBAR RFA RIGHT SIDE FIRST L3-4,4-5,5-S1 performed by Duncan Muñoz MD at [address] Left 6/25/2018    LUMBAR RFA  LEFT SIDE @ L3-4,4-5,5-S1, performed by Duncan Muñoz MD at [address] [place] [address],  IMAGE GUIDANCE, SINGLE Right 8/27/2018    SI RFA  RIGHT SIDE performed by Duncan Muñoz MD at [address] [place] [address],  IMAGE GUIDANCE, SINGLE Left 9/11/2018    SI RFA  LEFT SIDE performed by Duncan Muñoz MD at [address] [place] [address]       Family History   Problem Relation Age of Onset    Cancer Mother         melanoma    Heart Disease Father     Stroke Sister        Social History     Socioeconomic History    Marital status:       Spouse name: Not on file    Number of children: 2    Years of education: 15    Highest education level: High school graduate   Occupational History    Not on file   Social Needs    Financial resource strain: Not hard at all    Food insecurity     Worry: Never true     Inability: Never true    Transportation needs     Medical: No     Non-medical: No   Tobacco Use    Smoking status: Never Smoker    Smokeless tobacco: Never Used   Substance and Sexual Activity    Alcohol use: No    Drug use: No    Sexual activity: Not on file   Lifestyle    Physical activity     Days per week: Not on file     Minutes per session: Not on file    Stress: Not on file   Relationships    Social connections     Talks on phone: Not on file     Gets together: Not on file     Attends Catholic service: Not on file     Active member of club or organization: Not on file     Attends meetings of clubs or organizations: Not on file     Relationship status: Not on file    Intimate partner violence     Fear of current or ex partner: Not on file     Emotionally abused: Not on file     Physically abused: Not on file     Forced sexual activity: Not on file   Other Topics Concern    Not on file   Social History Narrative    Not on file       Medications & Allergies:   Current Outpatient Medications   Medication Instructions    Acetaminophen (TYLENOL ARTHRITIS EXT RELIEF PO) Oral    aspirin 81 mg, Oral, DAILY    calcium carbonate-vitamin D (CALCIUM 600+D) 600-200 MG-UNIT TABS 1 tablet, DAILY    Cholecalciferol (VITAMIN D) 2000 UNITS CAPS capsule 1 capsule, DAILY    clopidogrel (PLAVIX) 75 mg, Oral, DAILY    gabapentin (NEURONTIN) 600 MG tablet TAKE 1 TABLET IN  THE      MORNING, 1 TABLET IN THE   AFTERNOON,  AND2 TABLETS  AT BEDTIME    levothyroxine (SYNTHROID) 50 MCG tablet TAKE 1 TABLET BY MOUTH EVERY DAY    lisinopril (PRINIVIL;ZESTRIL) 10 mg, Oral, DAILY    lovastatin (MEVACOR) 20 MG tablet TAKE 1 TABLET BY MOUTH EVERY DAY AT NIGHT    Multiple Vitamins-Minerals (THERAPEUTIC MULTIVITAMIN-MINERALS) tablet 1 tablet, DAILY    pantoprazole (PROTONIX) 40 mg, Oral, DAILY WITH BREAKFAST       Allergies   Allergen Reactions    Cataflam [Diclofenac Potassium] Shortness Of Breath    Phenergan [Promethazine Hcl]      Confusion \"didn't know where she was at\"       Review of Systems:   Constitutional: denies any fevers or chills  Respiratory: admits to cough but denies SOB   Genitourinary: negative for bowel/bladder incontinence   Musculoskeletal: Right buttocks pain  Neurological: numbness/tingling in toes of feet  Behavioral/Psych: negative for anxiety/depression     Objective: There were no vitals filed for this visit. Constitutional: Pleasant, no acute distress   Head: Normocephalic, atraumatic   Eyes: Conjunctivae normal   Neck: Supple, symmetrical   Lungs: Normal respiratory effort, non-labored breathing   Cardiovascular: Limbs warm and well perfused   Abdomen: Non-protruded   Musculoskeletal: Muscle bulk symmetric, no atrophy, no gross deformities   · Thorax: Scoliosis appreciated on exam  · Lumbar Spine: Lumbar paraspinals tender bilaterally. SLR neg bilaterally. Right ischial bursa tender to palpation. Neurological: Cranial nerves II-XII grossly intact. · Gait - Antalgic gait (improved). Ambulates without assist device. · Motor: No focal motor deficit  · Sensory: LT sensation intact in lower limbs   Skin: No rashes or lesions visibile in lower limbs  Psychological: Cooperative, no exaggerated pain behaviors       Assessment:    Diagnosis Orders   1. Ischial bursitis of right side  CHG FLUOR NEEDLE/CATH SPINE/PARASPINAL DX/THER ADDON    [place] ARTHROCENTESIS ASPIR&/INJ MAJOR JT/BURSA W/O US   2. Other chronic pain     3. Chronic right-sided low back pain with right-sided sciatica     4. Spinal stenosis of lumbar region without neurogenic claudication         Tanika Patricio is a 80 y. o.female presenting to the pain clinic for evaluation of right-sided low back pain. Her clinical history physical examination are consistent with right SI joint dysfunction/pain.   She also has a potential radicular pain component particularly in the L5 distribution. We will start with a right SI joint injection. She may have some residual radicular leg pain after the injection. She responded significantly well to her right SI joint injection performed in December. She does have right ischial bursitis that is bothering her at this point. I have set her up for a right ischial bursa injection. I have provided her with some gluteal strengthening exercises to help pad her ischial bursa in the interim she can work on. Plan: The following treatment recommendations and plan were discussed in detail with Valeriano Chacko. Imaging:   I have reviewed patients imaging of MRI L-spine and XRs and results were discussed with patient today. Analgesics:   Patient is taking Acetaminophen. Patient informed that the maximum amount of acetaminophen taken on a regular basis should only be 4000 mg per day. Adjuvants: In light of the presence of a neuropathic component of pain, the patient can continue her Gabapentin. Interventions: With physical examination consistent with right ischial bursa pain, we will proceed with a right ischial bursa injection. Patient wants to proceed with this injection. Anticoagulation/NPO Recommendations:   Patient does not need to hold any medications for the procedure. Patient DOES NOT need to be NPO for the procedure. Multidisciplinary Pain Management:   In the presence of complex, chronic, and multi-factorial pain, the importance of a multidisciplinary approach to pain management in the patients management regimen was emphasized and discussed in great detail.      Referrals:  I have provided the patient with two gluteus mirian strengthening exercises - glute bridges and glute squeezes that should should perform 3 sets of 10 repetitions    Prescriptions Written This Visit:   None    Follow-up: For Right ischial bursa     I spent 30 minutes with the patient and greater than 50% of this time was spent discussing her new clinical diagnosis of ischial bursitis and providing her her strengthening exercises for gluteal strengthening.       Louisa Perez, DO  Interventional Pain Management/PM&R   [place]

## 2021-03-16 ENCOUNTER — APPOINTMENT (OUTPATIENT)
Dept: GENERAL RADIOLOGY | Age: 86
End: 2021-03-16
Attending: ANESTHESIOLOGY
Payer: MEDICARE

## 2021-03-16 ENCOUNTER — HOSPITAL ENCOUNTER (OUTPATIENT)
Age: 86
Setting detail: OUTPATIENT SURGERY
Discharge: HOME | End: 2021-03-16
Attending: ANESTHESIOLOGY | Admitting: ANESTHESIOLOGY
Payer: MEDICARE

## 2021-03-16 VITALS
HEART RATE: 79 BPM | TEMPERATURE: 98.1 F | BODY MASS INDEX: 21.97 KG/M2 | DIASTOLIC BLOOD PRESSURE: 90 MMHG | HEIGHT: 59 IN | OXYGEN SATURATION: 98 % | SYSTOLIC BLOOD PRESSURE: 136 MMHG | RESPIRATION RATE: 12 BRPM | WEIGHT: 109 LBS

## 2021-03-16 PROCEDURE — 6360000004 HC RX CONTRAST MEDICATION: Performed by: ANESTHESIOLOGY

## 2021-03-16 PROCEDURE — 3600000054 HC PAIN LEVEL 3 BASE: Performed by: ANESTHESIOLOGY

## 2021-03-16 PROCEDURE — 20610 DRAIN/INJ JOINT/BURSA W/O US: CPT | Performed by: ANESTHESIOLOGY

## 2021-03-16 PROCEDURE — 7100000010 HC PHASE II RECOVERY - FIRST 15 MIN: Performed by: ANESTHESIOLOGY

## 2021-03-16 PROCEDURE — 6360000002 HC RX W HCPCS: Performed by: ANESTHESIOLOGY

## 2021-03-16 PROCEDURE — 2500000003 HC RX 250 WO HCPCS: Performed by: ANESTHESIOLOGY

## 2021-03-16 PROCEDURE — 3209999900 FLUORO FOR SURGICAL PROCEDURES

## 2021-03-16 RX ORDER — LIDOCAINE HYDROCHLORIDE 10 MG/ML
INJECTION, SOLUTION EPIDURAL; INFILTRATION; INTRACAUDAL; PERINEURAL PRN
Status: DISCONTINUED | OUTPATIENT
Start: 2021-03-16 | End: 2021-03-16 | Stop reason: ALTCHOICE

## 2021-03-16 RX ORDER — BUPIVACAINE HYDROCHLORIDE 5 MG/ML
INJECTION, SOLUTION EPIDURAL; INTRACAUDAL PRN
Status: DISCONTINUED | OUTPATIENT
Start: 2021-03-16 | End: 2021-03-16 | Stop reason: ALTCHOICE

## 2021-03-16 RX ORDER — METHYLPREDNISOLONE ACETATE 40 MG/ML
INJECTION, SUSPENSION INTRA-ARTICULAR; INTRALESIONAL; INTRAMUSCULAR; SOFT TISSUE PRN
Status: DISCONTINUED | OUTPATIENT
Start: 2021-03-16 | End: 2021-03-16 | Stop reason: ALTCHOICE

## 2021-03-16 NOTE — PROGRESS NOTES
1206-  Patient arrived to phase II via cart. Spontaneous respirations even and unlabored. Placed on monitor--VSS. Report received from [facility].   1248-  Assessment completed. Patient is alert and oriented x4. Injection sites clean and dry. Patient denies pain--will monitor. 1210-  Patient denies snack or drink. All belongings in room. 1215-  Patient dressing. 1218-  Patient discharged in stable condition with all belongings. This RN walked patient to car.

## 2021-05-05 ENCOUNTER — OFFICE VISIT (OUTPATIENT)
Dept: PHYSICAL MEDICINE AND REHAB | Age: 86
End: 2021-05-05
Payer: MEDICARE

## 2021-05-05 VITALS
HEIGHT: 59 IN | DIASTOLIC BLOOD PRESSURE: 60 MMHG | WEIGHT: 113 LBS | BODY MASS INDEX: 22.78 KG/M2 | SYSTOLIC BLOOD PRESSURE: 120 MMHG

## 2021-05-05 DIAGNOSIS — G89.29 OTHER CHRONIC PAIN: ICD-10-CM

## 2021-05-05 DIAGNOSIS — M54.41 CHRONIC RIGHT-SIDED LOW BACK PAIN WITH RIGHT-SIDED SCIATICA: ICD-10-CM

## 2021-05-05 DIAGNOSIS — G89.29 CHRONIC RIGHT-SIDED LOW BACK PAIN WITH RIGHT-SIDED SCIATICA: ICD-10-CM

## 2021-05-05 DIAGNOSIS — M53.3 SACROILIAC PAIN: Primary | ICD-10-CM

## 2021-05-05 DIAGNOSIS — M70.71 ISCHIAL BURSITIS OF RIGHT SIDE: ICD-10-CM

## 2021-05-05 PROCEDURE — 99214 OFFICE O/P EST MOD 30 MIN: CPT | Performed by: ANESTHESIOLOGY

## 2021-05-05 PROCEDURE — 1036F TOBACCO NON-USER: CPT | Performed by: ANESTHESIOLOGY

## 2021-05-05 PROCEDURE — 1090F PRES/ABSN URINE INCON ASSESS: CPT | Performed by: ANESTHESIOLOGY

## 2021-05-05 PROCEDURE — 4040F PNEUMOC VAC/ADMIN/RCVD: CPT | Performed by: ANESTHESIOLOGY

## 2021-05-05 PROCEDURE — 1123F ACP DISCUSS/DSCN MKR DOCD: CPT | Performed by: ANESTHESIOLOGY

## 2021-05-05 PROCEDURE — G8420 CALC BMI NORM PARAMETERS: HCPCS | Performed by: ANESTHESIOLOGY

## 2021-05-05 PROCEDURE — G8427 DOCREV CUR MEDS BY ELIG CLIN: HCPCS | Performed by: ANESTHESIOLOGY

## 2021-05-05 RX ORDER — MELOXICAM 7.5 MG/1
7.5 TABLET ORAL DAILY
Qty: 30 TABLET | Refills: 2 | Status: SHIPPED | OUTPATIENT
Start: 2021-05-05 | End: 2021-07-27

## 2021-05-05 NOTE — PROGRESS NOTES
Chronic Pain/PM&R Clinic Note     Encounter Date: 5/5/21    Subjective:   Chief Complaint:   Chief Complaint   Patient presents with    Follow Up After Procedure       History of Present Illness:   Rema Chan is a 80 y.o. female seen in the clinic initially on 10/8/20 upon request from Alice Lopez DO (PCP) for her history of low back pain. She has a longstanding history of cervical neck and low back pain. However, she sustained a fall while trying to put her pants on 10/2/2021 where she fell on her right buttocks. She states that since this episode she has been dealing with severe low back pain with radiation down the posterior thigh and slightly down the posterior calf. She does admit the pain radiates around to her groin. She states she has numbness and tingling however these are more in the toes. He does endorse some right leg weakness which she feels like this pain limited. Her pain is sharp and stabbing in nature and 5/10. Her pain is exacerbated with any sitting or laying on the affected side. Her pain is alleviated with rest and medication. She denies any focal leg weakness, saddle anesthesia, or bowel/bladder incontinence. She states she is currently been managing her pain with over-the-counter Tylenol arthritis. She has seen a couple pain specialist in the past including receiving spinal injections with Dr. Nilda Hines and Dr. Jl Abraham. Today, 5/5/2021, patient presents for planned follow-up after right ischial bursa injection. She feels like she did obtain relief from this but feels like her pain has migrated up further in her buttocks. She continues to have pain in the right side of her low back and the right buttocks. She states that this can radiate into the groin region. She denies any radicular leg pain, focal leg weakness, leg paresthesias, saddle esthesia, bowel/bladder incontinence.   She feels like this pain can be sharp and stabbing in nature particular when she is up moving REPLACEMENT      left    KNEE ARTHROSCOPY  2010    left    LUMBAR NERVE BLOCK N/A 12/11/2018    LUMBAR INTER LAMINAR DAYANARA LESI #1@ L5 performed by Marck Salinas MD at [facility] N/A 3/12/2019    LUMBAR INTER LAMINAR DAYANARA LESI @L5 performed by Marck Salinas MD at [address] [facility] Right 8/30/2019    SI RFA  RIGHT SIDE performed by Marck Salinas MD at [address] [facility] Left 10/1/2019    SI RFA  LEFT SIDE performed by Marck Salinas MD at [address] [place] [address] Right 08/27/2018    SI RFA    NERVE SURGERY Left 09/11/2018    SI RFA    OTHER SURGICAL HISTORY      previous nerve blocks at / [address] Right 5/22/2018    LUMBAR RFA RIGHT SIDE FIRST L3-4,4-5,5-S1 performed by Marck Salinas MD at [address] [facility] Left 6/25/2018    LUMBAR RFA  LEFT SIDE @ L3-4,4-5,5-S1, performed by Marck Salinas MD at [address] [place] [address],  IMAGE GUIDANCE, SINGLE Right 8/27/2018    SI RFA  RIGHT SIDE performed by Marck Salinas MD at [address] [place] [address],  IMAGE GUIDANCE, SINGLE Left 9/11/2018    SI RFA  LEFT SIDE performed by Marck [facility]inas MD at [facility]       Family History   Problem Relation Age of Onset    Cancer Mother         melanoma    Heart Disease Father     Stroke Sister        Social History     Socioeconomic History    Marital status:       Spouse name: Not on file    Number of children: 2    Years of education: 15    Highest education level: High school graduate   Occupational History    Not on file   Social Needs    Financial resource strain: Not hard at all   [employer] insecurity     Worry: Never true     Inability: Never true    Transportation needs     Medical: No     Non-medical: No   Tobacco Use    Smoking status: Never Smoker    Smokeless tobacco: Never Used   Substance and Sexual Activity    Alcohol use: No    Drug use: No    Sexual activity: Not on file   Lifestyle    Physical activity     Days per week: Not on file     Minutes per session: Not on file    Stress: Not on file   Relationships    Social connections     Talks on phone: Not on file     Gets together: Not on file     Attends Congregational service: Not on file     Active member of club or organization: Not on file     Attends meetings of clubs or organizations: Not on file     Relationship status: Not on file    Intimate partner violence     Fear of current or ex partner: Not on file     Emotionally abused: Not on file     Physically abused: Not on file     Forced sexual activity: Not on file   Other Topics Concern    Not on file   Social History Narrative    Not on file       Medications & Allergies:   Current Outpatient Medications   Medication Instructions    Acetaminophen (TYLENOL ARTHRITIS EXT RELIEF PO) Oral    aspirin 81 mg, Oral, DAILY    calcium carbonate-vitamin D (CALCIUM 600+D) 600-200 MG-UNIT TABS 1 tablet, DAILY    Cholecalciferol (VITAMIN D) 2000 UNITS CAPS capsule 1 capsule, DAILY    clopidogrel (PLAVIX) 75 mg, Oral, DAILY    gabapentin (NEURONTIN) 600 MG tablet TAKE 1 TABLET IN  THE      MORNING, 1 TABLET IN THE   AFTERNOON,  AND2 TABLETS  AT BEDTIME    levothyroxine (SYNTHROID) 50 MCG tablet TAKE 1 TABLET BY MOUTH EVERY DAY    lisinopril (PRINIVIL;ZESTRIL) 10 mg, Oral, DAILY    lovastatin (MEVACOR) 20 MG tablet TAKE 1 TABLET BY MOUTH EVERY DAY AT NIGHT    meloxicam (MOBIC) 7.5 mg, Oral, DAILY    Multiple Vitamins-Minerals (THERAPEUTIC MULTIVITAMIN-MINERALS) tablet 1 tablet, DAILY    pantoprazole (PROTONIX) 40 mg, Oral, DAILY WITH BREAKFAST       Allergies   Allergen Reactions    Cataflam [Diclofenac Potassium] Shortness Of Breath    Phenergan [Promethazine Hcl]      Confusion \"didn't know where she was at\"       Review of Systems:   Constitutional: denies any fevers or chills  Respiratory: admits to cough but denies SOB   Genitourinary: negative for bowel/bladder incontinence   Musculoskeletal: Right buttocks pain  Neurological: numbness/tingling in toes of feet  Behavioral/Psych: negative for anxiety/depression     Objective:     Vitals:    05/05/21 1455   BP: 120/60       Constitutional: Pleasant, no acute distress   Head: Normocephalic, atraumatic   Eyes: Conjunctivae normal   Neck: Supple, symmetrical   Lungs: Normal respiratory effort, non-labored breathing   Cardiovascular: Limbs warm and well perfused   Abdomen: Non-protruded   Musculoskeletal: Muscle bulk symmetric, no atrophy, no gross deformities   · Thorax: Scoliosis appreciated on exam  · Lumbar Spine: Lumbar paraspinals tender bilaterally. SLR neg bilaterally. Right SI joint tender to palpation. Positive Aldair right. Positive Gaenslen's right. Neurological: Cranial nerves II-XII grossly intact. · Gait - Antalgic gait (improved). Ambulates without assist device. · Motor: 5/5 motor strength appreciated in bilateral hip flexion, knee extension, knee flexion, ankle plantarflexion, ankle dorsiflexion  · Sensory: LT sensation intact in lower limbs   Skin: No rashes or lesions visibile in lower limbs  Psychological: Cooperative, no exaggerated pain behaviors       Assessment:    Diagnosis Orders   1. Sacroiliac pain  CHG FLUOR NEEDLE/CATH SPINE/PARASPINAL DX/THER ADDON    IL INJECT SI JOINT ARTHRGRPHY&/ANES/STEROID W/IMAGE   2. Ischial bursitis of right side     3. Other chronic pain     4. Chronic right-sided low back pain with right-sided sciatica         Cosmo Bowie is a 80 y. o.female presenting to the pain clinic for evaluation of right-sided low back pain. Her clinical history physical examination are consistent with right SI joint dysfunction/pain.   She also has a potential radicular pain component particularly in the L5 distribution. We will start with a right SI joint injection. She may have some residual radicular leg pain after the injection. She responded significantly well to her right SI joint injection performed in December. She does have right ischial bursitis that is bothering her at this point. She responded to her right ischial bursa injection. She appears to have continued right SI joint pain. I have set her up for repeat SI joint injection. In addition I have started her on meloxicam 7.5 mg daily. If she fails to respond to a right SI joint injection we may pursue a an MRI of the right hip to evaluate for labral tear. Plan: The following treatment recommendations and plan were discussed in detail with Betty Arevalo. Imaging:   I have reviewed patients imaging of MRI L-spine and XRs and results were discussed with patient today. Analgesics:   Patient is taking Acetaminophen. Patient informed that the maximum amount of acetaminophen taken on a regular basis should only be 4000 mg per day. I have started the patient on meloxicam 7.5 mg daily. Patient is advised to take this medication with food. Adjuvants: In light of the presence of a neuropathic component of pain, the patient can continue her Gabapentin. Interventions: With physical examination consistent with right sacroiliac joint dysfunction/pain, we will proceed with a right sacroiliac joint injection. Patient wants to proceed with this injection. Anticoagulation/NPO Recommendations:   Patient does not need to hold any medications for the procedure. Patient DOES NOT need to be NPO for the procedure. Multidisciplinary Pain Management:   In the presence of complex, chronic, and multi-factorial pain, the importance of a multidisciplinary approach to pain management in the patients management regimen was emphasized and discussed in great detail. Referrals:  None    Prescriptions Written This Visit:   Meloxicam 7.5 mg    Follow-up: For Right SI joint injection      Abbie Sotelo,   Interventional Pain Management/PM&R   [place]

## 2021-05-05 NOTE — PATIENT INSTRUCTIONS
The following treatment recommendations and plan were discussed in detail with Ada Lewis. Imaging:   I have reviewed patients imaging of MRI L-spine and XRs and results were discussed with patient today. Analgesics:   Patient is taking Acetaminophen. Patient informed that the maximum amount of acetaminophen taken on a regular basis should only be 4000 mg per day. I have started the patient on meloxicam 7.5 mg daily. Patient is advised to take this medication with food. Adjuvants: In light of the presence of a neuropathic component of pain, the patient can continue her Gabapentin. Interventions: With physical examination consistent with right sacroiliac joint dysfunction/pain, we will proceed with a right sacroiliac joint injection. Patient wants to proceed with this injection. Anticoagulation/NPO Recommendations:   Patient does not need to hold any medications for the procedure. Patient DOES NOT need to be NPO for the procedure. Multidisciplinary Pain Management:   In the presence of complex, chronic, and multi-factorial pain, the importance of a multidisciplinary approach to pain management in the patients management regimen was emphasized and discussed in great detail.      Referrals:  None    Prescriptions Written This Visit:   None    Follow-up: For Right SI joint injection

## 2021-05-10 NOTE — H&P
Today, patient presents for planned right sacroiliac joint injection. This note is reflective of the patient's previous visit for evaluation. We will proceed with today's planned procedure. Since patient's last visit for evaluation, there have been no interval changes in medical history. Patient has no new numbness, weakness, or focal neurological deficit since evaluation. Patient has no contraindications to injection (no anticoagulation, recent antibiotic intake for active infections, allergies to latex / contrast dye / steroid medications), and has a  present. NPO necessity has been assessed and accepted based on procedure complexity. The risks and benefits of the procedure have been explained including but are not limited to infection, bleeding, paralysis, immediate post procedure weakness, and dizziness; the patient acknowledges understanding and desires to proceed with the procedure. Consented for same procedure. All other questions and concerns were addressed at bedside. See procedure note for full details. Vitals:    05/11/21 1031   BP: 132/68   Pulse: 66   Resp: 16   Temp: 98.1 °F (36.7 °C)   SpO2: 96%       Follow up: 7/29/21     Post procedure Instructions: The patient was advised not to drive during the day of the procedure and not to engage in any significant decision making. The patient was also advised to be cautious with walking/activity for 24 hours following today's visit and asked not to engage in over-exertion. After this time, it is ok to resume pre-procedure level of activity. Patient advised to apply ice to site of injection in situations of pain and discomfort. Patient advised to not submerge site of injection during bath or pool activities for approximately 48 hours post-procedure. Patient attested to understanding post procedure directions / restrictions. All other questions and concerns addressed before patient discharge in ambulatory fashion.          Chronic Pain/PM&R Clinic Note     Encounter Date: 5/5/21    Subjective:   Chief Complaint:   No chief complaint on file. History of Present Illness:   Margo Daniel is a 80 y.o. female seen in the clinic initially on 10/8/20 upon request from Nancy Hernandez DO (PCP) for her history of low back pain. She has a longstanding history of cervical neck and low back pain. However, she sustained a fall while trying to put her pants on 10/2/2021 where she fell on her right buttocks. She states that since this episode she has been dealing with severe low back pain with radiation down the posterior thigh and slightly down the posterior calf. She does admit the pain radiates around to her groin. She states she has numbness and tingling however these are more in the toes. He does endorse some right leg weakness which she feels like this pain limited. Her pain is sharp and stabbing in nature and 5/10. Her pain is exacerbated with any sitting or laying on the affected side. Her pain is alleviated with rest and medication. She denies any focal leg weakness, saddle anesthesia, or bowel/bladder incontinence. She states she is currently been managing her pain with over-the-counter Tylenol arthritis. She has seen a couple pain specialist in the past including receiving spinal injections with Dr. Michael Mead and Dr. Allyson Christiansen. Today, 5/5/2021, patient presents for planned follow-up after right ischial bursa injection. She feels like she did obtain relief from this but feels like her pain has migrated up further in her buttocks. She continues to have pain in the right side of her low back and the right buttocks. She states that this can radiate into the groin region. She denies any radicular leg pain, focal leg weakness, leg paresthesias, saddle esthesia, bowel/bladder incontinence. She feels like this pain can be sharp and stabbing in nature particular when she is up moving around.   She continues to take her gabapentin and Tylenol as needed but has not been on an NSAID. History of Intervention:   Surgery: No previous lumbar surgeries  Injections: Previous lumbar epiduralssignificant relief  R SI joint injection (12/8/20) - significant (100%) relief in hip pain  Right ischial bursa injection (3/16/2021)moderate relief    Current Treatment Medications:   Gabapentin 600 mg / 1200 mg  Tylenol PRN    Historical Treatment Medications:   None    Imaging:  MRI L-spine (10/4/20)    LUMBAR SPINE 2 VIEWS:         CLINICAL INFORMATION: fall         COMPARISON: No prior study.         TECHNIQUE: Standard AP and lateral views of lumbar spine were obtained.                   Impression    1. Mild thoracolumbar dextro scoliosis. Moderate lumbar levoscoliosis. Cannot exclude muscle spasm. 2. Multifocal marked disc space narrowing and degenerative disc disease throughout the lumbar spine. Moderately severe degenerative vertebral body spondylosis scattered throughout the lumbar spine. 3. No fracture acute is seen. Mild degenerative changes right sacroiliac joint.         Past Medical History:   Diagnosis Date    Cerebral artery occlusion with cerebral infarction [facility]) 2012    tia    GERD (gastroesophageal reflux disease)     Hyperlipidemia     Hypertension     Hypothyroidism     Osteoarthritis     Sixth nerve palsy 2020       Past Surgical History:   Procedure Laterality Date    ARTHROCENTESIS Right 4/12/2019    Right hip bursa steroid INJECTION  NO SEDATION performed by Terri Fajardo MD at [address]  2003    COLONOSCOPY      ENDOSCOPY, COLON, DIAGNOSTIC      HC INJECTION PROCEDURE FOR SACROILIAC JOINT Right 12/8/2020    Right SI joint injection performed by Tiago Santiago DO at [address] Right 3/16/2021    right ischial bursa injection performed by Tiago Santiago DO at [address] [facility]      left    KNEE ARTHROSCOPY  2010    left    LUMBAR NERVE BLOCK N/A 12/11/2018    LUMBAR INTER LAMINAR DAYANARA LESI #1@ L5 performed by Ramya Olea MD at [facility] N/A 3/12/2019    LUMBAR INTER LAMINAR DAYANARA LESI @L5 performed by Ramya Olea MD at [address] [facility] Right 8/30/2019    SI RFA  RIGHT SIDE performed by Ramya Olea MD at [address] [facility] Left 10/1/2019    SI RFA  LEFT SIDE performed by Ramya Olea MD at [address] [place] [address] Right 08/27/2018    SI RFA    NERVE SURGERY Left 09/11/2018    SI RFA    OTHER SURGICAL HISTORY      previous nerve blocks at [address] Right 5/22/2018    LUMBAR RFA RIGHT SIDE FIRST L3-4,4-5,5-S1 performed by Ramya Olea MD at [address] [facility] Left 6/25/2018    LUMBAR RFA  LEFT SIDE @ L3-4,4-5,5-S1, performed by Ramya Olea MD at [address] [place] [address],  IMAGE GUIDANCE, SINGLE Right 8/27/2018    SI RFA  RIGHT SIDE performed by Ramya Olea MD at [address] [place] [address],  IMAGE GUIDANCE, SINGLE Left 9/11/2018    SI RFA  LEFT SIDE performed by Ramya Olea MD at [address] [place] [address]       Family History   Problem Relation Age of Onset    Cancer Mother         melanoma    Heart Disease Father     Stroke Sister        Social History     Socioeconomic History    Marital status:       Spouse name: Not on file    Number of children: 2    Years of education: 15    Highest education level: High school graduate   Occupational History    Not on file   Social Needs    Financial resource strain: Not hard at all   [employer] insecurity     Worry: Never true     Inability: Never true   [employer] needs     Medical: No     Non-medical: No Tobacco Use    Smoking status: Never Smoker    Smokeless tobacco: Never Used   Substance and Sexual Activity    Alcohol use: No    Drug use: No    Sexual activity: Not on file   Lifestyle    Physical activity     Days per week: Not on file     Minutes per session: Not on file    Stress: Not on file   Relationships    Social connections     Talks on phone: Not on file     Gets together: Not on file     Attends Yazidism service: Not on file     Active member of club or organization: Not on file     Attends meetings of clubs or organizations: Not on file     Relationship status: Not on file    Intimate partner violence     Fear of current or ex partner: Not on file     Emotionally abused: Not on file     Physically abused: Not on file     Forced sexual activity: Not on file   Other Topics Concern    Not on file   Social History Narrative    Not on file       Medications & Allergies:   Current Outpatient Medications   Medication Instructions    Acetaminophen (TYLENOL ARTHRITIS EXT RELIEF PO) Oral    aspirin 81 mg, Oral, DAILY    calcium carbonate-vitamin D (CALCIUM 600+D) 600-200 MG-UNIT TABS 1 tablet, DAILY    Cholecalciferol (VITAMIN D) 2000 UNITS CAPS capsule 1 capsule, DAILY    clopidogrel (PLAVIX) 75 mg, Oral, DAILY    gabapentin (NEURONTIN) 600 MG tablet TAKE 1 TABLET IN  THE      MORNING, 1 TABLET IN THE   AFTERNOON,  AND2 TABLETS  AT BEDTIME    levothyroxine (SYNTHROID) 50 MCG tablet TAKE 1 TABLET BY MOUTH EVERY DAY    lisinopril (PRINIVIL;ZESTRIL) 10 mg, Oral, DAILY    lovastatin (MEVACOR) 20 MG tablet TAKE 1 TABLET BY MOUTH EVERY DAY AT NIGHT    meloxicam (MOBIC) 7.5 mg, Oral, DAILY    Multiple Vitamins-Minerals (THERAPEUTIC MULTIVITAMIN-MINERALS) tablet 1 tablet, DAILY    pantoprazole (PROTONIX) 40 mg, Oral, DAILY WITH BREAKFAST       Allergies   Allergen Reactions    Cataflam [Diclofenac Potassium] Shortness Of Breath    Phenergan [Promethazine Hcl]      Confusion \"didn't know where she was at\"       Review of Systems:   Constitutional: denies any fevers or chills  Respiratory: admits to cough but denies SOB   Genitourinary: negative for bowel/bladder incontinence   Musculoskeletal: Right buttocks pain  Neurological: numbness/tingling in toes of feet  Behavioral/Psych: negative for anxiety/depression     Objective: There were no vitals filed for this visit. Constitutional: Pleasant, no acute distress   Head: Normocephalic, atraumatic   Eyes: Conjunctivae normal   Neck: Supple, symmetrical   Lungs: Normal respiratory effort, non-labored breathing   Cardiovascular: Limbs warm and well perfused   Abdomen: Non-protruded   Musculoskeletal: Muscle bulk symmetric, no atrophy, no gross deformities   · Thorax: Scoliosis appreciated on exam  · Lumbar Spine: Lumbar paraspinals tender bilaterally. SLR neg bilaterally. Right SI joint tender to palpation. Positive Aldair right. Positive Gaenslen's right. Neurological: Cranial nerves II-XII grossly intact. · Gait - Antalgic gait (improved). Ambulates without assist device. · Motor: 5/5 motor strength appreciated in bilateral hip flexion, knee extension, knee flexion, ankle plantarflexion, ankle dorsiflexion  · Sensory: LT sensation intact in lower limbs   Skin: No rashes or lesions visibile in lower limbs  Psychological: Cooperative, no exaggerated pain behaviors       Assessment:    Diagnosis Orders   1. Sacroiliac pain  CHG FLUOR NEEDLE/CATH SPINE/PARASPINAL DX/THER ADDON    VA INJECT SI JOINT ARTHRGRPHY&/ANES/STEROID W/IMAGE   2. Ischial bursitis of right side     3. Other chronic pain     4. Chronic right-sided low back pain with right-sided sciatica         Esther Harris is a 80 y. o.female presenting to the pain clinic for evaluation of right-sided low back pain. Her clinical history physical examination are consistent with right SI joint dysfunction/pain.   She also has a potential radicular pain component particularly in the L5 distribution. We will start with a right SI joint injection. She may have some residual radicular leg pain after the injection. She responded significantly well to her right SI joint injection performed in December. She does have right ischial bursitis that is bothering her at this point. She responded to her right ischial bursa injection. She appears to have continued right SI joint pain. I have set her up for repeat SI joint injection. In addition I have started her on meloxicam 7.5 mg daily. If she fails to respond to a right SI joint injection we may pursue a an MRI of the right hip to evaluate for labral tear. Plan: The following treatment recommendations and plan were discussed in detail with Lisette Nj. Imaging:   I have reviewed patients imaging of MRI L-spine and XRs and results were discussed with patient today. Analgesics:   Patient is taking Acetaminophen. Patient informed that the maximum amount of acetaminophen taken on a regular basis should only be 4000 mg per day. I have started the patient on meloxicam 7.5 mg daily. Patient is advised to take this medication with food. Adjuvants: In light of the presence of a neuropathic component of pain, the patient can continue her Gabapentin. Interventions: With physical examination consistent with right sacroiliac joint dysfunction/pain, we will proceed with a right sacroiliac joint injection. Patient wants to proceed with this injection. Anticoagulation/NPO Recommendations:   Patient does not need to hold any medications for the procedure. Patient DOES NOT need to be NPO for the procedure. Multidisciplinary Pain Management:   In the presence of complex, chronic, and multi-factorial pain, the importance of a multidisciplinary approach to pain management in the patients management regimen was emphasized and discussed in great detail.      Referrals:  None    Prescriptions Written This Visit:   Meloxicam 7.5

## 2021-05-11 ENCOUNTER — HOSPITAL ENCOUNTER (OUTPATIENT)
Age: 86
Setting detail: OUTPATIENT SURGERY
Discharge: HOME | End: 2021-05-11
Attending: ANESTHESIOLOGY | Admitting: ANESTHESIOLOGY
Payer: MEDICARE

## 2021-05-11 ENCOUNTER — APPOINTMENT (OUTPATIENT)
Dept: GENERAL RADIOLOGY | Age: 86
End: 2021-05-11
Attending: ANESTHESIOLOGY
Payer: MEDICARE

## 2021-05-11 VITALS
SYSTOLIC BLOOD PRESSURE: 134 MMHG | TEMPERATURE: 96.8 F | DIASTOLIC BLOOD PRESSURE: 63 MMHG | RESPIRATION RATE: 14 BRPM | OXYGEN SATURATION: 98 % | HEIGHT: 59 IN | WEIGHT: 113 LBS | BODY MASS INDEX: 22.78 KG/M2 | HEART RATE: 65 BPM

## 2021-05-11 PROCEDURE — 3600000054 HC PAIN LEVEL 3 BASE: Performed by: ANESTHESIOLOGY

## 2021-05-11 PROCEDURE — 2500000003 HC RX 250 WO HCPCS: Performed by: ANESTHESIOLOGY

## 2021-05-11 PROCEDURE — 7100000010 HC PHASE II RECOVERY - FIRST 15 MIN: Performed by: ANESTHESIOLOGY

## 2021-05-11 PROCEDURE — 2709999900 HC NON-CHARGEABLE SUPPLY: Performed by: ANESTHESIOLOGY

## 2021-05-11 PROCEDURE — 27096 INJECT SACROILIAC JOINT: CPT | Performed by: ANESTHESIOLOGY

## 2021-05-11 PROCEDURE — 6360000004 HC RX CONTRAST MEDICATION: Performed by: ANESTHESIOLOGY

## 2021-05-11 PROCEDURE — 6360000002 HC RX W HCPCS: Performed by: ANESTHESIOLOGY

## 2021-05-11 PROCEDURE — 3209999900 FLUORO FOR SURGICAL PROCEDURES

## 2021-05-11 PROCEDURE — 7100000011 HC PHASE II RECOVERY - ADDTL 15 MIN: Performed by: ANESTHESIOLOGY

## 2021-05-11 RX ORDER — LIDOCAINE HYDROCHLORIDE 10 MG/ML
INJECTION, SOLUTION EPIDURAL; INFILTRATION; INTRACAUDAL; PERINEURAL PRN
Status: DISCONTINUED | OUTPATIENT
Start: 2021-05-11 | End: 2021-05-11 | Stop reason: ALTCHOICE

## 2021-05-11 RX ORDER — BUPIVACAINE HYDROCHLORIDE 5 MG/ML
INJECTION, SOLUTION PERINEURAL PRN
Status: DISCONTINUED | OUTPATIENT
Start: 2021-05-11 | End: 2021-05-11 | Stop reason: ALTCHOICE

## 2021-05-11 RX ORDER — METHYLPREDNISOLONE ACETATE 40 MG/ML
INJECTION, SUSPENSION INTRA-ARTICULAR; INTRALESIONAL; INTRAMUSCULAR; SOFT TISSUE PRN
Status: DISCONTINUED | OUTPATIENT
Start: 2021-05-11 | End: 2021-05-11 | Stop reason: ALTCHOICE

## 2021-05-11 ASSESSMENT — PAIN DESCRIPTION - DESCRIPTORS: DESCRIPTORS: ACHING

## 2021-05-11 ASSESSMENT — PAIN - FUNCTIONAL ASSESSMENT: PAIN_FUNCTIONAL_ASSESSMENT: 0-10

## 2021-05-11 ASSESSMENT — PAIN SCALES - GENERAL: PAINLEVEL_OUTOF10: 0

## 2021-05-11 NOTE — PROGRESS NOTES
1144: Patient to Phase II Recovery via bed in stable condition. Patient alert & oriented x4 at this time. 1150: Patient denies snack at this time. States she is getting lunch with her daughter. 1151: Patient getting dressed at this time. 1205: Patient discharged to vehicle with daughter & this RN in stable walking condition.

## 2021-06-01 ENCOUNTER — HOSPITAL ENCOUNTER (OUTPATIENT)
Dept: GENERAL RADIOLOGY | Age: 86
Discharge: HOME | End: 2021-06-01
Payer: MEDICARE

## 2021-06-01 ENCOUNTER — HOSPITAL ENCOUNTER (OUTPATIENT)
Age: 86
Discharge: HOME | End: 2021-06-01
Payer: MEDICARE

## 2021-06-01 ENCOUNTER — OFFICE VISIT (OUTPATIENT)
Dept: FAMILY MEDICINE CLINIC | Age: 86
End: 2021-06-01
Payer: MEDICARE

## 2021-06-01 VITALS
BODY MASS INDEX: 22.64 KG/M2 | RESPIRATION RATE: 16 BRPM | HEART RATE: 68 BPM | SYSTOLIC BLOOD PRESSURE: 114 MMHG | DIASTOLIC BLOOD PRESSURE: 62 MMHG | WEIGHT: 112.1 LBS | TEMPERATURE: 98 F

## 2021-06-01 DIAGNOSIS — M25.561 CHRONIC PAIN OF RIGHT KNEE: ICD-10-CM

## 2021-06-01 DIAGNOSIS — M53.3 CHRONIC SACROILIAC JOINT PAIN: ICD-10-CM

## 2021-06-01 DIAGNOSIS — M25.551 RIGHT HIP PAIN: Primary | ICD-10-CM

## 2021-06-01 DIAGNOSIS — R26.89 ANTALGIC GAIT: ICD-10-CM

## 2021-06-01 DIAGNOSIS — G89.29 CHRONIC PAIN OF RIGHT KNEE: ICD-10-CM

## 2021-06-01 DIAGNOSIS — M25.551 RIGHT HIP PAIN: ICD-10-CM

## 2021-06-01 DIAGNOSIS — M47.816 LUMBAR SPONDYLOSIS: ICD-10-CM

## 2021-06-01 DIAGNOSIS — G89.29 CHRONIC SACROILIAC JOINT PAIN: ICD-10-CM

## 2021-06-01 PROCEDURE — 1090F PRES/ABSN URINE INCON ASSESS: CPT | Performed by: FAMILY MEDICINE

## 2021-06-01 PROCEDURE — 73564 X-RAY EXAM KNEE 4 OR MORE: CPT

## 2021-06-01 PROCEDURE — G8420 CALC BMI NORM PARAMETERS: HCPCS | Performed by: FAMILY MEDICINE

## 2021-06-01 PROCEDURE — G8427 DOCREV CUR MEDS BY ELIG CLIN: HCPCS | Performed by: FAMILY MEDICINE

## 2021-06-01 PROCEDURE — 1036F TOBACCO NON-USER: CPT | Performed by: FAMILY MEDICINE

## 2021-06-01 PROCEDURE — 99214 OFFICE O/P EST MOD 30 MIN: CPT | Performed by: FAMILY MEDICINE

## 2021-06-01 PROCEDURE — 73502 X-RAY EXAM HIP UNI 2-3 VIEWS: CPT

## 2021-06-01 PROCEDURE — 4040F PNEUMOC VAC/ADMIN/RCVD: CPT | Performed by: FAMILY MEDICINE

## 2021-06-01 PROCEDURE — 1123F ACP DISCUSS/DSCN MKR DOCD: CPT | Performed by: FAMILY MEDICINE

## 2021-06-01 ASSESSMENT — ENCOUNTER SYMPTOMS
RESPIRATORY NEGATIVE: 1
GASTROINTESTINAL NEGATIVE: 1
BACK PAIN: 1

## 2021-06-01 NOTE — PROGRESS NOTES
Michelle Riojas (:  1934) is a 80 y.o. female,Established patient, here for evaluation of the following chief complaint(s):  Back Pain (pt states the pain has been there for a while but it keeps getting worse ), Hip Pain (right side ), and Knee Pain (right side )             Subjective   SUBJECTIVE/OBJECTIVE:  HPI:    Chief Complaint   Patient presents with    Back Pain     pt states the pain has been there for a while but it keeps getting worse     Hip Pain     right side     Knee Pain     right side      Pt here to discuss her chronic pain issues.       Patient Active Problem List   Diagnosis    Hypertension    Hyperlipidemia    GERD (gastroesophageal reflux disease)    Lumbar spondylosis    Hypothyroidism    Vertebro basilar insufficiency    Cervical spondylosis with myelopathy    Light headed    Stenosis, spinal, lumbar    TIA involving vertebral artery    Sacroiliac inflammation ([address]    Trochanteric bursitis of right hip     Past Surgical History:   Procedure Laterality Date    ARTHROCENTESIS Right 2019    Right hip bursa steroid INJECTION  NO SEDATION performed by Ramya Olea MD at [address] Right 2021    Right SI joint injection performed by Jake Gutierrez DO at [address]      COLONOSCOPY      ENDOSCOPY, COLON, DIAGNOSTIC      HC INJECTION PROCEDURE FOR SACROILIAC JOINT Right 2020    Right SI joint injection performed by Jake Gutierrez DO at [address] Right 3/16/2021    right ischial bursa injection performed by Jake Gutierrez DO at [address] [facility]      left    KNEE ARTHROSCOPY      left    LUMBAR NERVE BLOCK N/A 2018    LUMBAR INTER LAMINAR DAYANARA LESI #1@ L5 performed by Ramya Olea MD at [facility] N/A 3/12/2019    LUMBAR INTER LAMINAR DAYANARA LESI @L5 performed by Sung Clements [employer] in the Last Year: Never true   Transportation Needs: No Transportation Needs    Lack of Transportation (Medical): No    Lack of Transportation (Non-Medical): No   Physical Activity:     Days of Exercise per Week:     Minutes of Exercise per Session:    Stress:     Feeling of Stress :    Social Connections:     Frequency of Communication with Friends and Family:     Frequency of Social Gatherings with Friends and Family:     Attends Shinto Services:     Active Member of Clubs or Organizations:     Attends Club or Organization Meetings:     Marital Status:    Intimate Partner Violence:     Fear of Current or Ex-Partner:     Emotionally Abused:     Physically Abused:     Sexually Abused:          Review of Systems   Constitutional: Negative. HENT: Negative. Respiratory: Negative. Cardiovascular: Negative. Gastrointestinal: Negative. Musculoskeletal: Positive for arthralgias (right hip and right knee pain), back pain and gait problem. All other systems reviewed and are negative. Objective   Physical Exam  Vitals and nursing note reviewed. Constitutional:       General: She is not in acute distress. Appearance: Normal appearance. She is well-developed. HENT:      Head: Normocephalic and atraumatic. Right Ear: Tympanic membrane normal.      Left Ear: Tympanic membrane normal.   Eyes:      Conjunctiva/sclera: Conjunctivae normal.   Cardiovascular:      Rate and Rhythm: Normal rate and regular rhythm. Heart sounds: Normal heart sounds. No murmur heard. Pulmonary:      Effort: Pulmonary effort is normal.      Breath sounds: Normal breath sounds. No wheezing, rhonchi or rales. Abdominal:      General: There is no distension. Musculoskeletal:      Cervical back: Neck supple. Lumbar back: Tenderness present. Decreased range of motion. Right hip: Tenderness present. No bony tenderness. Decreased range of motion.       Right knee: No swelling, effusion or ecchymosis. Tenderness present over the medial joint line. Abnormal alignment. Skin:     General: Skin is warm and dry. Findings: No rash (on exposed surfaces). Neurological:      General: No focal deficit present. Mental Status: She is alert. Psychiatric:         Attention and Perception: Attention normal.         Mood and Affect: Mood normal.         Speech: Speech normal.         Behavior: Behavior normal. Behavior is cooperative. Thought Content: Thought content normal.         Judgment: Judgment normal.     ASSESSMENT/PLAN:  1. Right hip pain  -     XR HIP RIGHT (2-3 VIEWS); Future  2. Chronic pain of right knee  -     XR KNEE RIGHT (MIN 4 VIEWS); Future  3. Lumbar spondylosis  4. Chronic sacroiliac joint pain  5. Antalgic gait    -  Pt is currently seeing Dr. Jeri Lunsford for her pain, unfortunately current meds and recent injections have not helped  -  Options discussed (PT vs oral steroids vs opiates vs medical THC vs Ortho vs other)  -  Pt wishes to get xrays and go from there  -  Discussed right knee injection, wishes to get xrays first    Return if symptoms worsen or fail to improve. Will call with results and recommendations. An electronic signature was used to authenticate this note.     --Milady Sunshine, DO

## 2021-06-03 ENCOUNTER — OFFICE VISIT (OUTPATIENT)
Dept: FAMILY MEDICINE CLINIC | Age: 86
End: 2021-06-03
Payer: MEDICARE

## 2021-06-03 VITALS
RESPIRATION RATE: 12 BRPM | SYSTOLIC BLOOD PRESSURE: 120 MMHG | WEIGHT: 112.3 LBS | DIASTOLIC BLOOD PRESSURE: 68 MMHG | BODY MASS INDEX: 22.68 KG/M2 | HEART RATE: 68 BPM

## 2021-06-03 DIAGNOSIS — G89.29 CHRONIC PAIN OF RIGHT KNEE: ICD-10-CM

## 2021-06-03 DIAGNOSIS — M25.561 CHRONIC PAIN OF RIGHT KNEE: ICD-10-CM

## 2021-06-03 DIAGNOSIS — G89.29 CHRONIC SACROILIAC JOINT PAIN: ICD-10-CM

## 2021-06-03 DIAGNOSIS — M17.11 PRIMARY OSTEOARTHRITIS OF RIGHT KNEE: Primary | ICD-10-CM

## 2021-06-03 DIAGNOSIS — M53.3 CHRONIC SACROILIAC JOINT PAIN: ICD-10-CM

## 2021-06-03 PROCEDURE — 1036F TOBACCO NON-USER: CPT | Performed by: FAMILY MEDICINE

## 2021-06-03 PROCEDURE — G8420 CALC BMI NORM PARAMETERS: HCPCS | Performed by: FAMILY MEDICINE

## 2021-06-03 PROCEDURE — 1123F ACP DISCUSS/DSCN MKR DOCD: CPT | Performed by: FAMILY MEDICINE

## 2021-06-03 PROCEDURE — 4040F PNEUMOC VAC/ADMIN/RCVD: CPT | Performed by: FAMILY MEDICINE

## 2021-06-03 PROCEDURE — 1090F PRES/ABSN URINE INCON ASSESS: CPT | Performed by: FAMILY MEDICINE

## 2021-06-03 PROCEDURE — 99213 OFFICE O/P EST LOW 20 MIN: CPT | Performed by: FAMILY MEDICINE

## 2021-06-03 PROCEDURE — 20610 DRAIN/INJ JOINT/BURSA W/O US: CPT | Performed by: FAMILY MEDICINE

## 2021-06-03 PROCEDURE — G8427 DOCREV CUR MEDS BY ELIG CLIN: HCPCS | Performed by: FAMILY MEDICINE

## 2021-06-03 RX ORDER — METHYLPREDNISOLONE ACETATE 80 MG/ML
80 INJECTION, SUSPENSION INTRA-ARTICULAR; INTRALESIONAL; INTRAMUSCULAR; SOFT TISSUE ONCE
Status: COMPLETED | OUTPATIENT
Start: 2021-06-03 | End: 2021-06-03

## 2021-06-03 RX ADMIN — METHYLPREDNISOLONE ACETATE 80 MG: 80 INJECTION, SUSPENSION INTRA-ARTICULAR; INTRALESIONAL; INTRAMUSCULAR; SOFT TISSUE at 14:49

## 2021-06-03 ASSESSMENT — ENCOUNTER SYMPTOMS
BACK PAIN: 1
RESPIRATORY NEGATIVE: 1
GASTROINTESTINAL NEGATIVE: 1

## 2021-06-03 NOTE — PROGRESS NOTES
Trino Guillermina (:  1934) is a 80 y.o. female,Established patient, here for evaluation of the following chief complaint(s):  Knee Pain (joint injection--right knee)                Subjective   SUBJECTIVE/OBJECTIVE:  HPI:    Chief Complaint   Patient presents with    Knee Pain     joint injection--right knee     Pt here to review xrays and get injection in right knee.     Patient Active Problem List   Diagnosis    Hypertension    Hyperlipidemia    GERD (gastroesophageal reflux disease)    Lumbar spondylosis    Hypothyroidism    Vertebro basilar insufficiency    Cervical spondylosis with myelopathy    Light headed    Stenosis, spinal, lumbar    TIA involving vertebral artery    [address]ro[address]ac inflammation [address] [address] [facility]    Trochanteric bursitis of right hip     Past Surgical History:   Procedure Laterality Date    ARTHROCENTESIS Right 2019    Right hip bursa steroid INJECTION  NO SEDATION performed by Micky Fabian MD at [address] Right 2021    Right SI joint injection performed by Dulce Olivas DO at [address]      COLONOSCOPY      ENDOSCOPY, COLON, DIAGNOSTIC      HC INJECTION PROCEDURE FOR SACROILIAC JOINT Right 2020    Right SI joint injection performed by Dulce Olivas DO at [address] Right 3/16/2021    right ischial bursa injection performed by Dulce Olivas DO at [address] [facility]      left    KNEE ARTHROSCOPY      left    LUMBAR NERVE BLOCK N/A 2018    LUMBAR INTER LAMINAR DAYANARA LESI #1@ L5 performed by Micky Fabian MD at [facility] N/A 3/12/2019    LUMBAR INTER LAMINAR DAYANARA LESI @L5 performed by Micky Fabian MD at [address] Right 2019    SI RFA  RIGHT SIDE performed by Micky Fabian MD at [address] [facility] SURGERY Left 10/1/2019    SI RFA  LEFT SIDE performed by Riddhi Gilbert MD at [address] Right 08/27/2018    SI RFA    NERVE SURGERY Left 09/11/2018    SI RFA    OTHER SURGICAL HISTORY      previous nerve blocks at [address] Right 5/22/2018    LUMBAR RFA RIGHT SIDE FIRST L3-4,4-5,5-S1 performed by Riddhi Gilbert MD at [address] Left 6/25/2018    LUMBAR RFA  LEFT SIDE @ L3-4,4-5,5-S1, performed by Riddhi Gilbert MD at [address] [place] [address],  IMAGE GUIDANCE, SINGLE Right 8/27/2018    SI RFA  RIGHT SIDE performed by Riddhi Gilbert MD at [address] [place] [address],  IMAGE GUIDANCE, SINGLE Left 9/11/2018    SI RFA  LEFT SIDE performed by Riddhi Gilbert MD at [address] [facility]     Social History     Tobacco Use    Smoking status: Never Smoker    Smokeless tobacco: Never Used   Vaping Use    Vaping Use: Never used   Substance Use Topics    Alcohol use: No    Drug use: No       Review of Systems   Constitutional: Negative. HENT: Negative. Respiratory: Negative. Cardiovascular: Negative. Gastrointestinal: Negative. Musculoskeletal: Positive for arthralgias (right knee and back pain) and back pain. All other systems reviewed and are negative. Objective   Physical Exam  Vitals and nursing note reviewed. Constitutional:       General: She is not in acute distress. Appearance: Normal appearance. She is well-developed. HENT:      Head: Normocephalic and atraumatic. Right Ear: Tympanic membrane normal.      Left Ear: Tympanic membrane normal.   Eyes:      Conjunctiva/sclera: Conjunctivae normal.   Cardiovascular:      Rate and Rhythm: Normal rate and regular rhythm. Heart sounds: Normal heart sounds.  No murmur heard. Pulmonary:      Effort: Pulmonary effort is normal.      Breath sounds: Normal breath sounds. No wheezing, rhonchi or rales. Abdominal:      General: There is no distension. Musculoskeletal:      Cervical back: Neck supple. Skin:     General: Skin is warm and dry. Findings: No rash (on exposed surfaces). Neurological:      General: No focal deficit present. Mental Status: She is alert. Psychiatric:         Attention and Perception: Attention normal.         Mood and Affect: Mood normal.         Speech: Speech normal.         Behavior: Behavior normal. Behavior is cooperative. Thought Content: Thought content normal.         Judgment: Judgment normal.      ASSESSMENT/PLAN:  1. Primary osteoarthritis of right knee  -     methylPREDNISolone acetate (DEPO-MEDROL) injection 80 mg; 80 mg, Intra-articular, ONCE, On Thu 6/3/21 at 1500, For 1 dose  -     20610 - CO DRAIN/INJECT LARGE JOINT/BURSA  2. Chronic pain of right knee  3. Chronic sacroiliac joint pain    -  After patient consent obtained, the Right infrapatellar region of the knee was cleansed and anesthetized with ethyl chloride. The knee was then injected with DepoMedrol 80mg mixed with 2cc of Marcaine. Ice then applied for 10 minutes      Return if symptoms worsen or fail to improve. An electronic signature was used to authenticate this note.     --Milady Sunshine, DO

## 2021-06-16 DIAGNOSIS — M54.16 LUMBAR RADICULOPATHY: ICD-10-CM

## 2021-06-16 RX ORDER — GABAPENTIN 600 MG/1
TABLET ORAL
Qty: 28 TABLET | Refills: 0 | Status: SHIPPED | OUTPATIENT
Start: 2021-06-16 | End: 2021-11-19

## 2021-06-16 RX ORDER — LOVASTATIN 20 MG/1
TABLET ORAL
Qty: 90 TABLET | Refills: 3 | Status: SHIPPED | OUTPATIENT
Start: 2021-06-16 | End: 2022-03-31

## 2021-06-16 NOTE — TELEPHONE ENCOUNTER
Pt called office stating she only has one pill left of her Gabapentin and her mail order will not be here for a week. She is requesting 1wk to be sent to [address] [place] [address]. If no call back, she will check with the pharmacy after 11am. Refill if appropriate.

## 2021-07-07 ENCOUNTER — NURSE TRIAGE (OUTPATIENT)
Dept: OTHER | Facility: CLINIC | Age: 86
End: 2021-07-07

## 2021-07-07 ENCOUNTER — OFFICE VISIT (OUTPATIENT)
Dept: FAMILY MEDICINE CLINIC | Age: 86
End: 2021-07-07
Payer: MEDICARE

## 2021-07-07 VITALS
WEIGHT: 110.6 LBS | SYSTOLIC BLOOD PRESSURE: 118 MMHG | HEIGHT: 59 IN | DIASTOLIC BLOOD PRESSURE: 68 MMHG | RESPIRATION RATE: 18 BRPM | TEMPERATURE: 98.6 F | BODY MASS INDEX: 22.3 KG/M2 | OXYGEN SATURATION: 97 % | HEART RATE: 79 BPM

## 2021-07-07 DIAGNOSIS — R42 DIZZINESS: Primary | ICD-10-CM

## 2021-07-07 DIAGNOSIS — J01.90 ACUTE RHINOSINUSITIS: ICD-10-CM

## 2021-07-07 PROCEDURE — 99214 OFFICE O/P EST MOD 30 MIN: CPT | Performed by: NURSE PRACTITIONER

## 2021-07-07 PROCEDURE — 93000 ELECTROCARDIOGRAM COMPLETE: CPT | Performed by: NURSE PRACTITIONER

## 2021-07-07 PROCEDURE — 1036F TOBACCO NON-USER: CPT | Performed by: NURSE PRACTITIONER

## 2021-07-07 PROCEDURE — 1123F ACP DISCUSS/DSCN MKR DOCD: CPT | Performed by: NURSE PRACTITIONER

## 2021-07-07 PROCEDURE — 1090F PRES/ABSN URINE INCON ASSESS: CPT | Performed by: NURSE PRACTITIONER

## 2021-07-07 PROCEDURE — 4040F PNEUMOC VAC/ADMIN/RCVD: CPT | Performed by: NURSE PRACTITIONER

## 2021-07-07 PROCEDURE — G8427 DOCREV CUR MEDS BY ELIG CLIN: HCPCS | Performed by: NURSE PRACTITIONER

## 2021-07-07 PROCEDURE — G8420 CALC BMI NORM PARAMETERS: HCPCS | Performed by: NURSE PRACTITIONER

## 2021-07-07 RX ORDER — PREDNISONE 20 MG/1
40 TABLET ORAL DAILY
Qty: 10 TABLET | Refills: 0 | Status: SHIPPED | OUTPATIENT
Start: 2021-07-07 | End: 2021-07-12

## 2021-07-07 RX ORDER — AMOXICILLIN AND CLAVULANATE POTASSIUM 875; 125 MG/1; MG/1
1 TABLET, FILM COATED ORAL 2 TIMES DAILY
Qty: 20 TABLET | Refills: 0 | Status: SHIPPED | OUTPATIENT
Start: 2021-07-07 | End: 2021-07-10

## 2021-07-07 ASSESSMENT — ENCOUNTER SYMPTOMS
SINUS PRESSURE: 1
SORE THROAT: 1
RHINORRHEA: 1
COUGH: 1
SHORTNESS OF BREATH: 0
WHEEZING: 0
SINUS PAIN: 1

## 2021-07-07 NOTE — PATIENT INSTRUCTIONS
Patient Education        Dizziness: Care Instructions  Your Care Instructions  Dizziness is the feeling of unsteadiness or fuzziness in your head. It is different than having vertigo, which is a feeling that the room is spinning or that you are moving or falling. It is also different from lightheadedness, which is the feeling that you are about to faint. It can be hard to know what causes dizziness. Some people feel dizzy when they have migraine headaches. Sometimes bouts of flu can make you feel dizzy. Some medical conditions, such as heart problems or high blood pressure, can make you feel dizzy. Many medicines can cause dizziness, including medicines for high blood pressure, pain, or anxiety. If a medicine causes your symptoms, your doctor may recommend that you stop or change the medicine. If it is a problem with your heart, you may need medicine to help your heart work better. If there is no clear reason for your symptoms, your doctor may suggest watching and waiting for a while to see if the dizziness goes away on its own. Follow-up care is a key part of your treatment and safety. Be sure to make and go to all appointments, and call your doctor if you are having problems. It's also a good idea to know your test results and keep a list of the medicines you take. How can you care for yourself at home? · If your doctor recommends or prescribes medicine, take it exactly as directed. Call your doctor if you think you are having a problem with your medicine. · Do not drive while you feel dizzy. · Try to prevent falls. Steps you can take include:  ? Using nonskid mats, adding grab bars near the tub, and using night-lights. ? Clearing your home so that walkways are free of anything you might trip on.  ? Letting family and friends know that you have been feeling dizzy. This will help them know how to help you. When should you call for help? Call 911 anytime you think you may need emergency care.  For example, call if:    · You passed out (lost consciousness).     · You have dizziness along with symptoms of a heart attack. These may include:  ? Chest pain or pressure, or a strange feeling in the chest.  ? Sweating. ? Shortness of breath. ? Nausea or vomiting. ? Pain, pressure, or a strange feeling in the back, neck, jaw, or upper belly or in one or both shoulders or arms. ? Lightheadedness or sudden weakness. ? A fast or irregular heartbeat.     · You have symptoms of a stroke. These may include:  ? Sudden numbness, tingling, weakness, or loss of movement in your face, arm, or leg, especially on only one side of your body. ? Sudden vision changes. ? Sudden trouble speaking. ? Sudden confusion or trouble understanding simple statements. ? Sudden problems with walking or balance. ? A sudden, severe headache that is different from past headaches. Call your doctor now or seek immediate medical care if:    · You feel dizzy and have a fever, headache, or ringing in your ears.     · You have new or increased nausea and vomiting.     · Your dizziness does not go away or comes back. Watch closely for changes in your health, and be sure to contact your doctor if:    · You do not get better as expected. Where can you learn more? Go to https://Apervita.Searchdaimon. org and sign in to your Dr. Jerry's Smooth [employer] account. Enter B832 in the [facility] box to learn more about \"Dizziness: Care Instructions. \"     If you do not have an account, please click on the \"Sign Up Now\" link. Current as of: October 19, 2020               Content Version: 12.9  © 1981-2734 [employer]. Care instructions adapted under license by [facility] ([facility]). If you have questions about a medical condition or this instruction, always ask your healthcare professional. [place] [address] any warranty or liability for your use of this information.          Patient Education        Sinusitis: Care Instructions  Your Care Instructions     Sinusitis is an infection of the lining of the sinus cavities in your head. Sinusitis often follows a cold. It causes pain and pressure in your head and face. In most cases, sinusitis gets better on its own in 1 to 2 weeks. But some mild symptoms may last for several weeks. Sometimes antibiotics are needed. Follow-up care is a key part of your treatment and safety. Be sure to make and go to all appointments, and call your doctor if you are having problems. It's also a good idea to know your test results and keep a list of the medicines you take. How can you care for yourself at home? · Take an over-the-counter pain medicine, such as acetaminophen (Tylenol), ibuprofen (Advil, Motrin), or naproxen (Aleve). Read and follow all instructions on the label. · If the doctor prescribed antibiotics, take them as directed. Do not stop taking them just because you feel better. You need to take the full course of antibiotics. · Be careful when taking over-the-counter cold or flu medicines and Tylenol at the same time. Many of these medicines have acetaminophen, which is Tylenol. Read the labels to make sure that you are not taking more than the recommended dose. Too much acetaminophen (Tylenol) can be harmful. · Breathe warm, moist air from a steamy shower, a hot bath, or a sink filled with hot water. Avoid cold, dry air. Using a humidifier in your home may help. Follow the directions for cleaning the machine. · Use saline (saltwater) nasal washes. This can help keep your nasal passages open and wash out mucus and bacteria. You can buy saline nose drops at a grocery store or drugstore. Or you can make your own at home by adding 1 teaspoon of salt and 1 teaspoon of baking soda to 2 cups of distilled water. If you make your own, fill a bulb syringe with the solution, insert the tip into your nostril, and squeeze gently. Jose Him your nose.   · Put a hot, wet towel or a warm gel pack on your face 3 or 4 times a day for 5 to 10 minutes each time. · Try a decongestant nasal spray like oxymetazoline (Afrin). Do not use it for more than 3 days in a row. Using it for more than 3 days can make your congestion worse. When should you call for help? Call your doctor now or seek immediate medical care if:    · You have new or worse swelling or redness in your face or around your eyes.     · You have a new or higher fever. Watch closely for changes in your health, and be sure to contact your doctor if:    · You have new or worse facial pain.     · The mucus from your nose becomes thicker (like pus) or has new blood in it.     · You are not getting better as expected. Where can you learn more? Go to https://Scandit.ecoATM. org and sign in to your [employer] account. Enter I555 in the [employer] box to learn more about \"Sinusitis: Care Instructions. \"     If you do not have an account, please click on the \"Sign Up Now\" link. Current as of: December 2, 2020               Content Version: 12.9  © 8169-2699 [employer]. Care instructions adapted under license by [facility] ([facility]). If you have questions about a medical condition or this instruction, always ask your healthcare professional. [address] any warranty or liability for your use of this information.

## 2021-07-07 NOTE — TELEPHONE ENCOUNTER
Received call from Charis Richey at [facility] with [employer]. Brief description of triage: Dizziness, room tilting and faintness when standing since yesterday. Triage indicates for patient to be seen today. Advised patient to go to [facility] if unable to get appointment. Care advice provided, patient verbalizes understanding; denies any other questions or concerns; instructed to call back for any new or worsening symptoms. Writer provided warm transfer to Marion at [facility] for appointment scheduling. Attention Provider: Thank you for allowing me to participate in the care of your patient. The patient was connected to triage in response to information provided to the [facility]. Please do not respond through this encounter as the response is not directed to a shared pool. Reason for Disposition   Patient wants to be seen    Answer Assessment - Initial Assessment Questions  1. DESCRIPTION: \"Describe your dizziness. \"      Dizzy when moving head    2. LIGHTHEADED: \"Do you feel lightheaded? \" (e.g., somewhat faint, woozy, weak upon standing)      Yes    3. VERTIGO: \"Do you feel like either you or the room is spinning or tilting? \" (i.e. vertigo)      Tilting    4. SEVERITY: \"How bad is it? \"  \"Do you feel like you are going to faint? \" \"Can you stand and walk? \"    - MILD - walking normally    - MODERATE - interferes with normal activities (e.g., work, school)     - SEVERE - unable to stand, requires support to walk, feels like passing out now. Moderate    5. ONSET:  \"When did the dizziness begin? \"      Yesterday    6. AGGRAVATING FACTORS: \"Does anything make it worse? \" (e.g., standing, change in head position)      Standing    7. HEART RATE: \"Can you tell me your heart rate? \" \"How many beats in 15 seconds? \"  (Note: not all patients can do this)        Feels normal    8. CAUSE: \"What do you think is causing the dizziness? \"      Because of falling yesterday on the patio (lost balance)    9.

## 2021-07-07 NOTE — PROGRESS NOTES
[address] MEDICINE [address]  [address] [place] [address]  Dept: [phone]  Loc: [phone]  Visit Date: 7/7/2021      Chief Complaint:   Neema Sun is a 80 y.o. female thatpresents for Dizziness (EKG) and Sinus Problem (headache stuffy nose cough )        HPI:  Dizziness  Symptoms have been present for 1 day, however, congestion, sinus pain and pressure, sore throat, cough have been present for 3-4 weeks  Patient describes symptoms as a sense of imbalance mostly with quick position changes, bending over  Symptoms are intermittent, occurring a ew times a day  Inciting event prior to symptoms starting? Yes - sinus congestion that isn't improving  Provoking factos- position changes  Alleviating factors - rest  Frequency of symptoms - few times a day, intermittently happens. Duration of events - few min. Associated Symptoms -  none  History of trauma - No   Nausea and vomiting - No  History of Meniers disease, BPPV, or labrinthitis - No  Medications that can cause vertigo -  Yes  History of alcohol consumption - No    Of note, pt reports falling yesterday as she became dizzy after bending over to turn on her hose. Denies any LOC or hitting her head. Has a superficial abrasion left knee, healing. No other injuries. Trying to change positions slowly since that happened. Denies any chest pain, SOB, lightheadedness when she fell. The patient comes in alone today. History obtained from pt and medical records. I have reviewed the patient's past medical history, past surgical history, allergies,medications, social and family history and I have made updates where appropriate.     Past Medical History:   Diagnosis Date    Cerebral artery occlusion with cerebral infarction [facility]) 2012    tia    GERD (gastroesophageal reflux disease)     Hyperlipidemia     Hypertension     Hypothyroidism     Osteoarthritis     Sixth nerve palsy 2020       Past Surgical History:   Procedure Laterality Date    ARTHROCENTESIS Right 4/12/2019    Right hip bursa steroid INJECTION  NO SEDATION performed by Sushant Bray MD at [address] Right 5/11/2021    Right SI joint injection performed by Juvenal Hirsch DO at [address]  2003    COLONOSCOPY      ENDOSCOPY, COLON, DIAGNOSTIC      HC INJECTION PROCEDURE FOR SACROILIAC JOINT Right 12/8/2020    Right SI joint injection performed by Juvenal Hirsch DO at [address] Right 3/16/2021    right ischial bursa injection performed by Juvenal Hirsch DO at [address] [facility]      left    KNEE ARTHROSCOPY  2010    left    LUMBAR NERVE BLOCK N/A 12/11/2018    LUMBAR INTER LAMINAR DAYANARA LESI #1@ L5 performed by Sushant Bray MD at [facility] N/A 3/12/2019    LUMBAR INTER LAMINAR DAYANARA LESI @L5 performed by [facility] MD [address] Right 8/30/2019    SI RFA  RIGHT SIDE performed by Sushant Bray MD [address] Left 10/1/2019    SI RFA  LEFT SIDE performed by Sushant Bray MD at [address] [place] [address] Right 08/27/2018    SI RFA    NERVE SURGERY Left 09/11/2018    SI RFA    OTHER SURGICAL HISTORY      previous nerve blocks at [address] Right 5/22/2018    LUMBAR RFA RIGHT SIDE FIRST L3-4,4-5,5-S1 performed by Sushant Bray MD at [address] Left 6/25/2018    LUMBAR RFA  LEFT SIDE @ L3-4,4-5,5-S1, performed by Sushant Bray MD at [facility] IMAGE GUIDANCE, [facility] Right 8/27/2018    SI RFA  RIGHT SIDE performed by Sushant Bray MD at [facility] [place] RADIOFREQUENCY NEUROTOMY LUMBAR OR SACRAL, W IMAGE GUIDANCE, SINGLE Left 9/11/2018    SI RFA  LEFT SIDE performed by Willie Matias MD at [address] [place] [address] [address] History     Tobacco Use    Smoking status: Never Smoker    Smokeless tobacco: Never Used   Vaping Use    Vaping Use: Never used   Substance Use Topics    Alcohol use: No    Drug use: No       Family History   Problem Relation Age of Onset    Cancer Mother         melanoma    Heart Disease Father     Stroke Sister          Review of Systems   HENT: Positive for congestion, rhinorrhea, sinus pressure, sinus pain and sore throat. Respiratory: Positive for cough. Negative for shortness of breath and wheezing. Cardiovascular: Negative for chest pain. PHYSICAL EXAM:  /68   Pulse 79   Temp 98.6 °F (37 °C)   Resp 18   Ht 4' 11\" (1.499 m)   Wt 110 lb 9.6 oz (50.2 kg)   SpO2 97%   BMI 22.34 kg/m²     Physical Exam  Constitutional:       Appearance: Normal appearance. HENT:      Head: Normocephalic and atraumatic. Right Ear: External ear normal. No tenderness. A middle ear effusion is present. Tympanic membrane is not erythematous. Left Ear: External ear normal. No tenderness. A middle ear effusion is present. Tympanic membrane is not erythematous. Nose: Mucosal edema and rhinorrhea present. Right Sinus: Frontal sinus tenderness present. Left Sinus: Frontal sinus tenderness present. Mouth/Throat:      Mouth: Mucous membranes are moist.      Pharynx: Posterior oropharyngeal erythema present. No pharyngeal swelling or oropharyngeal exudate. Eyes:      Conjunctiva/sclera: Conjunctivae normal.   Cardiovascular:      Rate and Rhythm: Normal rate and regular rhythm. Pulses: Normal pulses. Heart sounds: Normal heart sounds. Pulmonary:      Effort: Pulmonary effort is normal.      Breath sounds: Normal breath sounds.    Abdominal:      General: Bowel sounds are normal. Palpations: Abdomen is soft. Musculoskeletal:         General: Normal range of motion. Cervical back: Normal range of motion. Skin:     General: Skin is warm and dry. Neurological:      General: No focal deficit present. Mental Status: She is alert and oriented to person, place, and time. Psychiatric:         Mood and Affect: Mood normal.         Behavior: Behavior normal.             ASSESSMENT & PLAN  Patricio Duvall was seen today for dizziness and sinus problem. Diagnoses and all orders for this visit:    Dizziness  -     EKG 12 Lead; Future  -     EKG 12 Lead    Acute rhinosinusitis  -     amoxicillin-clavulanate (AUGMENTIN) 875-125 MG per tablet; Take 1 tablet by mouth 2 times daily for 10 days  -     predniSONE (DELTASONE) 20 MG tablet; Take 2 tablets by mouth daily for 5 days    EKG is stable from previous in 2015  Feel her current sinus and ear issues are contributing to her dizziness  Symptoms have not improved in 3-4 weeks  Start Augmentin BID and Prednisone daily for sinusitis  Push fluids  Change positions slowly  ER precautions given  Call or message us if no improvement    No follow-ups on file. Patricio Duvall received counseling on the following healthy behaviors: nutrition, exercise and medication adherence  Reviewedprior labs and health maintenance. Continue current medications, diet and exercise. Discussed use, benefit, and side effects of prescribed medications. Barriers to medication compliance addressed. Patient given educationalmaterials - see patient instructions. All patient questions answered. Patient voiced understanding.      Electronically signed by TANNER Panchal - CNP, APRN on 7/7/21 at 12:07 PM EDT

## 2021-07-10 ENCOUNTER — OFFICE VISIT (OUTPATIENT)
Dept: FAMILY MEDICINE CLINIC | Age: 86
End: 2021-07-10
Payer: MEDICARE

## 2021-07-10 VITALS
RESPIRATION RATE: 14 BRPM | TEMPERATURE: 97.6 F | SYSTOLIC BLOOD PRESSURE: 130 MMHG | WEIGHT: 112.4 LBS | HEART RATE: 71 BPM | HEIGHT: 59 IN | OXYGEN SATURATION: 94 % | DIASTOLIC BLOOD PRESSURE: 62 MMHG | BODY MASS INDEX: 22.66 KG/M2

## 2021-07-10 DIAGNOSIS — J01.90 ACUTE RHINOSINUSITIS: Primary | ICD-10-CM

## 2021-07-10 DIAGNOSIS — R19.7 DIARRHEA, UNSPECIFIED TYPE: ICD-10-CM

## 2021-07-10 PROCEDURE — G8427 DOCREV CUR MEDS BY ELIG CLIN: HCPCS | Performed by: FAMILY MEDICINE

## 2021-07-10 PROCEDURE — 1123F ACP DISCUSS/DSCN MKR DOCD: CPT | Performed by: FAMILY MEDICINE

## 2021-07-10 PROCEDURE — G8420 CALC BMI NORM PARAMETERS: HCPCS | Performed by: FAMILY MEDICINE

## 2021-07-10 PROCEDURE — 1090F PRES/ABSN URINE INCON ASSESS: CPT | Performed by: FAMILY MEDICINE

## 2021-07-10 PROCEDURE — 99213 OFFICE O/P EST LOW 20 MIN: CPT | Performed by: FAMILY MEDICINE

## 2021-07-10 PROCEDURE — 1036F TOBACCO NON-USER: CPT | Performed by: FAMILY MEDICINE

## 2021-07-10 PROCEDURE — 4040F PNEUMOC VAC/ADMIN/RCVD: CPT | Performed by: FAMILY MEDICINE

## 2021-07-10 RX ORDER — AZITHROMYCIN 250 MG/1
TABLET, FILM COATED ORAL
Qty: 1 PACKET | Refills: 0 | Status: ON HOLD | OUTPATIENT
Start: 2021-07-10 | End: 2021-08-17

## 2021-07-10 NOTE — PROGRESS NOTES
SURGERY CENTER OR    HIP SURGERY Right 3/16/2021    right ischial bursa injection performed by Otoniel Nation DO at [address] [facility]      left    KNEE ARTHROSCOPY  2010    left    LUMBAR NERVE BLOCK N/A 12/11/2018    LUMBAR INTER LAMINAR DAYANARA LESI #1@ L5 performed by Nuvia Ellis MD at [facility] N/A 3/12/2019    LUMBAR INTER LAMINAR DAYANARA LESI @L5 performed by Nuvia Ellis MD at [address] Right 8/30/2019    SI RFA  RIGHT SIDE performed by Nuvia Ellis MD at [address] Left 10/1/2019    SI RFA  LEFT SIDE performed by Nuvia Ellis MD at [address] [place] [address] Right 08/27/2018    SI RFA    NERVE SURGERY Left 09/11/2018    SI RFA    OTHER SURGICAL HISTORY      previous nerve blocks at [address] Right 5/22/2018    LUMBAR RFA RIGHT SIDE FIRST L3-4,4-5,5-S1 performed by Nuvia Ellis MD at [address] [facility] Left 6/25/2018    LUMBAR RFA  LEFT SIDE @ L3-4,4-5,5-S1, performed by Nvuia Ellis MD at [address] [place] [address],  IMAGE GUIDANCE, SINGLE Right 8/27/2018    SI RFA  RIGHT SIDE performed by Nuvia Ellis MD at [address] [place] [address],  IMAGE GUIDANCE, SINGLE Left 9/11/2018    SI RFA  LEFT SIDE performed by Nuvia Ellis MD at [address] [address] [address] History     Tobacco Use    Smoking status: Never Smoker    Smokeless tobacco: Never Used   Vaping Use    Vaping Use: Never used   Substance Use Topics    Alcohol use: No    Drug use: No       Family History   Problem Relation Age of Onset    Cancer Mother         melanoma    Heart Disease Father     Stroke Sister          I have reviewed the patient's past medical history, past surgical history, allergies, medications, social and family history and I have made updates where appropriate. Review of Systems        PHYSICAL EXAM:  /62   Pulse 71   Temp 97.6 °F (36.4 °C) (Infrared)   Resp 14   Ht 4' 11\" (1.499 m)   Wt 112 lb 6.4 oz (51 kg)   SpO2 94%   BMI 22.70 kg/m²     Physical Exam  Nursing note reviewed. Constitutional:       Appearance: She is well-developed. Pulmonary:      Effort: Pulmonary effort is normal. No respiratory distress. Neurological:      Mental Status: She is alert. Psychiatric:         Behavior: Behavior normal.         Thought Content: Thought content normal.         Judgment: Judgment normal.             ASSESSMENT & PLAN  Coco Simms was seen today for medication problem. Diagnoses and all orders for this visit:    Acute rhinosinusitis  -     azithromycin (ZITHROMAX Z-ZEYNEP) 250 MG tablet; 2 tabs PO x 1 on Day 1, then 1 tab PO q daily on Days 2-5. Diarrhea, unspecified type    -DC augmentin due to diarrhea, will start azithro, contact PCP if sx persist.    No follow-ups on file. The most recent results of the following tests were reviewed with the patient today: none     All copied or forwarded information in the progress note was verified by me to be accurate at the time of visit  Patient's past medical, surgical, social and family history were reviewed and updated     All patient questions answered. Patient voiced understanding.

## 2021-07-27 RX ORDER — MELOXICAM 7.5 MG/1
7.5 TABLET ORAL DAILY
Qty: 30 TABLET | Refills: 2 | Status: SHIPPED | OUTPATIENT
Start: 2021-07-27 | End: 2021-09-09 | Stop reason: SDUPTHER

## 2021-07-27 NOTE — TELEPHONE ENCOUNTER
Last seen: 5/5/2021.  Follow-up:   Future Appointments   Date Time Provider Twan Hernandez   7/29/2021  2:00 PM DO MEKHI Devine [facility]

## 2021-07-29 ENCOUNTER — OFFICE VISIT (OUTPATIENT)
Dept: PHYSICAL MEDICINE AND REHAB | Age: 86
End: 2021-07-29
Payer: MEDICARE

## 2021-07-29 VITALS
SYSTOLIC BLOOD PRESSURE: 128 MMHG | DIASTOLIC BLOOD PRESSURE: 78 MMHG | BODY MASS INDEX: 22.58 KG/M2 | WEIGHT: 112 LBS | HEIGHT: 59 IN

## 2021-07-29 DIAGNOSIS — M53.3 SACROILIAC PAIN: ICD-10-CM

## 2021-07-29 DIAGNOSIS — M25.561 CHRONIC PAIN OF RIGHT KNEE: Primary | ICD-10-CM

## 2021-07-29 DIAGNOSIS — M70.71 ISCHIAL BURSITIS OF RIGHT SIDE: ICD-10-CM

## 2021-07-29 DIAGNOSIS — G89.29 OTHER CHRONIC PAIN: ICD-10-CM

## 2021-07-29 DIAGNOSIS — G89.29 CHRONIC PAIN OF RIGHT KNEE: Primary | ICD-10-CM

## 2021-07-29 PROCEDURE — G8420 CALC BMI NORM PARAMETERS: HCPCS | Performed by: ANESTHESIOLOGY

## 2021-07-29 PROCEDURE — G8427 DOCREV CUR MEDS BY ELIG CLIN: HCPCS | Performed by: ANESTHESIOLOGY

## 2021-07-29 PROCEDURE — 1123F ACP DISCUSS/DSCN MKR DOCD: CPT | Performed by: ANESTHESIOLOGY

## 2021-07-29 PROCEDURE — 1036F TOBACCO NON-USER: CPT | Performed by: ANESTHESIOLOGY

## 2021-07-29 PROCEDURE — 1090F PRES/ABSN URINE INCON ASSESS: CPT | Performed by: ANESTHESIOLOGY

## 2021-07-29 PROCEDURE — 4040F PNEUMOC VAC/ADMIN/RCVD: CPT | Performed by: ANESTHESIOLOGY

## 2021-07-29 PROCEDURE — 99214 OFFICE O/P EST MOD 30 MIN: CPT | Performed by: ANESTHESIOLOGY

## 2021-07-29 NOTE — PATIENT INSTRUCTIONS
The following treatment recommendations and plan were discussed in detail with Hiral Espino. Imaging:   I have reviewed patients imaging of right knee x-ray and results were discussed the patient today. Analgesics:   Patient is taking Acetaminophen. Patient informed that the maximum amount of acetaminophen taken on a regular basis should only be 4000 mg per day. I will continue the patient on meloxicam 7.5 mg daily. Patient is advised to take this medication with food. Adjuvants: In light of the presence of a neuropathic component of pain, the patient can continue her Gabapentin. Interventions: With continued right knee pain and x-ray showing severe end-stage osteoarthritis of the right knee, we will proceed with a right genicular nerve block under fluoroscopy. Patient is in agreement to proceed with injection. Anticoagulation/NPO Recommendations:   Patient does not need to hold any medications for the procedure. Patient will need to be NPO x 8 hours prior to the procedure. We will start an IV prior to the procedure. Multidisciplinary Pain Management:   In the presence of complex, chronic, and multi-factorial pain, the importance of a multidisciplinary approach to pain management in the patients management regimen was emphasized and discussed in great detail.      Referrals:  None    Prescriptions Written This Visit:   None    Follow-up: For Right genicular nerve block

## 2021-07-29 NOTE — PROGRESS NOTES
Chronic Pain/PM&R Clinic Note     Encounter Date: 7/29/21    Subjective:   Chief Complaint:   Chief Complaint   Patient presents with    Follow-up       History of Present Illness:   Deena Coleman is a 80 y.o. female seen in the clinic initially on 10/8/20 upon request from Matt Riley DO (PCP) for her history of low back pain. She has a longstanding history of cervical neck and low back pain. However, she sustained a fall while trying to put her pants on 10/2/2021 where she fell on her right buttocks. She states that since this episode she has been dealing with severe low back pain with radiation down the posterior thigh and slightly down the posterior calf. She does admit the pain radiates around to her groin. She states she has numbness and tingling however these are more in the toes. He does endorse some right leg weakness which she feels like this pain limited. Her pain is sharp and stabbing in nature and 5/10. Her pain is exacerbated with any sitting or laying on the affected side. Her pain is alleviated with rest and medication. She denies any focal leg weakness, saddle anesthesia, or bowel/bladder incontinence. She states she is currently been managing her pain with over-the-counter Tylenol arthritis. She has seen a couple pain specialist in the past including receiving spinal injections with Dr. Fela Veras and Dr. Darline Lynn. Today, 7/29/2021, patient presents for planned follow-up for chronic right low back/right hip pain. She underwent a right SI joint injection on 5/11/2021. She reports significant relief (greater than 80%) and her right SI joint pain for about 2 months. She is upset that this relief is not longer lasting duration. She states that since the injection she has noticed worsening right knee pain. She states she has a left total knee replacement but not on the right.   She describes the majority of her knee pain to be on the medial aspect of the knee worse with any activities where she is walking and up on her feet. She also continues to complain of right SI joint pain and pain on her right buttocks where she is sitting. She denies any radicular leg pain, new leg paresthesias, saddle anesthesia, bowel/bladder incontinence. She continues take her meloxicam and states she has no side effects on this medication. History of Intervention:   Surgery: No previous lumbar surgeries  Injections: Previous lumbar epidurals-significant relief  R SI joint injection (12/8/20) - significant (100%) x 2 months  Right ischial bursa injection (3/16/2021)-moderate relief  R SI joint injection (5/11/2021)-greater than 80% relief x 2 months    Current Treatment Medications:   Meloxicam 7.5 mg daily  Gabapentin 600 mg / 1200 mg  Tylenol PRN    Historical Treatment Medications:   None    Imaging:  MRI L-spine (10/4/20)    LUMBAR SPINE 2 VIEWS:         CLINICAL INFORMATION: fall         COMPARISON: No prior study.         TECHNIQUE: Standard AP and lateral views of lumbar spine were obtained.                   Impression    1. Mild thoracolumbar dextro scoliosis. Moderate lumbar levoscoliosis. Cannot exclude muscle spasm. 2. Multifocal marked disc space narrowing and degenerative disc disease throughout the lumbar spine. Moderately severe degenerative vertebral body spondylosis scattered throughout the lumbar spine. 3. No fracture acute is seen. Mild degenerative changes right sacroiliac joint.         Past Medical History:   Diagnosis Date    Cerebral artery occlusion with cerebral infarction [facility]) 2012    tia    GERD (gastroesophageal reflux disease)     Hyperlipidemia     Hypertension     Hypothyroidism     Osteoarthritis     Sixth nerve palsy 2020       Past Surgical History:   Procedure Laterality Date    ARTHROCENTESIS Right 4/12/2019    Right hip bursa steroid INJECTION  NO SEDATION performed by Cayla Sierra MD at [address] Right 5/11/2021    Right SI joint injection performed by Ying Parker DO at [address]  2003    COLONOSCOPY      ENDOSCOPY, COLON, DIAGNOSTIC      HC INJECTION PROCEDURE FOR SACROILIAC JOINT Right 12/8/2020    Right SI joint injection performed by Ying Parker DO at [address] Right 3/16/2021    right ischial bursa injection performed by Ying Parker DO at [address] [facility]      left    KNEE ARTHROSCOPY  2010    left    LUMBAR NERVE BLOCK N/A 12/11/2018    LUMBAR INTER LAMINAR DAYANARA LESI #1@ L5 performed by Chanell Tejeda MD at [facility] N/A 3/12/2019    LUMBAR INTER LAMINAR DAYANARA LESI @L5 performed by Chanell Tejeda MD at [address] [facility] Right 8/30/2019    SI RFA  RIGHT SIDE performed by Chanell Tejeda MD at [address] Left 10/1/2019    SI RFA  LEFT SIDE performed by Chanell Tejeda MD at [address] [place] [address] Right 08/27/2018    SI RFA    NERVE SURGERY Left 09/11/2018    SI RFA    OTHER SURGICAL HISTORY      previous nerve blocks at [address] Right 5/22/2018    LUMBAR RFA RIGHT SIDE FIRST L3-4,4-5,5-S1 performed by Chanell Tejeda MD at [address] Left 6/25/2018    LUMBAR RFA  LEFT SIDE @ L3-4,4-5,5-S1, performed by Chanell Tejeda MD at [address] [place] [address],  IMAGE GUIDANCE, SINGLE Right 8/27/2018    SI RFA  RIGHT SIDE performed by Chanell Tejeda MD at [address] [place] [address],  IMAGE GUIDANCE, SINGLE Left 9/11/2018    SI RFA  LEFT SIDE performed by Chanell Tejeda MD at [address] [facility]       Family History   Problem Relation Age of Onset    Cancer Mother         melanoma    Heart Disease Father     Stroke Sister        Social History     Socioeconomic History    Marital status:      Spouse name: Not on file    Number of children: 2    Years of education: 15    Highest education level: High school graduate   Occupational History    Not on file   Tobacco Use    Smoking status: Never Smoker    Smokeless tobacco: Never Used   Vaping Use    Vaping Use: Never used   Substance and Sexual Activity    Alcohol use: No    Drug use: No    Sexual activity: Not on file   Other Topics Concern    Not on file   Social History Narrative    Not on file     Social Determinants of Health     Financial Resource Strain: Low Risk     Difficulty of Paying Living Expenses: Not hard at all   Food Insecurity: No Food Insecurity    Worried About [employer] in the Last Year: Never true    [place] of Food in the Last Year: Never true   Transportation Needs: No Transportation Needs    Lack of Transportation (Medical): No    Lack of Transportation (Non-Medical): No   Physical Activity:     Days of Exercise per Week:     Minutes of Exercise per Session:    Stress:     Feeling of Stress :    Social Connections:     Frequency of Communication with Friends and Family:     Frequency of Social Gatherings with Friends and Family:     Attends Yarsani Services:     Active Member of Clubs or Organizations:     Attends Club or Organization Meetings:     Marital Status:    Intimate Partner Violence:     Fear of Current or Ex-Partner:     Emotionally Abused:     Physically Abused:     Sexually Abused:        Medications & Allergies:   Current Outpatient Medications   Medication Instructions    Acetaminophen (TYLENOL ARTHRITIS EXT RELIEF PO) Oral    aspirin 81 mg, Oral, DAILY    azithromycin (ZITHROMAX Z-ZEYNEP) 250 MG tablet 2 tabs PO x 1 on Day 1, then 1 tab PO q daily on Days 2-5.     calcium carbonate-vitamin D (CALCIUM 600+D) 600-200 MG-UNIT TABS 1 tablet, DAILY    Cholecalciferol (VITAMIN D) 2000 UNITS CAPS capsule 1 capsule, DAILY    clopidogrel (PLAVIX) 75 mg, Oral, DAILY    gabapentin (NEURONTIN) 600 MG tablet TAKE 1 TABLET IN  THE      MORNING, 1 TABLET IN THE   AFTERNOON,  AND2 TABLETS  AT BEDTIME    levothyroxine (SYNTHROID) 50 MCG tablet TAKE 1 TABLET BY MOUTH EVERY DAY    lisinopril (PRINIVIL;ZESTRIL) 10 mg, Oral, DAILY    lovastatin (MEVACOR) 20 MG tablet TAKE 1 TABLET BY MOUTH EVERY DAY AT NIGHT    meloxicam (MOBIC) 7.5 mg, Oral, DAILY    Multiple Vitamins-Minerals (THERAPEUTIC MULTIVITAMIN-MINERALS) tablet 1 tablet, DAILY    pantoprazole (PROTONIX) 40 mg, Oral, DAILY WITH BREAKFAST       Allergies   Allergen Reactions    Cataflam [Diclofenac Potassium] Shortness Of Breath    Augmentin [Amoxicillin-Pot Clavulanate] Diarrhea    Phenergan [Promethazine Hcl]      Confusion \"didn't know where she was at\"       Review of Systems:   Constitutional: denies any fevers or chills  Genitourinary: negative for bowel/bladder incontinence   Musculoskeletal: Positive for right knee pain, right buttocks pain, and right low back pain  Neurological: Positive for numbness/tingling in toes of feet  Behavioral/Psych: negative for anxiety/depression   All other ROS reviewed and are negative    Objective:     Vitals:    07/29/21 1359   BP: 128/78       Constitutional: Pleasant, no acute distress   Head: Normocephalic, atraumatic   Eyes: Conjunctivae normal   Neck: Supple, symmetrical   Lungs: Normal respiratory effort, non-labored breathing   Cardiovascular: Limbs warm and well perfused   Abdomen: Non-protruded   Musculoskeletal: Muscle bulk symmetric, no atrophy, no gross deformities   · Thorax: Scoliosis appreciated on exam  · Lumbar Spine: Lumbar paraspinals tender bilaterally. SLR neg bilaterally. Right SI joint tender to palpation. Positive Aldair right. Positive Gaenslen's right.   -Right knee: TTP along medial joint line  Neurological: Cranial nerves II-XII grossly intact. · Gait - Antalgic gait (improved). Ambulates without assist device. · Motor: 5/5 motor strength appreciated in bilateral hip flexion, knee extension, knee flexion, ankle plantarflexion, ankle dorsiflexion  · Sensory: LT sensation intact in lower limbs   Skin: No rashes or lesions visibile in lower limbs  Psychological: Cooperative, no exaggerated pain behaviors       Assessment:    Diagnosis Orders   1. Chronic pain of right knee  CHG FLUOR NEEDLE/CATH SPINE/PARASPINAL DX/THER ADDON    IL INJECTION AA&/STRD OTHER PERIPHERAL NERVE/BRANCH   2. Sacroiliac pain     3. Ischial bursitis of right side     4. Other chronic pain         Gertrude Johnson is a 80 y. o.female presenting to the pain clinic for evaluation of right-sided low back pain. Her clinical history physical examination are consistent with right SI joint dysfunction/pain. She has responded twice to right SI joint injections but her relief wears off at about 2 months. She continues to struggle with right ischial bursa pain and right knee pain secondary to arthritis. I have set her up for right genicular nerve block with plans to do a genicular ablation. We may potentially investigate a right SI joint ablation to give her a break from high-dose steroid injections. We will continue her meloxicam as prescribed. Plan: The following treatment recommendations and plan were discussed in detail with Gertrude Johnson. Imaging:   I have reviewed patients imaging of right knee x-ray and results were discussed the patient today. Analgesics:   Patient is taking Acetaminophen. Patient informed that the maximum amount of acetaminophen taken on a regular basis should only be 4000 mg per day. I will continue the patient on meloxicam 7.5 mg daily. Patient is advised to take this medication with food. Adjuvants:    In light of the presence of a neuropathic component of pain, the patient can continue her Gabapentin. Interventions: With continued right knee pain and x-ray showing severe end-stage osteoarthritis of the right knee, we will proceed with a right genicular nerve block under fluoroscopy. Patient is in agreement to proceed with injection. Anticoagulation/NPO Recommendations:   Patient does not need to hold any medications for the procedure. Patient will need to be NPO x 8 hours prior to the procedure. We will start an IV prior to the procedure. Multidisciplinary Pain Management:   In the presence of complex, chronic, and multi-factorial pain, the importance of a multidisciplinary approach to pain management in the patients management regimen was emphasized and discussed in great detail. Referrals:  None    Prescriptions Written This Visit:   None    Follow-up: For Right genicular nerve block    I spent 30 minutes with the patient and greater than 50% of this time was spent discussing her multiple pain generators, discussing interventions that do not require steroids, and discussing the risk versus benefits of a genicular nerve block and potential radiofrequency ablation therapy for chronic right knee pain.       Monique Cook, DO  Interventional Pain Management/PM&R   [place]

## 2021-08-03 ENCOUNTER — PREP FOR PROCEDURE (OUTPATIENT)
Dept: PHYSICAL MEDICINE AND REHAB | Age: 86
End: 2021-08-03

## 2021-08-03 NOTE — H&P
Today, patient presents for planned right genicular nerve block. This note is reflective of the patient's previous visit for evaluation. We will proceed with today's planned procedure. Since patient's last visit for evaluation, there have been no interval changes in medical history. Patient has no new numbness, weakness, or focal neurological deficit since evaluation. Patient has no contraindications to injection (no anticoagulation or recent antibiotic intake for active infections), and has a  present or is able to drive themselves (as discussed and cleared by physician). Allergies to latex, contrast dye, and steroid medications have been confirmed with the patient prior to the procedure. NPO necessity has been assessed and accepted based on procedure complexity. The risks and benefits of the procedure have been explained including but are not limited to infection, bleeding, paralysis, immediate post procedure weakness, and dizziness; the patient acknowledges understanding and desires to proceed with the procedure. Patient has signed consent for same procedure as discussed in previous clinic encounter. All other questions and concerns were addressed at bedside. See procedure note for full details. Follow up: 8/11/21    Post procedure Instructions: The patient was advised not to drive during the day of the procedure and not to engage in any significant decision making (unless otherwise states by physician). The patient was also advised to be cautious with walking/activity for 24 hours following today's visit and asked not to engage in over-exertion (unless otherwise states by physician). After this time, it is ok to resume pre-procedure level of activity. Patient advised to apply ice to site of injection in situations of pain and discomfort. Patient advised to not submerge site of injection during bath or pool activities for approximately 24 hours post-procedure.  Patient attested to understanding post SEDATION performed by Yvette Beaver MD at [address] Right 5/11/2021    Right SI joint injection performed by Collin Simmons DO at [address]  [address]    COLONOSCOPY      ENDOSCOPY, COLON, DIAGNOSTIC      HC INJECTION PROCEDURE FOR SACROILIAC JOINT Right 12/8/2020    Right SI joint injection performed by Collin Simmons DO at [address] Right 3/16/2021    right ischial bursa injection performed by Collin Simmons DO at [address] [facility]      left    KNEE ARTHROSCOPY  2010    left    LUMBAR NERVE BLOCK N/A 12/11/2018    LUMBAR INTER LAMINAR DAYANARA LESI #1@ L5 performed by Yvette Beaver MD at [place] N/A 3/12/2019    LUMBAR INTER LAMINAR DAYANARA LESI @L5 performed by Yvette Beaver MD at [address] [facility] Right 8/30/2019    SI RFA  RIGHT SIDE performed by Yvette Beaver MD at [address] [facility] Left 10/1/2019    SI RFA  LEFT SIDE performed by Yvette Beaver MD at [address] [place] [address] Right 08/27/2018    SI RFA    NERVE SURGERY Left 09/11/2018    SI RFA    OTHER SURGICAL HISTORY      previous nerve blocks at [address] Right 5/22/2018    LUMBAR RFA RIGHT SIDE FIRST L3-4,4-5,5-S1 performed by Yvette Beaver MD at [address] Left 6/25/2018    LUMBAR RFA  LEFT SIDE @ L3-4,4-5,5-S1, performed by Yvette Beaver MD at [address] [place] [address] IMAGE GUIDANCE, SINGLE Right 8/27/2018    SI RFA  RIGHT SIDE performed by Yvette Beaver MD at [address] [place] [address] IMAGE GUIDANCE, SINGLE Left 9/11/2018    SI RFA  LEFT SIDE [facility] [address] [facility]       Family History   Problem Relation Age of Onset    Cancer Mother         melanoma    Heart Disease Father     Stroke Sister        Social History     Socioeconomic History    Marital status:      Spouse name: Not on file    Number of children: 2    Years of education: 15    Highest education level: High school graduate   Occupational History    Not on file   Tobacco Use    Smoking status: Never Smoker    Smokeless tobacco: Never Used   Vaping Use    Vaping Use: Never used   Substance and Sexual Activity    Alcohol use: No    Drug use: No    Sexual activity: Not on file   Other Topics Concern    Not on file   Social History Narrative    Not on file     Social Determinants of Health     Financial Resource Strain: Low Risk     Difficulty of Paying Living Expenses: Not hard at all   Food Insecurity: No Food Insecurity    Worried About [employer] in the Last Year: Never true    [place] of Food in the Last Year: Never true   Transportation Needs: No Transportation Needs    Lack of Transportation (Medical): No    Lack of Transportation (Non-Medical):  No   Physical Activity:     Days of Exercise per Week:     Minutes of Exercise per Session:    Stress:     Feeling of Stress :    Social Connections:     Frequency of Communication with Friends and Family:     Frequency of Social Gatherings with Friends and Family:     Attends [employer] Services:     Active Member of Clubs or Organizations:     Attends Club or Organization Meetings:     Marital Status:    Intimate Partner Violence:     Fear of Current or Ex-Partner:     Emotionally Abused:     Physically Abused:     Sexually Abused:        Medications & Allergies:   Current Outpatient Medications   Medication Instructions    Acetaminophen (TYLENOL ARTHRITIS EXT RELIEF PO) Oral    aspirin 81 mg, Oral, DAILY    azithromycin (ZITHROMAX Z-ZEYNEP) 250 MG tablet 2 tabs PO x 1 on Day 1, then 1 tab PO q daily on Days 2-5.  calcium carbonate-vitamin D (CALCIUM 600+D) 600-200 MG-UNIT TABS 1 tablet, DAILY    Cholecalciferol (VITAMIN D) 2000 UNITS CAPS capsule 1 capsule, DAILY    clopidogrel (PLAVIX) 75 mg, Oral, DAILY    gabapentin (NEURONTIN) 600 MG tablet TAKE 1 TABLET IN  THE      MORNING, 1 TABLET IN THE   AFTERNOON,  AND2 TABLETS  AT BEDTIME    levothyroxine (SYNTHROID) 50 MCG tablet TAKE 1 TABLET BY MOUTH EVERY DAY    lisinopril (PRINIVIL;ZESTRIL) 10 mg, Oral, DAILY    lovastatin (MEVACOR) 20 MG tablet TAKE 1 TABLET BY MOUTH EVERY DAY AT NIGHT    meloxicam (MOBIC) 7.5 mg, Oral, DAILY    Multiple Vitamins-Minerals (THERAPEUTIC MULTIVITAMIN-MINERALS) tablet 1 tablet, DAILY    pantoprazole (PROTONIX) 40 mg, Oral, DAILY WITH BREAKFAST       Allergies   Allergen Reactions    Cataflam [Diclofenac Potassium] Shortness Of Breath    Augmentin [Amoxicillin-Pot Clavulanate] Diarrhea    Phenergan [Promethazine Hcl]      Confusion \"didn't know where she was at\"       Review of Systems:   Constitutional: denies any fevers or chills  Genitourinary: negative for bowel/bladder incontinence   Musculoskeletal: Positive for right knee pain, right buttocks pain, and right low back pain  Neurological: Positive for numbness/tingling in toes of feet  Behavioral/Psych: negative for anxiety/depression   All other ROS reviewed and are negative    Objective: There were no vitals filed for this visit. Constitutional: Pleasant, no acute distress   Head: Normocephalic, atraumatic   Eyes: Conjunctivae normal   Neck: Supple, symmetrical   Lungs: Normal respiratory effort, non-labored breathing   Cardiovascular: Limbs warm and well perfused   Abdomen: Non-protruded   Musculoskeletal: Muscle bulk symmetric, no atrophy, no gross deformities   · Thorax: Scoliosis appreciated on exam  · Lumbar Spine: Lumbar paraspinals tender bilaterally. SLR neg bilaterally.   Right SI joint tender to palpation. Positive Aldair right. Positive Gaenslen's right. -Right knee: TTP along medial joint line  Neurological: Cranial nerves II-XII grossly intact. · Gait - Antalgic gait (improved). Ambulates without assist device. · Motor: 5/5 motor strength appreciated in bilateral hip flexion, knee extension, knee flexion, ankle plantarflexion, ankle dorsiflexion  · Sensory: LT sensation intact in lower limbs   Skin: No rashes or lesions visibile in lower limbs  Psychological: Cooperative, no exaggerated pain behaviors       Assessment:    Diagnosis Orders   1. Chronic pain of right knee  CHG FLUOR NEEDLE/CATH SPINE/PARASPINAL DX/THER ADDON    NM INJECTION AA&/STRD OTHER PERIPHERAL NERVE/BRANCH   2. Sacroiliac pain     3. Ischial bursitis of right side     4. Other chronic pain         Spencer Maria is a 80 y. o.female presenting to the pain clinic for evaluation of right-sided low back pain. Her clinical history physical examination are consistent with right SI joint dysfunction/pain. She has responded twice to right SI joint injections but her relief wears off at about 2 months. She continues to struggle with right ischial bursa pain and right knee pain secondary to arthritis. I have set her up for right genicular nerve block with plans to do a genicular ablation. We may potentially investigate a right SI joint ablation to give her a break from high-dose steroid injections. We will continue her meloxicam as prescribed. Plan: The following treatment recommendations and plan were discussed in detail with Spencer Maria. Imaging:   I have reviewed patients imaging of right knee x-ray and results were discussed the patient today. Analgesics:   Patient is taking Acetaminophen. Patient informed that the maximum amount of acetaminophen taken on a regular basis should only be 4000 mg per day. I will continue the patient on meloxicam 7.5 mg daily.   Patient is advised to take this medication with food.    Adjuvants: In light of the presence of a neuropathic component of pain, the patient can continue her Gabapentin. Interventions: With continued right knee pain and x-ray showing severe end-stage osteoarthritis of the right knee, we will proceed with a right genicular nerve block under fluoroscopy. Patient is in agreement to proceed with injection. Anticoagulation/NPO Recommendations:   Patient does not need to hold any medications for the procedure. Patient will need to be NPO x 8 hours prior to the procedure. We will start an IV prior to the procedure. Multidisciplinary Pain Management:   In the presence of complex, chronic, and multi-factorial pain, the importance of a multidisciplinary approach to pain management in the patients management regimen was emphasized and discussed in great detail. Referrals:  None    Prescriptions Written This Visit:   None    Follow-up: For Right genicular nerve block    I spent 30 minutes with the patient and greater than 50% of this time was spent discussing her multiple pain generators, discussing interventions that do not require steroids, and discussing the risk versus benefits of a genicular nerve block and potential radiofrequency ablation therapy for chronic right knee pain.       Mabel Tan,   Interventional Pain Management/PM&R   [place]

## 2021-08-04 ENCOUNTER — HOSPITAL ENCOUNTER (OUTPATIENT)
Age: 86
Setting detail: OUTPATIENT SURGERY
Discharge: HOME | End: 2021-08-04
Attending: ANESTHESIOLOGY | Admitting: ANESTHESIOLOGY
Payer: MEDICARE

## 2021-08-04 ENCOUNTER — APPOINTMENT (OUTPATIENT)
Dept: GENERAL RADIOLOGY | Age: 86
End: 2021-08-04
Attending: ANESTHESIOLOGY
Payer: MEDICARE

## 2021-08-04 VITALS
RESPIRATION RATE: 16 BRPM | HEIGHT: 59 IN | BODY MASS INDEX: 22.38 KG/M2 | TEMPERATURE: 96 F | DIASTOLIC BLOOD PRESSURE: 63 MMHG | WEIGHT: 111 LBS | SYSTOLIC BLOOD PRESSURE: 144 MMHG | HEART RATE: 61 BPM | OXYGEN SATURATION: 97 %

## 2021-08-04 PROCEDURE — 99152 MOD SED SAME PHYS/QHP 5/>YRS: CPT | Performed by: ANESTHESIOLOGY

## 2021-08-04 PROCEDURE — 6360000002 HC RX W HCPCS: Performed by: ANESTHESIOLOGY

## 2021-08-04 PROCEDURE — 3209999900 FLUORO FOR SURGICAL PROCEDURES

## 2021-08-04 PROCEDURE — 7100000010 HC PHASE II RECOVERY - FIRST 15 MIN: Performed by: ANESTHESIOLOGY

## 2021-08-04 PROCEDURE — 64454 NJX AA&/STRD GNCLR NRV BRNCH: CPT | Performed by: ANESTHESIOLOGY

## 2021-08-04 PROCEDURE — 2500000003 HC RX 250 WO HCPCS: Performed by: ANESTHESIOLOGY

## 2021-08-04 PROCEDURE — 3600000054 HC PAIN LEVEL 3 BASE: Performed by: ANESTHESIOLOGY

## 2021-08-04 PROCEDURE — 7100000011 HC PHASE II RECOVERY - ADDTL 15 MIN: Performed by: ANESTHESIOLOGY

## 2021-08-04 RX ORDER — BUPIVACAINE HYDROCHLORIDE 5 MG/ML
INJECTION, SOLUTION PERINEURAL PRN
Status: DISCONTINUED | OUTPATIENT
Start: 2021-08-04 | End: 2021-08-04 | Stop reason: ALTCHOICE

## 2021-08-04 RX ORDER — MIDAZOLAM HYDROCHLORIDE 1 MG/ML
INJECTION INTRAMUSCULAR; INTRAVENOUS PRN
Status: DISCONTINUED | OUTPATIENT
Start: 2021-08-04 | End: 2021-08-04 | Stop reason: ALTCHOICE

## 2021-08-04 RX ORDER — FENTANYL CITRATE 50 UG/ML
INJECTION, SOLUTION INTRAMUSCULAR; INTRAVENOUS PRN
Status: DISCONTINUED | OUTPATIENT
Start: 2021-08-04 | End: 2021-08-04 | Stop reason: ALTCHOICE

## 2021-08-04 RX ORDER — LIDOCAINE HYDROCHLORIDE 10 MG/ML
INJECTION, SOLUTION EPIDURAL; INFILTRATION; INTRACAUDAL; PERINEURAL PRN
Status: DISCONTINUED | OUTPATIENT
Start: 2021-08-04 | End: 2021-08-04 | Stop reason: ALTCHOICE

## 2021-08-04 ASSESSMENT — PAIN - FUNCTIONAL ASSESSMENT: PAIN_FUNCTIONAL_ASSESSMENT: 0-10

## 2021-08-04 ASSESSMENT — PAIN SCALES - GENERAL: PAINLEVEL_OUTOF10: 0

## 2021-08-04 ASSESSMENT — PAIN DESCRIPTION - DESCRIPTORS: DESCRIPTORS: ACHING

## 2021-08-04 NOTE — PROCEDURES
Pre-operative Diagnosis: Right knee pain     Post-operative Diagnosis: Right knee pain     Procedure: Right genicular nerve blocks     Procedure Description:  The patient was brought to the procedure room and placed on the exam table in a comfortable supine position. The place for needle placement was obtained by manual palpation with radiographic confirmation. The sterile field was prepared by chloroprep and sterile drapes. Local anesthesia superficial and deep was provided by local infiltration of 1% Lidocaine. Three 3.5 inch spinal needles were advanced to a bony endpoint on the superiolateral portion of the femoral condyle, superiomedial portion of the femoral condyle, and the inferiomedial portion of the tibial condyle until a bony endpoint was met. Lateral x-ray views showed all the needles at 50% depth of the femur and tibia. Then, 0.5 cc of 0.5% bupivacaine was injected after negative aspiration. The needles were withdrawn. The patient tolerated the procedure well. Patient was subsequently monitored in the post procedure setting and discharged in an ambulatory fashion.       Procedural Complications: None  Estimated Blood Loss: 0 mL     IV sedation was used during the procedure:  - Moderate intravenous conscious sedation was supervised by Dr. Felipa Nails  - The patient was independently monitored by a Registered Nurse assigned to the procedure room  - Monitoring included automated blood pressure, continuous EKG, and continuous pulse oximetry  - The detailed conscious record is permanently stored in the [place] [address]  - The following is the conscious sedation record:  Start Time: 08:06  End Time : 08:21  Duration: 15 minutes   Medications Administered: 1 mg Versed, 50 mcg Fentanyl        Hunter Mondragon DO  Interventional Pain Management/PM&R   [place]

## 2021-08-04 NOTE — POST SEDATION
[facility]  Sedation/Analgesia Post Sedation Record    Pt Name: Alisa Richardson  MRN: [id number]  YOB: 1934  Procedure Performed By: Mery Junior DO  Primary Care Physician: Krystal Benavides DO    POST-PROCEDURE    Physicians/Assistants: Mery Junior DO  Procedure Performed: See Procedure Note   Sedation/Anesthesia: Versed and Fentanyl (See procedure note for amount and duration)  Estimated Blood Loss:     0  ml  Specimens Removed: None        Complications: None           Hunter Trejo DO  Electronically signed 8/4/2021 at 10:39 AM

## 2021-08-04 NOTE — PRE SEDATION
[facility]  Pre-Sedation/Analgesia History & Physical    Pt Name: Courtney Menjivar  MRN: [id number]  YOB: 1934  Provider Performing Procedure: Dalia Ramos DO   Primary Care Physician: Audrey Page DO      MEDICAL HISTORY       has a past medical history of Cerebral artery occlusion with cerebral infarction [facility]), GERD (gastroesophageal reflux disease), Hyperlipidemia, Hypertension, Hypothyroidism, Osteoarthritis, and Sixth nerve palsy. SURGICAL HISTORY   has a past surgical history that includes back surgery (2003); Neck surgery (1980s); Knee arthroscopy (2010); joint replacement; Colonoscopy; Endoscopy, colon, diagnostic; other surgical history; pr inject rx other periph nerve (Right, 5/22/2018); pr inject rx other periph nerve (Left, 6/25/2018); Nerve Surgery (Right, 08/27/2018); pr radiofrequency neurotomy lumbar or sacral, w image guidance, single (Right, 8/27/2018); Nerve Surgery (Left, 09/11/2018); pr radiofrequency neurotomy lumbar or sacral, w image guidance, single (Left, 9/11/2018); lumbar nerve block (N/A, 12/11/2018); lumbar nerve block (N/A, 3/12/2019); arthrocentesis (Right, 4/12/2019); Lumbar spine surgery (Right, 8/30/2019); Lumbar spine surgery (Left, 10/1/2019); Injection Procedure For Sacroiliac Joint (Right, 12/8/2020); hip surgery (Right, 3/16/2021); and Back Injection (Right, 5/11/2021). ALLERGIES   Allergies as of 07/29/2021 - Fully Reviewed 07/29/2021   Allergen Reaction Noted    Cataflam [diclofenac potassium] Shortness Of Breath 07/26/2012    Augmentin [amoxicillin-pot clavulanate] Diarrhea 07/10/2021    Phenergan [promethazine hcl]  03/13/2013       MEDICATIONS   Prior to Admission medications    Medication Sig Start Date End Date Taking?  Authorizing Provider   meloxicam (MOBIC) 7.5 MG tablet Take 1 tablet by mouth daily 7/27/21 8/26/21 Yes Hunter Mondragon DO   lovastatin (MEVACOR) 20 MG tablet TAKE 1 TABLET BY MOUTH EVERY DAY AT NIGHT 6/16/21  Yes Chilango Sherman DO   gabapentin (NEURONTIN) 600 MG tablet TAKE 1 TABLET IN  THE      MORNING, 1 TABLET IN THE   AFTERNOON,  AND2 TABLETS  AT BEDTIME 6/16/21 6/17/22 Yes Chilango Sherman DO   pantoprazole (PROTONIX) 40 MG tablet Take 1 tablet by mouth daily (with breakfast) 11/12/20  Yes Chilango Sherman DO   clopidogrel (PLAVIX) 75 MG tablet Take 1 tablet by mouth daily 11/11/20  Yes Chilango Sherman DO   lisinopril (PRINIVIL;ZESTRIL) 10 MG tablet Take 1 tablet by mouth daily 11/11/20  Yes Chilango Sherman DO   levothyroxine (SYNTHROID) 50 MCG tablet TAKE 1 TABLET BY MOUTH EVERY DAY 10/20/20  Yes Chilango Sherman DO   aspirin 81 MG EC tablet Take 81 mg by mouth daily   Yes Historical Provider, MD   Acetaminophen (TYLENOL ARTHRITIS EXT RELIEF PO) Take by mouth   Yes Historical Provider, MD   calcium carbonate-vitamin D (CALCIUM 600+D) 600-200 MG-UNIT TABS Take 1 tablet by mouth daily. Yes Historical Provider, MD   Multiple Vitamins-Minerals (THERAPEUTIC MULTIVITAMIN-MINERALS) tablet Take 1 tablet by mouth daily. Yes Historical Provider, MD   Cholecalciferol (VITAMIN D) 2000 UNITS CAPS capsule Take 1 capsule by mouth daily. Yes Historical Provider, MD   azithromycin (ZITHROMAX Z-ZEYNEP) 250 MG tablet 2 tabs PO x 1 on Day 1, then 1 tab PO q daily on Days 2-5. 7/10/21   Tatianna Gutierrez DO     PHYSICAL:   Vitals:    08/04/21 0816   BP: (!) 144/63   Pulse: 61   Resp: 16   Temp: 96 °F (35.6 °C)   SpO2: 97%     PLANNED PROCEDURE   See procedure note  SEDATION  Planned agent: Versed and Fentanyl  ASA Classification: 2  Class 1: A normal healthy patient  Class 2: Pt with mild to moderate systemic disease  Class 3: Severe systemic disease or disturbance  Class 4: Severe systemic disorders that are already life threatening. Class 5: Moribund pt with little chances of survival, for more than 24 hours. Mallampati I Airway Classification: 2    1.  Pre-procedure diagnostic studies complete and results available. 2. Previous sedation/anesthesia experiences assessed. 3. The patient is an appropriate candidate to undergo the planned procedure sedation and anesthesia. (Refer to nursing sedation/analgesia documentation record)  4. Formulation and discussion of sedation/procedure plan, risks, and expectations with patient and/or responsible adult completed. 5. Patient examined immediately prior to the procedure.  (Refer to nursing sedation/analgesia documentation record)    Rena Rascon DO  Electronically signed 8/4/2021 at 10:38 AM

## 2021-08-11 ENCOUNTER — OFFICE VISIT (OUTPATIENT)
Dept: PHYSICAL MEDICINE AND REHAB | Age: 86
End: 2021-08-11
Payer: MEDICARE

## 2021-08-11 VITALS
HEIGHT: 59 IN | WEIGHT: 112.8 LBS | DIASTOLIC BLOOD PRESSURE: 62 MMHG | SYSTOLIC BLOOD PRESSURE: 114 MMHG | BODY MASS INDEX: 22.74 KG/M2

## 2021-08-11 DIAGNOSIS — G89.29 CHRONIC RIGHT-SIDED LOW BACK PAIN WITH RIGHT-SIDED SCIATICA: ICD-10-CM

## 2021-08-11 DIAGNOSIS — G89.29 OTHER CHRONIC PAIN: ICD-10-CM

## 2021-08-11 DIAGNOSIS — M70.71 ISCHIAL BURSITIS OF RIGHT SIDE: ICD-10-CM

## 2021-08-11 DIAGNOSIS — M25.561 CHRONIC PAIN OF RIGHT KNEE: Primary | ICD-10-CM

## 2021-08-11 DIAGNOSIS — M53.3 SACROILIAC PAIN: ICD-10-CM

## 2021-08-11 DIAGNOSIS — M54.41 CHRONIC RIGHT-SIDED LOW BACK PAIN WITH RIGHT-SIDED SCIATICA: ICD-10-CM

## 2021-08-11 DIAGNOSIS — G89.29 CHRONIC PAIN OF RIGHT KNEE: Primary | ICD-10-CM

## 2021-08-11 PROCEDURE — G8420 CALC BMI NORM PARAMETERS: HCPCS | Performed by: ANESTHESIOLOGY

## 2021-08-11 PROCEDURE — G8427 DOCREV CUR MEDS BY ELIG CLIN: HCPCS | Performed by: ANESTHESIOLOGY

## 2021-08-11 PROCEDURE — 1036F TOBACCO NON-USER: CPT | Performed by: ANESTHESIOLOGY

## 2021-08-11 PROCEDURE — 99213 OFFICE O/P EST LOW 20 MIN: CPT | Performed by: ANESTHESIOLOGY

## 2021-08-11 PROCEDURE — 4040F PNEUMOC VAC/ADMIN/RCVD: CPT | Performed by: ANESTHESIOLOGY

## 2021-08-11 PROCEDURE — 1123F ACP DISCUSS/DSCN MKR DOCD: CPT | Performed by: ANESTHESIOLOGY

## 2021-08-11 PROCEDURE — 1090F PRES/ABSN URINE INCON ASSESS: CPT | Performed by: ANESTHESIOLOGY

## 2021-08-11 NOTE — PATIENT INSTRUCTIONS
The following treatment recommendations and plan were discussed in detail with Yobani Tineo. Imaging:   I have reviewed patients imaging of right knee x-ray and results were discussed the patient today. Analgesics:   Patient is taking Acetaminophen. Patient informed that the maximum amount of acetaminophen taken on a regular basis should only be 4000 mg per day. I will continue the patient on meloxicam 7.5 mg daily. Patient is advised to take this medication with food. Adjuvants: In light of the presence of a neuropathic component of pain, the patient can continue her Gabapentin. Interventions: With continued right knee pain and x-ray showing severe end-stage osteoarthritis of the right knee and significant response to a right genicular nerve block, we will proceed with a right genicular nerve radiofrequency ablation. Patient is in agreement to proceed with injection. Anticoagulation/NPO Recommendations:   Patient does not need to hold any medications for the procedure. Patient will need to be NPO x 8 hours prior to the procedure. We will start an IV prior to the procedure. Multidisciplinary Pain Management:   In the presence of complex, chronic, and multi-factorial pain, the importance of a multidisciplinary approach to pain management in the patients management regimen was emphasized and discussed in great detail.      Referrals:  None    Prescriptions Written This Visit:   None    Follow-up: For Right genicular nerve RFA

## 2021-08-11 NOTE — PROGRESS NOTES
Chronic Pain/PM&R Clinic Note     Encounter Date: 8/11/21    Subjective:   Chief Complaint:   Chief Complaint   Patient presents with    Follow Up After Procedure       History of Present Illness:   Parag Dow is a 80 y.o. female seen in the clinic initially on 10/8/20 upon request from Cathy Seals DO (PCP) for her history of low back pain. She has a longstanding history of cervical neck and low back pain. However, she sustained a fall while trying to put her pants on 10/2/2021 where she fell on her right buttocks. She states that since this episode she has been dealing with severe low back pain with radiation down the posterior thigh and slightly down the posterior calf. She does admit the pain radiates around to her groin. She states she has numbness and tingling however these are more in the toes. He does endorse some right leg weakness which she feels like this pain limited. Her pain is sharp and stabbing in nature and 5/10. Her pain is exacerbated with any sitting or laying on the affected side. Her pain is alleviated with rest and medication. She denies any focal leg weakness, saddle anesthesia, or bowel/bladder incontinence. She states she is currently been managing her pain with over-the-counter Tylenol arthritis. She has seen a couple pain specialist in the past including receiving spinal injections with Dr. Mya Scales and Dr. Clifton Willams. Today, 8/11/2021, patient presents for planned follow-up for chronic right knee pain. She underwent a right genicular nerve block on 8/4/2021. She reports greater than 85% relief in her right knee pain for 3 days after the procedure. She feels like the knee was much more stable and did not give out during the walk time. She also reported not needing any breakthrough pain medications for 3 days after the test block.   She reports continued pain in her SI joint and low back and wants to proceed with the ablation therapy in the right knee and potentially Father     Stroke Sister        Social History     Socioeconomic History    Marital status:      Spouse name: Not on file    Number of children: 2    Years of education: 15    Highest education level: High school graduate   Occupational History    Not on file   Tobacco Use    Smoking status: Never Smoker    Smokeless tobacco: Never Used   Vaping Use    Vaping Use: Never used   Substance and Sexual Activity    Alcohol use: No    Drug use: No    Sexual activity: Not on file   Other Topics Concern    Not on file   Social History Narrative    Not on file     Social Determinants of Health     Financial Resource Strain: Low Risk     Difficulty of Paying Living Expenses: Not hard at all   Food Insecurity: No Food Insecurity    Worried About [address] in the Last Year: Never true    [place] of Food in the Last Year: Never true   Transportation Needs: No Transportation Needs    Lack of Transportation (Medical): No    Lack of Transportation (Non-Medical): No   Physical Activity:     Days of Exercise per Week:     Minutes of Exercise per Session:    Stress:     Feeling of Stress :    Social Connections:     Frequency of Communication with Friends and Family:     Frequency of Social Gatherings with Friends and Family:     Attends Rastafari Services:     Active Member of Clubs or Organizations:     Attends Club or Organization Meetings:     Marital Status:    Intimate Partner Violence:     Fear of Current or Ex-Partner:     Emotionally Abused:     Physically Abused:     Sexually Abused:        Medications & Allergies:   Current Outpatient Medications   Medication Instructions    Acetaminophen (TYLENOL ARTHRITIS EXT RELIEF PO) Oral    aspirin 81 mg, Oral, DAILY    azithromycin (ZITHROMAX Z-ZEYNEP) 250 MG tablet 2 tabs PO x 1 on Day 1, then 1 tab PO q daily on Days 2-5.     calcium carbonate-vitamin D (CALCIUM 600+D) 600-200 MG-UNIT TABS 1 tablet, DAILY    Cholecalciferol (VITAMIN D) 2000 UNITS CAPS capsule 1 capsule, DAILY    clopidogrel (PLAVIX) 75 mg, Oral, DAILY    gabapentin (NEURONTIN) 600 MG tablet TAKE 1 TABLET IN  THE      MORNING, 1 TABLET IN THE   AFTERNOON,  AND2 TABLETS  AT BEDTIME    levothyroxine (SYNTHROID) 50 MCG tablet TAKE 1 TABLET BY MOUTH EVERY DAY    lisinopril (PRINIVIL;ZESTRIL) 10 mg, Oral, DAILY    lovastatin (MEVACOR) 20 MG tablet TAKE 1 TABLET BY MOUTH EVERY DAY AT NIGHT    meloxicam (MOBIC) 7.5 mg, Oral, DAILY    Multiple Vitamins-Minerals (THERAPEUTIC MULTIVITAMIN-MINERALS) tablet 1 tablet, DAILY    pantoprazole (PROTONIX) 40 mg, Oral, DAILY WITH BREAKFAST       Allergies   Allergen Reactions    Cataflam [Diclofenac Potassium] Shortness Of Breath    Augmentin [Amoxicillin-Pot Clavulanate] Diarrhea    Phenergan [Promethazine Hcl]      Confusion \"didn't know where she was at\"       Review of Systems:   Constitutional: denies any fevers or chills  Genitourinary: negative for bowel/bladder incontinence   Musculoskeletal: Positive for right knee pain, right buttocks pain, and right low back pain  Neurological: Positive for numbness/tingling in toes of feet  Behavioral/Psych: negative for anxiety/depression   All other ROS reviewed and are negative    Objective:     Vitals:    08/11/21 1024   BP: 114/62       Constitutional: Pleasant, no acute distress   Head: Normocephalic, atraumatic   Eyes: Conjunctivae normal   Neck: Supple, symmetrical   Lungs: Normal respiratory effort, non-labored breathing   Cardiovascular: Limbs warm and well perfused   Abdomen: Non-protruded   Musculoskeletal: Muscle bulk symmetric, no atrophy, no gross deformities   · Thorax: Scoliosis appreciated on exam  · Lumbar Spine: Lumbar paraspinals tender bilaterally. SLR neg bilaterally. Right SI joint tender to palpation. Positive [place] right. Positive Gaenslen's right. -Right knee: TTP along medial joint line  Neurological: Cranial nerves II-XII grossly intact.    · Gait - Antalgic gait (improved). Ambulates without assist device. · Motor: No focal motor deficit appreciated lower limbs  Skin: No rashes or lesions visibile in lower limbs  Psychological: Cooperative, no exaggerated pain behaviors       Assessment:    Diagnosis Orders   1. Chronic pain of right knee  CHG FLUOR NEEDLE/CATH SPINE/PARASPINAL DX/THER ADDON    TN INJECT RX OTHER PERIPH NERVE   2. Sacroiliac pain     3. Other chronic pain     4. Ischial bursitis of right side     5. Chronic right-sided low back pain with right-sided sciatica         Kyle Melton is a 80 y. o.female presenting to the pain clinic for evaluation of right-sided low back pain. Her clinical history physical examination are consistent with right SI joint dysfunction/pain. She has responded twice to right SI joint injections but her relief wears off at about 2 months. She continues to struggle with right ischial bursa pain and right knee pain secondary to arthritis. I have set her up for right genicular nerve block with plans to do a genicular ablation. We may potentially investigate a right SI joint ablation to give her a break from high-dose steroid injections. We will continue her meloxicam as prescribed. She respond significantly to her right genicular nerve block and I set her up for a right genicular nerve ablation. We will follow-up shortly thereafter to consider SI joint ablation. Plan: The following treatment recommendations and plan were discussed in detail with Kyle Melton. Imaging:   I have reviewed patients imaging of right knee x-ray and results were discussed the patient today. Analgesics:   Patient is taking Acetaminophen. Patient informed that the maximum amount of acetaminophen taken on a regular basis should only be 4000 mg per day. I will continue the patient on meloxicam 7.5 mg daily. Patient is advised to take this medication with food. Adjuvants:    In light of the presence of a neuropathic

## 2021-08-15 NOTE — H&P
Today, patient presents for planned right genicular nerve radiofrequency ablation. This note is reflective of the patient's previous visit for evaluation. We will proceed with today's planned procedure. Since patient's last visit for evaluation, there have been no interval changes in medical history. Patient has no new numbness, weakness, or focal neurological deficit since evaluation. Patient has no contraindications to injection (no anticoagulation or recent antibiotic intake for active infections), and has a  present or is able to drive themselves (as discussed and cleared by physician). Allergies to latex, contrast dye, and steroid medications have been confirmed with the patient prior to the procedure. NPO necessity has been assessed and accepted based on procedure complexity. The risks and benefits of the procedure have been explained including but are not limited to infection, bleeding, paralysis, immediate post procedure weakness, and dizziness; the patient acknowledges understanding and desires to proceed with the procedure. Patient has signed consent for same procedure as discussed in previous clinic encounter. All other questions and concerns were addressed at bedside. See procedure note for full details. Post procedure Instructions: The patient was advised not to drive during the day of the procedure and not to engage in any significant decision making (unless otherwise states by physician). The patient was also advised to be cautious with walking/activity for 24 hours following today's visit and asked not to engage in over-exertion (unless otherwise states by physician). After this time, it is ok to resume pre-procedure level of activity. Patient advised to apply ice to site of injection in situations of pain and discomfort. Patient advised to not submerge site of injection during bath or pool activities for approximately 24 hours post-procedure.  Patient attested to understanding post procedure directions / restrictions. All other questions and concerns addressed before patient discharge in ambulatory fashion. Chronic Pain/PM&R Clinic Note     Encounter Date: 8/11/21    Subjective:   Chief Complaint:   No chief complaint on file. History of Present Illness:   Kan Lopez is a 80 y.o. female seen in the clinic initially on 10/8/20 upon request from Lester Mazariegos DO (PCP) for her history of low back pain. She has a longstanding history of cervical neck and low back pain. However, she sustained a fall while trying to put her pants on 10/2/2021 where she fell on her right buttocks. She states that since this episode she has been dealing with severe low back pain with radiation down the posterior thigh and slightly down the posterior calf. She does admit the pain radiates around to her groin. She states she has numbness and tingling however these are more in the toes. He does endorse some right leg weakness which she feels like this pain limited. Her pain is sharp and stabbing in nature and 5/10. Her pain is exacerbated with any sitting or laying on the affected side. Her pain is alleviated with rest and medication. She denies any focal leg weakness, saddle anesthesia, or bowel/bladder incontinence. She states she is currently been managing her pain with over-the-counter Tylenol arthritis. She has seen a couple pain specialist in the past including receiving spinal injections with Dr. Jose Ojeda and Dr. Toy Clark. Today, 8/11/2021, patient presents for planned follow-up for chronic right knee pain. She underwent a right genicular nerve block on 8/4/2021. She reports greater than 85% relief in her right knee pain for 3 days after the procedure. She feels like the knee was much more stable and did not give out during the walk time. She also reported not needing any breakthrough pain medications for 3 days after the test block.   She reports continued pain in her SI joint and low back and wants to proceed with the ablation therapy in the right knee and potentially investigate this for her SI joints and lumbar spine. She denies any new symptoms otherwise. History of Intervention:   Surgery: No previous lumbar surgeries  Injections: Previous lumbar epidurals-significant relief  R SI joint injection (12/8/20) - significant (100%) x 4 months  Right ischial bursa injection (3/16/2021)-moderate relief  R SI joint injection (5/11/2021)-greater than 80% relief x 3 months  R genicular nerve block (8/4/2021)- >85% relief x 3 days    Current Treatment Medications:   Meloxicam 7.5 mg daily  Gabapentin 600 mg / 1200 mg  Tylenol PRN    Historical Treatment Medications:   None    Imaging:  MRI L-spine (10/4/20)    LUMBAR SPINE 2 VIEWS:         CLINICAL INFORMATION: fall         COMPARISON: No prior study.         TECHNIQUE: Standard AP and lateral views of lumbar spine were obtained.                   Impression    1. Mild thoracolumbar dextro scoliosis. Moderate lumbar levoscoliosis. Cannot exclude muscle spasm. 2. Multifocal marked disc space narrowing and degenerative disc disease throughout the lumbar spine. Moderately severe degenerative vertebral body spondylosis scattered throughout the lumbar spine. 3. No fracture acute is seen. Mild degenerative changes right sacroiliac joint.         Past Medical History:   Diagnosis Date    Cerebral artery occlusion with cerebral infarction [facility]) 2012    tia    GERD (gastroesophageal reflux disease)     Hyperlipidemia     Hypertension     Hypothyroidism     Osteoarthritis     Sixth nerve palsy 2020       Past Surgical History:   Procedure Laterality Date    ARTHROCENTESIS Right 4/12/2019    Right hip bursa steroid INJECTION  NO SEDATION performed by Jerry Reyes MD at [address] Right 5/11/2021    Right SI joint injection performed by Vannessa Denis DO at [address] 2003    COLONOSCOPY      ENDOSCOPY, COLON, DIAGNOSTIC      HC INJECTION PROCEDURE FOR SACROILIAC JOINT Right 12/8/2020    Right SI joint injection performed by Marquis Raquel DO at [address] Right 3/16/2021    right ischial bursa injection performed by Marquis Raquel DO at [address]      left    KNEE ARTHROSCOPY  2010    left    LUMBAR NERVE BLOCK N/A 12/11/2018    LUMBAR INTER LAMINAR DAYANARA LESI #1@ L5 performed by Madonna Purcell MD at [facility] N/A 3/12/2019    LUMBAR INTER LAMINAR DAYANARA LESI @L5 performed by Madonna Purcell MD at [address] Right 8/30/2019    SI RFA  RIGHT SIDE performed by Madonna Purcell MD at [address] Left 10/1/2019    SI RFA  LEFT SIDE performed by Madonna Purcell MD at [address] Right 08/27/2018    SI RFA    NERVE SURGERY Left 09/11/2018    SI RFA    OTHER SURGICAL HISTORY      previous nerve blocks at [place] [address] Right 8/4/2021    Right genicular nerve block performed by Marquis Raquel DO at [address] Right 5/22/2018    LUMBAR RFA RIGHT SIDE FIRST L3-4,4-5,5-S1 performed by Madonna Purcell MD at [address] Left 6/25/2018    LUMBAR RFA  LEFT SIDE @ L3-4,4-5,5-S1, performed by Madonna Purcell MD at [address] [place] [address],  IMAGE GUIDANCE, SINGLE Right 8/27/2018    SI RFA  RIGHT SIDE performed by Madonna Purcell MD at [address] [place] [address] IMAGE GUIDANCE, SINGLE Left 9/11/2018    SI RFA  LEFT SIDE performed by Madonna Purcell MD at [address] [place] [address]       Family History Problem Relation Age of Onset    Cancer Mother         melanoma    Heart Disease Father     Stroke Sister        Social History     Socioeconomic History    Marital status:      Spouse name: Not on file    Number of children: 2    Years of education: 15    Highest education level: High school graduate   Occupational History    Not on file   Tobacco Use    Smoking status: Never Smoker    Smokeless tobacco: Never Used   Vaping Use    Vaping Use: Never used   Substance and Sexual Activity    Alcohol use: No    Drug use: No    Sexual activity: Not on file   Other Topics Concern    Not on file   Social History Narrative    Not on file     Social Determinants of Health     Financial Resource Strain: Low Risk     Difficulty of Paying Living Expenses: Not hard at all   Food Insecurity: No Food Insecurity    Worried About [place] in the Last Year: Never true    [place] of Food in the Last Year: Never true   Transportation Needs: No Transportation Needs    Lack of Transportation (Medical): No    Lack of Transportation (Non-Medical): No   Physical Activity:     Days of Exercise per Week:     Minutes of Exercise per Session:    Stress:     Feeling of Stress :    Social Connections:     Frequency of Communication with Friends and Family:     Frequency of Social Gatherings with Friends and Family:     Attends Confucianist Services:     Active Member of Clubs or Organizations:     Attends Club or Organization Meetings:     Marital Status:    Intimate Partner Violence:     Fear of Current or Ex-Partner:     Emotionally Abused:     Physically Abused:     Sexually Abused:        Medications & Allergies:   Current Outpatient Medications   Medication Instructions    Acetaminophen (TYLENOL ARTHRITIS EXT RELIEF PO) Oral    aspirin 81 mg, Oral, DAILY    azithromycin (ZITHROMAX Z-ZEYNEP) 250 MG tablet 2 tabs PO x 1 on Day 1, then 1 tab PO q daily on Days 2-5.     calcium carbonate-vitamin D (CALCIUM 600+D) 600-200 MG-UNIT TABS 1 tablet, DAILY    Cholecalciferol (VITAMIN D) 2000 UNITS CAPS capsule 1 capsule, DAILY    clopidogrel (PLAVIX) 75 mg, Oral, DAILY    gabapentin (NEURONTIN) 600 MG tablet TAKE 1 TABLET IN  THE      MORNING, 1 TABLET IN THE   AFTERNOON,  AND2 TABLETS  AT BEDTIME    levothyroxine (SYNTHROID) 50 MCG tablet TAKE 1 TABLET BY MOUTH EVERY DAY    lisinopril (PRINIVIL;ZESTRIL) 10 mg, Oral, DAILY    lovastatin (MEVACOR) 20 MG tablet TAKE 1 TABLET BY MOUTH EVERY DAY AT NIGHT    meloxicam (MOBIC) 7.5 mg, Oral, DAILY    Multiple Vitamins-Minerals (THERAPEUTIC MULTIVITAMIN-MINERALS) tablet 1 tablet, DAILY    pantoprazole (PROTONIX) 40 mg, Oral, DAILY WITH BREAKFAST       Allergies   Allergen Reactions    Cataflam [Diclofenac Potassium] Shortness Of Breath    Augmentin [Amoxicillin-Pot Clavulanate] Diarrhea    Phenergan [Promethazine Hcl]      Confusion \"didn't know where she was at\"       Review of Systems:   Constitutional: denies any fevers or chills  Genitourinary: negative for bowel/bladder incontinence   Musculoskeletal: Positive for right knee pain, right buttocks pain, and right low back pain  Neurological: Positive for numbness/tingling in toes of feet  Behavioral/Psych: negative for anxiety/depression   All other ROS reviewed and are negative    Objective: There were no vitals filed for this visit. Constitutional: Pleasant, no acute distress   Head: Normocephalic, atraumatic   Eyes: Conjunctivae normal   Neck: Supple, symmetrical   Lungs: Normal respiratory effort, non-labored breathing   Cardiovascular: Limbs warm and well perfused   Abdomen: Non-protruded   Musculoskeletal: Muscle bulk symmetric, no atrophy, no gross deformities   · Thorax: Scoliosis appreciated on exam  · Lumbar Spine: Lumbar paraspinals tender bilaterally. SLR neg bilaterally. Right SI joint tender to palpation. Positive Aldair right. Positive Gaenslen's right.   -Right knee: TTP along medial joint line  Neurological: Cranial nerves II-XII grossly intact. · Gait - Antalgic gait (improved). Ambulates without assist device. · Motor: No focal motor deficit appreciated lower limbs  Skin: No rashes or lesions visibile in lower limbs  Psychological: Cooperative, no exaggerated pain behaviors       Assessment:    Diagnosis Orders   1. Chronic pain of right knee  CHG FLUOR NEEDLE/CATH SPINE/PARASPINAL DX/THER ADDON    KY INJECT RX OTHER PERIPH NERVE   2. Sacroiliac pain     3. Other chronic pain     4. Ischial bursitis of right side     5. Chronic right-sided low back pain with right-sided sciatica         Lei Renner is a 80 y. o.female presenting to the pain clinic for evaluation of right-sided low back pain. Her clinical history physical examination are consistent with right SI joint dysfunction/pain. She has responded twice to right SI joint injections but her relief wears off at about 2 months. She continues to struggle with right ischial bursa pain and right knee pain secondary to arthritis. I have set her up for right genicular nerve block with plans to do a genicular ablation. We may potentially investigate a right SI joint ablation to give her a break from high-dose steroid injections. We will continue her meloxicam as prescribed. She respond significantly to her right genicular nerve block and I set her up for a right genicular nerve ablation. We will follow-up shortly thereafter to consider SI joint ablation. Plan: The following treatment recommendations and plan were discussed in detail with Lei Renner. Imaging:   I have reviewed patients imaging of right knee x-ray and results were discussed the patient today. Analgesics:   Patient is taking Acetaminophen. Patient informed that the maximum amount of acetaminophen taken on a regular basis should only be 4000 mg per day. I will continue the patient on meloxicam 7.5 mg daily.   Patient is advised to take this medication with food. Adjuvants: In light of the presence of a neuropathic component of pain, the patient can continue her Gabapentin. Interventions: With continued right knee pain and x-ray showing severe end-stage osteoarthritis of the right knee and significant response to a right genicular nerve block, we will proceed with a right genicular nerve radiofrequency ablation. Patient is in agreement to proceed with injection. Anticoagulation/NPO Recommendations:   Patient does not need to hold any medications for the procedure. Patient will need to be NPO x 8 hours prior to the procedure. We will start an IV prior to the procedure. Multidisciplinary Pain Management:   In the presence of complex, chronic, and multi-factorial pain, the importance of a multidisciplinary approach to pain management in the patients management regimen was emphasized and discussed in great detail.      Referrals:  None    Prescriptions Written This Visit:   None    Follow-up: For Right genicular nerve RFA      Hunter Mondragon DO  Interventional Pain Management/PM&R   [place]

## 2021-08-17 ENCOUNTER — HOSPITAL ENCOUNTER (OUTPATIENT)
Age: 86
Setting detail: OUTPATIENT SURGERY
Discharge: HOME | End: 2021-08-17
Attending: ANESTHESIOLOGY | Admitting: ANESTHESIOLOGY
Payer: MEDICARE

## 2021-08-17 ENCOUNTER — APPOINTMENT (OUTPATIENT)
Dept: GENERAL RADIOLOGY | Age: 86
End: 2021-08-17
Attending: ANESTHESIOLOGY
Payer: MEDICARE

## 2021-08-17 VITALS
TEMPERATURE: 98.8 F | HEART RATE: 60 BPM | HEIGHT: 59 IN | WEIGHT: 111.4 LBS | SYSTOLIC BLOOD PRESSURE: 144 MMHG | OXYGEN SATURATION: 95 % | RESPIRATION RATE: 16 BRPM | BODY MASS INDEX: 22.46 KG/M2 | DIASTOLIC BLOOD PRESSURE: 63 MMHG

## 2021-08-17 PROCEDURE — 99152 MOD SED SAME PHYS/QHP 5/>YRS: CPT | Performed by: ANESTHESIOLOGY

## 2021-08-17 PROCEDURE — 99153 MOD SED SAME PHYS/QHP EA: CPT | Performed by: ANESTHESIOLOGY

## 2021-08-17 PROCEDURE — 3209999900 FLUORO FOR SURGICAL PROCEDURES

## 2021-08-17 PROCEDURE — 64624 DSTRJ NULYT AGT GNCLR NRV: CPT | Performed by: ANESTHESIOLOGY

## 2021-08-17 PROCEDURE — 2720000010 HC SURG SUPPLY STERILE: Performed by: ANESTHESIOLOGY

## 2021-08-17 PROCEDURE — 3600000054 HC PAIN LEVEL 3 BASE: Performed by: ANESTHESIOLOGY

## 2021-08-17 PROCEDURE — 6360000002 HC RX W HCPCS: Performed by: ANESTHESIOLOGY

## 2021-08-17 PROCEDURE — 7100000010 HC PHASE II RECOVERY - FIRST 15 MIN: Performed by: ANESTHESIOLOGY

## 2021-08-17 PROCEDURE — 2709999900 HC NON-CHARGEABLE SUPPLY: Performed by: ANESTHESIOLOGY

## 2021-08-17 PROCEDURE — 2500000003 HC RX 250 WO HCPCS: Performed by: ANESTHESIOLOGY

## 2021-08-17 PROCEDURE — 7100000011 HC PHASE II RECOVERY - ADDTL 15 MIN: Performed by: ANESTHESIOLOGY

## 2021-08-17 PROCEDURE — 3600000055 HC PAIN LEVEL 3 ADDL 15 MIN: Performed by: ANESTHESIOLOGY

## 2021-08-17 RX ORDER — LIDOCAINE HYDROCHLORIDE 20 MG/ML
INJECTION, SOLUTION EPIDURAL; INFILTRATION; INTRACAUDAL; PERINEURAL PRN
Status: DISCONTINUED | OUTPATIENT
Start: 2021-08-17 | End: 2021-08-17 | Stop reason: ALTCHOICE

## 2021-08-17 RX ORDER — LIDOCAINE HYDROCHLORIDE 10 MG/ML
INJECTION, SOLUTION EPIDURAL; INFILTRATION; INTRACAUDAL; PERINEURAL PRN
Status: DISCONTINUED | OUTPATIENT
Start: 2021-08-17 | End: 2021-08-17 | Stop reason: ALTCHOICE

## 2021-08-17 RX ORDER — FENTANYL CITRATE 50 UG/ML
INJECTION, SOLUTION INTRAMUSCULAR; INTRAVENOUS PRN
Status: DISCONTINUED | OUTPATIENT
Start: 2021-08-17 | End: 2021-08-17 | Stop reason: ALTCHOICE

## 2021-08-17 RX ORDER — MIDAZOLAM HYDROCHLORIDE 1 MG/ML
INJECTION INTRAMUSCULAR; INTRAVENOUS PRN
Status: DISCONTINUED | OUTPATIENT
Start: 2021-08-17 | End: 2021-08-17 | Stop reason: ALTCHOICE

## 2021-08-17 ASSESSMENT — PAIN DESCRIPTION - DESCRIPTORS: DESCRIPTORS: ACHING

## 2021-08-17 ASSESSMENT — PAIN SCALES - GENERAL: PAINLEVEL_OUTOF10: 0

## 2021-08-17 ASSESSMENT — PAIN - FUNCTIONAL ASSESSMENT: PAIN_FUNCTIONAL_ASSESSMENT: 0-10

## 2021-08-17 NOTE — PRE SEDATION
[address] [place] [address]  Pre-Sedation/Analgesia History & Physical    Pt Name: Courtney Menjivar  MRN: [id number]  YOB: 1934  Provider Performing Procedure: Dalia Ramos DO   Primary Care Physician: Audrey Page DO      MEDICAL HISTORY       has a past medical history of Cerebral artery occlusion with cerebral infarction [facility]), GERD (gastroesophageal reflux disease), Hyperlipidemia, Hypertension, Hypothyroidism, Osteoarthritis, and Sixth nerve palsy. SURGICAL HISTORY   has a past surgical history that includes back surgery (2003); Neck surgery (1980s); Knee arthroscopy (2010); joint replacement; Colonoscopy; Endoscopy, colon, diagnostic; other surgical history; pr inject rx other periph nerve (Right, 5/22/2018); pr inject rx other periph nerve (Left, 6/25/2018); Nerve Surgery (Right, 08/27/2018); pr radiofrequency neurotomy lumbar or sacral, w image guidance, single (Right, 8/27/2018); Nerve Surgery (Left, 09/11/2018); pr radiofrequency neurotomy lumbar or sacral, w image guidance, single (Left, 9/11/2018); lumbar nerve block (N/A, 12/11/2018); lumbar nerve block (N/A, 3/12/2019); arthrocentesis (Right, 4/12/2019); Lumbar spine surgery (Right, 8/30/2019); Lumbar spine surgery (Left, 10/1/2019); Injection Procedure For Sacroiliac Joint (Right, 12/8/2020); hip surgery (Right, 3/16/2021); Back Injection (Right, 5/11/2021); and Pain management procedure (Right, 8/4/2021). ALLERGIES   Allergies as of 08/11/2021 - Fully Reviewed 08/11/2021   Allergen Reaction Noted    Cataflam [diclofenac potassium] Shortness Of Breath 07/26/2012    Augmentin [amoxicillin-pot clavulanate] Diarrhea 07/10/2021    Phenergan [promethazine hcl]  03/13/2013       MEDICATIONS   Prior to Admission medications    Medication Sig Start Date End Date Taking?  Authorizing Provider   meloxicam (MOBIC) 7.5 MG tablet Take 1 tablet by mouth daily 7/27/21 8/26/21 Yes Hunter Mondragon DO   lovastatin (MEVACOR) 20 MG tablet TAKE 1 TABLET BY MOUTH EVERY DAY AT NIGHT 6/16/21  Yes Gordo Pass, DO   gabapentin (NEURONTIN) 600 MG tablet TAKE 1 TABLET IN  THE      MORNING, 1 TABLET IN THE   AFTERNOON,  AND2 TABLETS  AT BEDTIME 6/16/21 6/17/22 Yes Gordo Pass, DO   clopidogrel (PLAVIX) 75 MG tablet Take 1 tablet by mouth daily 11/11/20  Yes Gordo Pass, DO   lisinopril (PRINIVIL;ZESTRIL) 10 MG tablet Take 1 tablet by mouth daily 11/11/20  Yes Gordo Pass, DO   levothyroxine (SYNTHROID) 50 MCG tablet TAKE 1 TABLET BY MOUTH EVERY DAY 10/20/20  Yes Gordo Pass, DO   aspirin 81 MG EC tablet Take 81 mg by mouth daily   Yes Historical Provider, MD   calcium carbonate-vitamin D (CALCIUM 600+D) 600-200 MG-UNIT TABS Take 1 tablet by mouth daily. Yes Historical Provider, MD   Multiple Vitamins-Minerals (THERAPEUTIC MULTIVITAMIN-MINERALS) tablet Take 1 tablet by mouth daily. Yes Historical Provider, MD   Cholecalciferol (VITAMIN D) 2000 UNITS CAPS capsule Take 1 capsule by mouth daily. Yes Historical Provider, MD   pantoprazole (PROTONIX) 40 MG tablet Take 1 tablet by mouth daily (with breakfast) 11/12/20   Gordo Hatch, DO   Acetaminophen (TYLENOL ARTHRITIS EXT RELIEF PO) Take by mouth    Historical Provider, MD     PHYSICAL:   Vitals:    08/17/21 1153   BP: (!) 144/63   Pulse: 60   Resp: 16   Temp: 98.8 °F (37.1 °C)   SpO2: 95%     PLANNED PROCEDURE   See procedure note  SEDATION  Planned agent: Versed and Fentanyl  ASA Classification: 2  Class 1: A normal healthy patient  Class 2: Pt with mild to moderate systemic disease  Class 3: Severe systemic disease or disturbance  Class 4: Severe systemic disorders that are already life threatening. Class 5: Moribund pt with little chances of survival, for more than 24 hours. Mallampati I Airway Classification: 2    1. Pre-procedure diagnostic studies complete and results available. 2. Previous sedation/anesthesia experiences assessed.   3. The patient is an appropriate candidate to undergo the planned procedure sedation and anesthesia. (Refer to nursing sedation/analgesia documentation record)  4. Formulation and discussion of sedation/procedure plan, risks, and expectations with patient and/or responsible adult completed. 5. Patient examined immediately prior to the procedure.  (Refer to nursing sedation/analgesia documentation record)    Bk Silva DO  Electronically signed 8/17/2021 at 12:15 PM

## 2021-08-17 NOTE — PROGRESS NOTES
1153 To recovery via cart. Spont resp. VSS. Report received from surgical rn. Pt denies pain numbness tingling or nausea. HOB elevated. Call bell in reach. Snack and drink given. 1210 IV discontinued.  Up to dress self  1235 Discharge to home in stable ambulatory condition with grandson

## 2021-08-17 NOTE — POST SEDATION
[address] [place] [address]  Sedation/Analgesia Post Sedation Record    Pt Name: Gertrude Johnson  MRN: [id number]  YOB: 1934  Procedure Performed By: Blaine Riedel, DO  Primary Care Physician: Drew Santana DO    POST-PROCEDURE    Physicians/Assistants: Blaine Riedel, DO  Procedure Performed: See Procedure Note   Sedation/Anesthesia: Versed and Fentanyl (See procedure note for amount and duration)  Estimated Blood Loss:     0  ml  Specimens Removed: None        Complications: None           Hunter Sanon DO  Electronically signed 8/17/2021 at 12:16 PM

## 2021-08-18 NOTE — PROCEDURES
Pre-operative Diagnosis: Right knee pain     Post-operative Diagnosis: Right knee pain     Procedure: Right genicular cooled radiofrequency ablation     Procedure Description:  The patient was brought to the procedure room and placed on the exam table in a comfortable supine position. The knee was prepared with chloraprep and sterile drapes. Needle placement was confirmed with fluoroscopic guidance. The superficial skin and subcutaneous tissues were anesthetized by local infiltration of 1% Lidocaine. Three 5 cm cannulas were advanced in an AP view near the superiolateral portion of the femoral condyle, superiomedial portion of the femoral condyle, and the inferiomedial portion of the tibial condyle until a bony endpoint was met. Lateral x-ray views showed all the needles at 50% depth of the femur and tibia. Then, motor stimulation was tested at 2.0 volts with no leg movement. After negative aspiration, 1 cc of 2% were injected at each side port followed by a one minute wait to anesthetize the genicular nerves. Radiofrequency lesions were made at 60 degrees Celsius for a duration of 2 minutes and 30 seconds followed by removal of the probe.         RFA Log     Impedance (I)                                      Superiomedial (SM)  -                          200  Superiolateral (SL)   -                           190  Inferiomedial (IM)    -                            195       Procedural Complications: None  Estimated Blood Loss: 0 mL        IV sedation was used during the procedure:  - Moderate intravenous conscious sedation was supervised by Dr. Heather Estrada  - The patient was independently monitored by a Registered Nurse assigned to the procedure room  - Monitoring included automated blood pressure, continuous EKG, and continuous pulse oximetry  - The detailed conscious record is permanently stored in the [place] [address]  - The following is the conscious sedation record:  Start Time: 11:35  End Time : 11:55  Duration: 20 minutes   Medications Administered: 2 mg Versed, 100 mcg Fentanyl        Hunter Mondragon DO  Interventional Pain Management/PM&R   [place]

## 2021-09-09 ENCOUNTER — OFFICE VISIT (OUTPATIENT)
Dept: PHYSICAL MEDICINE AND REHAB | Age: 86
End: 2021-09-09
Payer: MEDICARE

## 2021-09-09 VITALS
DIASTOLIC BLOOD PRESSURE: 62 MMHG | BODY MASS INDEX: 22.38 KG/M2 | SYSTOLIC BLOOD PRESSURE: 142 MMHG | WEIGHT: 111 LBS | HEIGHT: 59 IN

## 2021-09-09 DIAGNOSIS — G89.29 OTHER CHRONIC PAIN: ICD-10-CM

## 2021-09-09 DIAGNOSIS — G89.29 CHRONIC PAIN OF RIGHT KNEE: ICD-10-CM

## 2021-09-09 DIAGNOSIS — M25.561 CHRONIC PAIN OF RIGHT KNEE: ICD-10-CM

## 2021-09-09 DIAGNOSIS — M53.3 SACROILIAC PAIN: ICD-10-CM

## 2021-09-09 DIAGNOSIS — M70.70 ISCHIAL BURSITIS, UNSPECIFIED LATERALITY: Primary | ICD-10-CM

## 2021-09-09 PROCEDURE — G8427 DOCREV CUR MEDS BY ELIG CLIN: HCPCS | Performed by: ANESTHESIOLOGY

## 2021-09-09 PROCEDURE — 1090F PRES/ABSN URINE INCON ASSESS: CPT | Performed by: ANESTHESIOLOGY

## 2021-09-09 PROCEDURE — 1123F ACP DISCUSS/DSCN MKR DOCD: CPT | Performed by: ANESTHESIOLOGY

## 2021-09-09 PROCEDURE — 99214 OFFICE O/P EST MOD 30 MIN: CPT | Performed by: ANESTHESIOLOGY

## 2021-09-09 PROCEDURE — 4040F PNEUMOC VAC/ADMIN/RCVD: CPT | Performed by: ANESTHESIOLOGY

## 2021-09-09 PROCEDURE — 1036F TOBACCO NON-USER: CPT | Performed by: ANESTHESIOLOGY

## 2021-09-09 PROCEDURE — G8420 CALC BMI NORM PARAMETERS: HCPCS | Performed by: ANESTHESIOLOGY

## 2021-09-09 RX ORDER — MELOXICAM 7.5 MG/1
7.5 TABLET ORAL DAILY
Qty: 30 TABLET | Refills: 2 | Status: SHIPPED | OUTPATIENT
Start: 2021-09-09 | End: 2021-10-06 | Stop reason: SDUPTHER

## 2021-09-09 NOTE — PROGRESS NOTES
Chronic Pain/PM&R Clinic Note     Encounter Date: 9/9/21    Subjective:   Chief Complaint:   Chief Complaint   Patient presents with    Follow-up     increased back pain    Discuss Medications     Patient would like a 90 day supply of Meloxicam        History of Present Illness:   Ella Lazar is a 80 y.o. female seen in the clinic initially on 10/8/20 upon request from Yamil Brito DO (PCP) for her history of low back pain. She has a longstanding history of cervical neck and low back pain. However, she sustained a fall while trying to put her pants on 10/2/2021 where she fell on her right buttocks. She states that since this episode she has been dealing with severe low back pain with radiation down the posterior thigh and slightly down the posterior calf. She does admit the pain radiates around to her groin. She states she has numbness and tingling however these are more in the toes. He does endorse some right leg weakness which she feels like this pain limited. Her pain is sharp and stabbing in nature and 5/10. Her pain is exacerbated with any sitting or laying on the affected side. Her pain is alleviated with rest and medication. She denies any focal leg weakness, saddle anesthesia, or bowel/bladder incontinence. She states she is currently been managing her pain with over-the-counter Tylenol arthritis. She has seen a couple pain specialist in the past including receiving spinal injections with Dr. Freddy Carrillo and Dr. Alivia Keith. Today, 8/11/2021, patient presents for planned follow-up for chronic right knee pain. She underwent a right genicular nerve block on 8/4/2021. She reports greater than 85% relief in her right knee pain for 3 days after the procedure. She feels like the knee was much more stable and did not give out during the walk time. She also reported not needing any breakthrough pain medications for 3 days after the test block.   She reports continued pain in her SI joint and low back and wants to proceed with the ablation therapy in the right knee and potentially investigate this for her SI joints and lumbar spine. She denies any new symptoms otherwise. Today, 9/9/2021, patient presents for planned follow-up for chronic low back pain. She completed her right genicular nerve ablation on 8/17/2021. She reports new onset bilateral buttocks pain particular in her sit bones which she has had in the past before. She states this pain is primarily when she is sitting and laying down and can be excruciating pain. She states that this pain can shoot down the posterior aspect of her thigh but never past her knee. She denies any associated paresthesias in the legs, focal leg weakness, saddle anesthesia, bowel/bladder incontinence. She continues take her meloxicam as prescribed with no side effects. She is wondering about injection therapy to help with this pain. History of Intervention:   Surgery: No previous lumbar surgeries  Injections: Previous lumbar epidurals-significant relief  R SI joint injection (12/8/20) - significant (100%) x 4 months  Right ischial bursa injection (3/16/2021)-moderate relief  R SI joint injection (5/11/2021)-greater than 80% relief x 3 months  R genicular nerve block (8/4/2021)- >85% relief x 3 days  R genicular ablation (8/17/21)     Current Treatment Medications:   Meloxicam 7.5 mg daily  Gabapentin 600 mg / 1200 mg  Tylenol PRN    Historical Treatment Medications:   None    Imaging:  MRI L-spine (10/4/20)    LUMBAR SPINE 2 VIEWS:         CLINICAL INFORMATION: fall         COMPARISON: No prior study.         TECHNIQUE: Standard AP and lateral views of lumbar spine were obtained.                   Impression    1. Mild thoracolumbar dextro scoliosis. Moderate lumbar levoscoliosis. Cannot exclude muscle spasm. 2. Multifocal marked disc space narrowing and degenerative disc disease throughout the lumbar spine.  Moderately severe degenerative vertebral body performed by Robbie Somers DO at [address] Right 8/17/2021    right genicular nerve radiofrequency ablation performed by Robbie Somers DO at [address] Right 5/22/2018    LUMBAR RFA RIGHT SIDE FIRST L3-4,4-5,5-S1 performed by Jeff De Luna MD at [address] Left 6/25/2018    LUMBAR RFA  LEFT SIDE @ L3-4,4-5,5-S1, performed by Jeff De Luna MD at [facility], W IMAGE GUIDANCE, SINGLE Right 8/27/2018    SI RFA  RIGHT SIDE performed by Jeff De Luna MD at [address] [facility] OR SACR[place], W IMAGE GUIDANCE, SINGLE Left 9/11/2018    SI RFA  LEFT SIDE performed by Jeff De Luna MD at [address] [place] [address]       Family History   Problem Relation Age of Onset    Cancer Mother         melanoma    Heart Disease Father     Stroke Sister        Social History     Socioeconomic History    Marital status:      Spouse name: Not on file    Number of children: 2    Years of education: 15    Highest education level: High school graduate   Occupational History    Not on file   Tobacco Use    Smoking status: Never Smoker    Smokeless tobacco: Never Used   Vaping Use    Vaping Use: Never used   Substance and Sexual Activity    Alcohol use: No    Drug use: No    Sexual activity: Not on file   Other Topics Concern    Not on file   Social History Narrative    Not on file     Social Determinants of Health     Financial Resource Strain: Low Risk     Difficulty of Paying Living Expenses: Not hard at all   Food Insecurity: No Food Insecurity    Worried About [facility] in the Last Year: Never true    [place] of Food in the Last Year: Never true   Transportation Needs: No Transportation Needs    Lack of Transportation (Medical):  No    Lack of Transportation (Non-Medical):  No   Physical Activity:     Days of Exercise per Week:     Minutes of Exercise per Session:    Stress:     Feeling of Stress :    Social Connections:     Frequency of Communication with Friends and Family:     Frequency of Social Gatherings with Friends and Family:     Attends Orthodox Services:     Active Member of Clubs or Organizations:     Attends Club or Organization Meetings:     Marital Status:    Intimate Partner Violence:     Fear of Current or Ex-Partner:     Emotionally Abused:     Physically Abused:     Sexually Abused:        Medications & Allergies:   Current Outpatient Medications   Medication Instructions    Acetaminophen (TYLENOL ARTHRITIS EXT RELIEF PO) Oral    aspirin 81 mg, Oral, DAILY    calcium carbonate-vitamin D (CALCIUM 600+D) 600-200 MG-UNIT TABS 1 tablet, DAILY    Cholecalciferol (VITAMIN D) 2000 UNITS CAPS capsule 1 capsule, DAILY    clopidogrel (PLAVIX) 75 mg, Oral, DAILY    gabapentin (NEURONTIN) 600 MG tablet TAKE 1 TABLET IN  THE      MORNING, 1 TABLET IN THE   AFTERNOON,  AND2 TABLETS  AT BEDTIME    levothyroxine (SYNTHROID) 50 MCG tablet TAKE 1 TABLET BY MOUTH EVERY DAY    lisinopril (PRINIVIL;ZESTRIL) 10 mg, Oral, DAILY    lovastatin (MEVACOR) 20 MG tablet TAKE 1 TABLET BY MOUTH EVERY DAY AT NIGHT    meloxicam (MOBIC) 7.5 mg, Oral, DAILY    Multiple Vitamins-Minerals (THERAPEUTIC MULTIVITAMIN-MINERALS) tablet 1 tablet, DAILY    pantoprazole (PROTONIX) 40 mg, Oral, DAILY WITH BREAKFAST       Allergies   Allergen Reactions    Cataflam [Diclofenac Potassium] Shortness Of Breath    Augmentin [Amoxicillin-Pot Clavulanate] Diarrhea    Phenergan [Promethazine Hcl]      Confusion \"didn't know where she was at\"       Review of Systems:   Constitutional: denies any fevers or chills  Genitourinary: negative for bowel/bladder incontinence   Musculoskeletal: Positive for bilateral buttocks pain  Neurological: Positive for numbness/tingling in toes of feet  Behavioral/Psych: negative for anxiety/depression   All other ROS reviewed and are negative    Objective:     Vitals:    09/09/21 1028   BP: (!) 142/62       Constitutional: Pleasant, no acute distress   Head: Normocephalic, atraumatic   Eyes: Conjunctivae normal   Neck: Supple, symmetrical   Lungs: Normal respiratory effort, non-labored breathing   Cardiovascular: Limbs warm and well perfused   Abdomen: Non-protruded   Musculoskeletal: Muscle bulk symmetric, no atrophy, no gross deformities   · Thorax: Scoliosis appreciated on exam  · Lumbar Spine: Lumbar paraspinals tender bilaterally. SLR neg bilaterally. Bilateral ischial bursa is tender to palpation.  -Right knee: TTP along medial joint line  Neurological: Cranial nerves II-XII grossly intact. · Gait - Antalgic gait (improved). Ambulates without assistive device. · Motor: No focal motor deficit appreciated lower limbs  Skin: No rashes or lesions visibile in lower limbs  Psychological: Cooperative, no exaggerated pain behaviors       Assessment:    Diagnosis Orders   1. Ischial bursitis, unspecified laterality  CHG FLUOR NEEDLE/CATH SPINE/PARASPINAL DX/THER ADDON    MA ARTHROCENTESIS ASPIR&/INJ MAJOR JT/BURSA W/O US   2. Other chronic pain     3. Sacroiliac pain     4. Chronic pain of right knee         Magi Thompson is a 80 y. o.female presenting to the pain clinic for evaluation of right-sided low back pain. Her clinical history physical examination are consistent with right SI joint dysfunction/pain. She has responded twice to right SI joint injections but her relief wears off at about 2 months. She continues to struggle with right ischial bursa pain and right knee pain secondary to arthritis. I have set her up for right genicular nerve block with plans to do a genicular ablation. We may potentially investigate a right SI joint ablation to give her a break from high-dose steroid injections.   We will continue her meloxicam as prescribed. She has worsening bilateral ischial bursa pain I set her up for bilateral ischial bursa injection under fluoroscopy. I have continued her meloxicam.      Plan: The following treatment recommendations and plan were discussed in detail with Scot Mcclure. Imaging:   I have reviewed patients imaging of right knee x-ray and results were discussed the patient today. Analgesics:   Patient is taking Acetaminophen. Patient informed that the maximum amount of acetaminophen taken on a regular basis should only be 4000 mg per day. I will continue the patient on meloxicam 7.5 mg daily. Patient is advised to take this medication with food. Adjuvants: In light of the presence of a neuropathic component of pain, the patient can continue her Gabapentin. Interventions: With examination consistent with bilateral ischial bursa pain, set patient up for bilateral ischial bursa injections under fluoroscopy. Patient is in agreement and wants to proceed with injection. Anticoagulation/NPO Recommendations:   Patient does not need to hold any medications for the procedure. Patient does not need to be NPO prior to the procedure. We will do a straight LOCAL injection. Multidisciplinary Pain Management:   In the presence of complex, chronic, and multi-factorial pain, the importance of a multidisciplinary approach to pain management in the patients management regimen was emphasized and discussed in great detail.      Referrals:  None    Prescriptions Written This Visit:   Meloxicam 7.5 mg    Follow-up: For B/L ischial bursa injections      Magaly Garcia, DO  Interventional Pain Management/PM&R   [place]

## 2021-09-19 NOTE — H&P
Today, patient presents for planned bilateral ischial bursa injections. This note is reflective of the patient's previous visit for evaluation. We will proceed with today's planned procedure. Since patient's last visit for evaluation, there have been no interval changes in medical history. Patient has no new numbness, weakness, or focal neurological deficit since evaluation. Patient has no contraindications to injection (no anticoagulation or recent antibiotic intake for active infections), and has a  present or is able to drive themselves (as discussed and cleared by physician). Allergies to latex, contrast dye, and steroid medications have been confirmed with the patient prior to the procedure. NPO necessity has been assessed and accepted based on procedure complexity. The risks and benefits of the procedure have been explained including but are not limited to infection, bleeding, paralysis, immediate post procedure weakness, and dizziness; the patient acknowledges understanding and desires to proceed with the procedure. Patient has signed consent for same procedure as discussed in previous clinic encounter. All other questions and concerns were addressed at bedside. See procedure note for full details. Post procedure Instructions: The patient was advised not to drive during the day of the procedure and not to engage in any significant decision making (unless otherwise states by physician). The patient was also advised to be cautious with walking/activity for 24 hours following today's visit and asked not to engage in over-exertion (unless otherwise states by physician). After this time, it is ok to resume pre-procedure level of activity. Patient advised to apply ice to site of injection in situations of pain and discomfort. Patient advised to not submerge site of injection during bath or pool activities for approximately 24 hours post-procedure.  Patient attested to understanding post procedure directions / restrictions. All other questions and concerns addressed before patient discharge in ambulatory fashion. Chronic Pain/PM&R Clinic Note     Encounter Date: 9/9/21    Subjective:   Chief Complaint:   No chief complaint on file. History of Present Illness:   Scot Mcclure is a 80 y.o. female seen in the clinic initially on 10/8/20 upon request from Nena Elizabeth DO (PCP) for her history of low back pain. She has a longstanding history of cervical neck and low back pain. However, she sustained a fall while trying to put her pants on 10/2/2021 where she fell on her right buttocks. She states that since this episode she has been dealing with severe low back pain with radiation down the posterior thigh and slightly down the posterior calf. She does admit the pain radiates around to her groin. She states she has numbness and tingling however these are more in the toes. He does endorse some right leg weakness which she feels like this pain limited. Her pain is sharp and stabbing in nature and 5/10. Her pain is exacerbated with any sitting or laying on the affected side. Her pain is alleviated with rest and medication. She denies any focal leg weakness, saddle anesthesia, or bowel/bladder incontinence. She states she is currently been managing her pain with over-the-counter Tylenol arthritis. She has seen a couple pain specialist in the past including receiving spinal injections with Dr. Dougie Spicer and Dr. Mainor Rivero. Today, 8/11/2021, patient presents for planned follow-up for chronic right knee pain. She underwent a right genicular nerve block on 8/4/2021. She reports greater than 85% relief in her right knee pain for 3 days after the procedure. She feels like the knee was much more stable and did not give out during the walk time. She also reported not needing any breakthrough pain medications for 3 days after the test block.   She reports continued pain in her SI joint and low back and wants to proceed with the ablation therapy in the right knee and potentially investigate this for her SI joints and lumbar spine. She denies any new symptoms otherwise. Today, 9/9/2021, patient presents for planned follow-up for chronic low back pain. She completed her right genicular nerve ablation on 8/17/2021. She reports new onset bilateral buttocks pain particular in her sit bones which she has had in the past before. She states this pain is primarily when she is sitting and laying down and can be excruciating pain. She states that this pain can shoot down the posterior aspect of her thigh but never past her knee. She denies any associated paresthesias in the legs, focal leg weakness, saddle anesthesia, bowel/bladder incontinence. She continues take her meloxicam as prescribed with no side effects. She is wondering about injection therapy to help with this pain. History of Intervention:   Surgery: No previous lumbar surgeries  Injections: Previous lumbar epiduralssignificant relief  R SI joint injection (12/8/20) - significant (100%) x 4 months  Right ischial bursa injection (3/16/2021)moderate relief  R SI joint injection (5/11/2021)greater than 80% relief x 3 months  R genicular nerve block (8/4/2021) >85% relief x 3 days  R genicular ablation (8/17/21)     Current Treatment Medications:   Meloxicam 7.5 mg daily  Gabapentin 600 mg / 1200 mg  Tylenol PRN    Historical Treatment Medications:   None    Imaging:  MRI L-spine (10/4/20)    LUMBAR SPINE 2 VIEWS:         CLINICAL INFORMATION: fall         COMPARISON: No prior study.         TECHNIQUE: Standard AP and lateral views of lumbar spine were obtained.                   Impression    1. Mild thoracolumbar dextro scoliosis. Moderate lumbar levoscoliosis. Cannot exclude muscle spasm. 2. Multifocal marked disc space narrowing and degenerative disc disease throughout the lumbar spine.  Moderately severe degenerative vertebral body spondylosis scattered throughout the lumbar spine. 3. No fracture acute is seen. Mild degenerative changes right sacroiliac joint.         Past Medical History:   Diagnosis Date    Cerebral artery occlusion with cerebral infarction [facility]) 2012    tia    GERD (gastroesophageal reflux disease)     Hyperlipidemia     Hypertension     Hypothyroidism     Osteoarthritis     Sixth nerve palsy 2020       Past Surgical History:   Procedure Laterality Date    ARTHROCENTESIS Right 4/12/2019    Right hip bursa steroid INJECTION  NO SEDATION performed by Adrián Espinosa MD at [address] Right 5/11/2021    Right SI joint injection performed by Zeenat Schulz DO at [address]  2003    COLONOSCOPY      ENDOSCOPY, COLON, DIAGNOSTIC      HC INJECTION PROCEDURE FOR SACROILIAC JOINT Right 12/8/2020    Right SI joint injection performed by Zeenat Schulz DO at [address] Right 3/16/2021    right ischial bursa injection performed by Zeenat Schulz DO at [address]       left    KNEE ARTHROSCOPY  2010    left    LUMBAR NERVE BLOCK N/A 12/11/2018    LUMBAR INTER LAMINAR DAYANARA LESI #1@ L5 performed by Adrián Espinosa MD at [facility] N/A 3/12/2019    LUMBAR INTER LAMINAR DAYANARA LESI @L5 performed by Adrián Espinosa MD at [address] [facility] Right 8/30/2019    SI RFA  RIGHT SIDE performed by Adrián Espinosa MD at [address] [facility] Left 10/1/2019    SI RFA  LEFT SIDE performed by Adrián Espinosa MD at [address] Right 08/27/2018    SI RFA    NERVE SURGERY Left 09/11/2018    SI RFA    OTHER SURGICAL HISTORY      previous nerve blocks at [facility] Right 8/4/2021    Right genicular nerve block performed by Noris Mcconnell DO at [address] [facility] PAIN MANAGEMENT PROCEDURE Right 8/17/2021    right genicular nerve radiofrequency ablation performed by Noris Mcconnell DO at [address] Right 5/22/2018    LUMBAR RFA RIGHT SIDE FIRST L3-4,4-5,5-S1 performed by Lu Asencio MD at [address] Left 6/25/2018    LUMBAR RFA  LEFT SIDE @ L3-4,4-5,5-S1, performed by Lu Asencio MD at [address] [place] [address],  IMAGE GUIDANCE, SINGLE Right 8/27/2018    SI RFA  RIGHT SIDE performed by Lu Asencio MD at [address] [place] [address],  IMAGE GUIDANCE, SINGLE Left 9/11/2018    SI RFA  LEFT SIDE performed by Lu Asencio MD at [address] [place] [address]       Family History   Problem Relation Age of Onset    Cancer Mother         melanoma    Heart Disease Father     Stroke Sister        Social History     Socioeconomic History    Marital status:      Spouse name: Not on file    Number of children: 2    Years of education: 15    Highest education level: High school graduate   Occupational History    Not on file   Tobacco Use    Smoking status: Never Smoker    Smokeless tobacco: Never Used   Vaping Use    Vaping Use: Never used   Substance and Sexual Activity    Alcohol use: No    Drug use: No    Sexual activity: Not on file   Other Topics Concern    Not on file   Social History Narrative    Not on file     Social Determinants of Health     Financial Resource Strain: Low Risk     Difficulty of Paying Living Expenses: Not hard at all   Food Insecurity: No Food Insecurity    Worried About [address] in the Last Year: Never true    [place] of Food in the Last Year: Never true   Transportation Needs: No Transportation Needs    Lack of Transportation (Medical):  No    Lack of Transportation (Non-Medical):  No   Physical Activity:     Days of Exercise per Week:     Minutes of Exercise per Session:    Stress:     Feeling of Stress :    Social Connections:     Frequency of Communication with Friends and Family:     Frequency of Social Gatherings with Friends and Family:     Attends Baptism Services:     Active Member of Clubs or Organizations:     Attends Club or Organization Meetings:     Marital Status:    Intimate Partner Violence:     Fear of Current or Ex-Partner:     Emotionally Abused:     Physically Abused:     Sexually Abused:        Medications & Allergies:   Current Outpatient Medications   Medication Instructions    Acetaminophen (TYLENOL ARTHRITIS EXT RELIEF PO) Oral    aspirin 81 mg, Oral, DAILY    calcium carbonate-vitamin D (CALCIUM 600+D) 600-200 MG-UNIT TABS 1 tablet, DAILY    Cholecalciferol (VITAMIN D) 2000 UNITS CAPS capsule 1 capsule, DAILY    clopidogrel (PLAVIX) 75 mg, Oral, DAILY    gabapentin (NEURONTIN) 600 MG tablet TAKE 1 TABLET IN  THE      MORNING, 1 TABLET IN THE   AFTERNOON,  AND2 TABLETS  AT BEDTIME    levothyroxine (SYNTHROID) 50 MCG tablet TAKE 1 TABLET BY MOUTH EVERY DAY    lisinopril (PRINIVIL;ZESTRIL) 10 mg, Oral, DAILY    lovastatin (MEVACOR) 20 MG tablet TAKE 1 TABLET BY MOUTH EVERY DAY AT NIGHT    meloxicam (MOBIC) 7.5 mg, Oral, DAILY    Multiple Vitamins-Minerals (THERAPEUTIC MULTIVITAMIN-MINERALS) tablet 1 tablet, DAILY    pantoprazole (PROTONIX) 40 mg, Oral, DAILY WITH BREAKFAST       Allergies   Allergen Reactions    Cataflam [Diclofenac Potassium] Shortness Of Breath    Augmentin [Amoxicillin-Pot Clavulanate] Diarrhea    Phenergan [Promethazine Hcl]      Confusion \"didn't know where she was at\"       Review of Systems:   Constitutional: denies any fevers or chills  Genitourinary: negative for bowel/bladder incontinence   Musculoskeletal: Positive for bilateral buttocks pain  Neurological: Positive for numbness/tingling in toes of feet  Behavioral/Psych: negative for anxiety/depression   All other ROS reviewed and are negative    Objective: There were no vitals filed for this visit. Constitutional: Pleasant, no acute distress   Head: Normocephalic, atraumatic   Eyes: Conjunctivae normal   Neck: Supple, symmetrical   Lungs: Normal respiratory effort, non-labored breathing   Cardiovascular: Limbs warm and well perfused   Abdomen: Non-protruded   Musculoskeletal: Muscle bulk symmetric, no atrophy, no gross deformities   · Thorax: Scoliosis appreciated on exam  · Lumbar Spine: Lumbar paraspinals tender bilaterally. SLR neg bilaterally. Bilateral ischial bursa is tender to palpation.  -Right knee: TTP along medial joint line  Neurological: Cranial nerves II-XII grossly intact. · Gait - Antalgic gait (improved). Ambulates without assistive device. · Motor: No focal motor deficit appreciated lower limbs  Skin: No rashes or lesions visibile in lower limbs  Psychological: Cooperative, no exaggerated pain behaviors       Assessment:    Diagnosis Orders   1. Ischial bursitis, unspecified laterality  CHG FLUOR NEEDLE/CATH SPINE/PARASPINAL DX/THER ADDON    ND ARTHROCENTESIS ASPIR&/INJ MAJOR JT/BURSA W/O US   2. Other chronic pain     3. Sacroiliac pain     4. Chronic pain of right knee         Ruth Tony is a 80 y. o.female presenting to the pain clinic for evaluation of right-sided low back pain. Her clinical history physical examination are consistent with right SI joint dysfunction/pain. She has responded twice to right SI joint injections but her relief wears off at about 2 months. She continues to struggle with right ischial bursa pain and right knee pain secondary to arthritis. I have set her up for right genicular nerve block with plans to do a genicular ablation. We may potentially investigate a right SI joint ablation to give her a break from high-dose steroid injections.   We will continue her meloxicam as prescribed. She has worsening bilateral ischial bursa pain I set her up for bilateral ischial bursa injection under fluoroscopy. I have continued her meloxicam.      Plan: The following treatment recommendations and plan were discussed in detail with Racquel Anderson. Imaging:   I have reviewed patients imaging of right knee x-ray and results were discussed the patient today. Analgesics:   Patient is taking Acetaminophen. Patient informed that the maximum amount of acetaminophen taken on a regular basis should only be 4000 mg per day. I will continue the patient on meloxicam 7.5 mg daily. Patient is advised to take this medication with food. Adjuvants: In light of the presence of a neuropathic component of pain, the patient can continue her Gabapentin. Interventions: With examination consistent with bilateral ischial bursa pain, set patient up for bilateral ischial bursa injections under fluoroscopy. Patient is in agreement and wants to proceed with injection. Anticoagulation/NPO Recommendations:   Patient does not need to hold any medications for the procedure. Patient does not need to be NPO prior to the procedure. We will do a straight LOCAL injection. Multidisciplinary Pain Management:   In the presence of complex, chronic, and multi-factorial pain, the importance of a multidisciplinary approach to pain management in the patients management regimen was emphasized and discussed in great detail.      Referrals:  None    Prescriptions Written This Visit:   Meloxicam 7.5 mg    Follow-up: For B/L ischial bursa injections      Ines Baez, DO  Interventional Pain Management/PM&R   [place]

## 2021-09-21 ENCOUNTER — APPOINTMENT (OUTPATIENT)
Dept: GENERAL RADIOLOGY | Age: 86
End: 2021-09-21
Attending: ANESTHESIOLOGY
Payer: MEDICARE

## 2021-09-21 ENCOUNTER — HOSPITAL ENCOUNTER (OUTPATIENT)
Age: 86
Setting detail: OUTPATIENT SURGERY
Discharge: HOME | End: 2021-09-21
Attending: ANESTHESIOLOGY | Admitting: ANESTHESIOLOGY
Payer: MEDICARE

## 2021-09-21 VITALS
HEART RATE: 71 BPM | DIASTOLIC BLOOD PRESSURE: 63 MMHG | OXYGEN SATURATION: 97 % | HEIGHT: 59 IN | WEIGHT: 114.2 LBS | BODY MASS INDEX: 23.02 KG/M2 | RESPIRATION RATE: 17 BRPM | SYSTOLIC BLOOD PRESSURE: 144 MMHG | TEMPERATURE: 98.3 F

## 2021-09-21 PROCEDURE — 6360000002 HC RX W HCPCS: Performed by: ANESTHESIOLOGY

## 2021-09-21 PROCEDURE — 20610 DRAIN/INJ JOINT/BURSA W/O US: CPT | Performed by: ANESTHESIOLOGY

## 2021-09-21 PROCEDURE — 7100000010 HC PHASE II RECOVERY - FIRST 15 MIN: Performed by: ANESTHESIOLOGY

## 2021-09-21 PROCEDURE — 3209999900 FLUORO FOR SURGICAL PROCEDURES

## 2021-09-21 PROCEDURE — 7100000011 HC PHASE II RECOVERY - ADDTL 15 MIN: Performed by: ANESTHESIOLOGY

## 2021-09-21 PROCEDURE — 3600000054 HC PAIN LEVEL 3 BASE: Performed by: ANESTHESIOLOGY

## 2021-09-21 PROCEDURE — 6360000004 HC RX CONTRAST MEDICATION: Performed by: ANESTHESIOLOGY

## 2021-09-21 PROCEDURE — 2500000003 HC RX 250 WO HCPCS: Performed by: ANESTHESIOLOGY

## 2021-09-21 RX ORDER — LIDOCAINE HYDROCHLORIDE 10 MG/ML
INJECTION, SOLUTION EPIDURAL; INFILTRATION; INTRACAUDAL; PERINEURAL PRN
Status: DISCONTINUED | OUTPATIENT
Start: 2021-09-21 | End: 2021-09-21 | Stop reason: ALTCHOICE

## 2021-09-21 RX ORDER — BUPIVACAINE HYDROCHLORIDE 5 MG/ML
INJECTION, SOLUTION PERINEURAL PRN
Status: DISCONTINUED | OUTPATIENT
Start: 2021-09-21 | End: 2021-09-21 | Stop reason: ALTCHOICE

## 2021-09-21 RX ORDER — METHYLPREDNISOLONE ACETATE 80 MG/ML
INJECTION, SUSPENSION INTRA-ARTICULAR; INTRALESIONAL; INTRAMUSCULAR; SOFT TISSUE PRN
Status: DISCONTINUED | OUTPATIENT
Start: 2021-09-21 | End: 2021-09-21 | Stop reason: ALTCHOICE

## 2021-09-21 ASSESSMENT — PAIN - FUNCTIONAL ASSESSMENT: PAIN_FUNCTIONAL_ASSESSMENT: 0-10

## 2021-09-21 ASSESSMENT — PAIN SCALES - GENERAL: PAINLEVEL_OUTOF10: 0

## 2021-09-21 ASSESSMENT — PAIN DESCRIPTION - DESCRIPTORS: DESCRIPTORS: CONSTANT;DULL

## 2021-09-21 NOTE — PROGRESS NOTES
1344-  Patient arrived to phase II via cart. Spontaneous respiraitons even and unlabored. Placed on monitor--VSS. Report received from [facility]. 1345-  Assessment completed. Patient is alert and oriented x4. Patient states pain 3/10, but tolerable--will monitor. Injection sites clean and dry. 1348-  Snack and drink given to patient. Family in car.   [id number]-  All belongings in room. 1400-  Patient dressing. 1407-  Patient discharged in stable condition with all belongings. This RN walked patient to car.

## 2021-09-21 NOTE — PROCEDURES
Pre-operative Diagnosis:  Bilateral ischial bursa pain     Post-operative Diagnosis: Bilateral ischial bursa pain     Procedure: Bilateral ischial bursa pain     Procedure Description:  After having signed the informed consent, the patient was placed in the prone position. The patient's back/buttocks was prepped with chloraprep solution, and draped in a sterile fashion. A total of 1 ml of 1% lidocaine were used to anesthetize the skin and underlying tissues. Under fluoroscopic guidance a single 22-gauge, 3.5 inch spinal needle was advanced to lie at the inferior pole of the right ischium. There were no paresthesias or heme aspiration. Needle placement was confirmed in the AP view. After negative aspiration, 0.5 ml of Omnipaque 300 contrast was injected with appropriate spread observed. A total of 2 ml of 0.5% bupivicaine mixed with 40 mg depo-medrol were injected into the ischial bursa. The needle was withdrawn without any complications. This exact procedure was completed on the contralateral side. The patient tolerated the procedure well, was transported to the recovery room and observed for 15 minutes and discharged in an ambulatory fashion. No immediate reported complications.      Procedural Complications: None  Estimated Blood Loss: 0 mL        Hunter Mondragon DO  Interventional Pain Management/PM&R   [facility] and [address] [facility]

## 2021-09-29 ENCOUNTER — TELEPHONE (OUTPATIENT)
Dept: FAMILY MEDICINE CLINIC | Age: 86
End: 2021-09-29

## 2021-09-29 NOTE — TELEPHONE ENCOUNTER
----- Message from Matty Vargas sent at 9/28/2021  4:42 PM EDT -----  Subject: Message to Provider    QUESTIONS  Information for Provider? Pt. wants to schedule an appt. regarding her   back pain and wants to discuss the shots she has been getting from her   neurologist. Also pt. wants to get her flu shot.  ---------------------------------------------------------------------------  --------------  [address]  What is the best way for the office to contact you? OK to leave message on   voicemail  Preferred Call Back Phone Number? [phone]  ---------------------------------------------------------------------------  --------------  SCRIPT ANSWERS  Relationship to Patient? Self  (Is the patient requesting to be seen urgently for their symptoms?)? No  Is this follow up request related to routine Diabetes Management? No  Are you having any new concerns about your existing condition?  Yes

## 2021-10-01 ENCOUNTER — TELEPHONE (OUTPATIENT)
Dept: PHYSICAL MEDICINE AND REHAB | Age: 86
End: 2021-10-01

## 2021-10-04 NOTE — TELEPHONE ENCOUNTER
Please see if patient wants to come in for evaluation tomorrow at 1230 in [address]. Can we see if patient has been taking meloxicam as we would have to hold this medication for any epidural injection. If patient has been taking meloxicam and we cannot perform the epidural this Wednesday. I would like to evaluate the patient to see what injection therapy can be done.     Rossi Frances, DO  Interventional Pain Management/PM&R   [place]

## 2021-10-04 NOTE — TELEPHONE ENCOUNTER
Pt called to check status of this request.  I gave her Dr Mallika Dos Santos response. She'll come in 10/5 at 12:20. She took the meloxicam this AM, won't take it tomorrow morning \"in case\".

## 2021-10-05 ENCOUNTER — TELEPHONE (OUTPATIENT)
Dept: PHYSICAL MEDICINE AND REHAB | Age: 86
End: 2021-10-05

## 2021-10-05 ENCOUNTER — OFFICE VISIT (OUTPATIENT)
Dept: PHYSICAL MEDICINE AND REHAB | Age: 86
End: 2021-10-05
Payer: MEDICARE

## 2021-10-05 VITALS
WEIGHT: 114 LBS | HEIGHT: 59 IN | SYSTOLIC BLOOD PRESSURE: 124 MMHG | BODY MASS INDEX: 22.98 KG/M2 | DIASTOLIC BLOOD PRESSURE: 58 MMHG

## 2021-10-05 DIAGNOSIS — M53.3 SACROILIAC PAIN: ICD-10-CM

## 2021-10-05 DIAGNOSIS — G89.29 OTHER CHRONIC PAIN: ICD-10-CM

## 2021-10-05 DIAGNOSIS — M70.70 ISCHIAL BURSITIS, UNSPECIFIED LATERALITY: ICD-10-CM

## 2021-10-05 DIAGNOSIS — M54.16 LUMBAR RADICULOPATHY: Primary | ICD-10-CM

## 2021-10-05 PROCEDURE — G8484 FLU IMMUNIZE NO ADMIN: HCPCS | Performed by: ANESTHESIOLOGY

## 2021-10-05 PROCEDURE — G8427 DOCREV CUR MEDS BY ELIG CLIN: HCPCS | Performed by: ANESTHESIOLOGY

## 2021-10-05 PROCEDURE — 4040F PNEUMOC VAC/ADMIN/RCVD: CPT | Performed by: ANESTHESIOLOGY

## 2021-10-05 PROCEDURE — 1036F TOBACCO NON-USER: CPT | Performed by: ANESTHESIOLOGY

## 2021-10-05 PROCEDURE — G8420 CALC BMI NORM PARAMETERS: HCPCS | Performed by: ANESTHESIOLOGY

## 2021-10-05 PROCEDURE — 1090F PRES/ABSN URINE INCON ASSESS: CPT | Performed by: ANESTHESIOLOGY

## 2021-10-05 PROCEDURE — 1123F ACP DISCUSS/DSCN MKR DOCD: CPT | Performed by: ANESTHESIOLOGY

## 2021-10-05 PROCEDURE — 99214 OFFICE O/P EST MOD 30 MIN: CPT | Performed by: ANESTHESIOLOGY

## 2021-10-05 NOTE — PATIENT INSTRUCTIONS
The following treatment recommendations and plan were discussed in detail with Annamaria Eng. Imaging:   I have reviewed patients imaging of lumbar spine x-ray and results were discussed the patient today. Analgesics:   Patient is taking Acetaminophen. Patient informed that the maximum amount of acetaminophen taken on a regular basis should only be 4000 mg per day. I will continue the patient on meloxicam 7.5 mg daily. Patient is advised to take this medication with food. Adjuvants: In light of the presence of a neuropathic component of pain, the patient can continue her Gabapentin. Interventions: With examination consistent with lumbar radicular pain, we will proceed with a lumbar epidural steroid L5/S1. Risks of the procedure were discussed in detail with the patient. Patient wants proceed with the injection. Anticoagulation/NPO Recommendations:   Patient will need to hold Plavix x 7 days prior to the procedure and Mobic x 4 days prior to the procedure. Patient does not need to be NPO prior to the procedure. We will do a LOCAL injection. Multidisciplinary Pain Management:   In the presence of complex, chronic, and multi-factorial pain, the importance of a multidisciplinary approach to pain management in the patients management regimen was emphasized and discussed in great detail.      Referrals:  None    Prescriptions Written This Visit:   None    Follow-up: For SOULEYMANE

## 2021-10-05 NOTE — TELEPHONE ENCOUNTER
Irene Frazier Procedure and follow up scheduled. Educated on pre-procedure instructions, . Verbalized understanding.     Plavix instructions give to pt by Dr Alexa Ruiz

## 2021-10-05 NOTE — PROGRESS NOTES
Chronic Pain/PM&R Clinic Note     Encounter Date: 10/5/21    Subjective:   Chief Complaint:   Chief Complaint   Patient presents with    Follow-up     Discuss injections, patient last took Meloxicam 8am 10/4/21       History of Present Illness:   Destin Bang is a 80 y.o. female seen in the clinic initially on 10/8/20 upon request from Myriam Adams DO (PCP) for her history of low back pain. She has medical history of previous TIA (on Plavix). She has a longstanding history of cervical neck and low back pain. However, she sustained a fall while trying to put her pants on 10/2/2021 where she fell on her right buttocks. She states that since this episode she has been dealing with severe low back pain with radiation down the posterior thigh and slightly down the posterior calf. She does admit the pain radiates around to her groin. She states she has numbness and tingling however these are more in the toes. He does endorse some right leg weakness which she feels like this pain limited. Her pain is sharp and stabbing in nature and 5/10. Her pain is exacerbated with any sitting or laying on the affected side. Her pain is alleviated with rest and medication. She denies any focal leg weakness, saddle anesthesia, or bowel/bladder incontinence. She states she is currently been managing her pain with over-the-counter Tylenol arthritis. She has seen a couple pain specialist in the past including receiving spinal injections with Dr. Candis Phillips and Dr. Elia Oneill. Today, 10/5/2021, patient presents for planned follow-up for chronic low back and leg pain. She states that over the last several weeks she has noticed worsening pain radiating from her low back all the way down the back of her thighs bilaterally. She states that there is no inciting event that led to this. She states that she has had this pain before and it was previously responded well to lumbar epidural steroid injections.   She denies any associated leg weakness, new leg paresthesias, saddle anesthesia, bowel/bladder incontinence. She states that she continues take her medications without any side effects. She states that she has stopped her Mobic in anticipation for an epidural steroid injection. History of Intervention:   Surgery: No previous lumbar surgeries  Injections: Previous lumbar epidurals-significant relief  R SI joint injection (12/8/20) - significant (100%) x 4 months  Right ischial bursa injection (3/16/2021)-moderate relief  R SI joint injection (5/11/2021)-greater than 80% relief x 3 months  R genicular nerve block (8/4/2021)- >85% relief x 3 days  R genicular ablation (8/17/21)     Current Treatment Medications:   Meloxicam 7.5 mg daily  Gabapentin 600 mg / 1200 mg  Tylenol PRN    Historical Treatment Medications:   None    Imaging:  MRI L-spine (10/4/20)    LUMBAR SPINE 2 VIEWS:         CLINICAL INFORMATION: fall         COMPARISON: No prior study.         TECHNIQUE: Standard AP and lateral views of lumbar spine were obtained.                   Impression    1. Mild thoracolumbar dextro scoliosis. Moderate lumbar levoscoliosis. Cannot exclude muscle spasm. 2. Multifocal marked disc space narrowing and degenerative disc disease throughout the lumbar spine. Moderately severe degenerative vertebral body spondylosis scattered throughout the lumbar spine. 3. No fracture acute is seen. Mild degenerative changes right sacroiliac joint.         Past Medical History:   Diagnosis Date    Cerebral artery occlusion with cerebral infarction [facility]) 2012    tia    GERD (gastroesophageal reflux disease)     Hyperlipidemia     Hypertension     Hypothyroidism     Osteoarthritis     Sixth nerve palsy 2020       Past Surgical History:   Procedure Laterality Date    ARTHROCENTESIS Right 4/12/2019    Right hip bursa steroid INJECTION  NO SEDATION performed by Kinza Alston MD at [address] Right 5/11/2021 Right SI joint injection performed by Veronica Marin DO at [address]  2003    COLONOSCOPY      ENDOSCOPY, COLON, DIAGNOSTIC      HC INJECTION PROCEDURE FOR SACROILIAC JOINT Right 12/8/2020    Right SI joint injection performed by Veronica Marin DO at [address] Right 3/16/2021    right ischial bursa injection performed by Veronica Marin DO at [address] Bilateral 9/21/2021    bilateral ischial bursa injections under fluoroscopy.  performed by Veronica Marin DO at [address]       left    KNEE ARTHROSCOPY  2010    left    LUMBAR NERVE BLOCK N/A 12/11/2018    LUMBAR INTER LAMINAR DAYANARA LESI #1@ L5 performed by Jax Linn MD at [facility] N/A 3/12/2019    LUMBAR INTER LAMINAR DAYANARA LESI @L5 performed by Jax Linn MD at [address] Right 8/30/2019    SI RFA  RIGHT SIDE performed by Jax Linn MD at [address] Left 10/1/2019    SI RFA  LEFT SIDE performed by Jax Linn MD at [address] Right 08/27/2018    SI RFA    NERVE SURGERY Left 09/11/2018    SI RFA    OTHER SURGICAL HISTORY      previous nerve blocks at [facility] Right 8/4/2021    Right genicular nerve block performed by Veronica Marin DO at [address] Right 8/17/2021    right genicular nerve radiofrequency ablation performed by Veronica Marin DO at [address] Right 5/22/2018    LUMBAR RFA RIGHT SIDE FIRST L3-4,4-5,5-S1 performed by Jax Linn MD at [address] Left 6/25/2018    LUMBAR RFA  LEFT SIDE @ L3-4,4-5,5-S1, performed by Little Conte MD [place] at [address] [facility],  IMAGE GUIDANCE, SINGLE Right 8/27/2018    SI RFA  RIGHT SIDE performed by Helen Whitaker MD at [address], W IMAGE GUIDANCE, SINGLE Left 9/11/2018    SI RFA  LEFT SIDE performed by Helen Whitaker MD at [address] [place] [address]       Family History   Problem Relation Age of Onset    Cancer Mother         melanoma    Heart Disease Father     Stroke Sister        Social History     Socioeconomic History    Marital status:      Spouse name: Not on file    Number of children: 2    Years of education: 15    Highest education level: High school graduate   Occupational History    Not on file   Tobacco Use    Smoking status: Never Smoker    Smokeless tobacco: Never Used   Vaping Use    Vaping Use: Never used   Substance and Sexual Activity    Alcohol use: No    Drug use: No    Sexual activity: Not on file   Other Topics Concern    Not on file   Social History Narrative    Not on file     Social Determinants of Health     Financial Resource Strain: Low Risk     Difficulty of Paying Living Expenses: Not hard at all   Food Insecurity: No Food Insecurity    Worried About [facility] in the Last Year: Never true    [place] of Food in the Last Year: Never true   Transportation Needs: No Transportation Needs    Lack of Transportation (Medical): No    Lack of Transportation (Non-Medical):  No   Physical Activity:     Days of Exercise per Week:     Minutes of Exercise per Session:    Stress:     Feeling of Stress :    Social Connections:     Frequency of Communication with Friends and Family:     Frequency of Social Gatherings with Friends and Family:     Attends [employer] Services:     Active Member of Clubs or Organizations:     Attends Club or Organization Meetings:     Marital Status:    Intimate Partner Violence:     Fear of Current or Ex-Partner:     Emotionally Abused:     Physically Abused:     Sexually Abused:        Medications & Allergies:   Current Outpatient Medications   Medication Instructions    Acetaminophen (TYLENOL ARTHRITIS EXT RELIEF PO) Oral    aspirin 81 mg, Oral, DAILY    calcium carbonate-vitamin D (CALCIUM 600+D) 600-200 MG-UNIT TABS 1 tablet, DAILY    Cholecalciferol (VITAMIN D) 2000 UNITS CAPS capsule 1 capsule, DAILY    clopidogrel (PLAVIX) 75 mg, Oral, DAILY    gabapentin (NEURONTIN) 600 MG tablet TAKE 1 TABLET IN  THE      MORNING, 1 TABLET IN THE   AFTERNOON,  AND2 TABLETS  AT BEDTIME    levothyroxine (SYNTHROID) 50 MCG tablet TAKE 1 TABLET BY MOUTH EVERY DAY    lisinopril (PRINIVIL;ZESTRIL) 10 mg, Oral, DAILY    lovastatin (MEVACOR) 20 MG tablet TAKE 1 TABLET BY MOUTH EVERY DAY AT NIGHT    meloxicam (MOBIC) 7.5 mg, Oral, DAILY    Multiple Vitamins-Minerals (THERAPEUTIC MULTIVITAMIN-MINERALS) tablet 1 tablet, DAILY    pantoprazole (PROTONIX) 40 mg, Oral, DAILY WITH BREAKFAST       Allergies   Allergen Reactions    Cataflam [Diclofenac Potassium] Shortness Of Breath    Augmentin [Amoxicillin-Pot Clavulanate] Diarrhea    Phenergan [Promethazine Hcl]      Confusion \"didn't know where she was at\"       Review of Systems:   Constitutional: denies any fevers or chills  Genitourinary: negative for bowel/bladder incontinence   Musculoskeletal: Positive for bilateral leg pain  Neurological: Positive for numbness/tingling in toes of feet  Behavioral/Psych: negative for anxiety/depression   All other ROS reviewed and are negative    Objective:     Vitals:    10/05/21 1211   BP: (!) 124/58       Constitutional: Pleasant, no acute distress   Head: Normocephalic, atraumatic   Eyes: Conjunctivae normal   Neck: Supple, symmetrical   Lungs: Normal respiratory effort, non-labored breathing   Cardiovascular: Limbs warm and well perfused   Abdomen: Non-protruded   Musculoskeletal: Muscle bulk symmetric, no atrophy, no gross deformities   · Thorax: Scoliosis appreciated on exam  · Lumbar Spine: Lumbar paraspinals tender bilaterally. Seated SLR positive bilaterally. -Right knee: TTP along medial joint line  Neurological: Cranial nerves II-XII grossly intact. · Gait -slightly antalgic gait. Ambulates without assistive device. · Motor: No focal motor deficit appreciated lower limbs  Skin: No rashes or lesions visibile in lower limbs  Psychological: Cooperative, no exaggerated pain behaviors       Assessment:    Diagnosis Orders   1. Lumbar radiculopathy  CHG FLUOR NEEDLE/CATH SPINE/PARASPINAL DX/THER ADDON    NV NJX DX/THER SBST INTRLMNR LMBR/SAC W/IMG GDN   2. Other chronic pain     3. Ischial bursitis, unspecified laterality     4. Sacroiliac pain         Frances Yarbrough is a 80 y. o.female presenting to the pain clinic for evaluation of right-sided low back pain. Her clinical history physical examination are consistent with right SI joint dysfunction/pain. She has responded twice to right SI joint injections but her relief wears off at about 2 months. She continues to struggle with right ischial bursa pain and right knee pain secondary to arthritis. I have set her up for right genicular nerve block with plans to do a genicular ablation. We may potentially investigate a right SI joint ablation to give her a break from high-dose steroid injections. We will continue her meloxicam as prescribed. She has worsening radicular leg pain and I set her up for lumbar epidural steroid injection at L5/S1 but we will have to hold her Plavix 7 days prior. Plan: The following treatment recommendations and plan were discussed in detail with Frances Yarbrough. Imaging:   I have reviewed patients imaging of lumbar spine x-ray and results were discussed the patient today. Analgesics:   Patient is taking Acetaminophen.  Patient informed that the maximum amount of acetaminophen taken on a regular basis should only be 4000 mg per day. I will continue the patient on meloxicam 7.5 mg daily. Patient is advised to take this medication with food. Adjuvants: In light of the presence of a neuropathic component of pain, the patient can continue her Gabapentin. Interventions: With examination consistent with lumbar radicular pain, we will proceed with a lumbar epidural steroid L5/S1. Risks of the procedure were discussed in detail with the patient. Patient wants proceed with the injection. Anticoagulation/NPO Recommendations:   Patient will need to hold Plavix x 7 days prior to the procedure and Mobic x 4 days prior to the procedure. Patient does not need to be NPO prior to the procedure. We will do a LOCAL injection. Multidisciplinary Pain Management:   In the presence of complex, chronic, and multi-factorial pain, the importance of a multidisciplinary approach to pain management in the patients management regimen was emphasized and discussed in great detail. Referrals:  None    Prescriptions Written This Visit:   None    Follow-up: For SOULEYMANE    I spent 30 minutes with the patient and greater than 50% of this time was spent discussing injection therapy for radicular leg pain in the setting of holding Plavix and coordinating care to hold Plavix for 7 days prior to her procedure.       Chinyere Kelly, DO  Interventional Pain Management/PM&R   [place]

## 2021-10-06 ENCOUNTER — OFFICE VISIT (OUTPATIENT)
Dept: FAMILY MEDICINE CLINIC | Age: 86
End: 2021-10-06
Payer: MEDICARE

## 2021-10-06 VITALS
HEART RATE: 84 BPM | SYSTOLIC BLOOD PRESSURE: 122 MMHG | RESPIRATION RATE: 20 BRPM | BODY MASS INDEX: 22.88 KG/M2 | DIASTOLIC BLOOD PRESSURE: 62 MMHG | WEIGHT: 113.3 LBS

## 2021-10-06 DIAGNOSIS — M70.71 ISCHIAL BURSITIS OF RIGHT SIDE: ICD-10-CM

## 2021-10-06 DIAGNOSIS — M54.16 LUMBAR RADICULOPATHY: ICD-10-CM

## 2021-10-06 DIAGNOSIS — M53.3 CHRONIC SACROILIAC JOINT PAIN: Primary | ICD-10-CM

## 2021-10-06 DIAGNOSIS — G89.29 CHRONIC SACROILIAC JOINT PAIN: Primary | ICD-10-CM

## 2021-10-06 PROCEDURE — 1036F TOBACCO NON-USER: CPT | Performed by: FAMILY MEDICINE

## 2021-10-06 PROCEDURE — 1123F ACP DISCUSS/DSCN MKR DOCD: CPT | Performed by: FAMILY MEDICINE

## 2021-10-06 PROCEDURE — G8427 DOCREV CUR MEDS BY ELIG CLIN: HCPCS | Performed by: FAMILY MEDICINE

## 2021-10-06 PROCEDURE — 4040F PNEUMOC VAC/ADMIN/RCVD: CPT | Performed by: FAMILY MEDICINE

## 2021-10-06 PROCEDURE — 1090F PRES/ABSN URINE INCON ASSESS: CPT | Performed by: FAMILY MEDICINE

## 2021-10-06 PROCEDURE — G8420 CALC BMI NORM PARAMETERS: HCPCS | Performed by: FAMILY MEDICINE

## 2021-10-06 PROCEDURE — G8484 FLU IMMUNIZE NO ADMIN: HCPCS | Performed by: FAMILY MEDICINE

## 2021-10-06 PROCEDURE — 99213 OFFICE O/P EST LOW 20 MIN: CPT | Performed by: FAMILY MEDICINE

## 2021-10-06 RX ORDER — MELOXICAM 7.5 MG/1
7.5 TABLET ORAL DAILY
Qty: 90 TABLET | Refills: 1 | Status: SHIPPED | OUTPATIENT
Start: 2021-10-06 | End: 2022-07-08

## 2021-10-06 ASSESSMENT — ENCOUNTER SYMPTOMS
BACK PAIN: 1
RESPIRATORY NEGATIVE: 1
GASTROINTESTINAL NEGATIVE: 1

## 2021-10-06 NOTE — PROGRESS NOTES
Frances Yarbrough (:  1934) is a 80 y.o. female,Established patient, here for evaluation of the following chief complaint(s):  Back Pain (saw Dr. Marcelo Tabares yesterday)        Subjective   SUBJECTIVE/OBJECTIVE:  HPI:    Chief Complaint   Patient presents with    Back Pain     saw Dr. Marcelo Tabares yesterday     Pt here to discuss her back pain. Pt was seen by Dr. Marcelo Tabares who is recommending an epidural injection. If no relief, plans ablation. Pt presents today to discuss. Patient Active Problem List   Diagnosis    Hypertension    Hyperlipidemia    GERD (gastroesophageal reflux disease)    Lumbar spondylosis    Hypothyroidism    Vertebro basilar insufficiency    Cervical spondylosis with myelopathy    Light headed    Stenosis, spinal, lumbar    TIA involving vertebral artery    Sacroiliac inflammation ([address]    Trochanteric bursitis of right hip    Ischial bursitis     Past Surgical History:   Procedure Laterality Date    ARTHROCENTESIS Right 2019    Right hip bursa steroid INJECTION  NO SEDATION performed by Connie Lynch MD at [address] [facility] Right 2021    Right SI joint injection performed by Natali Kingsley DO at [facility]      COLONOSCOPY      ENDOSCOPY, COLON, DIAGNOSTIC      HC INJECTION PROCEDURE FOR SACROILIAC JOINT Right 2020    Right SI joint injection performed by Natali Kingsley DO at [address] [facility] [address] Right 3/16/2021    right ischial bursa injection performed by Natali Kingsley DO at [address] [facility] [address] Bilateral 2021    bilateral ischial bursa injections under fluoroscopy.  performed by Natali Kingsley DO at [address] [facility]      left    KNEE ARTHROSCOPY      left    LUMBAR NERVE BLOCK N/A 2018    LUMBAR INTER LAMINAR DAYANARA LESI #1@ L5 performed by Connie Lynch MD at [facility] hip pain) and back pain. All other systems reviewed and are negative. Objective   Physical Exam  Vitals and nursing note reviewed. Constitutional:       General: She is not in acute distress. Appearance: Normal appearance. She is well-developed. HENT:      Head: Normocephalic and atraumatic. Right Ear: Tympanic membrane normal.      Left Ear: Tympanic membrane normal.   Eyes:      Conjunctiva/sclera: Conjunctivae normal.   Cardiovascular:      Rate and Rhythm: Normal rate and regular rhythm. Heart sounds: Normal heart sounds. No murmur heard. Pulmonary:      Effort: Pulmonary effort is normal.      Breath sounds: Normal breath sounds. No wheezing, rhonchi or rales. Abdominal:      General: There is no distension. Musculoskeletal:      Cervical back: Neck supple. Lumbar back: Tenderness present. Decreased range of motion. Back:    Skin:     General: Skin is warm and dry. Findings: No rash (on exposed surfaces). Neurological:      General: No focal deficit present. Mental Status: She is alert. Psychiatric:         Attention and Perception: Attention normal.         Mood and Affect: Mood normal.         Speech: Speech normal.         Behavior: Behavior normal. Behavior is cooperative. Thought Content: Thought content normal.         Judgment: Judgment normal.               ASSESSMENT/PLAN:  1. Chronic sacroiliac joint pain  2. Ischial bursitis of right side  3. Lumbar radiculopathy    -  Correspondence from Dr. Stefan Morales reviewed with pt, agree with plan    Return for RTO as needed. An electronic signature was used to authenticate this note.     --Nelsy Hawley, DO

## 2021-10-11 NOTE — H&P
Today, patient presents for planned  lumbar epidural steroid at L5/S1. This note is reflective of the patient's previous visit for evaluation. We will proceed with today's planned procedure. Since patient's last visit for evaluation, there have been no interval changes in medical history. Patient has no new numbness, weakness, or focal neurological deficit since evaluation. Patient has no contraindications to injection (no anticoagulation or recent antibiotic intake for active infections), and has a  present or is able to drive themselves (as discussed and cleared by physician). Allergies to latex, contrast dye, and steroid medications have been confirmed with the patient prior to the procedure. NPO necessity has been assessed and accepted based on procedure complexity. The risks and benefits of the procedure have been explained including but are not limited to infection, bleeding, paralysis, immediate post procedure weakness, and dizziness; the patient acknowledges understanding and desires to proceed with the procedure. Patient has signed consent for same procedure as discussed in previous clinic encounter. All other questions and concerns were addressed at bedside. See procedure note for full details. Post procedure Instructions: The patient was advised not to drive during the day of the procedure and not to engage in any significant decision making (unless otherwise states by physician). The patient was also advised to be cautious with walking/activity for 24 hours following today's visit and asked not to engage in over-exertion (unless otherwise states by physician). After this time, it is ok to resume pre-procedure level of activity. Patient advised to apply ice to site of injection in situations of pain and discomfort. Patient advised to not submerge site of injection during bath or pool activities for approximately 24 hours post-procedure.  Patient attested to understanding post procedure directions / restrictions. All other questions and concerns addressed before patient discharge in ambulatory fashion. Chronic Pain/PM&R Clinic Note     Encounter Date: 10/5/21    Subjective:   Chief Complaint:   No chief complaint on file. History of Present Illness:   Dennis Childers is a 80 y.o. female seen in the clinic initially on 10/8/20 upon request from Anitha Hernández DO (PCP) for her history of low back pain. She has medical history of previous TIA (on Plavix). She has a longstanding history of cervical neck and low back pain. However, she sustained a fall while trying to put her pants on 10/2/2021 where she fell on her right buttocks. She states that since this episode she has been dealing with severe low back pain with radiation down the posterior thigh and slightly down the posterior calf. She does admit the pain radiates around to her groin. She states she has numbness and tingling however these are more in the toes. He does endorse some right leg weakness which she feels like this pain limited. Her pain is sharp and stabbing in nature and 5/10. Her pain is exacerbated with any sitting or laying on the affected side. Her pain is alleviated with rest and medication. She denies any focal leg weakness, saddle anesthesia, or bowel/bladder incontinence. She states she is currently been managing her pain with over-the-counter Tylenol arthritis. She has seen a couple pain specialist in the past including receiving spinal injections with Dr. Edil Escobar and Dr. Smiley Elizabeth. Today, 10/5/2021, patient presents for planned follow-up for chronic low back and leg pain. She states that over the last several weeks she has noticed worsening pain radiating from her low back all the way down the back of her thighs bilaterally. She states that there is no inciting event that led to this.   She states that she has had this pain before and it was previously responded well to lumbar epidural steroid injections. She denies any associated leg weakness, new leg paresthesias, saddle anesthesia, bowel/bladder incontinence. She states that she continues take her medications without any side effects. She states that she has stopped her Mobic in anticipation for an epidural steroid injection. History of Intervention:   Surgery: No previous lumbar surgeries  Injections: Previous lumbar epiduralssignificant relief  R SI joint injection (12/8/20) - significant (100%) x 4 months  Right ischial bursa injection (3/16/2021)moderate relief  R SI joint injection (5/11/2021)greater than 80% relief x 3 months  R genicular nerve block (8/4/2021) >85% relief x 3 days  R genicular ablation (8/17/21)     Current Treatment Medications:   Meloxicam 7.5 mg daily  Gabapentin 600 mg / 1200 mg  Tylenol PRN    Historical Treatment Medications:   None    Imaging:  MRI L-spine (10/4/20)    LUMBAR SPINE 2 VIEWS:         CLINICAL INFORMATION: fall         COMPARISON: No prior study.         TECHNIQUE: Standard AP and lateral views of lumbar spine were obtained.                   Impression    1. Mild thoracolumbar dextro scoliosis. Moderate lumbar levoscoliosis. Cannot exclude muscle spasm. 2. Multifocal marked disc space narrowing and degenerative disc disease throughout the lumbar spine. Moderately severe degenerative vertebral body spondylosis scattered throughout the lumbar spine. 3. No fracture acute is seen. Mild degenerative changes right sacroiliac joint.         Past Medical History:   Diagnosis Date    Cerebral artery occlusion with cerebral infarction [facility]) 2012    tia    GERD (gastroesophageal reflux disease)     Hyperlipidemia     Hypertension     Hypothyroidism     Osteoarthritis     Sixth nerve palsy 2020       Past Surgical History:   Procedure Laterality Date    ARTHROCENTESIS Right 4/12/2019    Right hip bursa steroid INJECTION  NO SEDATION performed by Sherren Makua, MD at [address] [facility]  BACK INJECTION Right 5/11/2021    Right SI joint injection performed by Vinod Russ DO at [address]  2003    COLONOSCOPY      ENDOSCOPY, COLON, DIAGNOSTIC      [place] INJECTION PROCEDURE FOR SACROILIAC JOINT Right 12/8/2020    Right SI joint injection performed by Vinod Russ DO at [address] Right 3/16/2021    right ischial bursa injection performed by Vinod Russ DO at [address] Bilateral 9/21/2021    bilateral ischial bursa injections under fluoroscopy.  performed by Vinod Russ DO at [address] [facility]      left    KNEE ARTHROSCOPY  2010    left    LUMBAR NERVE BLOCK N/A 12/11/2018    LUMBAR INTER LAMINAR DAYANARA LESI #1@ L5 performed by Mela Menard MD at [facility] N/A 3/12/2019    LUMBAR INTER LAMINAR DAYANARA LESI @L5 performed by Mela Menard MD at [address] [facility] Right 8/30/2019    SI RFA  RIGHT SIDE performed by Mela Menard MD at [address] [facility] Left 10/1/2019    SI RFA  LEFT SIDE performed by Mela Menard MD at [address] Right 08/27/2018    SI RFA    NERVE SURGERY Left 09/11/2018    SI RFA    OTHER SURGICAL HISTORY      previous nerve blocks at [facility] Right 8/4/2021    Right genicular nerve block performed by Vinod Russ DO at [address] Right 8/17/2021    right genicular nerve radiofrequency ablation performed by Vinod Russ DO at [address] Right 5/22/2018    LUMBAR RFA RIGHT SIDE FIRST L3-4,4-5,5-S1 performed by Mela Menard MD at [address] Left 6/25/2018    LUMBAR RFA  LEFT SIDE @ L3-4,4-5,5-S1, performed by Luz Lambert MD at [address] [place] [address],  IMAGE GUIDANCE, SINGLE Right 8/27/2018    SI RFA  RIGHT SIDE performed by Luz Lambert MD at [address] [place] [address],  IMAGE GUIDANCE, SINGLE Left 9/11/2018    SI RFA  LEFT SIDE performed by Luz Lambert MD at [address]       Family History   Problem Relation Age of Onset    Cancer Mother         melanoma    Heart Disease Father     Stroke Sister        Social History     Socioeconomic History    Marital status:      Spouse name: Not on file    Number of children: 2    Years of education: 15    Highest education level: High school graduate   Occupational History    Not on file   Tobacco Use    Smoking status: Never Smoker    Smokeless tobacco: Never Used   Vaping Use    Vaping Use: Never used   Substance and Sexual Activity    Alcohol use: No    Drug use: No    Sexual activity: Not on file   Other Topics Concern    Not on file   Social History Narrative    Not on file     Social Determinants of Health     Financial Resource Strain:     Difficulty of Paying Living Expenses:    Food Insecurity:     Worried About Running Out of Food in the Last Year:     [address] in the Last Year:    Transportation Needs:     Lack of Transportation (Medical):      Lack of Transportation (Non-Medical):    Physical Activity:     Days of Exercise per Week:     Minutes of Exercise per Session:    Stress:     Feeling of Stress :    Social Connections:     Frequency of Communication with Friends and Family:     Frequency of Social Gatherings with Friends and Family:     Attends Scientologist Services:     Active Member of Clubs or Organizations:     Attends Club or Organization Meetings:     Marital Status:    Intimate Partner Violence:     Fear of Current or Ex-Partner:     Emotionally Abused: appreciated on exam  · Lumbar Spine: Lumbar paraspinals tender bilaterally. Seated SLR positive bilaterally. -Right knee: TTP along medial joint line  Neurological: Cranial nerves II-XII grossly intact. · Gait -slightly antalgic gait. Ambulates without assistive device. · Motor: No focal motor deficit appreciated lower limbs  Skin: No rashes or lesions visibile in lower limbs  Psychological: Cooperative, no exaggerated pain behaviors       Assessment:    Diagnosis Orders   1. Lumbar radiculopathy  CHG FLUOR NEEDLE/CATH SPINE/PARASPINAL DX/THER ADDON    CT NJX DX/THER SBST INTRLMNR LMBR/SAC W/IMG GDN   2. Other chronic pain     3. Ischial bursitis, unspecified laterality     4. Sacroiliac pain         Demetrio Gallagher is a 80 y. o.female presenting to the pain clinic for evaluation of right-sided low back pain. Her clinical history physical examination are consistent with right SI joint dysfunction/pain. She has responded twice to right SI joint injections but her relief wears off at about 2 months. She continues to struggle with right ischial bursa pain and right knee pain secondary to arthritis. I have set her up for right genicular nerve block with plans to do a genicular ablation. We may potentially investigate a right SI joint ablation to give her a break from high-dose steroid injections. We will continue her meloxicam as prescribed. She has worsening radicular leg pain and I set her up for lumbar epidural steroid injection at L5/S1 but we will have to hold her Plavix 7 days prior. Plan: The following treatment recommendations and plan were discussed in detail with Demetrio Gallagher. Imaging:   I have reviewed patients imaging of lumbar spine x-ray and results were discussed the patient today. Analgesics:   Patient is taking Acetaminophen. Patient informed that the maximum amount of acetaminophen taken on a regular basis should only be 4000 mg per day.      I will continue the patient on meloxicam 7.5 mg daily. Patient is advised to take this medication with food. Adjuvants: In light of the presence of a neuropathic component of pain, the patient can continue her Gabapentin. Interventions: With examination consistent with lumbar radicular pain, we will proceed with a lumbar epidural steroid L5/S1. Risks of the procedure were discussed in detail with the patient. Patient wants proceed with the injection. Anticoagulation/NPO Recommendations:   Patient will need to hold Plavix x 7 days prior to the procedure and Mobic x 4 days prior to the procedure. Patient does not need to be NPO prior to the procedure. We will do a LOCAL injection. Multidisciplinary Pain Management:   In the presence of complex, chronic, and multi-factorial pain, the importance of a multidisciplinary approach to pain management in the patients management regimen was emphasized and discussed in great detail. Referrals:  None    Prescriptions Written This Visit:   None    Follow-up: For SOULEYMANE    I spent 30 minutes with the patient and greater than 50% of this time was spent discussing injection therapy for radicular leg pain in the setting of holding Plavix and coordinating care to hold Plavix for 7 days prior to her procedure.       Justine Tabares,   Interventional Pain Management/PM&R   [place]

## 2021-10-12 ENCOUNTER — HOSPITAL ENCOUNTER (OUTPATIENT)
Age: 86
Setting detail: OUTPATIENT SURGERY
Discharge: HOME | End: 2021-10-12
Attending: ANESTHESIOLOGY | Admitting: ANESTHESIOLOGY
Payer: MEDICARE

## 2021-10-12 ENCOUNTER — APPOINTMENT (OUTPATIENT)
Dept: GENERAL RADIOLOGY | Age: 86
End: 2021-10-12
Attending: ANESTHESIOLOGY
Payer: MEDICARE

## 2021-10-12 VITALS
RESPIRATION RATE: 18 BRPM | HEIGHT: 59 IN | OXYGEN SATURATION: 98 % | WEIGHT: 112.6 LBS | HEART RATE: 70 BPM | TEMPERATURE: 97.4 F | BODY MASS INDEX: 22.7 KG/M2 | SYSTOLIC BLOOD PRESSURE: 147 MMHG | DIASTOLIC BLOOD PRESSURE: 65 MMHG

## 2021-10-12 PROCEDURE — 6360000002 HC RX W HCPCS: Performed by: ANESTHESIOLOGY

## 2021-10-12 PROCEDURE — 7100000010 HC PHASE II RECOVERY - FIRST 15 MIN: Performed by: ANESTHESIOLOGY

## 2021-10-12 PROCEDURE — 3600000055 HC PAIN LEVEL 3 ADDL 15 MIN: Performed by: ANESTHESIOLOGY

## 2021-10-12 PROCEDURE — 7100000011 HC PHASE II RECOVERY - ADDTL 15 MIN: Performed by: ANESTHESIOLOGY

## 2021-10-12 PROCEDURE — 62323 NJX INTERLAMINAR LMBR/SAC: CPT | Performed by: ANESTHESIOLOGY

## 2021-10-12 PROCEDURE — 3209999900 FLUORO FOR SURGICAL PROCEDURES

## 2021-10-12 PROCEDURE — 2500000003 HC RX 250 WO HCPCS: Performed by: ANESTHESIOLOGY

## 2021-10-12 PROCEDURE — 2709999900 HC NON-CHARGEABLE SUPPLY: Performed by: ANESTHESIOLOGY

## 2021-10-12 PROCEDURE — 3600000054 HC PAIN LEVEL 3 BASE: Performed by: ANESTHESIOLOGY

## 2021-10-12 PROCEDURE — 6360000004 HC RX CONTRAST MEDICATION: Performed by: ANESTHESIOLOGY

## 2021-10-12 RX ORDER — BACTERIOSTATIC SODIUM CHLORIDE 0.9 %
VIAL (ML) INJECTION PRN
Status: DISCONTINUED | OUTPATIENT
Start: 2021-10-12 | End: 2021-10-12 | Stop reason: ALTCHOICE

## 2021-10-12 RX ORDER — LIDOCAINE HYDROCHLORIDE 10 MG/ML
INJECTION, SOLUTION EPIDURAL; INFILTRATION; INTRACAUDAL; PERINEURAL PRN
Status: DISCONTINUED | OUTPATIENT
Start: 2021-10-12 | End: 2021-10-12 | Stop reason: ALTCHOICE

## 2021-10-12 RX ORDER — DEXAMETHASONE SODIUM PHOSPHATE 4 MG/ML
INJECTION, SOLUTION INTRA-ARTICULAR; INTRALESIONAL; INTRAMUSCULAR; INTRAVENOUS; SOFT TISSUE PRN
Status: DISCONTINUED | OUTPATIENT
Start: 2021-10-12 | End: 2021-10-12 | Stop reason: ALTCHOICE

## 2021-10-12 ASSESSMENT — PAIN - FUNCTIONAL ASSESSMENT: PAIN_FUNCTIONAL_ASSESSMENT: 0-10

## 2021-10-12 NOTE — PRE SEDATION
[address] [place] [address]  Pre-Sedation/Analgesia History & Physical    Pt Name: Andrei Delvalle  MRN: [id number]  YOB: 1934  Provider Performing Procedure: Christen Lanes, DO   Primary Care Physician: Faisal Garcia DO      MEDICAL HISTORY       has a past medical history of Cerebral artery occlusion with cerebral infarction [facility]), GERD (gastroesophageal reflux disease), Hyperlipidemia, Hypertension, Hypothyroidism, Osteoarthritis, and Sixth nerve palsy. SURGICAL HISTORY   has a past surgical history that includes back surgery (2003); Neck surgery (1980s); Knee arthroscopy (2010); joint replacement; Colonoscopy; Endoscopy, colon, diagnostic; other surgical history; pr inject rx other periph nerve (Right, 5/22/2018); pr inject rx other periph nerve (Left, 6/25/2018); Nerve Surgery (Right, 08/27/2018); pr radiofrequency neurotomy lumbar or sacral, w image guidance, single (Right, 8/27/2018); Nerve Surgery (Left, 09/11/2018); pr radiofrequency neurotomy lumbar or sacral, w image guidance, single (Left, 9/11/2018); lumbar nerve block (N/A, 12/11/2018); lumbar nerve block (N/A, 3/12/2019); arthrocentesis (Right, 4/12/2019); Lumbar spine surgery (Right, 8/30/2019); Lumbar spine surgery (Left, 10/1/2019); Injection Procedure For Sacroiliac Joint (Right, 12/8/2020); hip surgery (Right, 3/16/2021); Back Injection (Right, 5/11/2021); Pain management procedure (Right, 8/4/2021); Pain management procedure (Right, 8/17/2021); and hip surgery (Bilateral, 9/21/2021). ALLERGIES   Allergies as of 10/05/2021 - Fully Reviewed 10/05/2021   Allergen Reaction Noted    Cataflam [diclofenac potassium] Shortness Of Breath 07/26/2012    Augmentin [amoxicillin-pot clavulanate] Diarrhea 07/10/2021    Phenergan [promethazine hcl]  03/13/2013       MEDICATIONS   Prior to Admission medications    Medication Sig Start Date End Date Taking?  Authorizing Provider   meloxicam (MOBIC) 7.5 MG tablet Take 1 tablet by mouth daily 10/6/21 4/4/22 Yes Lucrecia Madden, DO   lovastatin (MEVACOR) 20 MG tablet TAKE 1 TABLET BY MOUTH EVERY DAY AT NIGHT 6/16/21  Yes Lucrecia Madden, DO   gabapentin (NEURONTIN) 600 MG tablet TAKE 1 TABLET IN  THE      MORNING, 1 TABLET IN THE   AFTERNOON,  AND2 TABLETS  AT BEDTIME 6/16/21 6/17/22 Yes Lucrecia Madden, DO   pantoprazole (PROTONIX) 40 MG tablet Take 1 tablet by mouth daily (with breakfast) 11/12/20  Yes Lucrecia Madden, DO   clopidogrel (PLAVIX) 75 MG tablet Take 1 tablet by mouth daily 11/11/20  Yes Lucrecia Madden, DO   lisinopril (PRINIVIL;ZESTRIL) 10 MG tablet Take 1 tablet by mouth daily 11/11/20  Yes Lucrecia Madden, DO   levothyroxine (SYNTHROID) 50 MCG tablet TAKE 1 TABLET BY MOUTH EVERY DAY 10/20/20  Yes Lucrecia Madden, DO   aspirin 81 MG EC tablet Take 81 mg by mouth daily   Yes Historical Provider, MD   Acetaminophen (TYLENOL ARTHRITIS EXT RELIEF PO) Take by mouth   Yes Historical Provider, MD   calcium carbonate-vitamin D (CALCIUM 600+D) 600-200 MG-UNIT TABS Take 1 tablet by mouth daily. Yes Historical Provider, MD   Multiple Vitamins-Minerals (THERAPEUTIC MULTIVITAMIN-MINERALS) tablet Take 1 tablet by mouth daily. Yes Historical Provider, MD   Cholecalciferol (VITAMIN D) 2000 UNITS CAPS capsule Take 1 capsule by mouth daily. Yes Historical Provider, MD     PHYSICAL:   Vitals:    10/12/21 1339   BP: (!) 147/65   Pulse: 70   Resp: 18   Temp: 97.4 °F (36.3 °C)   SpO2: 98%     PLANNED PROCEDURE   See procedure note  SEDATION  Planned agent: Versed and Fentanyl  ASA Classification: 2  Class 1: A normal healthy patient  Class 2: Pt with mild to moderate systemic disease  Class 3: Severe systemic disease or disturbance  Class 4: Severe systemic disorders that are already life threatening. Class 5: Moribund pt with little chances of survival, for more than 24 hours. Mallampati I Airway Classification: 2    1.  Pre-procedure diagnostic studies complete and results available. 2. Previous sedation/anesthesia experiences assessed. 3. The patient is an appropriate candidate to undergo the planned procedure sedation and anesthesia. (Refer to nursing sedation/analgesia documentation record)  4. Formulation and discussion of sedation/procedure plan, risks, and expectations with patient and/or responsible adult completed. 5. Patient examined immediately prior to the procedure.  (Refer to nursing sedation/analgesia documentation record)    Naomi Acosta DO  Electronically signed 10/12/2021 at 2:08 PM

## 2021-10-12 NOTE — PROCEDURES
Pre-operative Diagnosis: Radicular leg pain     Post-operative Diagnosis: Radicular leg pain     Procedure: Lumbar epidural steroid injection    Procedure Description:  After having obtained a signed informed consent, the patient was taken to the fluoroscopy suite and placed in the prone position. The patient's back was prepped with chloraprep and draped in a sterile fashion. A total of 1.5 cc of 1 % lidocaine was used to anesthetize the skin and underlying tissues. Under fluoroscopic guidance, a single 20G Tuohy needle was advanced using midline approach at the L5/S1 interspace until gaining the epidural space using the loss of resistance to saline syringe technique. There were no paresthesias, heme, or CSF aspiration. A total of 0.25 cc of Omnipaque 300 were injected having had adequate dye spread within the epidural space. Needle placement and contrast spread was confirmed using the AP and contralateral views. 10 mg of Dexamethasone with 1 cc of saline solution were injected in the epidural space. The needle was flushed and removed without any complication. The patient tolerated the procedure well and was transported to the recovery room. The patient was observed for 15 minutes to then discharged in an ambulatory fashion.     Procedural Complications: None  Estimated Blood Loss: 0 mL       Hunter Mondragon DO  Interventional Pain Management/PM&R   [facility] and [address] [facility]

## 2021-10-12 NOTE — POST SEDATION
[facility]  Sedation/Analgesia Post Sedation Record    Pt Name: Padmini Cesar  MRN: [id number]  YOB: 1934  Procedure Performed By: Nallely Garzon DO  Primary Care Physician: Trudee Cabot, DO    POST-PROCEDURE    Physicians/Assistants: Nallely Garzon DO  Procedure Performed: See Procedure Note   Sedation/Anesthesia: Versed and Fentanyl (See procedure note for amount and duration)  Estimated Blood Loss:     0  ml  Specimens Removed: None        Complications: None           Hunter Rasheed DO  Electronically signed 10/12/2021 at 2:08 PM

## 2021-10-12 NOTE — PROGRESS NOTES
1337: Patient arrived to Phase II recovery via cart. Awake and alert. [facility]  3255: VSS, patient rates stabbing pain down R leg 9/10. HOB elevated and snack and drink provided. Call light placed within reach. 1347: Dr. Saritha Sandoval at bedside. 1350: Patient sat edge of bed without difficulty and denies complications. Dressed in bed independently. 1357: Discharged home in stable condition with friend.

## 2021-10-18 RX ORDER — LEVOTHYROXINE SODIUM 0.05 MG/1
TABLET ORAL
Qty: 90 TABLET | Refills: 3 | Status: SHIPPED | OUTPATIENT
Start: 2021-10-18 | End: 2022-10-10

## 2021-10-18 RX ORDER — LISINOPRIL 10 MG/1
TABLET ORAL
Qty: 90 TABLET | Refills: 3 | Status: SHIPPED | OUTPATIENT
Start: 2021-10-18 | End: 2022-10-05

## 2021-10-27 RX ORDER — CLOPIDOGREL BISULFATE 75 MG/1
TABLET ORAL
Qty: 90 TABLET | Refills: 3 | Status: SHIPPED | OUTPATIENT
Start: 2021-10-27 | End: 2022-10-19 | Stop reason: SDUPTHER

## 2021-11-03 RX ORDER — PANTOPRAZOLE SODIUM 40 MG/1
TABLET, DELAYED RELEASE ORAL
Qty: 90 TABLET | Refills: 3 | Status: SHIPPED | OUTPATIENT
Start: 2021-11-03 | End: 2022-10-10

## 2021-11-11 ENCOUNTER — OFFICE VISIT (OUTPATIENT)
Dept: PHYSICAL MEDICINE AND REHAB | Age: 86
End: 2021-11-11
Payer: MEDICARE

## 2021-11-11 VITALS
WEIGHT: 112 LBS | SYSTOLIC BLOOD PRESSURE: 132 MMHG | HEIGHT: 59 IN | BODY MASS INDEX: 22.58 KG/M2 | DIASTOLIC BLOOD PRESSURE: 62 MMHG

## 2021-11-11 DIAGNOSIS — M70.71 ISCHIAL BURSITIS OF RIGHT SIDE: ICD-10-CM

## 2021-11-11 DIAGNOSIS — M70.70 ISCHIAL BURSITIS, UNSPECIFIED LATERALITY: ICD-10-CM

## 2021-11-11 DIAGNOSIS — G89.29 CHRONIC PAIN OF RIGHT KNEE: ICD-10-CM

## 2021-11-11 DIAGNOSIS — M25.561 CHRONIC PAIN OF RIGHT KNEE: ICD-10-CM

## 2021-11-11 DIAGNOSIS — G89.29 OTHER CHRONIC PAIN: Primary | ICD-10-CM

## 2021-11-11 PROCEDURE — G8427 DOCREV CUR MEDS BY ELIG CLIN: HCPCS | Performed by: ANESTHESIOLOGY

## 2021-11-11 PROCEDURE — G8420 CALC BMI NORM PARAMETERS: HCPCS | Performed by: ANESTHESIOLOGY

## 2021-11-11 PROCEDURE — 1036F TOBACCO NON-USER: CPT | Performed by: ANESTHESIOLOGY

## 2021-11-11 PROCEDURE — G8484 FLU IMMUNIZE NO ADMIN: HCPCS | Performed by: ANESTHESIOLOGY

## 2021-11-11 PROCEDURE — 99214 OFFICE O/P EST MOD 30 MIN: CPT | Performed by: ANESTHESIOLOGY

## 2021-11-11 PROCEDURE — 4040F PNEUMOC VAC/ADMIN/RCVD: CPT | Performed by: ANESTHESIOLOGY

## 2021-11-11 PROCEDURE — 1090F PRES/ABSN URINE INCON ASSESS: CPT | Performed by: ANESTHESIOLOGY

## 2021-11-11 PROCEDURE — 1123F ACP DISCUSS/DSCN MKR DOCD: CPT | Performed by: ANESTHESIOLOGY

## 2021-11-11 RX ORDER — ACETAMINOPHEN AND CODEINE PHOSPHATE 300; 30 MG/1; MG/1
1 TABLET ORAL 3 TIMES DAILY PRN
Qty: 90 TABLET | Refills: 0 | Status: SHIPPED | OUTPATIENT
Start: 2021-11-11 | End: 2021-12-11

## 2021-11-11 NOTE — PROGRESS NOTES
Chronic Pain/PM&R Clinic Note     Encounter Date: 11/11/21    Subjective:   Chief Complaint:   Chief Complaint   Patient presents with    Follow-up       History of Present Illness:   Aram Garcia is a 80 y.o. female seen in the clinic initially on 10/8/20 upon request from Amber Wong DO (PCP) for her history of low back pain. She has medical history of previous TIA (on Plavix). She has a longstanding history of cervical neck and low back pain. However, she sustained a fall while trying to put her pants on 10/2/2021 where she fell on her right buttocks. She states that since this episode she has been dealing with severe low back pain with radiation down the posterior thigh and slightly down the posterior calf. She does admit the pain radiates around to her groin. She states she has numbness and tingling however these are more in the toes. He does endorse some right leg weakness which she feels like this pain limited. Her pain is sharp and stabbing in nature and 5/10. Her pain is exacerbated with any sitting or laying on the affected side. Her pain is alleviated with rest and medication. She denies any focal leg weakness, saddle anesthesia, or bowel/bladder incontinence. She states she is currently been managing her pain with over-the-counter Tylenol arthritis. She has seen a couple pain specialist in the past including receiving spinal injections with Dr. Piter Nunn and Dr. Gucci Gleason. Today, 10/5/2021, patient presents for planned follow-up for chronic low back and leg pain. She states that over the last several weeks she has noticed worsening pain radiating from her low back all the way down the back of her thighs bilaterally. She states that there is no inciting event that led to this. She states that she has had this pain before and it was previously responded well to lumbar epidural steroid injections.   She denies any associated leg weakness, new leg paresthesias, saddle anesthesia, bowel/bladder incontinence. She states that she continues take her medications without any side effects. She states that she has stopped her Mobic in anticipation for an epidural steroid injection. Today, 11/11/2021, patient presents for planned follow-up for chronic low back and leg pain. She states that overall she is doing worse since her last visit. She continues to have right knee pain and right hip/buttocks pain. She states that she did not get much relief from her lumbar epidural steroid injection was performed on 10/12/2021. She reports continued pain in her right knee and in her right buttocks region. She is wondering about how she can alleviate some of these pains with injection therapy. She states that she continues take her meloxicam, Tylenol, and gabapentin with no side effects. She does endorse good relief when she takes her Tylenol. Denies any symptoms of focal leg weakness, new leg paresthesias, saddle anesthesia, bowel/bladder incontinence. History of Intervention:   Surgery: No previous lumbar surgeries  Injections: Previous lumbar epidurals-significant relief  R SI joint injection (12/8/20) - significant (100%) x 4 months  Right ischial bursa injection (3/16/2021)-moderate relief  R SI joint injection (5/11/2021)-greater than 80% relief x 3 months  R genicular nerve block (8/4/2021)- >85% relief x 3 days  R genicular ablation (8/17/21) - minimal relief  LESI (10/12/21) - minimal relief    Current Treatment Medications:   Meloxicam 7.5 mg daily  Gabapentin 600 mg / 1200 mg  Tylenol PRN    Historical Treatment Medications:   None    Imaging:  MRI L-spine (10/4/20)    LUMBAR SPINE 2 VIEWS:         CLINICAL INFORMATION: fall         COMPARISON: No prior study.         TECHNIQUE: Standard AP and lateral views of lumbar spine were obtained.                   Impression    1. Mild thoracolumbar dextro scoliosis. Moderate lumbar levoscoliosis. Cannot exclude muscle spasm.     2. Multifocal marked disc space narrowing and degenerative disc disease throughout the lumbar spine. Moderately severe degenerative vertebral body spondylosis scattered throughout the lumbar spine. 3. No fracture acute is seen. Mild degenerative changes right sacroiliac joint. Past Medical History:   Diagnosis Date    Cerebral artery occlusion with cerebral infarction ([address] 2012    tia    GERD (gastroesophageal reflux disease)     Hyperlipidemia     Hypertension     Hypothyroidism     Osteoarthritis     Sixth nerve palsy 2020       Past Surgical History:   Procedure Laterality Date    ARTHROCENTESIS Right 4/12/2019    Right hip bursa steroid INJECTION  NO SEDATION performed by Jax Linn MD at [address] Right 5/11/2021    Right SI joint injection performed by Veronica Marin DO at [address]  2003    COLONOSCOPY      ENDOSCOPY, COLON, DIAGNOSTIC      HC INJECTION PROCEDURE FOR SACROILIAC JOINT Right 12/8/2020    Right SI joint injection performed by Veronica Marin DO at [address] Right 3/16/2021    right ischial bursa injection performed by Veronica Marin DO at [address] Bilateral 9/21/2021    bilateral ischial bursa injections under fluoroscopy.  performed by Veronica Marin DO at [address] [facility]      left    KNEE ARTHROSCOPY  2010    left    LUMBAR NERVE BLOCK N/A 12/11/2018    LUMBAR INTER LAMINAR DAYANARA LESI #1@ L5 performed by Jax Linn MD at [facility] N/A 3/12/2019    LUMBAR INTER LAMINAR DAYANARA LESI @L5 performed by Jax Linn MD at [address] [facility] Right 8/30/2019    SI RFA  RIGHT SIDE [facility] [address] [facility] Left 10/1/2019    SI RFA  LEFT SIDE performed by Jax Linn MD at [place] [facility]    NECK SURGERY  1980s    NERVE SURGERY Right 08/27/2018    SI RFA    NERVE SURGERY Left 09/11/2018    SI RFA    OTHER SURGICAL HISTORY      previous nerve blocks at [facility] Right 8/4/2021    [facility] Right 8/17/2021    right genicular nerve radiofrequency ablation [facility] N/A 10/12/2021    lumbar epidural steroid L5/S1 performed by Chance James DO at [address] Right 5/22/2018    LUMBAR RFA RIGHT SIDE FIRST L3-4,4-5,5-S1 performed by Elysia Chou MD at [address] Left 6/25/2018    LUMBAR RFA  LEFT SIDE @ L3-4,4-5,5-S1, performed by Elysia Chou MD at [address] [facility],  IMAGE GUIDANCE, SINGLE Right 8/27/2018    SI RFA  RIGHT SIDE performed by Elysia Chou MD at [address], W IMAGE GUIDANCE, SINGLE Left 9/11/2018    SI RFA  LEFT SIDE performed by Elysia Chou MD at [address] [place] [address]       Family History   Problem Relation Age of Onset    Cancer Mother         melanoma    Heart Disease Father     Stroke Sister        Social History     Socioeconomic History    Marital status:       Spouse name: Not on file    Number of children: 2    Years of education: 15    Highest education level: High school graduate   Occupational History    Not on file   Tobacco Use    Smoking status: Never Smoker    Smokeless tobacco: Never Used   Vaping Use    Vaping Use: Never used   Substance and Sexual Activity    Alcohol use: No    Drug use: No    Sexual activity: Not on file   Other Topics Concern    Not on file   Social History Narrative    Not on file     Social Determinants of Health     Financial Resource Strain:     Difficulty of Paying Living Expenses: Not on file   Food Insecurity:     Worried About Running Out of Food in the Last Year: Not on file    Naman of Food in the Last Year: Not on file   Transportation Needs:     Lack of Transportation (Medical): Not on file    Lack of Transportation (Non-Medical):  Not on file   Physical Activity:     Days of Exercise per Week: Not on file    Minutes of Exercise per Session: Not on file   Stress:     Feeling of Stress : Not on file   Social Connections:     Frequency of Communication with Friends and Family: Not on file    Frequency of Social Gatherings with Friends and Family: Not on file    Attends Bahai Services: Not on file    Active Member of Clubs or Organizations: Not on file    Attends Club or Organization Meetings: Not on file    Marital Status: Not on file   Intimate Partner Violence:     Fear of Current or Ex-Partner: Not on file    Emotionally Abused: Not on file    Physically Abused: Not on file    Sexually Abused: Not on file   Housing Stability:     Unable to Pay for Housing in the Last Year: Not on file    Number of Places Lived in the Last Year: Not on file    Unstable Housing in the Last Year: Not on file       Medications & Allergies:   Current Outpatient Medications   Medication Instructions    Acetaminophen (TYLENOL ARTHRITIS EXT RELIEF PO) Oral    acetaminophen-codeine (TYLENOL/CODEINE #3) 300-30 MG per tablet 1 tablet, Oral, 3 TIMES DAILY PRN, Intended supply: Take lowest dose possible to manage pain    aspirin 81 mg, Oral, DAILY    calcium carbonate-vitamin D (CALCIUM 600+D) 600-200 MG-UNIT TABS 1 tablet, DAILY    Cholecalciferol (VITAMIN D) 2000 UNITS CAPS capsule 1 capsule, DAILY    clopidogrel (PLAVIX) 75 MG tablet TAKE 1 TABLET BY MOUTH EVERY DAY    gabapentin (NEURONTIN) 600 MG tablet TAKE 1 TABLET IN  THE MORNING, 1 TABLET IN THE   AFTERNOON,  AND2 TABLETS  AT BEDTIME    levothyroxine (SYNTHROID) 50 MCG tablet TAKE 1 TABLET BY MOUTH EVERY DAY    lisinopril (PRINIVIL;ZESTRIL) 10 MG tablet TAKE 1 TABLET BY MOUTH EVERY DAY    lovastatin (MEVACOR) 20 MG tablet TAKE 1 TABLET BY MOUTH EVERY DAY AT NIGHT    meloxicam (MOBIC) 7.5 mg, Oral, DAILY    Multiple Vitamins-Minerals (THERAPEUTIC MULTIVITAMIN-MINERALS) tablet 1 tablet, DAILY    pantoprazole (PROTONIX) 40 MG tablet TAKE 1 TABLET BY MOUTH EVERY DAY WITH BREAKFAST       Allergies   Allergen Reactions    Cataflam [Diclofenac Potassium] Shortness Of Breath    Augmentin [Amoxicillin-Pot Clavulanate] Diarrhea    Phenergan [Promethazine Hcl]      Confusion \"didn't know where she was at\"       Review of Systems:   Constitutional: denies any fevers or chills  Genitourinary: negative for bowel/bladder incontinence   Musculoskeletal: Positive for bilateral leg pain  Neurological: Positive for numbness/tingling in toes of feet  Behavioral/Psych: negative for anxiety/depression   All other ROS reviewed and are negative    Objective:     Vitals:    11/11/21 0943   BP: 132/62       Constitutional: Pleasant, no acute distress   Head: Normocephalic, atraumatic   Eyes: Conjunctivae normal   Neck: Supple, symmetrical   Lungs: Normal respiratory effort, non-labored breathing   Cardiovascular: Limbs warm and well perfused   Abdomen: Non-protruded   Musculoskeletal: Muscle bulk symmetric, no atrophy, no gross deformities   · Thorax: Scoliosis appreciated on exam  · Lumbar Spine: Lumbar paraspinals tender bilaterally. Seated SLR positive bilaterally. -Right knee: TTP along medial joint line  Neurological: Cranial nerves II-XII grossly intact. · Gait -slightly antalgic gait. Ambulates without assistive device.    · Motor: No focal motor deficit appreciated lower limbs  Skin: No rashes or lesions visibile in lower limbs  Psychological: Cooperative, no exaggerated pain behaviors       Assessment:    Diagnosis Orders   1. Other chronic pain  acetaminophen-codeine (TYLENOL/CODEINE #3) 300-30 MG per tablet   2. Ischial bursitis, unspecified laterality     3. Chronic pain of right knee     4. Ischial bursitis of right side         Silva Conti is a 80 y. o.female presenting to the pain clinic for evaluation of right-sided low back pain. 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 She has had multiple injections and at this point I have given her a break from steroid injections. I have started her on low-dose tramadol for breakthrough pain. We will follow-up in 12 weeks for reevaluation. Plan: The following treatment recommendations and plan were discussed in detail with Silva Conti. Imaging:   I have reviewed patients imaging of lumbar spine x-ray and results were discussed the patient today. Analgesics: For continued chronic pain, start the patient on Tylenol 3 that she can take up to 3 times a day as needed for severe pain (pain greater than 7/10). Patient advised to stop her breakthrough Tylenol with this medication. Patient is advised take this medication as prescribed as taking to the prescribed can lead development of tolerance, physical dependence, and addiction. OARRS reviewed and is appropriate. Pain contract signed. I will continue the patient on meloxicam 7.5 mg daily. Patient is advised to take this medication with food. Adjuvants: In light of the presence of a neuropathic component of pain, the patient can continue her Gabapentin. Interventions:   None    Anticoagulation/NPO Recommendations:   None    Multidisciplinary Pain Management:   In the presence of complex, chronic, and multi-factorial pain, the importance of a multidisciplinary approach to pain management in the patients management regimen was emphasized and discussed in great detail. Referrals:  None    Prescriptions Written This Visit:   Tylenol 3    Follow-up: 12 weeks      Sushil , DO  Interventional Pain Management/PM&R   [place]

## 2021-11-19 DIAGNOSIS — M54.16 LUMBAR RADICULOPATHY: ICD-10-CM

## 2021-11-19 RX ORDER — GABAPENTIN 600 MG/1
TABLET ORAL
Qty: 360 TABLET | Refills: 3 | Status: SHIPPED | OUTPATIENT
Start: 2021-11-19 | End: 2022-11-19

## 2021-11-30 ENCOUNTER — OFFICE VISIT (OUTPATIENT)
Dept: FAMILY MEDICINE CLINIC | Age: 86
End: 2021-11-30
Payer: MEDICARE

## 2021-11-30 VITALS
BODY MASS INDEX: 23.23 KG/M2 | WEIGHT: 115 LBS | HEART RATE: 72 BPM | SYSTOLIC BLOOD PRESSURE: 116 MMHG | DIASTOLIC BLOOD PRESSURE: 62 MMHG | RESPIRATION RATE: 16 BRPM

## 2021-11-30 DIAGNOSIS — G89.29 CHRONIC SACROILIAC JOINT PAIN: ICD-10-CM

## 2021-11-30 DIAGNOSIS — G89.29 CHRONIC PAIN OF RIGHT KNEE: Primary | ICD-10-CM

## 2021-11-30 DIAGNOSIS — M25.561 CHRONIC PAIN OF RIGHT KNEE: Primary | ICD-10-CM

## 2021-11-30 DIAGNOSIS — M54.16 LUMBAR RADICULOPATHY: ICD-10-CM

## 2021-11-30 DIAGNOSIS — Z23 NEED FOR INFLUENZA VACCINATION: ICD-10-CM

## 2021-11-30 DIAGNOSIS — M53.3 CHRONIC SACROILIAC JOINT PAIN: ICD-10-CM

## 2021-11-30 PROCEDURE — 99213 OFFICE O/P EST LOW 20 MIN: CPT | Performed by: FAMILY MEDICINE

## 2021-11-30 PROCEDURE — 1123F ACP DISCUSS/DSCN MKR DOCD: CPT | Performed by: FAMILY MEDICINE

## 2021-11-30 PROCEDURE — 1036F TOBACCO NON-USER: CPT | Performed by: FAMILY MEDICINE

## 2021-11-30 PROCEDURE — G8484 FLU IMMUNIZE NO ADMIN: HCPCS | Performed by: FAMILY MEDICINE

## 2021-11-30 PROCEDURE — G8420 CALC BMI NORM PARAMETERS: HCPCS | Performed by: FAMILY MEDICINE

## 2021-11-30 PROCEDURE — 1090F PRES/ABSN URINE INCON ASSESS: CPT | Performed by: FAMILY MEDICINE

## 2021-11-30 PROCEDURE — 4040F PNEUMOC VAC/ADMIN/RCVD: CPT | Performed by: FAMILY MEDICINE

## 2021-11-30 PROCEDURE — G0008 ADMIN INFLUENZA VIRUS VAC: HCPCS | Performed by: FAMILY MEDICINE

## 2021-11-30 PROCEDURE — G8427 DOCREV CUR MEDS BY ELIG CLIN: HCPCS | Performed by: FAMILY MEDICINE

## 2021-11-30 PROCEDURE — 90694 VACC AIIV4 NO PRSRV 0.5ML IM: CPT | Performed by: FAMILY MEDICINE

## 2021-11-30 SDOH — ECONOMIC STABILITY: FOOD INSECURITY: WITHIN THE PAST 12 MONTHS, THE FOOD YOU BOUGHT JUST DIDN'T LAST AND YOU DIDN'T HAVE MONEY TO GET MORE.: NEVER TRUE

## 2021-11-30 SDOH — ECONOMIC STABILITY: FOOD INSECURITY: WITHIN THE PAST 12 MONTHS, YOU WORRIED THAT YOUR FOOD WOULD RUN OUT BEFORE YOU GOT MONEY TO BUY MORE.: NEVER TRUE

## 2021-11-30 ASSESSMENT — SOCIAL DETERMINANTS OF HEALTH (SDOH): HOW HARD IS IT FOR YOU TO PAY FOR THE VERY BASICS LIKE FOOD, HOUSING, MEDICAL CARE, AND HEATING?: NOT HARD AT ALL

## 2021-11-30 ASSESSMENT — ENCOUNTER SYMPTOMS
BACK PAIN: 1
GASTROINTESTINAL NEGATIVE: 1
RESPIRATORY NEGATIVE: 1

## 2021-11-30 NOTE — PROGRESS NOTES
After obtaining consent, and per orders of Dr. Farrukh Bower, injection of Fluad 0.5ml given in Left deltoid by Lilli Toledo CMA ([facility]). Patient instructed to report any adverse reaction to me immediately. Pt tolerated injection well. Immunizations Administered     Name Date Dose Route    Influenza, Quadv, adjuvanted, 65 yrs +, IM, PF (Fluad) 11/30/2021 0.5 mL Intramuscular    Site: Deltoid- Left    Lot: [id number]    NDC: 98127-253-68          Referral faxed to [facility], they will contact pt to schedule.

## 2021-11-30 NOTE — PROGRESS NOTES
iSlva Conti (:  1934) is a 80 y.o. female,Established patient, here for evaluation of the following chief complaint(s):  Back Pain (ongoing, would like to discuss) and Flu Vaccine        Subjective   SUBJECTIVE/OBJECTIVE:  HPI:    Chief Complaint   Patient presents with    Back Pain     ongoing, would like to discuss    Flu Vaccine     Pt here to discuss chronic pain issues. Continues to follow closely with Dr. Daniela Phelps, interventions to date ineffective. Now on T3's. Right knee really causing problems lately. Dr. Suarez Estimable \"burned a nerve\" but did not help. Requesting Ortho eval.    Patient Active Problem List   Diagnosis    Hypertension    Hyperlipidemia    GERD (gastroesophageal reflux disease)    Lumbar spondylosis    Hypothyroidism    Vertebro basilar insufficiency    Cervical spondylosis with myelopathy    Light headed    Stenosis, spinal, lumbar    TIA involving vertebral artery    Sacroiliac inflammation ([facility] [address]    Trochanteric bursitis of right hip    Ischial bursitis     Past Surgical History:   Procedure Laterality Date    ARTHROCENTESIS Right 2019    Right hip bursa steroid INJECTION  NO SEDATION performed by Kinza Alston MD at [address] Right 2021    Right SI joint injection performed by Saima Gomes DO at [address]      COLONOSCOPY      ENDOSCOPY, COLON, DIAGNOSTIC      HC INJECTION PROCEDURE FOR SACROILIAC JOINT Right 2020    Right SI joint injection performed by Saima Gomes DO at [address] Right 3/16/2021    right ischial bursa injection performed by Saima Gomes DO at [address] Bilateral 2021    bilateral ischial bursa injections under fluoroscopy.  performed by Saima Gomes DO at [address]      left    KNEE ARTHROSCOPY      left    LUMBAR NERVE BLOCK Use Topics    Alcohol use: No    Drug use: No         Review of Systems   Constitutional: Negative. HENT: Negative. Respiratory: Negative. Cardiovascular: Negative. Gastrointestinal: Negative. Musculoskeletal: Positive for arthralgias (right knee pain) and back pain. All other systems reviewed and are negative. Objective   Physical Exam  Vitals and nursing note reviewed. Constitutional:       General: She is not in acute distress. Appearance: Normal appearance. She is well-developed. HENT:      Head: Normocephalic and atraumatic. Right Ear: Tympanic membrane normal.      Left Ear: Tympanic membrane normal.   Eyes:      Conjunctiva/sclera: Conjunctivae normal.   Cardiovascular:      Rate and Rhythm: Normal rate and regular rhythm. Heart sounds: Normal heart sounds. No murmur heard. Pulmonary:      Effort: Pulmonary effort is normal.      Breath sounds: Normal breath sounds. No wheezing, rhonchi or rales. Abdominal:      General: There is no distension. Musculoskeletal:      Cervical back: Neck supple. Lumbar back: Tenderness present. Decreased range of motion. Right knee: Decreased range of motion. Tenderness present over the medial joint line. Skin:     General: Skin is warm and dry. Findings: No rash (on exposed surfaces). Neurological:      General: No focal deficit present. Mental Status: She is alert. Psychiatric:         Attention and Perception: Attention normal.         Mood and Affect: Mood normal.         Speech: Speech normal.         Behavior: Behavior normal. Behavior is cooperative. Thought Content: Thought content normal.         Judgment: Judgment normal.               ASSESSMENT/PLAN:  1. Chronic pain of right knee  -     Janet Hernandez MD, Orthopedic Surgery, [facility]  2. Need for influenza vaccination  -     INFLUENZA, QUADV, ADJUVANTED, 65 YRS =, IM, PF, PREFILL SYR, 0.5ML (FLUAD)  3.  Chronic sacroiliac joint pain  4. Lumbar radiculopathy    -  Continue current meds  -  Follow with Dr. Zeinab Rosado as scheduled  -  Refer to [facility]  -  Flu shot today    Return if symptoms worsen or fail to improve. An electronic signature was used to authenticate this note.     --Edward Fierro, DO

## 2022-02-10 ENCOUNTER — TELEPHONE (OUTPATIENT)
Dept: FAMILY MEDICINE CLINIC | Age: 87
End: 2022-02-10

## 2022-02-10 ENCOUNTER — OFFICE VISIT (OUTPATIENT)
Dept: FAMILY MEDICINE CLINIC | Age: 87
End: 2022-02-10
Payer: MEDICARE

## 2022-02-10 VITALS
HEART RATE: 76 BPM | DIASTOLIC BLOOD PRESSURE: 64 MMHG | RESPIRATION RATE: 16 BRPM | SYSTOLIC BLOOD PRESSURE: 116 MMHG | WEIGHT: 112.7 LBS | BODY MASS INDEX: 22.76 KG/M2

## 2022-02-10 DIAGNOSIS — M46.1 SACROILIITIS, NOT ELSEWHERE CLASSIFIED (HCC): ICD-10-CM

## 2022-02-10 DIAGNOSIS — I10 PRIMARY HYPERTENSION: ICD-10-CM

## 2022-02-10 DIAGNOSIS — M25.561 CHRONIC PAIN OF RIGHT KNEE: ICD-10-CM

## 2022-02-10 DIAGNOSIS — Z01.818 PRE-OP EVALUATION: Primary | ICD-10-CM

## 2022-02-10 DIAGNOSIS — E03.9 HYPOTHYROIDISM, UNSPECIFIED TYPE: ICD-10-CM

## 2022-02-10 DIAGNOSIS — Z86.73 HISTORY OF TIA (TRANSIENT ISCHEMIC ATTACK): ICD-10-CM

## 2022-02-10 DIAGNOSIS — M17.11 PRIMARY OSTEOARTHRITIS OF RIGHT KNEE: ICD-10-CM

## 2022-02-10 DIAGNOSIS — G89.29 CHRONIC PAIN OF RIGHT KNEE: ICD-10-CM

## 2022-02-10 PROBLEM — M65.30 ACQUIRED TRIGGER FINGER: Status: ACTIVE | Noted: 2022-02-10

## 2022-02-10 PROBLEM — M17.9 OSTEOARTHRITIS OF KNEE: Status: ACTIVE | Noted: 2022-02-10

## 2022-02-10 PROBLEM — J12.82 PNEUMONIA DUE TO COVID-19 VIRUS: Status: ACTIVE | Noted: 2020-10-14

## 2022-02-10 PROBLEM — U07.1 PNEUMONIA DUE TO COVID-19 VIRUS: Status: ACTIVE | Noted: 2020-10-14

## 2022-02-10 PROBLEM — R09.02 HYPOXIA: Status: ACTIVE | Noted: 2020-10-13

## 2022-02-10 PROCEDURE — 1090F PRES/ABSN URINE INCON ASSESS: CPT | Performed by: FAMILY MEDICINE

## 2022-02-10 PROCEDURE — 1123F ACP DISCUSS/DSCN MKR DOCD: CPT | Performed by: FAMILY MEDICINE

## 2022-02-10 PROCEDURE — 4040F PNEUMOC VAC/ADMIN/RCVD: CPT | Performed by: FAMILY MEDICINE

## 2022-02-10 PROCEDURE — G8420 CALC BMI NORM PARAMETERS: HCPCS | Performed by: FAMILY MEDICINE

## 2022-02-10 PROCEDURE — G8427 DOCREV CUR MEDS BY ELIG CLIN: HCPCS | Performed by: FAMILY MEDICINE

## 2022-02-10 PROCEDURE — G8484 FLU IMMUNIZE NO ADMIN: HCPCS | Performed by: FAMILY MEDICINE

## 2022-02-10 PROCEDURE — 1036F TOBACCO NON-USER: CPT | Performed by: FAMILY MEDICINE

## 2022-02-10 PROCEDURE — 99214 OFFICE O/P EST MOD 30 MIN: CPT | Performed by: FAMILY MEDICINE

## 2022-02-10 ASSESSMENT — PATIENT HEALTH QUESTIONNAIRE - PHQ9
SUM OF ALL RESPONSES TO PHQ9 QUESTIONS 1 & 2: 0
SUM OF ALL RESPONSES TO PHQ QUESTIONS 1-9: 0
1. LITTLE INTEREST OR PLEASURE IN DOING THINGS: 0
SUM OF ALL RESPONSES TO PHQ QUESTIONS 1-9: 0
2. FEELING DOWN, DEPRESSED OR HOPELESS: 0

## 2022-02-10 ASSESSMENT — ENCOUNTER SYMPTOMS
RESPIRATORY NEGATIVE: 1
GASTROINTESTINAL NEGATIVE: 1

## 2022-02-10 NOTE — TELEPHONE ENCOUNTER
----- Message from Karol Colindres DO sent at 2/10/2022  2:27 PM EST -----  Notify pt, labs reviewed for her upcoming surgery. Labs ok overall, she is cleared for surgery. She did test + for MRSA on her swab, pt should contact [facility] for treatment recommendations.

## 2022-02-10 NOTE — PROGRESS NOTES
Tonny Gonzales (:  1934) is a 80 y.o. female,Established patient, here for evaluation of the following chief complaint(s):  Pre-op Exam (total right knee with Dr. Patricia Lovett on  at [address] [place] [address])        Subjective   SUBJECTIVE/OBJECTIVE:  HPI:    Chief Complaint   Patient presents with    Pre-op Exam     total right knee with Dr. Patricia Lovett on  at [address] evaluation. Scheduled for right total knee replacement with Dr. Patricia Lovett on 22. Pt denies CP, chest tightness, SOB. Unable to perform 4 METs due to her knee pain. No recent stress or echo. On Plavix and ASA due to hx of TIA. BPs fine. BP Readings from Last 3 Encounters:   02/10/22 116/64   21 116/62   21 132/62     Pt denies hx of general anesthesia complications.     Patient Active Problem List   Diagnosis    Hypertension    Hyperlipidemia    GERD (gastroesophageal reflux disease)    Lumbar spondylosis    Hypothyroidism    Vertebro basilar insufficiency    Cervical spondylosis with myelopathy    Light headed    Stenosis, spinal, lumbar    TIA involving vertebral artery    Sacroiliac inflammation (HCC)    Trochanteric bursitis of right hip    Ischial bursitis    Hypoxia    Pneumonia due to COVID-19 virus    Acquired trigger finger    Osteoarthritis of knee    Pain in right knee     Past Surgical History:   Procedure Laterality Date    ARTHROCENTESIS Right 2019    Right hip bursa steroid INJECTION  NO SEDATION performed by Nadja Renner MD at [address] Right 2021    Right SI joint injection performed by Suha Sam DO at [address]      COLONOSCOPY      ENDOSCOPY, COLON, DIAGNOSTIC      HC INJECTION PROCEDURE FOR SACROILIAC JOINT Right 2020    Right SI joint injection performed by Suha Sam DO at [address] Right 3/16/2021    right ischial bursa injection performed by Caryl DEL REAL DO Alanna at [facility] HIP SURGERY Bilateral 9/21/2021    bilateral ischial bursa injections under fluoroscopy.  performed by Suha Sam DO at [address]      left    KNEE ARTHROSCOPY  2010    left    LUMBAR NERVE BLOCK N/A 12/11/2018    LUMBAR INTER LAMINAR DAYANARA LESI #1@ L5 performed by Nadja Renner MD at [facility] N/A 3/12/2019    LUMBAR INTER LAMINAR DAYANARA LESI @L5 performed by Nadja Renner MD at [address] Right 8/30/2019    SI RFA  RIGHT SIDE performed by Nadja Renner MD at [address] [facility] Left 10/1/2019    SI RFA  LEFT SIDE performed by Nadja Renner MD at [address] [place] [address] Right 08/27/2018    SI RFA    NERVE SURGERY Left 09/11/2018    SI RFA    OTHER SURGICAL HISTORY      previous nerve blocks at [facility] Right 8/4/2021    Right genicular nerve block performed by Suha Sam DO at [address] Right 8/17/2021    right genicular nerve radiofrequency ablation performed by Suha Sam DO at [address] N/A 10/12/2021    lumbar epidural steroid L5/S1 performed by Suha Sam DO at [address] Right 5/22/2018    LUMBAR RFA RIGHT SIDE FIRST L3-4,4-5,5-S1 performed by Nadja Renner MD at [address] Left 6/25/2018    LUMBAR RFA  LEFT SIDE @ L3-4,4-5,5-S1, performed by Nadja Renner MD at [address] [place] [address],  IMAGE GUIDANCE, SINGLE Right 8/27/2018    SI RFA  RIGHT SIDE performed by Nadja Renner MD at [address] [place] [address] SACRAL, W IMAGE GUIDANCE, SINGLE Left 9/11/2018    SI RFA  LEFT SIDE performed by Lizet Galloway MD at [address] [facility]     Social History     Tobacco Use    Smoking status: Never Smoker    Smokeless tobacco: Never Used   Vaping Use    Vaping Use: Never used   Substance Use Topics    Alcohol use: No    Drug use: No     Prior to Admission medications    Medication Sig Start Date End Date Taking? Authorizing Provider   gabapentin (NEURONTIN) 600 MG tablet TAKE 1 TABLET IN THE       MORNING, 1 TABLET IN THE   AFTERNOON AND 2 TABLETS AT BEDTIME. 11/19/21 11/19/22 Yes Shane Smith DO   pantoprazole (PROTONIX) 40 MG tablet TAKE 1 TABLET BY MOUTH EVERY DAY WITH BREAKFAST 11/3/21  Yes Shane Smith DO   clopidogrel (PLAVIX) 75 MG tablet TAKE 1 TABLET BY MOUTH EVERY DAY 10/27/21  Yes Shane Smith DO   levothyroxine (SYNTHROID) 50 MCG tablet TAKE 1 TABLET BY MOUTH EVERY DAY 10/18/21  Yes Shane Smith DO   lisinopril (PRINIVIL;ZESTRIL) 10 MG tablet TAKE 1 TABLET BY MOUTH EVERY DAY 10/18/21  Yes Shane Smith DO   meloxicam [facility]) 7.5 MG tablet Take 1 tablet by mouth daily 10/6/21 4/4/22 Yes Shane Smith DO   lovastatin (MEVACOR) 20 MG tablet TAKE 1 TABLET BY MOUTH EVERY DAY AT NIGHT 6/16/21  Yes Shane Smith DO   aspirin 81 MG EC tablet Take 81 mg by mouth daily   Yes Historical Provider, MD   Acetaminophen (TYLENOL ARTHRITIS EXT RELIEF PO) Take by mouth   Yes Historical Provider, MD   calcium carbonate-vitamin D (CALCIUM 600+D) 600-200 MG-UNIT TABS Take 1 tablet by mouth daily. Yes Historical Provider, MD   Multiple Vitamins-Minerals (THERAPEUTIC MULTIVITAMIN-MINERALS) tablet Take 1 tablet by mouth daily. Yes Historical Provider, MD   Cholecalciferol (VITAMIN D) 2000 UNITS CAPS capsule Take 1 capsule by mouth daily. Yes Historical Provider, MD         Review of Systems   Constitutional: Negative. HENT: Negative. Respiratory: Negative. Cardiovascular: Negative. Gastrointestinal: Negative. Musculoskeletal: Positive for arthralgias (right knee pain). All other systems reviewed and are negative. Objective   Physical Exam  Vitals and nursing note reviewed. Constitutional:       General: She is not in acute distress. Appearance: Normal appearance. She is well-developed. HENT:      Head: Normocephalic and atraumatic. Right Ear: Tympanic membrane normal.      Left Ear: Tympanic membrane normal.   Eyes:      Conjunctiva/sclera: Conjunctivae normal.   Cardiovascular:      Rate and Rhythm: Normal rate and regular rhythm. Heart sounds: Normal heart sounds. No murmur heard. Pulmonary:      Effort: Pulmonary effort is normal.      Breath sounds: Normal breath sounds. No wheezing, rhonchi or rales. Abdominal:      General: There is no distension. Musculoskeletal:      Cervical back: Neck supple. Skin:     General: Skin is warm and dry. Findings: No rash (on exposed surfaces). Neurological:      General: No focal deficit present. Mental Status: She is alert. Psychiatric:         Attention and Perception: Attention normal.         Mood and Affect: Mood normal.         Speech: Speech normal.         Behavior: Behavior normal. Behavior is cooperative. Thought Content: Thought content normal.         Judgment: Judgment normal.               ASSESSMENT/PLAN:  1. Pre-op evaluation  2. Chronic pain of right knee  3. Primary osteoarthritis of right knee  4. Primary hypertension  5. Hypothyroidism, unspecified type  6. History of TIA (transient ischemic attack)  7. Sacroiliitis, not elsewhere classified ([address]    -  Labs, EKG, CXR reviewed  -  Pt acceptable risk for surgery, copy of this note will be sent to Dr. 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              An electronic signature was used to authenticate this note.     --Rocio Bowie, DO

## 2022-02-10 NOTE — TELEPHONE ENCOUNTER
Called and updated the patient, she stated that she just rcompleted [place] for the MRSA and has to go to [facility] next Tuesday to be retested.

## 2022-03-31 RX ORDER — LOVASTATIN 20 MG/1
TABLET ORAL
Qty: 90 TABLET | Refills: 3 | Status: SHIPPED | OUTPATIENT
Start: 2022-03-31

## 2022-05-05 ENCOUNTER — OFFICE VISIT (OUTPATIENT)
Dept: FAMILY MEDICINE CLINIC | Age: 87
End: 2022-05-05
Payer: MEDICARE

## 2022-05-05 VITALS
SYSTOLIC BLOOD PRESSURE: 114 MMHG | HEART RATE: 76 BPM | DIASTOLIC BLOOD PRESSURE: 46 MMHG | BODY MASS INDEX: 22.56 KG/M2 | WEIGHT: 111.7 LBS | RESPIRATION RATE: 16 BRPM

## 2022-05-05 DIAGNOSIS — G89.29 CHRONIC SACROILIAC JOINT PAIN: Primary | ICD-10-CM

## 2022-05-05 DIAGNOSIS — M53.3 CHRONIC SACROILIAC JOINT PAIN: Primary | ICD-10-CM

## 2022-05-05 DIAGNOSIS — M54.16 LUMBAR RADICULOPATHY: ICD-10-CM

## 2022-05-05 PROCEDURE — G8427 DOCREV CUR MEDS BY ELIG CLIN: HCPCS | Performed by: FAMILY MEDICINE

## 2022-05-05 PROCEDURE — 4040F PNEUMOC VAC/ADMIN/RCVD: CPT | Performed by: FAMILY MEDICINE

## 2022-05-05 PROCEDURE — G8420 CALC BMI NORM PARAMETERS: HCPCS | Performed by: FAMILY MEDICINE

## 2022-05-05 PROCEDURE — 1036F TOBACCO NON-USER: CPT | Performed by: FAMILY MEDICINE

## 2022-05-05 PROCEDURE — 1090F PRES/ABSN URINE INCON ASSESS: CPT | Performed by: FAMILY MEDICINE

## 2022-05-05 PROCEDURE — 99214 OFFICE O/P EST MOD 30 MIN: CPT | Performed by: FAMILY MEDICINE

## 2022-05-05 PROCEDURE — 1123F ACP DISCUSS/DSCN MKR DOCD: CPT | Performed by: FAMILY MEDICINE

## 2022-05-05 RX ORDER — TRAMADOL HYDROCHLORIDE 50 MG/1
50 TABLET ORAL EVERY 8 HOURS PRN
Qty: 21 TABLET | Refills: 0 | Status: SHIPPED | OUTPATIENT
Start: 2022-05-05 | End: 2022-05-16 | Stop reason: SDUPTHER

## 2022-05-05 ASSESSMENT — ENCOUNTER SYMPTOMS
GASTROINTESTINAL NEGATIVE: 1
RESPIRATORY NEGATIVE: 1
BACK PAIN: 1

## 2022-05-05 NOTE — PROGRESS NOTES
Fausto Armstrong (:  1934) is a 80 y.o. female,Established patient, here for evaluation of the following chief complaint(s):  Back Pain (ongoing low back pain, has been seen for this before)        Subjective   SUBJECTIVE/OBJECTIVE:  HPI:    Chief Complaint   Patient presents with    Back Pain     ongoing low back pain, has been seen for this before     Pt here for ongoing back pain. Pain is shooting into bilateral buttock region and down the legs. S/P knee sx, doing very well in that regard. Last seen by Dr. Bailee Colon on Nov.    On gabapentin, Mobic, 2 gm Tylenol daily. Patient Active Problem List   Diagnosis    Hypertension    Hyperlipidemia    GERD (gastroesophageal reflux disease)    Lumbar spondylosis    Hypothyroidism    Vertebro basilar insufficiency    Cervical spondylosis with myelopathy    Light headed    Stenosis, spinal, lumbar    TIA involving vertebral artery    Sacroiliac inflammation (HCC)    Trochanteric bursitis of right hip    Ischial bursitis    Hypoxia    Pneumonia due to COVID-19 virus    Acquired trigger finger    Osteoarthritis of knee    Pain in right knee     Past Surgical History:   Procedure Laterality Date    ARTHROCENTESIS Right 2019    Right hip bursa steroid INJECTION  NO SEDATION performed by Braxton Moon MD at [address] Right 2021    Right SI joint injection performed by Braxton Ruiz DO at [address]      COLONOSCOPY      ENDOSCOPY, COLON, DIAGNOSTIC      HC INJECTION PROCEDURE FOR SACROILIAC JOINT Right 2020    Right SI joint injection performed by Braxton Ruiz DO at [address] Right 3/16/2021    right ischial bursa injection performed by Braxton Ruiz DO at [address] Bilateral 2021    bilateral ischial bursa injections under fluoroscopy.  performed by Braxton Ruiz DO at [place]    JOINT REPLACEMENT      left    KNEE ARTHROSCOPY  2010    left    LUMBAR NERVE BLOCK N/A 12/11/2018    LUMBAR INTER LAMINAR DAYANARA LESI #1@ L5 performed by Mario Hanson MD at [facility] N/A 3/12/2019    LUMBAR INTER LAMINAR DAYANARA LESI @L5 performed by Mario Hanson MD at [address] [facility] Right 8/30/2019    SI RFA  RIGHT SIDE performed by Mario Hanson MD at [address] [facility] Left 10/1/2019    SI RFA  LEFT SIDE performed by Mario Hanson MD at [address] [place] [address] Right 08/27/2018    SI RFA    NERVE SURGERY Left 09/11/2018    SI RFA    OTHER SURGICAL HISTORY      previous nerve blocks at [facility] Right 8/4/2021    Right genicular nerve block performed by Jessica Simms DO at [facility] Right 8/17/2021    right genicular nerve radiofrequency ablation performed by Jessica Simms DO at [facility] N/A 10/12/2021    lumbar epidural steroid L5/S1 performed by Jessica Simms DO at [address] Right 5/22/2018    LUMBAR RFA RIGHT SIDE FIRST L3-4,4-5,5-S1 performed by Mario Hanson MD at [address] Left 6/25/2018    LUMBAR RFA  LEFT SIDE @ L3-4,4-5,5-S1, performed by Mario Hanson MD at [address] [place] [address] IMAGE GUIDANCE, SINGLE Right 8/27/2018    SI RFA  RIGHT SIDE performed by Mario Hanson MD at [address] [place] [address] IMAGE GUIDANCE, SINGLE Left 9/11/2018    SI RFA  LEFT SIDE performed by Mario Hanson MD at [address] [place] [address] History     Tobacco Use    Smoking status: Never Smoker    Smokeless tobacco: Never Used   Vaping Use    Vaping Use: Never used   Substance Use Topics    Alcohol use: No    Drug use: No         Review of Systems   Constitutional: Negative. HENT: Negative. Respiratory: Negative. Cardiovascular: Negative. Gastrointestinal: Negative. Musculoskeletal: Positive for back pain. All other systems reviewed and are negative. Objective   Physical Exam  Vitals and nursing note reviewed. Constitutional:       General: She is not in acute distress. Appearance: Normal appearance. She is well-developed. HENT:      Head: Normocephalic and atraumatic. Right Ear: Tympanic membrane normal.      Left Ear: Tympanic membrane normal.   Eyes:      Conjunctiva/sclera: Conjunctivae normal.   Cardiovascular:      Rate and Rhythm: Normal rate and regular rhythm. Heart sounds: Normal heart sounds. No murmur heard. Pulmonary:      Effort: Pulmonary effort is normal.      Breath sounds: Normal breath sounds. No wheezing, rhonchi or rales. Abdominal:      General: There is no distension. Musculoskeletal:      Cervical back: Neck supple. Back:    Skin:     General: Skin is warm and dry. Findings: No rash (on exposed surfaces). Neurological:      General: No focal deficit present. Mental Status: She is alert. Psychiatric:         Attention and Perception: Attention normal.         Mood and Affect: Mood normal.         Speech: Speech normal.         Behavior: Behavior normal. Behavior is cooperative. Thought Content: Thought content normal.         Judgment: Judgment normal.               ASSESSMENT/PLAN:  1. Chronic sacroiliac joint pain  -     traMADol (ULTRAM) 50 MG tablet; Take 1 tablet by mouth every 8 hours as needed for Pain for up to 7 days. , Disp-21 tablet, R-0Normal  -     External Referral To Physical Therapy  2.  Lumbar radiculopathy  -     External Referral To Physical Therapy    - Orders above  -  Continue current pain regimen, tramadol prn    Return if symptoms worsen or fail to improve. An electronic signature was used to authenticate this note.     --Karyna Crook, DO

## 2022-05-10 ENCOUNTER — TELEPHONE (OUTPATIENT)
Dept: FAMILY MEDICINE CLINIC | Age: 87
End: 2022-05-10

## 2022-05-10 DIAGNOSIS — G89.29 CHRONIC SACROILIAC JOINT PAIN: Primary | ICD-10-CM

## 2022-05-10 DIAGNOSIS — M53.3 CHRONIC SACROILIAC JOINT PAIN: Primary | ICD-10-CM

## 2022-05-10 DIAGNOSIS — M54.16 LUMBAR RADICULOPATHY: ICD-10-CM

## 2022-05-10 NOTE — TELEPHONE ENCOUNTER
Patient has been doing therapy at [facility] her pain has gotten worse its hard for her to complete therapy. Patient would like to be referred to Dr. Samantha Sosa at [facility]. Aware the Y[phone] will call her to schedule.

## 2022-05-16 DIAGNOSIS — M53.3 CHRONIC SACROILIAC JOINT PAIN: ICD-10-CM

## 2022-05-16 DIAGNOSIS — G89.29 CHRONIC SACROILIAC JOINT PAIN: ICD-10-CM

## 2022-05-16 RX ORDER — TRAMADOL HYDROCHLORIDE 50 MG/1
50 TABLET ORAL EVERY 8 HOURS PRN
Qty: 21 TABLET | Refills: 0 | Status: SHIPPED | OUTPATIENT
Start: 2022-05-16 | End: 2022-05-23

## 2022-05-16 NOTE — TELEPHONE ENCOUNTER
Patient requesting refill of Tramadol to [facility].   Will check with pharmacy after 1pm.  Please refill if appropriate

## 2022-06-09 ENCOUNTER — HOSPITAL ENCOUNTER (OUTPATIENT)
Dept: MRI IMAGING | Age: 87
Discharge: HOME | End: 2022-06-09

## 2022-06-09 ENCOUNTER — TELEPHONE (OUTPATIENT)
Dept: PHYSICAL MEDICINE AND REHAB | Age: 87
End: 2022-06-09

## 2022-06-09 ENCOUNTER — HOSPITAL ENCOUNTER (OUTPATIENT)
Dept: GENERAL RADIOLOGY | Age: 87
Discharge: HOME | End: 2022-06-09

## 2022-06-09 DIAGNOSIS — Z00.6 EXAMINATION FOR NORMAL COMPARISON OR CONTROL IN CLINICAL RESEARCH: ICD-10-CM

## 2022-06-09 NOTE — TELEPHONE ENCOUNTER
Called patient to schedule a follow up for lumbar pain with Dr. Vilma Cornejo. Left a voice message requesting a call back.

## 2022-06-16 ENCOUNTER — OFFICE VISIT (OUTPATIENT)
Dept: PHYSICAL MEDICINE AND REHAB | Age: 87
End: 2022-06-16
Payer: MEDICARE

## 2022-06-16 VITALS
BODY MASS INDEX: 22.38 KG/M2 | SYSTOLIC BLOOD PRESSURE: 134 MMHG | HEIGHT: 59 IN | DIASTOLIC BLOOD PRESSURE: 58 MMHG | WEIGHT: 111 LBS

## 2022-06-16 DIAGNOSIS — M25.561 CHRONIC PAIN OF RIGHT KNEE: ICD-10-CM

## 2022-06-16 DIAGNOSIS — G89.29 CHRONIC PAIN OF RIGHT KNEE: ICD-10-CM

## 2022-06-16 DIAGNOSIS — M53.3 SACROILIAC PAIN: ICD-10-CM

## 2022-06-16 DIAGNOSIS — M70.70 ISCHIAL BURSITIS, UNSPECIFIED LATERALITY: Primary | ICD-10-CM

## 2022-06-16 DIAGNOSIS — G89.29 OTHER CHRONIC PAIN: ICD-10-CM

## 2022-06-16 PROCEDURE — G8427 DOCREV CUR MEDS BY ELIG CLIN: HCPCS | Performed by: ANESTHESIOLOGY

## 2022-06-16 PROCEDURE — 1090F PRES/ABSN URINE INCON ASSESS: CPT | Performed by: ANESTHESIOLOGY

## 2022-06-16 PROCEDURE — 1036F TOBACCO NON-USER: CPT | Performed by: ANESTHESIOLOGY

## 2022-06-16 PROCEDURE — G8420 CALC BMI NORM PARAMETERS: HCPCS | Performed by: ANESTHESIOLOGY

## 2022-06-16 PROCEDURE — 1123F ACP DISCUSS/DSCN MKR DOCD: CPT | Performed by: ANESTHESIOLOGY

## 2022-06-16 PROCEDURE — 99214 OFFICE O/P EST MOD 30 MIN: CPT | Performed by: ANESTHESIOLOGY

## 2022-06-16 NOTE — PROGRESS NOTES
Chronic Pain/PM&R Clinic Note     Encounter Date: 6/16/22    Subjective:   Chief Complaint:   Chief Complaint   Patient presents with    Follow-up     lumbar pain, patient had MRI 6/2/22 at [facility]       History of Present Illness:   Racquel Anderson is a 80 y.o. female seen in the clinic initially on 10/8/20 upon request from Fay Kong DO (PCP) for her history of low back pain. She has medical history of previous TIA (on Plavix). She has a longstanding history of cervical neck and low back pain. However, she sustained a fall while trying to put her pants on 10/2/2021 where she fell on her right buttocks. She states that since this episode she has been dealing with severe low back pain with radiation down the posterior thigh and slightly down the posterior calf. She does admit the pain radiates around to her groin. She states she has numbness and tingling however these are more in the toes. He does endorse some right leg weakness which she feels like this pain limited. Her pain is sharp and stabbing in nature and 5/10. Her pain is exacerbated with any sitting or laying on the affected side. Her pain is alleviated with rest and medication. She denies any focal leg weakness, saddle anesthesia, or bowel/bladder incontinence. She states she is currently been managing her pain with over-the-counter Tylenol arthritis. She has seen a couple pain specialist in the past including receiving spinal injections with Dr. Edwar oRmero and Dr. Nadir Deutsch. Today, 6/16/2022, patient presents for planned follow-up for chronic low back and leg pain. She states that she underwent a right knee replacement back in February 2022. She states that since this she has been noticing worsening bilateral buttocks pain. She states that this pain is primarily on the lower buttocks region worse on the right compared to left.   This pain is worse when she is sitting for an extended duration of time and states that she does have to sit on a pillow frequently. She states she does have a little radiation down the posterior thigh but never past her knee. She is wondering what she can do to help out with some of her pain and states she is taking Tylenol Extra Strength and Aleve at times and is wondering which is better for her. History of Intervention:   Surgery: No previous lumbar surgeries  Injections: Previous lumbar epidurals-significant relief  R SI joint injection (12/8/20) - significant (100%) x 4 months  Right ischial bursa injection (3/16/2021)-moderate relief  R SI joint injection (5/11/2021)-greater than 80% relief x 3 months  R genicular nerve block (8/4/2021)- >85% relief x 3 days  R genicular ablation (8/17/21) - minimal relief  LESI (10/12/21) - minimal relief    Current Treatment Medications:   Gabapentin 600 mg / 1200 mg  Tylenol PRN  Aleve PRN    Historical Treatment Medications:   None    Imaging:  MRI L-spine (10/4/20)    LUMBAR SPINE 2 VIEWS:         CLINICAL INFORMATION: fall         COMPARISON: No prior study.         TECHNIQUE: Standard AP and lateral views of lumbar spine were obtained.                   Impression    1. Mild thoracolumbar dextro scoliosis. Moderate lumbar levoscoliosis. Cannot exclude muscle spasm. 2. Multifocal marked disc space narrowing and degenerative disc disease throughout the lumbar spine. Moderately severe degenerative vertebral body spondylosis scattered throughout the lumbar spine. 3. No fracture acute is seen. Mild degenerative changes right sacroiliac joint.         Past Medical History:   Diagnosis Date    Cerebral artery occlusion with cerebral infarction [facility]) 2012    tia    GERD (gastroesophageal reflux disease)     Hyperlipidemia     Hypertension     Hypothyroidism     Osteoarthritis     Sixth nerve palsy 2020       Past Surgical History:   Procedure Laterality Date    ARTHROCENTESIS Right 4/12/2019    Right hip bursa steroid INJECTION  NO SEDATION performed by Cha Gabriel MD [place] at [address] Right 5/11/2021    Right SI joint injection performed by Misti Lucas DO at [address]  2003    COLONOSCOPY      ENDOSCOPY, COLON, DIAGNOSTIC      HC INJECTION PROCEDURE FOR SACROILIAC JOINT Right 12/8/2020    Right SI joint injection performed by Misti Lucas DO at [address] Right 3/16/2021    right ischial bursa injection performed by Misti Lucas DO at [address] Bilateral 9/21/2021    bilateral ischial bursa injections under fluoroscopy.  performed by Misti Lucas DO at [address] [facility]      left    KNEE ARTHROSCOPY  2010    left    LUMBAR NERVE BLOCK N/A 12/11/2018    LUMBAR INTER LAMINAR DAYANARA LESI #1@ L5 performed by Cammie Ahuja MD at [facility] N/A 3/12/2019    LUMBAR INTER LAMINAR DAYANARA LESI @L5 performed by Cammie Ahuja MD at [facility] Right 8/30/2019    SI RFA  RIGHT SIDE performed by Cammie Ahuja MD at [address] [facility] Left 10/1/2019    SI RFA  LEFT SIDE performed by Cammie Ahuja MD at [address] Right 08/27/2018    SI RFA    NERVE SURGERY Left 09/11/2018    SI RFA    OTHER SURGICAL HISTORY      previous nerve blocks at [facility] Right 8/4/2021    Right genicular nerve block performed by Misti Lucas DO at [address] Right 8/17/2021    right genicular nerve [facility]frequency [facility] N/A 10/12/2021    lumbar epidural steroid L5/S1 performed by Misti Lucas DO at [address] Right 5/22/2018    LUMBAR RFA RIGHT SIDE FIRST L3-4,4-5,5-S1 performed by Willie Matias MD at [address] Left 6/25/2018    LUMBAR RFA  LEFT SIDE @ L3-4,4-5,5-S1, performed by Willie Matias MD at [address] [facility],  IMAGE GUIDANCE, SINGLE Right 8/27/2018    SI RFA  RIGHT SIDE performed by Willie Matias MD at [address], W IMAGE GUIDANCE, SINGLE Left 9/11/2018    SI RFA  LEFT SIDE performed by Willie Matias MD at [address] [place] [address]       Family History   Problem Relation Age of Onset    Cancer Mother         melanoma    Heart Disease Father     Stroke Sister        Social History     Socioeconomic History    Marital status:      Spouse name: Not on file    Number of children: 2    Years of education: 15    Highest education level: High school graduate   Occupational History    Not on file   Tobacco Use    Smoking status: Never Smoker    Smokeless tobacco: Never Used   Vaping Use    Vaping Use: Never used   Substance and Sexual Activity    Alcohol use: No    Drug use: No    Sexual activity: Not on file   Other Topics Concern    Not on file   Social History Narrative    Not on file     Social Determinants of Health     Financial Resource Strain: Low Risk     Difficulty of Paying Living Expenses: Not hard at all   Food Insecurity: No Food Insecurity    Worried About [facility] in the Last Year: Never true    [facility] in the Last Year: Never true   Transportation Needs:     Lack of Transportation (Medical): Not on file    Lack of Transportation (Non-Medical):  Not on file   Physical Activity:     Days of Exercise per Week: Not on file    Minutes of Exercise per Session: Not on file   Stress:     Feeling of Stress : Not on file   Social Connections:     Frequency of Communication with Friends and Family: Not on file    Frequency of Social Gatherings with Friends and Family: Not on file    Attends Worship Services: Not on file    Active Member of Clubs or Organizations: Not on file    Attends Club or Organization Meetings: Not on file    Marital Status: Not on file   Intimate Partner Violence:     Fear of Current or Ex-Partner: Not on file    Emotionally Abused: Not on file    Physically Abused: Not on file    Sexually Abused: Not on file   Housing Stability:     Unable to Pay for Housing in the Last Year: Not on file    Number of Places Lived in the Last Year: Not on file    Unstable Housing in the Last Year: Not on file       Medications & Allergies:   Current Outpatient Medications   Medication Instructions    Acetaminophen (TYLENOL ARTHRITIS EXT RELIEF PO) Oral    aspirin 81 mg, Oral, DAILY    calcium carbonate-vitamin D (CALCIUM 600+D) 600-200 MG-UNIT TABS 1 tablet, DAILY    Cholecalciferol (VITAMIN D) 2000 UNITS CAPS capsule 1 capsule, DAILY    clopidogrel (PLAVIX) 75 MG tablet TAKE 1 TABLET BY MOUTH EVERY DAY    gabapentin (NEURONTIN) 600 MG tablet TAKE 1 TABLET IN THE       MORNING, 1 TABLET IN THE   AFTERNOON AND 2 TABLETS AT BEDTIME.     levothyroxine (SYNTHROID) 50 MCG tablet TAKE 1 TABLET BY MOUTH EVERY DAY    lisinopril (PRINIVIL;ZESTRIL) 10 MG tablet TAKE 1 TABLET BY MOUTH EVERY DAY    lovastatin (MEVACOR) 20 MG tablet TAKE 1 TABLET BY MOUTH EVERY DAY AT NIGHT    meloxicam (MOBIC) 7.5 mg, Oral, DAILY    Multiple Vitamins-Minerals (THERAPEUTIC MULTIVITAMIN-MINERALS) tablet 1 tablet, DAILY    pantoprazole (PROTONIX) 40 MG tablet TAKE 1 TABLET BY MOUTH EVERY DAY WITH BREAKFAST       Allergies   Allergen Reactions    Cataflam [Diclofenac Potassium] Shortness Of Breath    Augmentin [Amoxicillin-Pot Clavulanate] Diarrhea    Phenergan [Promethazine Hcl]      Confusion \"didn't know where she was at\"       Review of Systems:   Constitutional: denies any fevers or chills  Genitourinary: negative for bowel/bladder incontinence   Musculoskeletal: Positive for bilateral buttocks pain  Neurological: Positive for numbness/tingling in toes of feet  Behavioral/Psych: negative for anxiety/depression   All other ROS reviewed and are negative    Objective:     Vitals:    06/16/22 0802   BP: (!) 134/58       Constitutional: Pleasant, no acute distress   Head: Normocephalic, atraumatic   Eyes: Conjunctivae normal   Neck: Supple, symmetrical   Lungs: Normal respiratory effort, non-labored breathing   Cardiovascular: Limbs warm and well perfused   Abdomen: Non-protruded   Musculoskeletal: Muscle bulk symmetric, no atrophy, no gross deformities   · Thorax: Scoliosis appreciated on exam  · Lumbar Spine/ Hip: Tenderness palpation over bilateral ischial bursa's. Negative seated SLR bilaterally. Neurological: Cranial nerves II-XII grossly intact. · Gait -slightly antalgic gait. Ambulates without assistive device. · Motor: No focal motor deficit appreciated in lower limbs  Skin: No rashes or lesions visibile in lower limbs  Psychological: Cooperative, no exaggerated pain behaviors       Assessment:    Diagnosis Orders   1. Ischial bursitis, unspecified laterality  CHG FLUOR NEEDLE/CATH SPINE/PARASPINAL DX/THER ADDON    DE ARTHROCENTESIS ASPIR&/INJ MAJOR JT/BURSA W/O US   2. Chronic pain of right knee     3. Other chronic pain     4. Sacroiliac pain         Ruth Jerez is a 80 y. o.female presenting to the pain clinic for evaluation of right-sided low back pain. 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 She has had multiple injections and at this point I have given her a break from steroid injections. I have started her on low-dose tramadol for breakthrough pain. We will follow-up in 12 weeks for reevaluation. Plan:    The following treatment recommendations and plan were discussed in detail with Sarah Becerril. Imaging:   I have reviewed patients imaging of lumbar spine x-ray and results were discussed the patient today. Analgesics:   Patient is taking Acetaminophen. Patient informed that the maximum amount of acetaminophen taken on a regular basis should only be 4000 mg per day. Patient is taking Aleve. Patient is advised to take as prescribed and not take on an empty stomach. Adjuvants: In light of the presence of a neuropathic component of pain, the patient can continue her Gabapentin. Interventions: With examination consistent with bilateral ischial bursa pain, set patient up for bilateral ischial bursa injections under fluoroscopy. Patient is in agreement and wants to proceed with injection. Anticoagulation/NPO Recommendations:   Patient does not need to hold any medications for the procedure. Patient does not need to be NPO prior to the procedure. We will do a straight LOCAL injection. Multidisciplinary Pain Management:   In the presence of complex, chronic, and multi-factorial pain, the importance of a multidisciplinary approach to pain management in the patients management regimen was emphasized and discussed in great detail.      Referrals:  None    Prescriptions Written This Visit:   None    Follow-up: For B/L ischial bursa injections      Hunter Caballero, DO  Interventional Pain Management/PM&R   [place]

## 2022-06-20 ENCOUNTER — TELEPHONE (OUTPATIENT)
Dept: FAMILY MEDICINE CLINIC | Age: 87
End: 2022-06-20

## 2022-06-20 NOTE — TELEPHONE ENCOUNTER
----- Message from Gold Perez sent at 6/20/2022  2:31 PM EDT -----  Subject: Message to Provider    QUESTIONS  Information for Provider? patient is asking if she could take Aleve for   her back pain in addition to the other medications that she is taking. She   has been taking Tylenol arthritis but someone suggested trying Aleve but   wanted to check first, Please contact patient ASAP to advise.   ---------------------------------------------------------------------------  --------------  CALL BACK INFO  What is the best way for the office to contact you? OK to leave message on   voicemail  Preferred Call Back Phone Number? [phone]  ---------------------------------------------------------------------------  --------------  SCRIPT ANSWERS  Relationship to Patient?  Self

## 2022-06-20 NOTE — TELEPHONE ENCOUNTER
Attempted to notify pt, no answer and unable to leave a vm. Phone just rings and rings, then says \"memory is full, remote access\".

## 2022-06-23 ENCOUNTER — PREP FOR PROCEDURE (OUTPATIENT)
Dept: PHYSICAL MEDICINE AND REHAB | Age: 87
End: 2022-06-23

## 2022-06-28 NOTE — H&P
Today, patient presents for planned bilateral ischial bursa injections. This note is reflective of the patient's previous visit for evaluation. We will proceed with today's planned procedure. Since patient's last visit for evaluation, there have been no interval changes in medical history. Patient has no new numbness, weakness, or focal neurological deficit since evaluation. Patient has no contraindications to injection (no anticoagulation or recent antibiotic intake for active infections), and has a  present or is able to drive themselves (as discussed and cleared by physician). Allergies to latex, contrast dye, and steroid medications have been confirmed with the patient prior to the procedure. NPO necessity has been assessed and accepted based on procedure complexity. The risks and benefits of the procedure have been explained including but are not limited to infection, bleeding, paralysis, immediate post procedure weakness, and dizziness; the patient acknowledges understanding and desires to proceed with the procedure. Patient has signed consent for same procedure as discussed in previous clinic encounter. All other questions and concerns were addressed at bedside. See procedure note for full details. Post procedure Instructions: The patient was advised not to drive during the day of the procedure and not to engage in any significant decision making (unless otherwise states by physician). The patient was also advised to be cautious with walking/activity for 24 hours following today's visit and asked not to engage in over-exertion (unless otherwise states by physician). After this time, it is ok to resume pre-procedure level of activity. Patient advised to apply ice to site of injection in situations of pain and discomfort. Patient advised to not submerge site of injection during bath or pool activities for approximately 24 hours post-procedure.  Patient attested to understanding post procedure directions / restrictions. All other questions and concerns addressed before patient discharge in ambulatory fashion. Chronic Pain/PM&R Clinic Note     Encounter Date: 6/16/22    Subjective:   Chief Complaint:   No chief complaint on file. History of Present Illness:   Ashley Vaca is a 80 y.o. female seen in the clinic initially on 10/8/20 upon request from Newton Fofana DO (PCP) for her history of low back pain. She has medical history of previous TIA (on Plavix). She has a longstanding history of cervical neck and low back pain. However, she sustained a fall while trying to put her pants on 10/2/2021 where she fell on her right buttocks. She states that since this episode she has been dealing with severe low back pain with radiation down the posterior thigh and slightly down the posterior calf. She does admit the pain radiates around to her groin. She states she has numbness and tingling however these are more in the toes. He does endorse some right leg weakness which she feels like this pain limited. Her pain is sharp and stabbing in nature and 5/10. Her pain is exacerbated with any sitting or laying on the affected side. Her pain is alleviated with rest and medication. She denies any focal leg weakness, saddle anesthesia, or bowel/bladder incontinence. She states she is currently been managing her pain with over-the-counter Tylenol arthritis. She has seen a couple pain specialist in the past including receiving spinal injections with Dr. Jonnathan Hinson and Dr. Lester Neumann. Today, 6/16/2022, patient presents for planned follow-up for chronic low back and leg pain. She states that she underwent a right knee replacement back in February 2022. She states that since this she has been noticing worsening bilateral buttocks pain. She states that this pain is primarily on the lower buttocks region worse on the right compared to left.   This pain is worse when she is sitting for an extended duration of time and states that she does have to sit on a pillow frequently. She states she does have a little radiation down the posterior thigh but never past her knee. She is wondering what she can do to help out with some of her pain and states she is taking Tylenol Extra Strength and Aleve at times and is wondering which is better for her. History of Intervention:   Surgery: No previous lumbar surgeries  Injections: Previous lumbar epidurals-significant relief  R SI joint injection (12/8/20) - significant (100%) x 4 months  Right ischial bursa injection (3/16/2021)-moderate relief  R SI joint injection (5/11/2021)-greater than 80% relief x 3 months  R genicular nerve block (8/4/2021)- >85% relief x 3 days  R genicular ablation (8/17/21) - minimal relief  LESI (10/12/21) - minimal relief    Current Treatment Medications:   Gabapentin 600 mg / 1200 mg  Tylenol PRN  Aleve PRN    Historical Treatment Medications:   None    Imaging:  MRI L-spine (10/4/20)    LUMBAR SPINE 2 VIEWS:         CLINICAL INFORMATION: fall         COMPARISON: No prior study.         TECHNIQUE: Standard AP and lateral views of lumbar spine were obtained.                   Impression    1. Mild thoracolumbar dextro scoliosis. Moderate lumbar levoscoliosis. Cannot exclude muscle spasm. 2. Multifocal marked disc space narrowing and degenerative disc disease throughout the lumbar spine. Moderately severe degenerative vertebral body spondylosis scattered throughout the lumbar spine. 3. No fracture acute is seen. Mild degenerative changes right sacroiliac joint.         Past Medical History:   Diagnosis Date    Cerebral artery occlusion with cerebral infarction [facility]) 2012    tia    GERD (gastroesophageal reflux disease)     Hyperlipidemia     Hypertension     Hypothyroidism     Osteoarthritis     Sixth nerve palsy 2020       Past Surgical History:   Procedure Laterality Date    ARTHROCENTESIS Right 4/12/2019    Right hip bursa steroid INJECTION  NO SEDATION performed by Jennifer Packer MD at [address] Right 5/11/2021    Right SI joint injection performed by Christen Lanes, DO at [address]  2003    COLONOSCOPY      ENDOSCOPY, COLON, DIAGNOSTIC      HC INJECTION PROCEDURE FOR SACROILIAC JOINT Right 12/8/2020    Right SI joint injection performed by Christen Lanes, DO at [address] Right 3/16/2021    right ischial bursa injection performed by Christen Lanes, DO at [address] Bilateral 9/21/2021    bilateral ischial bursa injections under fluoroscopy.  performed by Christen Lanes, DO at [address] [facility]      left    KNEE ARTHROSCOPY  2010    left    LUMBAR NERVE BLOCK N/A 12/11/2018    LUMBAR INTER LAMINAR DAYANARA LESI #1@ L5 performed by Jennifer Packer MD at [facility] N/A 3/12/2019    LUMBAR INTER LAMINAR DAYANARA LESI @L5 performed by Jennifer Packer MD at [address] [facility] Right 8/30/2019    SI RFA  RIGHT SIDE performed by Jennifer Packer MD at [address] [facility] Left 10/1/2019    SI RFA  LEFT SIDE performed by Jennifer Packer MD at [address] Right 08/27/2018    SI RFA    NERVE SURGERY Left 09/11/2018    SI RFA    OTHER SURGICAL HISTORY      previous nerve blocks at [facility] Right 8/4/2021    Right genicular nerve block performed by Christen Lanes, DO at [address] Right 8/17/2021    right genicular nerve radiofrequency ablation performed by Christen Lanes, DO at [address] N/A 10/12/2021    lumbar epidural steroid L5/S1 performed by Christen Lanes, DO at [address] Frequency of Communication with Friends and Family: Not on file    Frequency of Social Gatherings with Friends and Family: Not on file    Attends Yazidi Services: Not on file    Active Member of Clubs or Organizations: Not on file    Attends Club or Organization Meetings: Not on file    Marital Status: Not on file   Intimate Partner Violence:     Fear of Current or Ex-Partner: Not on file    Emotionally Abused: Not on file    Physically Abused: Not on file    Sexually Abused: Not on file   Housing Stability:     Unable to Pay for Housing in the Last Year: Not on file    Number of Places Lived in the Last Year: Not on file    Unstable Housing in the Last Year: Not on file       Medications & Allergies:   Current Outpatient Medications   Medication Instructions    Acetaminophen (TYLENOL ARTHRITIS EXT RELIEF PO) Oral    aspirin 81 mg, Oral, DAILY    calcium carbonate-vitamin D (CALCIUM 600+D) 600-200 MG-UNIT TABS 1 tablet, DAILY    Cholecalciferol (VITAMIN D) 2000 UNITS CAPS capsule 1 capsule, DAILY    clopidogrel (PLAVIX) 75 MG tablet TAKE 1 TABLET BY MOUTH EVERY DAY    gabapentin (NEURONTIN) 600 MG tablet TAKE 1 TABLET IN THE       MORNING, 1 TABLET IN THE   AFTERNOON AND 2 TABLETS AT BEDTIME.     levothyroxine (SYNTHROID) 50 MCG tablet TAKE 1 TABLET BY MOUTH EVERY DAY    lisinopril (PRINIVIL;ZESTRIL) 10 MG tablet TAKE 1 TABLET BY MOUTH EVERY DAY    lovastatin (MEVACOR) 20 MG tablet TAKE 1 TABLET BY MOUTH EVERY DAY AT NIGHT    meloxicam (MOBIC) 7.5 mg, Oral, DAILY    Multiple Vitamins-Minerals (THERAPEUTIC MULTIVITAMIN-MINERALS) tablet 1 tablet, DAILY    pantoprazole (PROTONIX) 40 MG tablet TAKE 1 TABLET BY MOUTH EVERY DAY WITH BREAKFAST       Allergies   Allergen Reactions    Cataflam [Diclofenac Potassium] Shortness Of Breath    Augmentin [Amoxicillin-Pot Clavulanate] Diarrhea    Phenergan [Promethazine Hcl]      Confusion \"didn't know where she was at\"       Review of Systems: Constitutional: denies any fevers or chills  Genitourinary: negative for bowel/bladder incontinence   Musculoskeletal: Positive for bilateral buttocks pain  Neurological: Positive for numbness/tingling in toes of feet  Behavioral/Psych: negative for anxiety/depression   All other ROS reviewed and are negative    Objective: There were no vitals filed for this visit. Constitutional: Pleasant, no acute distress   Head: Normocephalic, atraumatic   Eyes: Conjunctivae normal   Neck: Supple, symmetrical   Lungs: Normal respiratory effort, non-labored breathing   Cardiovascular: Limbs warm and well perfused   Abdomen: Non-protruded   Musculoskeletal: Muscle bulk symmetric, no atrophy, no gross deformities   · Thorax: Scoliosis appreciated on exam  · Lumbar Spine/ Hip: Tenderness palpation over bilateral ischial bursa's. Negative seated SLR bilaterally. Neurological: Cranial nerves II-XII grossly intact. · Gait -slightly antalgic gait. Ambulates without assistive device. · Motor: No focal motor deficit appreciated in lower limbs  Skin: No rashes or lesions visibile in lower limbs  Psychological: Cooperative, no exaggerated pain behaviors       Assessment:    Diagnosis Orders   1. Ischial bursitis, unspecified laterality  CHG FLUOR NEEDLE/CATH SPINE/PARASPINAL DX/THER ADDON    MO ARTHROCENTESIS ASPIR&/INJ MAJOR JT/BURSA W/O US   2. Chronic pain of right knee     3. Other chronic pain     4. Sacroiliac pain         David Irizarry is a 80 y. o.female presenting to the pain clinic for evaluation of right-sided low back pain. 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 She has had multiple injections and at this point I have given her a break from steroid injections. I have started her on low-dose tramadol for breakthrough pain. We will follow-up in 12 weeks for reevaluation. Plan:    The following treatment recommendations and plan were discussed in detail with Miguel Angel Munguia. Imaging:   I have reviewed patients imaging of lumbar spine x-ray and results were discussed the patient today. Analgesics:   Patient is taking Acetaminophen. Patient informed that the maximum amount of acetaminophen taken on a regular basis should only be 4000 mg per day. Patient is taking Aleve. Patient is advised to take as prescribed and not take on an empty stomach. Adjuvants: In light of the presence of a neuropathic component of pain, the patient can continue her Gabapentin. Interventions: With examination consistent with bilateral ischial bursa pain, set patient up for bilateral ischial bursa injections under fluoroscopy. Patient is in agreement and wants to proceed with injection. Anticoagulation/NPO Recommendations:   Patient does not need to hold any medications for the procedure. Patient does not need to be NPO prior to the procedure. We will do a straight LOCAL injection. Multidisciplinary Pain Management:   In the presence of complex, chronic, and multi-factorial pain, the importance of a multidisciplinary approach to pain management in the patients management regimen was emphasized and discussed in great detail.      Referrals:  None    Prescriptions Written This Visit:   None    Follow-up: For B/L ischial bursa injections      Hunter Valle, DO  Interventional Pain Management/PM&R   [place]

## 2022-06-29 ENCOUNTER — APPOINTMENT (OUTPATIENT)
Dept: GENERAL RADIOLOGY | Age: 87
End: 2022-06-29
Attending: ANESTHESIOLOGY
Payer: MEDICARE

## 2022-06-29 ENCOUNTER — HOSPITAL ENCOUNTER (OUTPATIENT)
Age: 87
Setting detail: OUTPATIENT SURGERY
Discharge: HOME | End: 2022-06-29
Attending: ANESTHESIOLOGY | Admitting: ANESTHESIOLOGY
Payer: MEDICARE

## 2022-06-29 VITALS
RESPIRATION RATE: 16 BRPM | SYSTOLIC BLOOD PRESSURE: 146 MMHG | DIASTOLIC BLOOD PRESSURE: 67 MMHG | BODY MASS INDEX: 21.85 KG/M2 | TEMPERATURE: 97 F | HEART RATE: 73 BPM | OXYGEN SATURATION: 95 % | HEIGHT: 59 IN | WEIGHT: 108.4 LBS

## 2022-06-29 PROCEDURE — 20610 DRAIN/INJ JOINT/BURSA W/O US: CPT | Performed by: ANESTHESIOLOGY

## 2022-06-29 PROCEDURE — 2500000003 HC RX 250 WO HCPCS: Performed by: ANESTHESIOLOGY

## 2022-06-29 PROCEDURE — 6360000004 HC RX CONTRAST MEDICATION: Performed by: ANESTHESIOLOGY

## 2022-06-29 PROCEDURE — 3600000055 HC PAIN LEVEL 3 ADDL 15 MIN: Performed by: ANESTHESIOLOGY

## 2022-06-29 PROCEDURE — 7100000011 HC PHASE II RECOVERY - ADDTL 15 MIN: Performed by: ANESTHESIOLOGY

## 2022-06-29 PROCEDURE — 77002 NEEDLE LOCALIZATION BY XRAY: CPT | Performed by: ANESTHESIOLOGY

## 2022-06-29 PROCEDURE — 6360000002 HC RX W HCPCS: Performed by: ANESTHESIOLOGY

## 2022-06-29 PROCEDURE — 7100000010 HC PHASE II RECOVERY - FIRST 15 MIN: Performed by: ANESTHESIOLOGY

## 2022-06-29 PROCEDURE — 3600000054 HC PAIN LEVEL 3 BASE: Performed by: ANESTHESIOLOGY

## 2022-06-29 PROCEDURE — 3209999900 FLUORO FOR SURGICAL PROCEDURES

## 2022-06-29 RX ORDER — BUPIVACAINE HYDROCHLORIDE 5 MG/ML
INJECTION, SOLUTION EPIDURAL; INTRACAUDAL PRN
Status: DISCONTINUED | OUTPATIENT
Start: 2022-06-29 | End: 2022-06-29 | Stop reason: ALTCHOICE

## 2022-06-29 RX ORDER — LIDOCAINE HYDROCHLORIDE 10 MG/ML
INJECTION, SOLUTION EPIDURAL; INFILTRATION; INTRACAUDAL; PERINEURAL PRN
Status: DISCONTINUED | OUTPATIENT
Start: 2022-06-29 | End: 2022-06-29 | Stop reason: ALTCHOICE

## 2022-06-29 RX ORDER — METHYLPREDNISOLONE ACETATE 80 MG/ML
INJECTION, SUSPENSION INTRA-ARTICULAR; INTRALESIONAL; INTRAMUSCULAR; SOFT TISSUE PRN
Status: DISCONTINUED | OUTPATIENT
Start: 2022-06-29 | End: 2022-06-29 | Stop reason: ALTCHOICE

## 2022-06-29 ASSESSMENT — PAIN SCALES - GENERAL: PAINLEVEL_OUTOF10: 8

## 2022-06-29 ASSESSMENT — PAIN DESCRIPTION - ORIENTATION: ORIENTATION: RIGHT;LEFT

## 2022-06-29 ASSESSMENT — PAIN DESCRIPTION - LOCATION: LOCATION: HIP

## 2022-06-29 NOTE — PROGRESS NOTES
1433: Patient arrives to recovery room via cart. Spontaneous respirations, vss, report received from surgical RN. Patient denies pain, numbness, tingling , nausea. Injection site clean, dry, intact. HOB elevated. Snack and drink given. Call light within reach. 1445: patient's ride notified that patient is finished and to pull vehicle around. 1455: patient ambulated to discharge lobby in stable condition. Patient discharged home with family.

## 2022-06-30 NOTE — PROCEDURES
Pre-operative Diagnosis:  Ischial bursitis     Post-operative Diagnosis: Ischial bursitis     Procedure: BILATERAL ischial bursa injection with fluoroscopic guidance     Procedure Description:  After having signed the informed consent, the patient was placed in the prone position. The patient's low back/buttocks was prepped with chloraprep solution, and draped in a sterile fashion. A total of 1 ml of 1% lidocaine were used to anesthetize the skin and underlying tissues. Under fluoroscopic guidance a single 22-gauge, 3.5 inch spinal needle was advanced to lie at the inferior pole of the bilateral ischium. There were no paresthesias or heme aspiration. Needle placement was confirmed in the AP view. After negative aspiration, 0.5 ml of Omnipaque 300 contrast was injected with appropriate spread observed. A total of 2 ml of 0.5% bupivicaine mixed with 40 mg depo-medrol were injected into the ischial bursa. The needle was withdrawn without any complications. This procedure was repeated on the contralateral side. The patient tolerated the procedure well, was transported to the recovery room and observed for 15 minutes and discharged in an ambulatory fashion. No immediate reported complications.      Procedural Complications: None  Estimated Blood Loss: 0 mL        Hunter Mondragon DO  Interventional Pain Management/PM&R   [facility] and [address] [facility]

## 2022-07-05 ENCOUNTER — TELEPHONE (OUTPATIENT)
Dept: FAMILY MEDICINE CLINIC | Age: 87
End: 2022-07-05

## 2022-07-05 NOTE — TELEPHONE ENCOUNTER
Received a call from [facility] with [address] stating that she was reviewing the patients medication list and noted that the patient is taking pantoprazole 40mg and it is costing her $12 for a 30 day supply and if she was changed to omeprazole she would have a 0 co-pay. Please advise.

## 2022-07-08 RX ORDER — MELOXICAM 7.5 MG/1
TABLET ORAL
Qty: 90 TABLET | Refills: 1 | Status: SHIPPED | OUTPATIENT
Start: 2022-07-08 | End: 2022-10-05 | Stop reason: SDUPTHER

## 2022-07-13 ENCOUNTER — TELEMEDICINE (OUTPATIENT)
Dept: FAMILY MEDICINE CLINIC | Age: 87
End: 2022-07-13
Payer: MEDICARE

## 2022-07-13 ENCOUNTER — HOSPITAL ENCOUNTER (OUTPATIENT)
Age: 87
Discharge: HOME | End: 2022-07-13
Payer: MEDICARE

## 2022-07-13 DIAGNOSIS — Z20.822 ENCOUNTER BY TELEHEALTH FOR SUSPECTED COVID-19: ICD-10-CM

## 2022-07-13 DIAGNOSIS — B34.9 VIRAL ILLNESS: Primary | ICD-10-CM

## 2022-07-13 LAB
INFLUENZA A: NOT DETECTED
INFLUENZA B: NOT DETECTED
SARS-COV-2 RNA, RT PCR: NOT DETECTED

## 2022-07-13 PROCEDURE — 87636 SARSCOV2 & INF A&B AMP PRB: CPT

## 2022-07-13 PROCEDURE — 99442 PR PHYS/QHP TELEPHONE EVALUATION 11-20 MIN: CPT | Performed by: FAMILY MEDICINE

## 2022-07-13 ASSESSMENT — ENCOUNTER SYMPTOMS
GASTROINTESTINAL NEGATIVE: 1
RESPIRATORY NEGATIVE: 1

## 2022-07-13 NOTE — PROGRESS NOTES
Justa Weldon (:  1934) is a Established patient, here for evaluation of the following:  Justa Weldon is a 80 y.o. female evaluated via telephone on 2022 for Cough and Congestion  . Documentation:  I communicated with the patient and/or health care decision maker about cough, congestion. Details of this discussion including any medical advice provided: see A/P    Total Time: minutes: 11-20 minutes    Justa Weldon was evaluated through a synchronous (real-time) audio encounter. Patient identification was verified at the start of the visit. She (or guardian if applicable) is aware that this is a billable service, which includes applicable co-pays. This visit was conducted with the patient's (and/or legal guardian's) verbal consent. She has not had a related appointment within my department in the past 7 days or scheduled within the next 24 hours. The patient was located at Home: [address] [place] [id number]. The provider was located at [facility] (Appt Dept): [address]  [facility],  [address] [facility]. Note: not billable if this call serves to triage the patient into an appointment for the relevant concern    Collette Nash, DO             Subjective   HPI:   Chief Complaint   Patient presents with    Cough    Congestion     Pt presents today with c/o ST, congestion, cough for the last 4 days. No fevers. No COVID exposure but was exposed to several grandchildren over the weekend who recently tested + for RSV. OTC cough med with some relief.     Patient Active Problem List   Diagnosis    Hypertension    Hyperlipidemia    GERD (gastroesophageal reflux disease)    Lumbar spondylosis    Hypothyroidism    Vertebro basilar insufficiency    Cervical spondylosis with myelopathy    Light headed    Stenosis, spinal, lumbar    TIA involving vertebral artery    Sacroiliac inflammation (HCC)    Trochanteric bursitis of right hip    Ischial bursitis    Hypoxia    Pneumonia due to COVID-19 virus    Acquired trigger finger    Osteoarthritis of knee    Pain in right knee     Past Surgical History:   Procedure Laterality Date    ARTHROCENTESIS Right 4/12/2019    Right hip bursa steroid INJECTION  NO SEDATION performed by Venkata Gil MD at [facility] Right 5/11/2021    Right SI joint injection performed by Garett Mujica DO at [address]  2003    COLONOSCOPY      ENDOSCOPY, COLON, DIAGNOSTIC      HC INJECTION PROCEDURE FOR SACROILIAC JOINT Right 12/8/2020    Right SI joint injection performed by Garett Mujica DO at [address] Right 3/16/2021    right ischial bursa injection performed by Garett Mujica DO at [address] Bilateral 9/21/2021    bilateral ischial bursa injections under fluoroscopy.  performed by Garett Mujica DO at [address] Bilateral 6/29/2022    bilateral ischial bursa injections under fluoroscopy performed by Garett Mujica DO at [address] [facility]      left    KNEE ARTHROSCOPY  2010    left    LUMBAR NERVE BLOCK N/A 12/11/2018    LUMBAR INTER LAMINAR DAYANARA LESI #1@ L5 performed by Venkata Gil MD at [facility] N/A 3/12/2019    LUMBAR INTER LAMINAR DAYANARA LESI @L5 performed by Venkata Gil MD at [address] [facility] Right 8/30/2019    SI RFA  RIGHT SIDE performed by Venkata Gil MD at [address] [facility] Left 10/1/2019    SI RFA  LEFT SIDE performed by Venkata Gil MD at [facility] Right 08/27/2018    SI RFA    NERVE SURGERY Left 09/11/2018    SI RFA    OTHER SURGICAL HISTORY      previous nerve blocks at [place] [address] Right 8/4/2021    Right genicular

## 2022-08-17 NOTE — TELEPHONE ENCOUNTER
Follow-up with both your primary care doctor and GI doctors for ongoing epigastric abdominal pain. Please return to ER if your pain worsens, you develop, nausea, vomiting, diarrhea, chest pain or shortness of breath.   Trial over-the-counter Pepcid as needed for symptoms Hilaria Jim is calling to see if she can get a shot in her sciatica, both sides, before 10-16, due to back pain flare up, she has a wedding in [place]. Please call her back with response. I offered her an OV with [facility]anna on 10-11, she stated if I come on the 11th I wouldn't be able to get my shot on the 12th, she states alanna gives shots on tues.

## 2022-09-13 ENCOUNTER — TELEPHONE (OUTPATIENT)
Dept: PHYSICAL MEDICINE AND REHAB | Age: 87
End: 2022-09-13

## 2022-09-13 NOTE — TELEPHONE ENCOUNTER
Pt, called office. States she is in a lot of pain and is requesting to be seen ASAP. Follow up scheduled 10/7/2022. Please advise any further recommendations or if able to see CNP?  Thanks

## 2022-09-15 ENCOUNTER — OFFICE VISIT (OUTPATIENT)
Dept: PHYSICAL MEDICINE AND REHAB | Age: 87
End: 2022-09-15
Payer: MEDICARE

## 2022-09-15 VITALS
HEIGHT: 59 IN | DIASTOLIC BLOOD PRESSURE: 60 MMHG | BODY MASS INDEX: 21.77 KG/M2 | SYSTOLIC BLOOD PRESSURE: 122 MMHG | WEIGHT: 108 LBS

## 2022-09-15 DIAGNOSIS — M70.71 ISCHIAL BURSITIS OF RIGHT SIDE: ICD-10-CM

## 2022-09-15 DIAGNOSIS — G89.29 CHRONIC RIGHT SI JOINT PAIN: ICD-10-CM

## 2022-09-15 DIAGNOSIS — M53.3 SACROILIAC PAIN: ICD-10-CM

## 2022-09-15 DIAGNOSIS — M70.70 ISCHIAL BURSITIS, UNSPECIFIED LATERALITY: Primary | ICD-10-CM

## 2022-09-15 DIAGNOSIS — M53.3 CHRONIC RIGHT SI JOINT PAIN: ICD-10-CM

## 2022-09-15 DIAGNOSIS — G89.29 OTHER CHRONIC PAIN: ICD-10-CM

## 2022-09-15 PROCEDURE — 1123F ACP DISCUSS/DSCN MKR DOCD: CPT | Performed by: NURSE PRACTITIONER

## 2022-09-15 PROCEDURE — 1036F TOBACCO NON-USER: CPT | Performed by: NURSE PRACTITIONER

## 2022-09-15 PROCEDURE — 99215 OFFICE O/P EST HI 40 MIN: CPT | Performed by: NURSE PRACTITIONER

## 2022-09-15 PROCEDURE — 1090F PRES/ABSN URINE INCON ASSESS: CPT | Performed by: NURSE PRACTITIONER

## 2022-09-15 PROCEDURE — G8427 DOCREV CUR MEDS BY ELIG CLIN: HCPCS | Performed by: NURSE PRACTITIONER

## 2022-09-15 PROCEDURE — G8420 CALC BMI NORM PARAMETERS: HCPCS | Performed by: NURSE PRACTITIONER

## 2022-09-15 NOTE — PROGRESS NOTES
Chronic Pain/PM&R Clinic Note     Encounter Date: 9/15/22    Subjective:   Chief Complaint:   Chief Complaint   Patient presents with    Follow-up       History of Present Illness:   Malachi Stewart is a 80 y.o. female seen in the clinic initially on 10/8/20 upon request from Kaiden Correa DO (PCP) for her history of low back pain. She has medical history of previous TIA (on Plavix). She has a longstanding history of cervical neck and low back pain. However, she sustained a fall while trying to put her pants on 10/2/2021 where she fell on her right buttocks. She states that since this episode she has been dealing with severe low back pain with radiation down the posterior thigh and slightly down the posterior calf. She does admit the pain radiates around to her groin. She states she has numbness and tingling however these are more in the toes. He does endorse some right leg weakness which she feels like this pain limited. Her pain is sharp and stabbing in nature and 5/10. Her pain is exacerbated with any sitting or laying on the affected side. Her pain is alleviated with rest and medication. She denies any focal leg weakness, saddle anesthesia, or bowel/bladder incontinence. She states she is currently been managing her pain with over-the-counter Tylenol arthritis. She has seen a couple pain specialist in the past including receiving spinal injections with Dr. Eda Velazquez and Dr. Roberto Torres. Today, 9/15/2022, patient presents for follow up. She did not come to her follow-up after her previous procedure that was completed on 6/29/2022 with Dr. Suri Pitts for bilateral ischial bursa injections. She presents today with reports that her buttocks pain into her posterior thighs is now back. She states that she received 100% relief for 6 weeks from her injections, then slowly started to return and is now back to her baseline as prior to procedure. She stats that it is a constant ache, occasionally sharp pain.  Worse MD Tawnya at [facility] Right 5/11/2021    Right SI joint injection performed by Davey Barnes DO at [address]  2003    COLONOSCOPY      ENDOSCOPY, COLON, DIAGNOSTIC      HC INJECTION PROCEDURE FOR SACROILIAC JOINT Right 12/8/2020    Right SI joint injection performed by Davey Barnes DO at [address] Right 3/16/2021    right ischial bursa injection performed by Davey Barnes DO at [address] Bilateral 9/21/2021    bilateral ischial bursa injections under fluoroscopy.  performed by Davey Barnes DO at [address] Bilateral 6/29/2022    bilateral ischial bursa injections under fluoroscopy performed by Davey Barnes DO at [address] [facility]      left    KNEE ARTHROSCOPY  2010    left    LUMBAR NERVE BLOCK N/A 12/11/2018    LUMBAR INTER LAMINAR DAYANARA LESI #1@ L5 performed by Franklin Torres MD at [address] N/A 3/12/2019    LUMBAR INTER LAMINAR DAYANARA LESI @L5 performed by Franklin Torres MD at [facility] Right 8/30/2019    SI RFA  RIGHT SIDE performed by Franklin Torres MD at [facility] Left 10/1/2019    SI RFA  LEFT SIDE performed by Franklin Torres MD at [address] [facility] Right 08/27/2018    SI RFA    NERVE SURGERY Left 09/11/2018    SI RFA    OTHER SURGICAL HISTORY      previous nerve blocks at [facility] Right 8/4/2021    Right genicular nerve block performed by Davey Barnes DO at [facility] Right 8/17/2021    right genicular nerve radiofrequency ablation performed by Davey Barnes DO at [facility] N/A 10/12/2021    lumbar epidural steroid L5/S1 performed by Davey Barnes DO at [address] [facility] [address] Right 5/22/2018    LUMBAR RFA RIGHT SIDE FIRST L3-4,4-5,5-S1 performed by Franklin Torres MD at [address] Left 6/25/2018    LUMBAR RFA  LEFT SIDE @ L3-4,4-5,5-S1, performed by Franklin Torres MD at [address] [facility], W IMAGE GUIDANCE, SINGLE Right 8/27/2018    SI RFA  RIGHT SIDE performed by Franklin Torres MD at [address] [facility] OR SACRAL, W IMAGE GUIDANCE, SINGLE Left 9/11/2018    SI RFA  LEFT SIDE performed by Franklin Torres MD at [address] [facility]       Family History   Problem Relation Age of Onset    Cancer Mother         melanoma    Heart Disease Father     Stroke Sister          Medications & Allergies:   Current Outpatient Medications   Medication Instructions    Acetaminophen (TYLENOL ARTHRITIS EXT RELIEF PO) Oral    aspirin 81 mg, Oral, DAILY    calcium carbonate-vitamin D 600-200 MG-UNIT TABS 1 tablet, DAILY    Cholecalciferol (VITAMIN D) 2000 UNITS CAPS capsule 1 capsule, DAILY    clopidogrel (PLAVIX) 75 MG tablet TAKE 1 TABLET BY MOUTH EVERY DAY    gabapentin (NEURONTIN) 600 MG tablet TAKE 1 TABLET IN THE       MORNING, 1 TABLET IN THE   AFTERNOON AND 2 TABLETS AT BEDTIME.    levothyroxine (SYNTHROID) 50 MCG tablet TAKE 1 TABLET BY MOUTH EVERY DAY    lisinopril (PRINIVIL;ZESTRIL) 10 MG tablet TAKE 1 TABLET BY MOUTH EVERY DAY    lovastatin (MEVACOR) 20 MG tablet TAKE 1 TABLET BY MOUTH EVERY DAY AT NIGHT    meloxicam (MOBIC) 7.5 MG tablet TAKE 1 TABLET BY MOUTH EVERY DAY    Multiple Vitamins-Minerals (THERAPEUTIC MULTIVITAMIN-MINERALS) tablet 1 tablet, DAILY    pantoprazole (PROTONIX) 40 MG tablet TAKE 1 TABLET BY MOUTH EVERY DAY WITH BREAKFAST       Allergies   Allergen Reactions    Cataflam [Diclofenac Potassium] Shortness Of Breath    Augmentin [Amoxicillin-Pot Clavulanate] Diarrhea    Phenergan [Promethazine Hcl]      Confusion \"didn't know where she was at\"       Review of Systems:   Constitutional: negative for weight changes or fevers  Genitourinary: negative for bowel/bladder incontinence   Musculoskeletal: positive for bilateral buttocks pain  Neurological: positive for numbness/tingling in toes   Behavioral/Psych: negative for anxiety/depression   All other systems reviewed and are negative    Objective:     Vitals:    09/15/22 0732   BP: 122/60       Constitutional: Pleasant, no acute distress   Head: Normocephalic, atraumatic   Eyes: Conjunctivae normal   Neck: Supple, symmetrical   Lungs: Normal respiratory effort, non-labored breathing   Cardiovascular: Limbs warm and well perfused   Abdomen: Non-protruded   Musculoskeletal: Muscle bulk symmetric, no atrophy, no gross deformities   · Thorax: scoliosis noted  · Lumbar Spine/Hip: tenderness to palpation over bilateral ischial bursa's. Negative seated SLR  Neurological: Cranial nerves II-XII grossly intact. · Gait - antalgic gait. Ambulates without assistive device. · Motor: No focal deficits noted  Skin: No rashes or lesions present   Psychological: Cooperative, no exaggerated pain behaviors       Assessment:    Diagnosis Orders   1. Ischial bursitis, unspecified laterality        2. Ischial bursitis of right side        3. Chronic right SI joint pain        4. Sacroiliac pain        5. Other chronic pain             Destin Bang is a 80 y. o.female presenting to the pain clinic for evaluation of right-sided low back pain. 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 With good response from injections, and return of pain complaints, I have set her up for repeat bilateral ischial bursa injections with Dr Freddy Cedillo.  We did discuss possibly trial some physical therapy to assist with strengthening, stretching and pain control in the future. She wishes to proceed with injections first     Plan: The following treatment recommendations and plan were discussed in detail with Lindsay David. Imaging:   I have reviewed patients imaging of lumbar XR and results were discussed with patient today. Analgesics:     Patient is taking Acetaminophen. Patient informed that the maximum amount of acetaminophen taken on a regular basis should only be 4000 mg per day. Adjuvants: In light of the presence of a neuropathic component of pain the patient is advised to continue her Neurontin    Interventions: With examination consistent with bilateral ischial bursa pain, set patient up for bilateral ischial bursa injections under fluoroscopy. Patient is in agreement and wants to proceed with injection. Anticoagulation/NPO Recommendations:   Patient does not need to hold any medications for the procedure. Patient does not need to be NPO prior to the procedure. We will do a straight LOCAL injection    Multidisciplinary Pain Management:   In the presence of complex, chronic, and multi-factorial pain, the importance of a multidisciplinary approach to pain management in the patients management regimen was emphasized and discussed in great detail. PHYSICAL THERAPY: Patient is advised to see a physical therapist for gentle stretching exercises and conditioning exercises for management of pain. The patient was also advised to continue physical therapy and stretching exercises at home and cognitive behavioral and/or group therapy. Referrals:  None    Prescriptions Written This Visit:   None    Follow-up:   B/L ischial bursa injections     It was my pleasure to evaluate Lindsay David today. I spent over 40 minutes establishing care (new patient to me), evaluating this patient, reviewing previous notes and images and completing documentation.        Juanpablo Lagunas, APRN - CNP

## 2022-09-22 ENCOUNTER — PREP FOR PROCEDURE (OUTPATIENT)
Dept: PHYSICAL MEDICINE AND REHAB | Age: 87
End: 2022-09-22

## 2022-09-23 NOTE — DISCHARGE INSTRUCTIONS

## 2022-09-25 NOTE — H&P
Today, patient presents for planned bilateral ischial bursa injections    This note is reflective of the patient's previous visit for evaluation. We will proceed with today's planned procedure. Since patient's last visit for evaluation, there have been no interval changes in medical history. Patient has no new numbness, weakness, or focal neurological deficit since evaluation. Patient has no contraindications to injection (no anticoagulation or recent antibiotic intake for active infections), and has a  present or is able to drive themselves (as discussed and cleared by physician). Allergies to latex, contrast dye, and steroid medications have been confirmed with the patient prior to the procedure. NPO necessity has been assessed and accepted based on procedure complexity. The risks and benefits of the procedure have been explained including but are not limited to infection, bleeding, paralysis, immediate post procedure weakness, and dizziness; the patient acknowledges understanding and desires to proceed with the procedure. Patient has signed consent for same procedure as discussed in previous clinic encounter. All other questions and concerns were addressed at bedside. See procedure note for full details. Post procedure Instructions: The patient was advised not to drive during the day of the procedure and not to engage in any significant decision making (unless otherwise states by physician). The patient was also advised to be cautious with walking/activity for 24 hours following today's visit and asked not to engage in over-exertion (unless otherwise states by physician). After this time, it is ok to resume pre-procedure level of activity. Patient advised to apply ice to site of injection in situations of pain and discomfort. Patient advised to not submerge site of injection during bath or pool activities for approximately 24 hours post-procedure.  Patient attested to understanding post procedure directions / restrictions. All other questions and concerns addressed before patient discharge in ambulatory fashion. Chronic Pain/PM&R Clinic Note     Encounter Date: 9/15/22    Subjective:   Chief Complaint:   No chief complaint on file. History of Present Illness:   Miguel Angel Munguia is a 80 y.o. female seen in the clinic initially on 10/8/20 upon request from Geoffrey Cee DO (PCP) for her history of low back pain. She has medical history of previous TIA (on Plavix). She has a longstanding history of cervical neck and low back pain. However, she sustained a fall while trying to put her pants on 10/2/2021 where she fell on her right buttocks. She states that since this episode she has been dealing with severe low back pain with radiation down the posterior thigh and slightly down the posterior calf. She does admit the pain radiates around to her groin. She states she has numbness and tingling however these are more in the toes. He does endorse some right leg weakness which she feels like this pain limited. Her pain is sharp and stabbing in nature and 5/10. Her pain is exacerbated with any sitting or laying on the affected side. Her pain is alleviated with rest and medication. She denies any focal leg weakness, saddle anesthesia, or bowel/bladder incontinence. She states she is currently been managing her pain with over-the-counter Tylenol arthritis. She has seen a couple pain specialist in the past including receiving spinal injections with Dr. Maia Martinez and Dr. Marco Waldron. Today, 9/15/2022, patient presents for follow up. She did not come to her follow-up after her previous procedure that was completed on 6/29/2022 with Dr. Jeana Valle for bilateral ischial bursa injections. She presents today with reports that her buttocks pain into her posterior thighs is now back.  She states that she received 100% relief for 6 weeks from her injections, then slowly started to return and is now back to her baseline as prior to procedure. She stats that it is a constant ache, occasionally sharp pain. Worse with moving, walking, bending, working outside. She uses heating pad with mild relied, no home stretches or exercises. She denies any new symptoms, or loss of bowel/bladder. She is interested in repeating the injections again. History of Interventions:   Surgery: No previous lumbar surgeries  Injections: Previous lumbar epidurals-significant relief  R SI joint injection (12/8/20) - significant (100%) x 4 months  Right ischial bursa injection (3/16/2021)-moderate relief  R SI joint injection (5/11/2021)-greater than 80% relief x 3 months  R genicular nerve block (8/4/2021)- >85% relief x 3 days  R genicular ablation (8/17/21) - minimal relief  LESI (10/12/21) - minimal relief  B/L ischial bursa injection (6/29/2022)- 100% relief x 6 weeks    Current Treatment Medications:   Gabapentin 600 mg / 1200 mg  Tylenol PRN  Aleve PRN    Historical Treatment Medications:   None    Imaging:  MRI L-spine (10/4/20)     LUMBAR SPINE 2 VIEWS:         CLINICAL INFORMATION: fall         COMPARISON: No prior study. TECHNIQUE: Standard AP and lateral views of lumbar spine were obtained. Impression    1. Mild thoracolumbar dextro scoliosis. Moderate lumbar levoscoliosis. Cannot exclude muscle spasm. 2. Multifocal marked disc space narrowing and degenerative disc disease throughout the lumbar spine. Moderately severe degenerative vertebral body spondylosis scattered throughout the lumbar spine. 3. No fracture acute is seen. Mild degenerative changes right sacroiliac joint.          Past Medical History:   Diagnosis Date    Cerebral artery occlusion with cerebral infarction [facility]) 2012    tia    GERD (gastroesophageal reflux disease)     Hyperlipidemia     Hypertension     Hypothyroidism     Osteoarthritis     Sixth nerve palsy 2020       Past Surgical History:   Procedure Laterality Date ARTHROCENTESIS Right 4/12/2019    Right hip bursa steroid INJECTION  NO SEDATION performed by Elysia Chou MD at [facility] Right 5/11/2021    Right SI joint injection performed by Chance James DO at [address]  2003    COLONOSCOPY      ENDOSCOPY, COLON, DIAGNOSTIC      HC INJECTION PROCEDURE FOR SACROILIAC JOINT Right 12/8/2020    Right SI joint injection performed by Chance James DO at [address] Right 3/16/2021    right ischial bursa injection performed by Chance James DO at [address] Bilateral 9/21/2021    bilateral ischial bursa injections under fluoroscopy.  performed by Chance James DO at [address] Bilateral 6/29/2022    bilateral ischial bursa injections under fluoroscopy performed by Chance James DO at [address] [facility]      left    KNEE ARTHROSCOPY  2010    left    LUMBAR NERVE BLOCK N/A 12/11/2018    LUMBAR INTER LAMINAR DAYANARA LESI #1@ L5 performed by Elysia Chou MD at [address] [facility] N/A 3/12/2019    LUMBAR INTER LAMINAR DAYANARA LESI @L5 performed by Elysia Chou MD at [facility] Right 8/30/2019    SI RFA  RIGHT SIDE performed by Elysia Chou MD at [facility] Left 10/1/2019    SI RFA  LEFT SIDE performed by Elysia Chou MD at [address] [facility] Right 08/27/2018    SI RFA    NERVE SURGERY Left 09/11/2018    SI RFA    OTHER SURGICAL HISTORY      previous nerve blocks at [facility] Right 8/4/2021    Right genicular nerve block performed by Chance James DO at [facility] Right 8/17/2021    right genicular nerve radiofrequency ablation performed by Poly De Los Santos DO Alanna at [facility] N/A 10/12/2021    lumbar epidural steroid L5/S1 performed by Patrick Alcantara DO at [address] [facility] [address] Right 5/22/2018    LUMBAR RFA RIGHT SIDE FIRST L3-4,4-5,5-S1 performed by Luz Lambert MD at [address] Left 6/25/2018    LUMBAR RFA  LEFT SIDE @ L3-4,4-5,5-S1, performed by Luz Lambert MD at [address] [facility], W IMAGE GUIDANCE, SINGLE Right 8/27/2018    SI RFA  RIGHT SIDE performed by Luz Lambert MD at [address] [facility] OR SACRAL, W IMAGE GUIDANCE, SINGLE Left 9/11/2018    SI RFA  LEFT SIDE performed by Luz Lambert MD at [address] [facility]       Family History   Problem Relation Age of Onset    Cancer Mother         melanoma    Heart Disease Father     Stroke Sister          Medications & Allergies:   Current Outpatient Medications   Medication Instructions    Acetaminophen (TYLENOL ARTHRITIS EXT RELIEF PO) Oral    aspirin 81 mg, Oral, DAILY    calcium carbonate-vitamin D 600-200 MG-UNIT TABS 1 tablet, DAILY    Cholecalciferol (VITAMIN D) 2000 UNITS CAPS capsule 1 capsule, DAILY    clopidogrel (PLAVIX) 75 MG tablet TAKE 1 TABLET BY MOUTH EVERY DAY    gabapentin (NEURONTIN) 600 MG tablet TAKE 1 TABLET IN THE       MORNING, 1 TABLET IN THE   AFTERNOON AND 2 TABLETS AT BEDTIME.    levothyroxine (SYNTHROID) 50 MCG tablet TAKE 1 TABLET BY MOUTH EVERY DAY    lisinopril (PRINIVIL;ZESTRIL) 10 MG tablet TAKE 1 TABLET BY MOUTH EVERY DAY    lovastatin (MEVACOR) 20 MG tablet TAKE 1 TABLET BY MOUTH EVERY DAY AT NIGHT    meloxicam (MOBIC) 7.5 MG tablet TAKE 1 TABLET BY MOUTH EVERY DAY    Multiple Vitamins-Minerals (THERAPEUTIC MULTIVITAMIN-MINERALS) tablet 1 tablet, DAILY    pantoprazole (PROTONIX) 40 MG tablet TAKE 1 TABLET BY MOUTH EVERY injections with Dr Emilie Cotton. We did discuss possibly trial some physical therapy to assist with strengthening, stretching and pain control in the future. She wishes to proceed with injections first     Plan: The following treatment recommendations and plan were discussed in detail with Souleymane Mcmillan. Imaging:   I have reviewed patients imaging of lumbar XR and results were discussed with patient today. Analgesics:     Patient is taking Acetaminophen. Patient informed that the maximum amount of acetaminophen taken on a regular basis should only be 4000 mg per day. Adjuvants: In light of the presence of a neuropathic component of pain the patient is advised to continue her Neurontin    Interventions: With examination consistent with bilateral ischial bursa pain, set patient up for bilateral ischial bursa injections under fluoroscopy. Patient is in agreement and wants to proceed with injection. Anticoagulation/NPO Recommendations:   Patient does not need to hold any medications for the procedure. Patient does not need to be NPO prior to the procedure. We will do a straight LOCAL injection    Multidisciplinary Pain Management:   In the presence of complex, chronic, and multi-factorial pain, the importance of a multidisciplinary approach to pain management in the patients management regimen was emphasized and discussed in great detail. PHYSICAL THERAPY: Patient is advised to see a physical therapist for gentle stretching exercises and conditioning exercises for management of pain. The patient was also advised to continue physical therapy and stretching exercises at home and cognitive behavioral and/or group therapy. Referrals:  None    Prescriptions Written This Visit:   None    Follow-up:   B/L ischial bursa injections     It was my pleasure to evaluate Souleymane Mcmillan today.   I spent over 40 minutes establishing care (new patient to me), evaluating this patient, reviewing previous notes and images and completing documentation.        Hunter Grier, DO   Interventional Pain Management/PM&R   [place]

## 2022-09-27 ENCOUNTER — APPOINTMENT (OUTPATIENT)
Dept: GENERAL RADIOLOGY | Age: 87
End: 2022-09-27
Attending: ANESTHESIOLOGY
Payer: MEDICARE

## 2022-09-27 ENCOUNTER — HOSPITAL ENCOUNTER (OUTPATIENT)
Age: 87
Setting detail: OUTPATIENT SURGERY
Discharge: HOME | End: 2022-09-27
Attending: ANESTHESIOLOGY | Admitting: ANESTHESIOLOGY
Payer: MEDICARE

## 2022-09-27 VITALS
WEIGHT: 108.6 LBS | HEART RATE: 72 BPM | SYSTOLIC BLOOD PRESSURE: 132 MMHG | TEMPERATURE: 99 F | OXYGEN SATURATION: 94 % | HEIGHT: 59 IN | DIASTOLIC BLOOD PRESSURE: 63 MMHG | RESPIRATION RATE: 18 BRPM | BODY MASS INDEX: 21.89 KG/M2

## 2022-09-27 PROCEDURE — 7100000010 HC PHASE II RECOVERY - FIRST 15 MIN: Performed by: ANESTHESIOLOGY

## 2022-09-27 PROCEDURE — 77002 NEEDLE LOCALIZATION BY XRAY: CPT | Performed by: ANESTHESIOLOGY

## 2022-09-27 PROCEDURE — 3600000056 HC PAIN LEVEL 4 BASE: Performed by: ANESTHESIOLOGY

## 2022-09-27 PROCEDURE — 6360000004 HC RX CONTRAST MEDICATION: Performed by: ANESTHESIOLOGY

## 2022-09-27 PROCEDURE — 2500000003 HC RX 250 WO HCPCS: Performed by: ANESTHESIOLOGY

## 2022-09-27 PROCEDURE — 2709999900 HC NON-CHARGEABLE SUPPLY: Performed by: ANESTHESIOLOGY

## 2022-09-27 PROCEDURE — 3209999900 FLUORO FOR SURGICAL PROCEDURES

## 2022-09-27 PROCEDURE — 7100000011 HC PHASE II RECOVERY - ADDTL 15 MIN: Performed by: ANESTHESIOLOGY

## 2022-09-27 PROCEDURE — 20610 DRAIN/INJ JOINT/BURSA W/O US: CPT | Performed by: ANESTHESIOLOGY

## 2022-09-27 PROCEDURE — 6360000002 HC RX W HCPCS: Performed by: ANESTHESIOLOGY

## 2022-09-27 RX ORDER — LIDOCAINE HYDROCHLORIDE 10 MG/ML
INJECTION, SOLUTION EPIDURAL; INFILTRATION; INTRACAUDAL; PERINEURAL PRN
Status: DISCONTINUED | OUTPATIENT
Start: 2022-09-27 | End: 2022-09-27 | Stop reason: ALTCHOICE

## 2022-09-27 RX ORDER — METHYLPREDNISOLONE ACETATE 80 MG/ML
INJECTION, SUSPENSION INTRA-ARTICULAR; INTRALESIONAL; INTRAMUSCULAR; SOFT TISSUE PRN
Status: DISCONTINUED | OUTPATIENT
Start: 2022-09-27 | End: 2022-09-27 | Stop reason: ALTCHOICE

## 2022-09-27 RX ORDER — BUPIVACAINE HYDROCHLORIDE 2.5 MG/ML
INJECTION, SOLUTION EPIDURAL; INFILTRATION; INTRACAUDAL PRN
Status: DISCONTINUED | OUTPATIENT
Start: 2022-09-27 | End: 2022-09-27 | Stop reason: ALTCHOICE

## 2022-09-27 ASSESSMENT — PAIN DESCRIPTION - DESCRIPTORS: DESCRIPTORS: ACHING

## 2022-09-27 ASSESSMENT — PAIN - FUNCTIONAL ASSESSMENT: PAIN_FUNCTIONAL_ASSESSMENT: 0-10

## 2022-09-27 NOTE — PROCEDURES
Pre-operative Diagnosis:  Ischial bursitis     Post-operative Diagnosis: Ischial bursitis     Procedure: BILATERAL ischial bursa injection with fluoroscopic guidance     Procedure Description:  After having signed the informed consent, the patient was placed in the prone position. The patient's low back/buttocks was prepped with chloraprep solution, and draped in a sterile fashion. A total of 1 ml of 1% lidocaine were used to anesthetize the skin and underlying tissues. Under fluoroscopic guidance a single 22-gauge, 3.5 inch spinal needle was advanced to lie at the inferior pole of the bilateral ischium. There were no paresthesias or heme aspiration. Needle placement was confirmed in the AP view. After negative aspiration, 0.5 ml of Omnipaque 300 contrast was injected with appropriate spread observed. A total of 2 ml of 0.5% bupivicaine mixed with 40 mg depo-medrol were injected into the ischial bursa. The needle was withdrawn without any complications. This procedure was repeated on the contralateral side. The patient tolerated the procedure well, was transported to the recovery room and observed for 15 minutes and discharged in an ambulatory fashion. No immediate reported complications.      Procedural Complications: None  Estimated Blood Loss: 0 mL        Hunter Mondragon DO  Interventional Pain Management/PM&R   [facility] and [address] [facility]

## 2022-10-04 ENCOUNTER — NURSE TRIAGE (OUTPATIENT)
Dept: OTHER | Facility: CLINIC | Age: 87
End: 2022-10-04

## 2022-10-04 NOTE — TELEPHONE ENCOUNTER
Received call from [facility] at [facility] with [employer]. Subjective: Caller states \"Dizziness\"     Current Symptoms:   Dizziness when standing up quickly or bending over  Patient feels \"off-balanced\"  Denies cold s/sx  Able to ambulate normally    Onset: 2 days ago; unchanged    Pain Severity: NA    Temperature: Denies    What has been tried: Drinking fluids    Recommended disposition: See PCP within 24 Hours    Care advice provided, patient verbalizes understanding; denies any other questions or concerns; instructed to call back for any new or worsening symptoms. Patient/Caller agrees with recommended disposition; writer provided warm transfer to [facility] at [facility] for appointment scheduling    Attention Provider: Thank you for allowing me to participate in the care of your patient. The patient was connected to triage in response to information provided to the ECC/PSC. Please do not respond through this encounter as the response is not directed to a shared pool.       Reason for Disposition   [1] MODERATE dizziness (e.g., vertigo; feels very unsteady, interferes with normal activities) AND [2] has NOT been evaluated by physician for this    Protocols used: Dizziness - Vertigo-ADULT-

## 2022-10-05 ENCOUNTER — OFFICE VISIT (OUTPATIENT)
Dept: FAMILY MEDICINE CLINIC | Age: 87
End: 2022-10-05
Payer: MEDICARE

## 2022-10-05 VITALS
SYSTOLIC BLOOD PRESSURE: 100 MMHG | OXYGEN SATURATION: 97 % | BODY MASS INDEX: 22.14 KG/M2 | RESPIRATION RATE: 18 BRPM | DIASTOLIC BLOOD PRESSURE: 56 MMHG | HEART RATE: 71 BPM | WEIGHT: 109.6 LBS

## 2022-10-05 DIAGNOSIS — R42 ORTHOSTATIC LIGHTHEADEDNESS: Primary | ICD-10-CM

## 2022-10-05 DIAGNOSIS — M47.816 LUMBAR SPONDYLOSIS: ICD-10-CM

## 2022-10-05 DIAGNOSIS — I10 PRIMARY HYPERTENSION: ICD-10-CM

## 2022-10-05 DIAGNOSIS — M46.1 SACROILIITIS, NOT ELSEWHERE CLASSIFIED (HCC): ICD-10-CM

## 2022-10-05 DIAGNOSIS — Z23 NEED FOR INFLUENZA VACCINATION: ICD-10-CM

## 2022-10-05 PROCEDURE — G8427 DOCREV CUR MEDS BY ELIG CLIN: HCPCS | Performed by: FAMILY MEDICINE

## 2022-10-05 PROCEDURE — 99214 OFFICE O/P EST MOD 30 MIN: CPT | Performed by: FAMILY MEDICINE

## 2022-10-05 PROCEDURE — G8484 FLU IMMUNIZE NO ADMIN: HCPCS | Performed by: FAMILY MEDICINE

## 2022-10-05 PROCEDURE — 1090F PRES/ABSN URINE INCON ASSESS: CPT | Performed by: FAMILY MEDICINE

## 2022-10-05 PROCEDURE — 1123F ACP DISCUSS/DSCN MKR DOCD: CPT | Performed by: FAMILY MEDICINE

## 2022-10-05 PROCEDURE — 90694 VACC AIIV4 NO PRSRV 0.5ML IM: CPT | Performed by: FAMILY MEDICINE

## 2022-10-05 PROCEDURE — G8420 CALC BMI NORM PARAMETERS: HCPCS | Performed by: FAMILY MEDICINE

## 2022-10-05 PROCEDURE — 1036F TOBACCO NON-USER: CPT | Performed by: FAMILY MEDICINE

## 2022-10-05 PROCEDURE — G0008 ADMIN INFLUENZA VIRUS VAC: HCPCS | Performed by: FAMILY MEDICINE

## 2022-10-05 RX ORDER — MELOXICAM 7.5 MG/1
TABLET ORAL
Qty: 90 TABLET | Refills: 1 | Status: SHIPPED | OUTPATIENT
Start: 2022-10-05

## 2022-10-05 NOTE — PROGRESS NOTES
Outpatient Physical Therapy Ortho Initial Evaluation   Jovita Greene     Patient Name: Zach Gutierrez  : 1942  MRN: [id number]  Today's Date: 7/10/2017      Visit Date: 07/10/2017    Patient Active Problem List   Diagnosis   • Osteoarthritis of right knee        Past Medical History:   Diagnosis Date   • Arthritis    • Dementia    • Diabetes    • Knee sprain    • ZENA (obstructive sleep apnea)     cpap        Past Surgical History:   Procedure Laterality Date   • COLONOSCOPY N/A 2017    Procedure: COLONOSCOPY;  Surgeon: Srikanth Payan MD;  Location: [facility] OR;  Service:    • HERNIA REPAIR Bilateral     inguinal   • SC TOTAL KNEE ARTHROPLASTY Right 2017    Procedure: TOTAL KNEE ARTHROPLASTY hima orthoalign antibiotic cement 7fr hemovac placement;  Surgeon: Bj Thrasher MD;  Location: [facility] OR;  Service: Orthopedics   • SHOULDER SURGERY Right     RCR       Visit Dx:     ICD-10-CM ICD-9-CM   1. Status post total right knee replacement Z96.651 V43.65             Patient History       07/10/17 0800          History    Chief Complaint Pain;Difficulty with daily activities;Joint stiffness  -SP      Type of Pain Knee pain;Hip pain  -SP      Date Current Problem(s) Began 17  -SP      Brief Description of Current Complaint Patient with chronic history of right knee pain that has not improved with conservative measures.  He is now s/p right TKR 2016.  Surgery was uncomplicated and he returned home with home health, now presents for outpatient PT.  Patient reports he has had some right hip pain over the last couple of days and generalized knee pain to 5/10 level.  Patient reports he is ambulating in home using SPC.  States that he is not having difficulty with transfers.  He is regularly taking pain meds and icing daily.  Patient reports that he fatigues easily with ambulation and has not been out in community very much  -SP      Patient/Caregiver Goals Relieve pain;Return to  After obtaining consent, and per orders of Dr. Darien Montero, injection of HD Flu 0.5 ML given IM in Left deltoid by Reena Cassidy CMA ([facility]). Patient instructed to  report any adverse reaction to me immediately.     Immunizations Administered       Name Date Dose Route    Influenza, FLUAD, (age 72 y+), Adjuvanted, 0.5mL 10/5/2022 0.5 mL Intramuscular    Site: Deltoid- Left    Lot: [id number]    NDC: 68404-460-38 prior level of function  -SP      Current Tobacco Use no  -SP      Patient's Rating of General Health Good  -SP      Occupation/sports/leisure activities retired, watching sports  -SP      Patient seeing anyone else for problem(s)? --   Dr. Thrasher  -SP      How has patient tried to help current problem? pain meds, ice  -SP      What clinical tests have you had for this problem? X-ray  -SP      Pain     Pain Location Knee  -SP      Pain at Present 5  -SP      Pain at Best 5  -SP      Pain at Worst 5  -SP      Pain Frequency Intermittent  -SP      Pain Description Aching  -SP      What Performance Factors Make the Current Problem(s) WORSE? prolonged weight bearing   -SP      What Performance Factors Make the Current Problem(s) BETTER? ice, pain meds  -SP      Tolerance Time- Standing short periods of time in home  -SP      Tolerance Time- Sitting difficulty tolerating for prolonged periods of time  -SP      Tolerance Time- Walking limited community distances; household distances  -SP      Is your sleep disturbed? Yes  -SP      Is medication used to assist with sleep? Yes  -SP      Difficulties with ADL's? difficulty tolerating prolonged weight bearing activity; ambulation on unlevel surfaces  -SP      Fall Risk Assessment    Any falls in the past year: No  -SP      Daily Activities    Primary Language English  -SP      How does patient learn best? Listening;Pictures/Video  -SP      Patient is concerned about/has problems with Walking;Standing  -SP      Does patient have problems with the following? None  -SP      Barriers to learning Hearing   pt is [place], requires simple one step commands  -SP      Pt Participated in POC and Goals Yes  -SP      Safety    Are you being hurt, hit, or frightened by anyone at home or in your life? No  -SP      Are you being neglected by a caregiver No  -SP        User Key  (r) = Recorded By, (t) = Taken By, (c) = Cosigned By    Initials Name Provider Type    SP Lana Prescott, PT  Physical Therapist                PT Ortho       07/10/17 0800    Posture/Observations    Observations Edema;Incision healing;Muscle atrophy  -SP    Posture/Observations Comments edema present knee to ankle; patient with mild forward flexed posture with gait  -SP    Sensation    Additional Comments decreased to light touch lateral aspect of right knee  -SP    Ankle/Foot Special Tests    John’s sign (DVT) Right:;Negative  -SP    General LE Assessment    ROM LLE ROM was WFL  -SP    ROM Detail right hip and ankle AROM wfl  -SP    Right Knee    Extension/Flexion AROM Deficit 6 to 115 degrees  -SP    Extension/Flexion PROM Deficit 5 to 120 degrees  -SP    Right Hip    Hip Flexion Gross Movement (3+/5) fair plus  -SP    Hip ABduction Gross Movement (3+/5) fair plus  -SP    Hip ADduction Gross Movement (3+/5) fair plus  -SP    Lower Extremity    Lower Ext Manual Muscle Testing right LE strength is WFL  -SP    Right Knee    Knee Extension Gross Movement (2+/5) poor plus  -SP    Knee Flexion Gross Movement (3-/5) fair minus  -SP    Right Ankle/Foot    Ankle PF Gross Movement (3/5) fair  -SP    Ankle Dorsiflexion Gross Movement (3+/5) fair plus  -SP    RLE Quick Girth (cm)    Mid patella 45.6 cm  -SP    Other 1 47.4 cm   5 cm above mid patella  -SP    Other 2 41.6 cm   5 cm below midpatella  -SP    Transfers    Transfers, Sit-Stand Hart independent   uses UEs to assist  -SP    Gait Assessment/Treatment    Gait, Hart Level independent  -SP    Gait, Assistive Device straight cane  -SP    Gait, Gait Pattern Analysis swing-through gait  -SP    Gait, Gait Deviations decreased heel strike;forward flexed posture;leans to the left;step length decreased   cues to use cane in left   -SP    Stairs Assessment/Treatment    Stairs, Hart Level independent  -SP    Stairs, Assistive Device straight cane  -SP    Stairs, Technique Used step to step (ascending);step to step (descending)  -SP    Stairs, Comment requires  hold to rail  -SP      User Key  (r) = Recorded By, (t) = Taken By, (c) = Cosigned By    Initials Name Provider Type    DARRELL Prescott PT Physical Therapist                            Therapy Education       07/10/17 1245          Therapy Education    Given Symptoms/condition management  -SP      Program New  -SP      How Provided Verbal  -SP      Provided to Patient  -SP      Level of Understanding Verbalized;Demonstrated  -SP        User Key  (r) = Recorded By, (t) = Taken By, (c) = Cosigned By    Initials Name Provider Type    DARRELL Prescott, PT Physical Therapist                PT OP Goals       07/10/17 0800       PT Short Term Goals    STG 1 1.  Patient demonstrates AROM right knee extension to 0 degrees to normalize gait  -SP     STG 2 2.  Patient ambulates level surfaces with least restrictive device with equal weight bearing and heel strike bilaterally  -SP     STG 3 3.  Patient exhibits AROM right knee to 120 degrees to aid in functional mobility  -SP     Long Term Goals    LTG 1 1.  Patient demonstrates increased strength right LE by one muscle grade  -SP     LTG 2 2.  Patient ambulates stairs and unlevel surfaces safely with least restrictive device  -SP     LTG 3 3.  Patient independent with HEP  -SP     Time Calculation    PT Goal Re-Cert Due Date 08/09/17  -SP       User Key  (r) = Recorded By, (t) = Taken By, (c) = Cosigned By    Initials Name Provider Type    DARRELL Prescott, PT Physical Therapist                PT Assessment/Plan       07/10/17 1314 07/10/17 1254    PT Assessment    Functional Limitations  Impaired gait;Limitation in home management;Limitations in community activities;Limitations in functional capacity and performance;Performance in leisure activities;Performance in self-care ADL;Performance in work activities  -SP    Impairments  Balance;Edema;Gait;Endurance;Pain;Muscle strength;Posture;Range of motion  -SP    Assessment Comments Patient is s/p  "right TKR 6/20/2017.  He presents with pain, decreased ROM, decreased strength and altered gait.  Patient is hard of hearing and requires simple one step commands.    -SP     Please refer to paper survey for additional self-reported information Yes  -SP     Rehab Potential Good  -SP     Patient/caregiver participated in establishment of treatment plan and goals Yes  -SP     PT Plan    PT Frequency 3x/week  -SP     Predicted Duration of Therapy Intervention (days/wks) 4-6 weeks  -SP     Planned CPT's? PT EVAL LOW COMPLEXITY: 62378;PT THER PROC EA 15 MIN: 27773;PT GAIT TRAINING EA 15 MIN: 07047;PT MANUAL THERAPY EA 15 MIN: 81490;PT HOT OR COLD PACK TREAT MCARE;PT ELECTRICAL STIM UNATTEND:   -SP       User Key  (r) = Recorded By, (t) = Taken By, (c) = Cosigned By    Initials Name Provider Type    SP Lana Prescott, PT Physical Therapist                Modalities       07/10/17 0800          Ice    Ice Applied Yes  -SP      Location right knee  -SP      Rx Minutes 10 mins  -SP      Ice S/P Rx Yes  -SP        User Key  (r) = Recorded By, (t) = Taken By, (c) = Cosigned By    Initials Name Provider Type    SP Lana Prescott, PT Physical Therapist              Exercises       07/10/17 0800          Exercise 1    Exercise Name 1 heel slides  -SP      Time (Minutes) 1 5  -SP      Exercise 2    Exercise Name 2 wall slides  -SP      Exercise 3    Exercise Name 3 cycle (recumbent)  -SP      Time (Minutes) 3 5  -SP      Exercise 4    Exercise Name 4 heel raises   -SP      Reps 4 20  -SP      Exercise 5    Exercise Name 5 step ups 4\"  -SP      Reps 5 15  -SP      Exercise 6    Exercise Name 6 mini squats  -SP      Reps 6 20  -SP      Exercise 7    Exercise Name 7 SLR  -SP      Reps 7 20  -SP      Exercise 8    Exercise Name 8 SAQ  -SP      Reps 8 20  -SP      Exercise 9    Exercise Name 9 QS towel under knee/ankle  -SP      Reps 9 10  -SP      Time (Seconds) 9 5  -SP      Exercise 10    Exercise Name 10 heel " prop  -SP      Time (Minutes) 10 4  -SP        User Key  (r) = Recorded By, (t) = Taken By, (c) = Cosigned By    Initials Name Provider Type    SP Lana Prescott, LUIS Physical Therapist           Manual Rx (last 36 hours)      Manual Treatments       07/10/17 0800          Manual Rx 1    Manual Rx 1 Location right knee  -SP      Manual Rx 1 Type PROM flexion stretch in short sit  -SP      Manual Rx 1 Duration 10 x 10 sec  -SP      Manual Rx 2    Manual Rx 2 Location right patella  -SP      Manual Rx 2 Type patellar mobilizations  -SP      Manual Rx 3    Manual Rx 3 Location right knee  -SP      Manual Rx 3 Type PROM knee extension stretch  -SP      Manual Rx 3 Duration 15 sec x 3  -SP        User Key  (r) = Recorded By, (t) = Taken By, (c) = Cosigned By    Initials Name Provider Type    SP Lana Prescott, LUIS Physical Therapist                            Outcome Measures       07/10/17 0800          Lower Extremity Functional Index    Any of your usual work, housework or school activities 1  -SP      Your usual hobbies, recreational or sporting activities 2  -SP      Getting into or out of the bath 3  -SP      Walking between rooms 4  -SP      Putting on your shoes or socks 2  -SP      Squatting 3  -SP      Lifting an object, like a bag of groceries from the floor 4  -SP      Performing light activities around your home 2  -SP      Performing heavy activities around your home 2  -SP      Getting into or out of a car 4  -SP      Walking 2 blocks 4  -SP      Walking a mile 2  -SP      Going up or down 10 stairs (about 1 flight of stairs) 2  -SP      Standing for 1 hour 2  -SP      Sitting for 1 hour 3  -SP      Running on even ground 0  -SP      Making sharp turns while running fast 0  -SP      Hopping 0  -SP      Rolling over in bed 3  -SP      Functional Assessment    Outcome Measure Options Lower Extremity Functional Scale (LEFS)  -SP        User Key  (r) = Recorded By, (t) = Taken By, (c) =  Cosigned By    Initials Name Provider Type    SP Lana Prescott, PT Physical Therapist            Time Calculation:   Start Time: 0800  Stop Time: 0925  Time Calculation (min): 85 min     Therapy Charges for Today     Code Description Service Date Service Provider Modifiers Qty    81503757662 HC PT EVAL LOW COMPLEXITY 1 7/10/2017 Lana Prescott, PT GP 1    60675189089 HC PT THER PROC EA 15 MIN 7/10/2017 Lana Prescott, PT GP 1    73732437598 HC PT HOT OR COLD PACK TREAT MCARE 7/10/2017 Lana Prescott, PT GP 1    92402095640 HC PT MOBILITY CURRENT 7/10/2017 Lana Prescott, PT GP, CL 1    62187441094 HC PT MOBILITY PROJECTED 7/10/2017 Lana Prescott, PT GP, CI 1          PT G-Codes  PT Professional Judgement Used?: Yes  Outcome Measure Options: Lower Extremity Functional Scale (LEFS)  Mobility: Walking and Moving Around Current Status (): At least 60 percent but less than 80 percent impaired, limited or restricted  Mobility: Walking and Moving Around Goal Status (): At least 1 percent but less than 20 percent impaired, limited or restricted         Lana Prescott, PT  7/10/2017

## 2022-10-05 NOTE — PROGRESS NOTES
Flavia Rogers (:  1934) is a 80 y.o. female,Established patient, here for evaluation of the following chief complaint(s):  Dizziness (X 2 weeks - with standing, walking or bending head in a downward movement ), Medication Refill, and Lower Back Pain (Discuss since having treatment with Dr. Hayden Whitaker )        Subjective   SUBJECTIVE/OBJECTIVE:  HPI:    Chief Complaint   Patient presents with    Dizziness     X 2 weeks - with standing, walking or bending head in a downward movement     Medication Refill    Lower Back Pain     Discuss since having treatment with Dr. Hayden Whitaker      Pt presents today with c/o intermittent lightheadedness for the last few weeks. Denies \"room spinning\". Typically happens with position change and will last a few seconds. BP Readings from Last 3 Encounters:   10/05/22 (!) 100/56   22 132/63   09/15/22 122/60     Denies CP, chest tightness, palpitations. Denies syncope. Pt continues to have back pain. Seeing Dr. Hayden Whitaker. S/p injections with little response. Questions next step.     Patient Active Problem List   Diagnosis    Hypertension    Hyperlipidemia    GERD (gastroesophageal reflux disease)    Lumbar spondylosis    Hypothyroidism    Vertebro basilar insufficiency    Cervical spondylosis with myelopathy    Light headed    Stenosis, spinal, lumbar    TIA involving vertebral artery    Sacroiliac inflammation (HCC)    Trochanteric bursitis of right hip    Ischial bursitis    Hypoxia    Pneumonia due to COVID-19 virus    Acquired trigger finger    Osteoarthritis of knee    Pain in right knee     Past Surgical History:   Procedure Laterality Date    ARTHROCENTESIS Right 2019    Right hip bursa steroid INJECTION  NO SEDATION performed by Mayte Quezada MD at [facility] Right 2021    Right SI joint injection performed by Schuyler Sever, DO at [address] [place] [address]  2003    COLONOSCOPY OR    ID INJECT RX OTHER PERIPH NERVE Right 5/22/2018    LUMBAR RFA RIGHT SIDE FIRST L3-4,4-5,5-S1 performed by Allie Lin MD at [address] Left 6/25/2018    LUMBAR RFA  LEFT SIDE @ L3-4,4-5,5-S1, performed by Allie Lin MD at [facility], W IMAGE GUIDANCE, SINGLE Right 8/27/2018    SI RFA  RIGHT SIDE performed by Allie Lin MD at [facility], W IMAGE GUIDANCE, SINGLE Left 9/11/2018    SI RFA  LEFT SIDE performed by Allie Lin MD at [address] [facility] History     Tobacco Use    Smoking status: Never    Smokeless tobacco: Never   Vaping Use    Vaping Use: Never used   Substance Use Topics    Alcohol use: No    Drug use: No           Review of Systems   Constitutional: Negative. HENT: Negative. Respiratory: Negative. Cardiovascular: Negative. Gastrointestinal: Negative. Musculoskeletal:  Positive for back pain. Neurological:  Positive for light-headedness. All other systems reviewed and are negative. Objective   Physical Exam  Vitals and nursing note reviewed. Constitutional:       General: She is not in acute distress. Appearance: Normal appearance. She is well-developed. HENT:      Head: Normocephalic and atraumatic. Right Ear: Tympanic membrane normal.      Left Ear: Tympanic membrane normal.   Eyes:      Conjunctiva/sclera: Conjunctivae normal.   Cardiovascular:      Rate and Rhythm: Normal rate and regular rhythm. Heart sounds: Normal heart sounds. No murmur heard. Pulmonary:      Effort: Pulmonary effort is normal.      Breath sounds: Normal breath sounds. No wheezing, rhonchi or rales. Abdominal:      General: There is no distension. Musculoskeletal:      Cervical back: Neck supple. Skin:     General: Skin is warm and dry.       Findings: No rash (on exposed surfaces). Neurological:      General: No focal deficit present. Mental Status: She is alert. Psychiatric:         Attention and Perception: Attention normal.         Mood and Affect: Mood normal.         Speech: Speech normal.         Behavior: Behavior normal. Behavior is cooperative. Thought Content: Thought content normal.         Judgment: Judgment normal.             ASSESSMENT/PLAN:  1. Orthostatic lightheadedness  2. Lumbar spondylosis  -     meloxicam (MOBIC) 7.5 MG tablet; TAKE 1 TABLET BY MOUTH EVERY DAY, Disp-90 tablet, R-1Normal  3. Need for influenza vaccination  -     Influenza, FLUAD, (age 72 y+), IM, Preservative Free, 0.5 mL  4. Sacroiliitis, not elsewhere classified ([facility] [address]  5. Primary hypertension    -  Chronic medical problems stable  -  Continue current medications with the exception of lisinopril, likely contributing to her lightheadedness  -  Monitor BPs closely  -  In regards to her back, follow with Dr. Lisa Flores as scheduled    Return if symptoms worsen or fail to improve. An electronic signature was used to authenticate this note.     --Casper Current, DO

## 2022-10-06 ASSESSMENT — ENCOUNTER SYMPTOMS
BACK PAIN: 1
GASTROINTESTINAL NEGATIVE: 1
RESPIRATORY NEGATIVE: 1

## 2022-10-10 RX ORDER — LEVOTHYROXINE SODIUM 0.05 MG/1
TABLET ORAL
Qty: 90 TABLET | Refills: 3 | Status: SHIPPED | OUTPATIENT
Start: 2022-10-10

## 2022-10-10 RX ORDER — PANTOPRAZOLE SODIUM 40 MG/1
TABLET, DELAYED RELEASE ORAL
Qty: 90 TABLET | Refills: 3 | Status: SHIPPED | OUTPATIENT
Start: 2022-10-10

## 2022-10-19 ENCOUNTER — OFFICE VISIT (OUTPATIENT)
Dept: PHYSICAL MEDICINE AND REHAB | Age: 87
End: 2022-10-19
Payer: MEDICARE

## 2022-10-19 VITALS
HEIGHT: 59 IN | WEIGHT: 109 LBS | DIASTOLIC BLOOD PRESSURE: 68 MMHG | SYSTOLIC BLOOD PRESSURE: 146 MMHG | BODY MASS INDEX: 21.97 KG/M2

## 2022-10-19 DIAGNOSIS — G89.29 CHRONIC RIGHT SI JOINT PAIN: Primary | ICD-10-CM

## 2022-10-19 DIAGNOSIS — G89.29 OTHER CHRONIC PAIN: ICD-10-CM

## 2022-10-19 DIAGNOSIS — M70.70 ISCHIAL BURSITIS, UNSPECIFIED LATERALITY: ICD-10-CM

## 2022-10-19 DIAGNOSIS — M53.3 SACROILIAC JOINT PAIN: ICD-10-CM

## 2022-10-19 DIAGNOSIS — M53.3 CHRONIC RIGHT SI JOINT PAIN: Primary | ICD-10-CM

## 2022-10-19 DIAGNOSIS — M53.3 SI (SACROILIAC) JOINT DYSFUNCTION: ICD-10-CM

## 2022-10-19 DIAGNOSIS — M53.3 SACROILIAC PAIN: ICD-10-CM

## 2022-10-19 PROCEDURE — G8420 CALC BMI NORM PARAMETERS: HCPCS | Performed by: NURSE PRACTITIONER

## 2022-10-19 PROCEDURE — G8427 DOCREV CUR MEDS BY ELIG CLIN: HCPCS | Performed by: NURSE PRACTITIONER

## 2022-10-19 PROCEDURE — 99214 OFFICE O/P EST MOD 30 MIN: CPT | Performed by: NURSE PRACTITIONER

## 2022-10-19 PROCEDURE — G8484 FLU IMMUNIZE NO ADMIN: HCPCS | Performed by: NURSE PRACTITIONER

## 2022-10-19 PROCEDURE — 1090F PRES/ABSN URINE INCON ASSESS: CPT | Performed by: NURSE PRACTITIONER

## 2022-10-19 PROCEDURE — 1123F ACP DISCUSS/DSCN MKR DOCD: CPT | Performed by: NURSE PRACTITIONER

## 2022-10-19 PROCEDURE — 1036F TOBACCO NON-USER: CPT | Performed by: NURSE PRACTITIONER

## 2022-10-19 RX ORDER — CLOPIDOGREL BISULFATE 75 MG/1
TABLET ORAL
Qty: 90 TABLET | Refills: 3 | Status: SHIPPED | OUTPATIENT
Start: 2022-10-19

## 2022-10-19 NOTE — PROGRESS NOTES
Chronic Pain/PM&R Clinic Note     Encounter Date: 10/19/22    Subjective:   Chief Complaint:   Chief Complaint   Patient presents with    Follow-up     Wants to talk about possible injections due to being out of town the next couple months. Also having pain in bilat hips/ and under buttocks as well as tail bone. History of Present Illness:   Minna Archuleta is a 80 y.o. female seen in the clinic initially on 10/8/20 upon request from Keely Sánchez DO (PCP) for her history of low back pain. She has medical history of previous TIA (on Plavix). She has a longstanding history of cervical neck and low back pain. However, she sustained a fall while trying to put her pants on 10/2/2021 where she fell on her right buttocks. She states that since this episode she has been dealing with severe low back pain with radiation down the posterior thigh and slightly down the posterior calf. She does admit the pain radiates around to her groin. She states she has numbness and tingling however these are more in the toes. He does endorse some right leg weakness which she feels like this pain limited. Her pain is sharp and stabbing in nature and 5/10. Her pain is exacerbated with any sitting or laying on the affected side. Her pain is alleviated with rest and medication. She denies any focal leg weakness, saddle anesthesia, or bowel/bladder incontinence. She states she is currently been managing her pain with over-the-counter Tylenol arthritis. She has seen a couple pain specialist in the past including receiving spinal injections with Dr. Reese Sullivan and Dr. Sonny Argueta. Today, 9/15/2022, patient presents for follow up. She did not come to her follow-up after her previous procedure that was completed on 6/29/2022 with Dr. Samantha Rand for bilateral ischial bursa injections. She presents today with reports that her buttocks pain into her posterior thighs is now back.  She states that she received 100% relief for 6 weeks from her injections, then slowly started to return and is now back to her baseline as prior to procedure. She stats that it is a constant ache, occasionally sharp pain. Worse with moving, walking, bending, working outside. She uses heating pad with mild relied, no home stretches or exercises. She denies any new symptoms, or loss of bowel/bladder. She is interested in repeating the injections again. Today, 10/19/2022, patient presents for procedural follow-up. She completed bilateral ischial bursa injections with Dr. Tamar Bills on 9/27/2022. She reports procedure went well, and that she had good relief for 3 weeks, about 95%-100% relief. Was able to tolerate walking, sitting longer, working around the house with pain more controlled. Pain is still improved but starting to come back to her baseline. She also notes she is having bilateral buttocks pain, her SI pain is back on the right but also noticed on the left side as well. She is asking about procedures to assist with that pain. She states she is going to be gone to [place] from mid November through mid December. History of Interventions:   Surgery: No previous lumbar surgeries  Injections: Previous lumbar epidurals-significant relief  R SI joint injection (12/8/20) - significant (100%) x 4 months  Right ischial bursa injection (3/16/2021)-moderate relief  R SI joint injection (5/11/2021)-greater than 80% relief x 3 months  R genicular nerve block (8/4/2021)- >85% relief x 3 days  R genicular ablation (8/17/21) - minimal relief  LESI (10/12/21) - minimal relief  B/L ischial bursa injection (6/29/2022)- 100% relief x 6 weeks  B/L ishial bursa injection (9/27/2022_ % relief x 3 weeks    Current Treatment Medications:   Gabapentin 600 mg / 1200 mg  Tylenol PRN  Aleve PRN    Historical Treatment Medications:   None    Imaging:  MRI L-spine (10/4/20)     LUMBAR SPINE 2 VIEWS:         CLINICAL INFORMATION: fall         COMPARISON: No prior study. TECHNIQUE: Standard AP and lateral views of lumbar spine were obtained. Impression    1. Mild thoracolumbar dextro scoliosis. Moderate lumbar levoscoliosis. Cannot exclude muscle spasm. 2. Multifocal marked disc space narrowing and degenerative disc disease throughout the lumbar spine. Moderately severe degenerative vertebral body spondylosis scattered throughout the lumbar spine. 3. No fracture acute is seen. Mild degenerative changes right sacroiliac joint. Past Medical History:   Diagnosis Date    Cerebral artery occlusion with cerebral infarction ([address] 2012    tia    GERD (gastroesophageal reflux disease)     Hyperlipidemia     Hypertension     Hypothyroidism     Osteoarthritis     Sixth nerve palsy 2020       Past Surgical History:   Procedure Laterality Date    ARTHROCENTESIS Right 4/12/2019    Right hip bursa steroid INJECTION  NO SEDATION performed by Lucile Siemens, MD at [facility] Right 5/11/2021    Right SI joint injection performed by Mirna Almeida DO at [address]  2003    COLONOSCOPY      ENDOSCOPY, COLON, DIAGNOSTIC      HC INJECTION PROCEDURE FOR SACROILIAC JOINT Right 12/8/2020    Right SI joint injection performed by Mirna Almeida DO at [address] [facility] Right 3/16/2021    right ischial bursa injection performed by Mirna Almeida DO at [address] [place] [address] Bilateral 9/21/2021    bilateral ischial bursa injections under fluoroscopy.  performed by Mirna Almeida DO at [address]  Bilateral 6/29/2022    bilateral ischial bursa injections under fluoroscopy performed by Mirna Almeida DO at [address] [facility]      left    KNEE ARTHROCENTESIS Bilateral 9/27/2022    bilateral ischial bursa injections performed by Mirna Almeida DO at [address] [facility] ARTHROSCOPY  2010    left    LUMBAR NERVE BLOCK N/A 12/11/2018    LUMBAR INTER LAMINAR DAYANARA LESI #1@ L5 performed by Lester Leija MD at [address] N/A 3/12/2019    LUMBAR INTER LAMINAR DAYANARA LESI @L5 performed by Lester Leija MD at [address] Right 8/30/2019    SI RFA  RIGHT SIDE performed by Lester Leija MD at [address]"" Left 10/1/2019    SI RFA  LEFT SIDE performed by Lester Leija MD at [address] Right 08/27/2018    SI RFA    NERVE SURGERY Left 09/11/2018    SI RFA    OTHER SURGICAL HISTORY      previous nerve blocks at [facility] Right 8/4/2021    Right genicular nerve block performed by Mitesh Schaefer DO at [facility] [address] Right 8/17/2021    right genicular nerve radiofrequency ablation performed by Mitesh Schaefer DO at [facility] [address] N/A 10/12/2021    lumbar epidural steroid L5/S1 performed by Mitesh Schaefer DO at [address] Right 5/22/2018    LUMBAR RFA RIGHT SIDE FIRST L3-4,4-5,5-S1 performed by Lester Leija MD at [address] Left 6/25/2018    LUMBAR RFA  LEFT SIDE @ L3-4,4-5,5-S1, performed by Lester Leija MD at [address] [facility], W IMAGE GUIDANCE, SINGLE Right 8/27/2018    SI RFA  RIGHT SIDE performed by Lester eLija MD at [address] [facility] OR SACRAL, W IMAGE GUIDANCE, SINGLE Left 9/11/2018    SI RFA  LEFT SIDE performed by Lester Leija MD at [address] [facility]       Family History   Problem Relation Age of Onset    Cancer Mother         melanoma    Heart Disease Father     Stroke Sister          Medications & Allergies:   Current Outpatient Medications   Medication Instructions    Acetaminophen (TYLENOL ARTHRITIS EXT RELIEF PO) Oral    aspirin 81 mg, Oral, DAILY    calcium carbonate-vitamin D 600-200 MG-UNIT TABS 1 tablet, DAILY    Cholecalciferol (VITAMIN D) 2000 UNITS CAPS capsule 1 capsule, DAILY    clopidogrel (PLAVIX) 75 MG tablet TAKE 1 TABLET BY MOUTH EVERY DAY    gabapentin (NEURONTIN) 600 MG tablet TAKE 1 TABLET IN THE       MORNING, 1 TABLET IN THE   AFTERNOON AND 2 TABLETS AT BEDTIME.    levothyroxine (SYNTHROID) 50 MCG tablet TAKE 1 TABLET BY MOUTH EVERY DAY    lovastatin (MEVACOR) 20 MG tablet TAKE 1 TABLET BY MOUTH EVERY DAY AT NIGHT    meloxicam (MOBIC) 7.5 MG tablet TAKE 1 TABLET BY MOUTH EVERY DAY    Multiple Vitamins-Minerals (THERAPEUTIC MULTIVITAMIN-MINERALS) tablet 1 tablet, DAILY    pantoprazole (PROTONIX) 40 MG tablet TAKE 1 TABLET BY MOUTH EVERY DAY WITH BREAKFAST       Allergies   Allergen Reactions    Cataflam [Diclofenac Potassium] Shortness Of Breath    Augmentin [Amoxicillin-Pot Clavulanate] Diarrhea    Phenergan [Promethazine Hcl]      Confusion \"didn't know where she was at\"       Review of Systems:   Constitutional: negative for weight changes or fevers  Genitourinary: negative for bowel/bladder incontinence   Musculoskeletal: positive for bilateral buttocks pain  Neurological: positive for numbness/tingling in toes   Behavioral/Psych: negative for anxiety/depression   All other systems reviewed and are negative    Objective:     Vitals:    10/19/22 1032   BP: (!) 146/68       Constitutional: Pleasant, no acute distress   Head: Normocephalic, atraumatic   Eyes: Conjunctivae normal   Neck: Supple, symmetrical   Lungs: Normal respiratory effort, non-labored breathing   Cardiovascular: Limbs warm and well perfused   Abdomen: Non-protruded   Musculoskeletal: Muscle bulk symmetric, no atrophy, no gross deformities   · Thorax: scoliosis noted  · Lumbar Spine/Hip: tenderness to palpation over bilateral ischial bursa's. Negative seated SLR  -Bilateral SI joint tenderness  Neurological: Cranial nerves II-XII grossly intact. · Gait - antalgic gait. Ambulates without assistive device. · Motor: No focal deficits noted  Skin: No rashes or lesions present   Psychological: Cooperative, no exaggerated pain behaviors       Assessment:    Diagnosis Orders   1. Chronic right SI joint pain  CHG FLUOR NEEDLE/CATH SPINE/PARASPINAL DX/THER ADDON    GA INJECT RX OTHER PERIPH NERVE      2. Sacroiliac pain  CHG FLUOR NEEDLE/CATH SPINE/PARASPINAL DX/THER ADDON    GA INJECT RX OTHER PERIPH NERVE      3. Ischial bursitis, unspecified laterality        4. SI (sacroiliac) joint dysfunction        5. Sacroiliac joint pain        6. Other chronic pain               Camilo Rice is a 80 y. o.female presenting to the pain clinic for evaluation of right-sided low back pain. 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 With good response from injections, and return of pain complaints, I have set her up for repeat bilateral ischial bursa injections with Dr Everardo Andrade. We did discuss possibly trial some physical therapy to assist with strengthening, stretching and pain control in the future. She wishes to proceed with injections first     With the return of her SI joint dysfunction pain, I have set her up for a right-sided SI joint radiofrequency ablation with Dr. Everardo Andrade. When she comes back from her vacation she would like to address the SI joint on the left side as she has not had injections into that side    Plan: The following treatment recommendations and plan were discussed in detail with Camilo Rice. Imaging:   I have reviewed patients imaging of lumbar XR and results were discussed with patient today. Analgesics:     Patient is taking Acetaminophen.  Patient informed that the maximum amount of acetaminophen taken on a regular basis should only be 4000 mg per day. Adjuvants: In light of the presence of a neuropathic component of pain the patient is advised to continue her Neurontin    Interventions: With physical examination consistent with right sacroiliac joint dysfunction/pain, we will proceed with a right sacroiliac joint radiofrequency ablation. Patient wants to proceed with this injection with Dr Faisal Bullard     Anticoagulation/NPO Recommendations:   Patient does not need to hold any medications for the procedure. Patient does need to be NPO prior to the procedure. IV anesthesia, needs a      Multidisciplinary Pain Management:   In the presence of complex, chronic, and multi-factorial pain, the importance of a multidisciplinary approach to pain management in the patients management regimen was emphasized and discussed in great detail. PHYSICAL THERAPY: Patient is advised to see a physical therapist for gentle stretching exercises and conditioning exercises for management of pain. The patient was also advised to continue physical therapy and stretching exercises at home and cognitive behavioral and/or group therapy. Referrals:  None    Prescriptions Written This Visit:   None    Follow-up:   After injections with Dr Faisal Bullard     It was my pleasure to evaluate Abdon Alvarez today. I spent over 35 minutes establishing care, evaluating this patient, reviewing previous notes and images and completing documentation.        Brain Omar, APRN - CNP   Interventional Pain Management/PM&R   [place]

## 2022-10-19 NOTE — TELEPHONE ENCOUNTER
The patient called in requesting a refill on her Plavix to be sent to [address] [place] [address]. Order pended for your signature.       If no call back the patient will check with her pharmacy after 4pm.

## 2022-10-27 ENCOUNTER — PREP FOR PROCEDURE (OUTPATIENT)
Dept: PHYSICAL MEDICINE AND REHAB | Age: 87
End: 2022-10-27

## 2022-10-27 RX ORDER — LISINOPRIL 10 MG/1
TABLET ORAL
Qty: 90 TABLET | Refills: 3 | OUTPATIENT
Start: 2022-10-27

## 2022-11-01 NOTE — H&P
Today, patient presents for planned right sacroiliac joint radiofrequency ablation. This note is reflective of the patient's previous visit for evaluation. We will proceed with today's planned procedure. Since patient's last visit for evaluation, there have been no interval changes in medical history. Patient has no new numbness, weakness, or focal neurological deficit since evaluation. Patient has no contraindications to injection (no anticoagulation or recent antibiotic intake for active infections), and has a  present or is able to drive themselves (as discussed and cleared by physician). Allergies to latex, contrast dye, and steroid medications have been confirmed with the patient prior to the procedure. NPO necessity has been assessed and accepted based on procedure complexity. The risks and benefits of the procedure have been explained including but are not limited to infection, bleeding, paralysis, immediate post procedure weakness, and dizziness; the patient acknowledges understanding and desires to proceed with the procedure. Patient has signed consent for same procedure as discussed in previous clinic encounter. All other questions and concerns were addressed at bedside. See procedure note for full details. Post procedure Instructions: The patient was advised not to drive during the day of the procedure and not to engage in any significant decision making (unless otherwise states by physician). The patient was also advised to be cautious with walking/activity for 24 hours following today's visit and asked not to engage in over-exertion (unless otherwise states by physician). After this time, it is ok to resume pre-procedure level of activity. Patient advised to apply ice to site of injection in situations of pain and discomfort. Patient advised to not submerge site of injection during bath or pool activities for approximately 24 hours post-procedure.  Patient attested to understanding post procedure directions / restrictions. All other questions and concerns addressed before patient discharge in ambulatory fashion. Chronic Pain/PM&R Clinic Note     Encounter Date: 10/19/22    Subjective:   Chief Complaint:   No chief complaint on file. History of Present Illness:   Florinda Galloway is a 80 y.o. female seen in the clinic initially on 10/8/20 upon request from Dinorah Mosley DO (PCP) for her history of low back pain. She has medical history of previous TIA (on Plavix). She has a longstanding history of cervical neck and low back pain. However, she sustained a fall while trying to put her pants on 10/2/2021 where she fell on her right buttocks. She states that since this episode she has been dealing with severe low back pain with radiation down the posterior thigh and slightly down the posterior calf. She does admit the pain radiates around to her groin. She states she has numbness and tingling however these are more in the toes. He does endorse some right leg weakness which she feels like this pain limited. Her pain is sharp and stabbing in nature and 5/10. Her pain is exacerbated with any sitting or laying on the affected side. Her pain is alleviated with rest and medication. She denies any focal leg weakness, saddle anesthesia, or bowel/bladder incontinence. She states she is currently been managing her pain with over-the-counter Tylenol arthritis. She has seen a couple pain specialist in the past including receiving spinal injections with Dr. Natalya Shankar and Dr. Natan Holley. Today, 9/15/2022, patient presents for follow up. She did not come to her follow-up after her previous procedure that was completed on 6/29/2022 with Dr. Britt Cuevas for bilateral ischial bursa injections. She presents today with reports that her buttocks pain into her posterior thighs is now back.  She states that she received 100% relief for 6 weeks from her injections, then slowly started to return and is now back to her baseline as prior to procedure. She stats that it is a constant ache, occasionally sharp pain. Worse with moving, walking, bending, working outside. She uses heating pad with mild relied, no home stretches or exercises. She denies any new symptoms, or loss of bowel/bladder. She is interested in repeating the injections again. Today, 10/19/2022, patient presents for procedural follow-up. She completed bilateral ischial bursa injections with Dr. Emily Palacios on 9/27/2022. She reports procedure went well, and that she had good relief for 3 weeks, about 95%-100% relief. Was able to tolerate walking, sitting longer, working around the house with pain more controlled. Pain is still improved but starting to come back to her baseline. She also notes she is having bilateral buttocks pain, her SI pain is back on the right but also noticed on the left side as well. She is asking about procedures to assist with that pain. She states she is going to be gone to [place] from mid November through mid December. History of Interventions:   Surgery: No previous lumbar surgeries  Injections: Previous lumbar epidurals-significant relief  R SI joint injection (12/8/20) - significant (100%) x 4 months  Right ischial bursa injection (3/16/2021)-moderate relief  R SI joint injection (5/11/2021)-greater than 80% relief x 3 months  R genicular nerve block (8/4/2021)- >85% relief x 3 days  R genicular ablation (8/17/21) - minimal relief  LESI (10/12/21) - minimal relief  B/L ischial bursa injection (6/29/2022)- 100% relief x 6 weeks  B/L ishial bursa injection (9/27/2022_ % relief x 3 weeks    Current Treatment Medications:   Gabapentin 600 mg / 1200 mg  Tylenol PRN  Aleve PRN    Historical Treatment Medications:   None    Imaging:  MRI L-spine (10/4/20)     LUMBAR SPINE 2 VIEWS:         CLINICAL INFORMATION: fall         COMPARISON: No prior study.          TECHNIQUE: Standard AP and lateral views of lumbar spine were obtained. Impression    1. Mild thoracolumbar dextro scoliosis. Moderate lumbar levoscoliosis. Cannot exclude muscle spasm. 2. Multifocal marked disc space narrowing and degenerative disc disease throughout the lumbar spine. Moderately severe degenerative vertebral body spondylosis scattered throughout the lumbar spine. 3. No fracture acute is seen. Mild degenerative changes right sacroiliac joint. Past Medical History:   Diagnosis Date    Cerebral artery occlusion with cerebral infarction ([address] 2012    tia    GERD (gastroesophageal reflux disease)     Hyperlipidemia     Hypertension     Hypothyroidism     Osteoarthritis     Sixth nerve palsy 2020       Past Surgical History:   Procedure Laterality Date    ARTHROCENTESIS Right 4/12/2019    Right hip bursa steroid INJECTION  NO SEDATION performed by Shadi Anderson MD at [facility] Right 5/11/2021    Right SI joint injection performed by Rhiannon Mckeon DO at [address]  2003    COLONOSCOPY      ENDOSCOPY, COLON, DIAGNOSTIC      HC INJECTION PROCEDURE FOR SACROILIAC JOINT Right 12/8/2020    Right SI joint injection performed by Rhiannon Mckeon DO at [address] [place] [address] Right 3/16/2021    right ischial bursa injection performed by Rhiannon Mckeon DO at [address] [place] [address] Bilateral 9/21/2021    bilateral ischial bursa injections under fluoroscopy.  performed by Rhiannon Mckeon DO at [address] [place] [address] Bilateral 6/29/2022    bilateral ischial bursa injections under fluoroscopy performed by Rhiannon Mckeon DO at [address] [facility]      left    KNEE ARTHROCENTESIS Bilateral 9/27/2022    bilateral ischial bursa injections performed by Rhiannon Mckeon DO at [address] [facility] ARTHROSCOPY  2010    left    LUMBAR NERVE BLOCK N/A 12/11/2018    LUMBAR INTER LAMINAR DAYANARA LESI #1@ L5 performed by Allie Lin MD at [address] [facility] N/A 3/12/2019    LUMBAR INTER LAMINAR DAYANARA LESI @L5 performed by Allie Lin MD at [facility] Right 8/30/2019    SI RFA  RIGHT SIDE performed by Allie Lin MD at [facility] Left 10/1/2019    SI RFA  LEFT SIDE performed by Allie Lin MD at [address] [facility] Right 08/27/2018    SI RFA    NERVE SURGERY Left 09/11/2018    SI RFA    OTHER SURGICAL HISTORY      previous nerve blocks at [facility] Right 8/4/2021    Right genicular nerve block performed by Nina Crow DO at [facility] Right 8/17/2021    right genicular nerve radiofrequency ablation performed by Nina Crow DO at [facility] N/A 10/12/2021    lumbar epidural steroid L5/S1 performed by Nina Crow DO at [address] [place] [address] Right 5/22/2018    LUMBAR RFA RIGHT SIDE FIRST L3-4,4-5,5-S1 performed by Allie Lin MD at [address] [place] [address] Left 6/25/2018    LUMBAR RFA  LEFT SIDE @ L3-4,4-5,5-S1, performed by Allie Lin MD at [address] [facility], W IMAGE GUIDANCE, SINGLE Right 8/27/2018    SI RFA  RIGHT SIDE performed by Allie Lin MD at [address] [facility], W IMAGE GUIDANCE, SINGLE Left 9/11/2018    SI RFA  LEFT SIDE performed by Allie Lin MD at [address] [facility]       Family History   Problem Relation Age of Onset    Cancer Mother         melanoma    Heart Disease Father     Stroke Sister          Medications & Allergies:   Current Outpatient Medications   Medication Instructions tenderness  Neurological: Cranial nerves II-XII grossly intact. · Gait - antalgic gait. Ambulates without assistive device. · Motor: No focal deficits noted  Skin: No rashes or lesions present   Psychological: Cooperative, no exaggerated pain behaviors       Assessment:    Diagnosis Orders   1. Chronic right SI joint pain  CHG FLUOR NEEDLE/CATH SPINE/PARASPINAL DX/THER ADDON    MT INJECT RX OTHER PERIPH NERVE      2. Sacroiliac pain  CHG FLUOR NEEDLE/CATH SPINE/PARASPINAL DX/THER ADDON    MT INJECT RX OTHER PERIPH NERVE      3. Ischial bursitis, unspecified laterality        4. SI (sacroiliac) joint dysfunction        5. Sacroiliac joint pain        6. Other chronic pain               Rochelle Flores is a 80 y. o.female presenting to the pain clinic for evaluation of right-sided low back pain. 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 With good response from injections, and return of pain complaints, I have set her up for repeat bilateral ischial bursa injections with Dr Zaire Brooks. We did discuss possibly trial some physical therapy to assist with strengthening, stretching and pain control in the future. She wishes to proceed with injections first     With the return of her SI joint dysfunction pain, I have set her up for a right-sided SI joint radiofrequency ablation with Dr. Zaire Brooks. When she comes back from her vacation she would like to address the SI joint on the left side as she has not had injections into that side    Plan: The following treatment recommendations and plan were discussed in detail with Rochelle Flores. Imaging:   I have reviewed patients imaging of lumbar XR and results were discussed with patient today. Analgesics:     Patient is taking Acetaminophen. Patient informed that the maximum amount of acetaminophen taken on a regular basis should only be 4000 mg per day. Adjuvants:    In light of the presence of a neuropathic component of pain the patient is advised to continue her Neurontin    Interventions: With physical examination consistent with right sacroiliac joint dysfunction/pain, we will proceed with a right sacroiliac joint radiofrequency ablation. Patient wants to proceed with this injection with Dr Bridgette Posadas     Anticoagulation/NPO Recommendations:   Patient does not need to hold any medications for the procedure. Patient does need to be NPO prior to the procedure. IV anesthesia, needs a      Multidisciplinary Pain Management:   In the presence of complex, chronic, and multi-factorial pain, the importance of a multidisciplinary approach to pain management in the patients management regimen was emphasized and discussed in great detail. PHYSICAL THERAPY: Patient is advised to see a physical therapist for gentle stretching exercises and conditioning exercises for management of pain. The patient was also advised to continue physical therapy and stretching exercises at home and cognitive behavioral and/or group therapy. Referrals:  None    Prescriptions Written This Visit:   None    Follow-up:   After injections with Dr Bridgette Posadas     It was my pleasure to evaluate Marce Sol today. I spent over 35 minutes establishing care, evaluating this patient, reviewing previous notes and images and completing documentation.        Hunter Starks, DO   Interventional Pain Management/PM&R   [place]

## 2022-11-02 ENCOUNTER — APPOINTMENT (OUTPATIENT)
Dept: GENERAL RADIOLOGY | Age: 87
End: 2022-11-02
Attending: ANESTHESIOLOGY
Payer: MEDICARE

## 2022-11-02 ENCOUNTER — HOSPITAL ENCOUNTER (OUTPATIENT)
Age: 87
Setting detail: OUTPATIENT SURGERY
Discharge: HOME | End: 2022-11-02
Attending: ANESTHESIOLOGY | Admitting: ANESTHESIOLOGY
Payer: MEDICARE

## 2022-11-02 VITALS
RESPIRATION RATE: 16 BRPM | SYSTOLIC BLOOD PRESSURE: 129 MMHG | HEIGHT: 59 IN | DIASTOLIC BLOOD PRESSURE: 60 MMHG | WEIGHT: 110.8 LBS | HEART RATE: 69 BPM | OXYGEN SATURATION: 95 % | TEMPERATURE: 97.2 F | BODY MASS INDEX: 22.34 KG/M2

## 2022-11-02 PROCEDURE — 6360000002 HC RX W HCPCS: Performed by: ANESTHESIOLOGY

## 2022-11-02 PROCEDURE — 7100000010 HC PHASE II RECOVERY - FIRST 15 MIN: Performed by: ANESTHESIOLOGY

## 2022-11-02 PROCEDURE — 2500000003 HC RX 250 WO HCPCS: Performed by: ANESTHESIOLOGY

## 2022-11-02 PROCEDURE — 99152 MOD SED SAME PHYS/QHP 5/>YRS: CPT | Performed by: ANESTHESIOLOGY

## 2022-11-02 PROCEDURE — 3600000054 HC PAIN LEVEL 3 BASE: Performed by: ANESTHESIOLOGY

## 2022-11-02 PROCEDURE — 2709999900 HC NON-CHARGEABLE SUPPLY: Performed by: ANESTHESIOLOGY

## 2022-11-02 PROCEDURE — 3209999900 FLUORO FOR SURGICAL PROCEDURES

## 2022-11-02 PROCEDURE — 7100000011 HC PHASE II RECOVERY - ADDTL 15 MIN: Performed by: ANESTHESIOLOGY

## 2022-11-02 PROCEDURE — 3600000055 HC PAIN LEVEL 3 ADDL 15 MIN: Performed by: ANESTHESIOLOGY

## 2022-11-02 PROCEDURE — 64625 RF ABLTJ NRV NRVTG SI JT: CPT | Performed by: ANESTHESIOLOGY

## 2022-11-02 RX ORDER — FENTANYL CITRATE 50 UG/ML
INJECTION, SOLUTION INTRAMUSCULAR; INTRAVENOUS PRN
Status: DISCONTINUED | OUTPATIENT
Start: 2022-11-02 | End: 2022-11-02 | Stop reason: ALTCHOICE

## 2022-11-02 RX ORDER — LIDOCAINE HYDROCHLORIDE 10 MG/ML
INJECTION, SOLUTION EPIDURAL; INFILTRATION; INTRACAUDAL; PERINEURAL PRN
Status: DISCONTINUED | OUTPATIENT
Start: 2022-11-02 | End: 2022-11-02 | Stop reason: ALTCHOICE

## 2022-11-02 RX ORDER — LIDOCAINE HYDROCHLORIDE 20 MG/ML
INJECTION, SOLUTION EPIDURAL; INFILTRATION; INTRACAUDAL; PERINEURAL PRN
Status: DISCONTINUED | OUTPATIENT
Start: 2022-11-02 | End: 2022-11-02 | Stop reason: ALTCHOICE

## 2022-11-02 RX ORDER — MIDAZOLAM HYDROCHLORIDE 1 MG/ML
INJECTION INTRAMUSCULAR; INTRAVENOUS PRN
Status: DISCONTINUED | OUTPATIENT
Start: 2022-11-02 | End: 2022-11-02 | Stop reason: ALTCHOICE

## 2022-11-02 ASSESSMENT — PAIN - FUNCTIONAL ASSESSMENT: PAIN_FUNCTIONAL_ASSESSMENT: 0-10

## 2022-11-02 ASSESSMENT — PAIN SCALES - GENERAL: PAINLEVEL_OUTOF10: 0

## 2022-11-02 NOTE — PRE SEDATION
[facility]  Pre-Sedation/Analgesia History & Physical    Pt Name: Minna Archuleta  MRN: [id number]  YOB: 1934  Provider Performing Procedure: Jacki Lou DO   Primary Care Physician: Lm Argueta DO      MEDICAL HISTORY       has a past medical history of Cerebral artery occlusion with cerebral infarction [facility]), GERD (gastroesophageal reflux disease), Hyperlipidemia, Hypothyroidism, Osteoarthritis, and Sixth nerve palsy. SURGICAL HISTORY   has a past surgical history that includes back surgery (2003); Neck surgery (1980s); Knee arthroscopy (2010); joint replacement; Colonoscopy; Endoscopy, colon, diagnostic; other surgical history; pr inject rx other periph nerve (Right, 5/22/2018); pr inject rx other periph nerve (Left, 6/25/2018); Nerve Surgery (Right, 08/27/2018); pr radiofrequency neurotomy lumbar or sacral, w image guidance, single (Right, 8/27/2018); Nerve Surgery (Left, 09/11/2018); pr radiofrequency neurotomy lumbar or sacral, w image guidance, single (Left, 9/11/2018); lumbar nerve block (N/A, 12/11/2018); lumbar nerve block (N/A, 3/12/2019); arthrocentesis (Right, 4/12/2019); Lumbar spine surgery (Right, 8/30/2019); Lumbar spine surgery (Left, 10/1/2019); Injection Procedure For Sacroiliac Joint (Right, 12/8/2020); hip surgery (Right, 3/16/2021); Back Injection (Right, 5/11/2021); Pain management procedure (Right, 8/4/2021); Pain management procedure (Right, 8/17/2021); hip surgery (Bilateral, 9/21/2021); Pain management procedure (N/A, 10/12/2021); hip surgery (Bilateral, 6/29/2022); and Knee Arthrocentesis (Bilateral, 9/27/2022).     ALLERGIES   Allergies as of 10/19/2022 - Fully Reviewed 10/19/2022   Allergen Reaction Noted    Cataflam [diclofenac potassium] Shortness Of Breath 07/26/2012    Augmentin [amoxicillin-pot clavulanate] Diarrhea 07/10/2021    Phenergan [promethazine hcl]  03/13/2013       MEDICATIONS   Prior to Admission medications    Medication Sig Start Date End Date Taking? Authorizing Provider   clopidogrel (PLAVIX) 75 MG tablet TAKE 1 TABLET BY MOUTH EVERY DAY 10/19/22   Jonell Osler, DO   levothyroxine (SYNTHROID) 50 MCG tablet TAKE 1 TABLET BY MOUTH EVERY DAY 10/10/22   Jonell Osler, DO   pantoprazole (PROTONIX) 40 MG tablet TAKE 1 TABLET BY MOUTH EVERY DAY WITH BREAKFAST 10/10/22   Jonell Osler, DO   meloxicam (MOBIC) 7.5 MG tablet TAKE 1 TABLET BY MOUTH EVERY DAY 10/5/22   Jonell Osler, DO   lovastatin (MEVACOR) 20 MG tablet TAKE 1 TABLET BY MOUTH EVERY DAY AT NIGHT 3/31/22   Jonell Osler, DO   gabapentin (NEURONTIN) 600 MG tablet TAKE 1 TABLET IN THE       MORNING, 1 TABLET IN THE   AFTERNOON AND 2 TABLETS AT BEDTIME. 11/19/21 11/19/22  Jonell Osler, DO   aspirin 81 MG EC tablet Take 81 mg by mouth daily    Historical Provider, MD   Acetaminophen (TYLENOL ARTHRITIS EXT RELIEF PO) Take by mouth    Historical Provider, MD   calcium carbonate-vitamin D 600-200 MG-UNIT TABS Take 1 tablet by mouth daily. Historical Provider, MD   Multiple Vitamins-Minerals (THERAPEUTIC MULTIVITAMIN-MINERALS) tablet Take 1 tablet by mouth daily. Historical Provider, MD   Cholecalciferol (VITAMIN D) 2000 UNITS CAPS capsule Take 1 capsule by mouth daily. Historical Provider, MD     PHYSICAL:   Vitals:    11/02/22 1511   BP: 129/60   Pulse: 69   Resp: 16   Temp: 97.2 °F (36.2 °C)   SpO2: 95%     PLANNED PROCEDURE   See procedure note  SEDATION  Planned agent: Versed and Fentanyl  ASA Classification: 2  Class 1: A normal healthy patient  Class 2: Pt with mild to moderate systemic disease  Class 3: Severe systemic disease or disturbance  Class 4: Severe systemic disorders that are already life threatening. Class 5: Moribund pt with little chances of survival, for more than 24 hours. Mallampati I Airway Classification: 2    1. Pre-procedure diagnostic studies complete and results available.   2. Previous sedation/anesthesia experiences assessed. 3. The patient is an appropriate candidate to undergo the planned procedure sedation and anesthesia. (Refer to nursing sedation/analgesia documentation record)  4. Formulation and discussion of sedation/procedure plan, risks, and expectations with patient and/or responsible adult completed. 5. Patient examined immediately prior to the procedure.  (Refer to nursing sedation/analgesia documentation record)    Felicity Hess DO  Electronically signed 11/2/2022 at 7:45 PM

## 2022-11-02 NOTE — POST SEDATION
[address] [address]  Sedation/Analgesia Post Sedation Record    Pt Name: Finn Collazo  MRN: [id number]  YOB: 1934  Procedure Performed By: Katheleen Hamman, DO  Primary Care Physician: Parul Hassan DO    POST-PROCEDURE    Physicians/Assistants: Katheleen Hamman, DO  Procedure Performed: See Procedure Note   Sedation/Anesthesia: Versed and Fentanyl (See procedure note for amount and duration)  Estimated Blood Loss:     0  ml  Specimens Removed: None        Complications: None           Hunter Kelley DO  Electronically signed 11/2/2022 at 7:45 PM

## 2022-11-02 NOTE — PROCEDURES
Pre-operative Diagnosis:  SI joint pain     Post-operative Diagnosis:  SI joint pain     Procedure: RIGHT SI joint thermal radiofrequency ablation     Procedure Description:  After having signed the informed consent, the patient was placed in the prone position. The patient's low back and buttocks was prepped with chloraprep solution, and draped in a sterile fashion. A total of 1 ml of 1% lidocaine were used to anesthetize the skin and underlying tissues. Coolief radiofrequency needles were introduced to the anatomic location of the L5 primary dorsal ramus at the junction of the superior articular process and sacral ala utilizing intermittent fluoroscopy, as well as the S1, S2, and S3 lateral branch nerves at the lateral border of the S1, S2, and S3 posterior/dorsal foramen. Motor stimulation at 2 volts was done to confirm no ablation of the ventral ramus at each level. 1 mL of 2% lidocaine was then injected slowly at each level. After waiting 60 seconds, ablation was performed utilizing a thermal radiofrequency generator at 80 degrees C for 1 minute and 30 seconds. The patient tolerated the procedure well, was transported to the recovery room and observed for 15 minutes and discharged in an ambulatory fashion. No immediate reported complications.      Procedural Complications: None  Estimated Blood Loss: 0 mL           IV sedation was used during the procedure:  - Moderate intravenous conscious sedation was supervised by Dr. Les Hogue  - The patient was independently monitored by a Registered Nurse assigned to the procedure room  - Monitoring included automated blood pressure, continuous EKG, and continuous pulse oximetry  - The detailed conscious record is permanently stored in the [place] [id number]  - The following is the conscious sedation record:  Start Time: 14:58  End Time : 15:13  Duration: 15 minutes   Medications Administered: 1 mg Versed, 100 mcg Fentanyl        Donivan Post, DO  Interventional Pain Management/PM&R   [facility] and [address] [place] [address]

## 2022-11-02 NOTE — PROGRESS NOTES
1511 Patient arrived to phase II via cart. Spontaneous respiraitons even and unlabored. Placed on monitor--VSS. Report received from [facility] Assessment completed. Patient is alert and oriented x4. IV capped off-- no complications. Patient denies pain--will monitor. Injection sites clean and dry. 1515 Snack and drink provided. Pt. Denies all other needs at this time. Call light left within reach. Side rails up X2.  1520 Pt. States readiness to be discharged. 1522 PT. Standing at bedside with stand by assist of RN. Pt. Denies weakness, but states dizziness. 1524 INT removed. No complications noted. 1525 Pt. Getting self dressed in bed. RN instructed the pt. Not to get up without assistance. Pt. Agreed. 1 Family notified of discharge. 1532 Pt. Taken to private vehicle in stable condition via wheelchair.

## 2022-11-30 DIAGNOSIS — M54.16 LUMBAR RADICULOPATHY: ICD-10-CM

## 2022-11-30 RX ORDER — GABAPENTIN 600 MG/1
TABLET ORAL
Qty: 360 TABLET | Refills: 3 | Status: SHIPPED | OUTPATIENT
Start: 2022-11-30 | End: 2023-11-30

## 2022-12-14 ENCOUNTER — OFFICE VISIT (OUTPATIENT)
Dept: PHYSICAL MEDICINE AND REHAB | Age: 87
End: 2022-12-14

## 2022-12-14 VITALS
SYSTOLIC BLOOD PRESSURE: 142 MMHG | BODY MASS INDEX: 22.18 KG/M2 | HEIGHT: 59 IN | WEIGHT: 110 LBS | DIASTOLIC BLOOD PRESSURE: 66 MMHG

## 2022-12-14 DIAGNOSIS — M25.561 CHRONIC PAIN OF RIGHT KNEE: Primary | ICD-10-CM

## 2022-12-14 DIAGNOSIS — G89.29 OTHER CHRONIC PAIN: ICD-10-CM

## 2022-12-14 DIAGNOSIS — G89.29 CHRONIC PAIN OF RIGHT KNEE: Primary | ICD-10-CM

## 2022-12-14 DIAGNOSIS — G89.29 CHRONIC RIGHT SI JOINT PAIN: ICD-10-CM

## 2022-12-14 DIAGNOSIS — M75.52 SUBACROMIAL BURSITIS OF LEFT SHOULDER JOINT: ICD-10-CM

## 2022-12-14 DIAGNOSIS — M53.3 CHRONIC RIGHT SI JOINT PAIN: ICD-10-CM

## 2022-12-14 RX ORDER — TRIAMCINOLONE ACETONIDE 40 MG/ML
40 INJECTION, SUSPENSION INTRA-ARTICULAR; INTRAMUSCULAR ONCE
Status: COMPLETED | OUTPATIENT
Start: 2022-12-14 | End: 2022-12-14

## 2022-12-14 RX ADMIN — TRIAMCINOLONE ACETONIDE 40 MG: 40 INJECTION, SUSPENSION INTRA-ARTICULAR; INTRAMUSCULAR at 13:37

## 2022-12-14 NOTE — PROGRESS NOTES
Chronic Pain/PM&R Clinic Note     Encounter Date: 12/14/22    Subjective:   Chief Complaint:   Chief Complaint   Patient presents with    Follow Up After Procedure     right sacroiliac joint radiofrequency ablation 11/2/22    Shoulder Pain     Left        History of Present Illness:   Thi Ignacio is a 80 y.o. female seen in the clinic initially on 10/8/20 upon request from Connie Martinez DO (PCP) for her history of low back pain. She has medical history of previous TIA (on Plavix). She has a longstanding history of cervical neck and low back pain. However, she sustained a fall while trying to put her pants on 10/2/2021 where she fell on her right buttocks. She states that since this episode she has been dealing with severe low back pain with radiation down the posterior thigh and slightly down the posterior calf. She does admit the pain radiates around to her groin. She states she has numbness and tingling however these are more in the toes. He does endorse some right leg weakness which she feels like this pain limited. Her pain is sharp and stabbing in nature and 5/10. Her pain is exacerbated with any sitting or laying on the affected side. Her pain is alleviated with rest and medication. She denies any focal leg weakness, saddle anesthesia, or bowel/bladder incontinence. She states she is currently been managing her pain with over-the-counter Tylenol arthritis. She has seen a couple pain specialist in the past including receiving spinal injections with Dr. Gavin Johnson and Dr. Evelyn Harvey. Today, 12/14/2022, patient presents for planned follow-up for chronic low back pain, right knee pain, and left shoulder pain. She states that her right sacroiliac joint radiofrequency ablation has provided her significant relief that was performed on 11/2/2022. She states that her low back/buttocks has not been giving her as many issues.   She states that she has been having ongoing right knee pain despite having the knee replaced back in February 2022. She is wondering why she has not seen any relief in her right knee pain and why her right knee pain is actually worse since having her knee replaced. She also has noticed some worsening pain in her left shoulder and states that this is on the lateral aspect of her shoulder. She thinks is from lifting her 8month-old great grandbaby while she was visiting her family in [place]. She states that this pain is worse when she is lifting her arm overhead. History of Intervention:   Surgery: No previous lumbar surgeries, Left TKR (2/2022)  Injections: Previous lumbar epidurals-significant relief  R SI joint injection (12/8/20) - significant (100%) x 4 months  Right ischial bursa injection (3/16/2021)-moderate relief  R SI joint injection (5/11/2021)-greater than 80% relief x 3 months  R genicular nerve block (8/4/2021)- >85% relief x 3 days  R genicular ablation (8/17/21) - minimal relief  LESI (10/12/21) - minimal relief  R SI RFA (11/2/22) - >85% relief     Current Treatment Medications:   Gabapentin 600 mg / 1200 mg  Tylenol PRN  Aleve PRN    Historical Treatment Medications:   None    Imaging:  MRI L-spine (10/4/20)    LUMBAR SPINE 2 VIEWS:         CLINICAL INFORMATION: fall         COMPARISON: No prior study. TECHNIQUE: Standard AP and lateral views of lumbar spine were obtained. Impression    1. Mild thoracolumbar dextro scoliosis. Moderate lumbar levoscoliosis. Cannot exclude muscle spasm. 2. Multifocal marked disc space narrowing and degenerative disc disease throughout the lumbar spine. Moderately severe degenerative vertebral body spondylosis scattered throughout the lumbar spine. 3. No fracture acute is seen. Mild degenerative changes right sacroiliac joint.         Past Medical History:   Diagnosis Date    Cerebral artery occlusion with cerebral infarction [facility]) 2012    tia    GERD (gastroesophageal reflux disease)     Hyperlipidemia Hypothyroidism     Osteoarthritis     Sixth nerve palsy 2020       Past Surgical History:   Procedure Laterality Date    ARTHROCENTESIS Right 04/12/2019    Right hip bursa steroid INJECTION  NO SEDATION performed by Renetta Pope MD at [facility] Right 05/11/2021    Right SI joint injection performed by Viviana Aly DO at [address] [facility]  2003    COLONOSCOPY      ENDOSCOPY, COLON, DIAGNOSTIC      HC INJECTION PROCEDURE FOR SACROILIAC JOINT Right 12/08/2020    Right SI joint injection performed by Viviana Aly DO at [address] [facility] Right 03/16/2021    right ischial bursa injection performed by Viviana Aly DO at [facility] Bilateral 09/21/2021    bilateral ischial bursa injections under fluoroscopy.  performed by Viviana Aly DO at [address] [facility] Bilateral 06/29/2022    bilateral ischial bursa injections under fluoroscopy [facility] [address] [facility]      left    JOINT REPLACEMENT Right 02/17/2022    KNEE ARTHROCENTESIS Bilateral 09/27/2022    bilateral ischial bursa injections performed by Viviana Aly DO at [id number] [place] [address] Drive ARTHROSCOPY  2010    left    LUMBAR NERVE BLOCK N/A 12/11/2018    LUMBAR INTER LAMINAR DAYANARA LESI #1@ L5 performed by Renetta Pope MD at [address] [facility] N/A 03/12/2019    LUMBAR INTER LAMINAR DAYANARA LESI @L5 performed by Renetta Pope MD at [facility] Right 08/30/2019    SI RFA  RIGHT SIDE performed by Renetta Pope MD at [facility] Left 10/01/2019    SI RFA  LEFT SIDE performed by Renetta Pope MD at [address] [facility] Right 08/27/2018    SI RFA    NERVE SURGERY Left 09/11/2018    SI RFA    OTHER SURGICAL HISTORY      previous nerve blocks at [facility] Right 08/04/2021    Right genicular nerve block [place] Marily [place] DO [place] [facility] [place] 08/17/2021    right genicular nerve radiofrequency ablation performed [place] Marily [place] DO [place] [facility] [place]/A 10/12/2021    lumbar epidural steroid L5/S1 [place]ily [place] DO [place] [facility] [place] Right 11/02/2022    right sacroiliac joint radiofrequency ablation performed by Marily Austin DO at [address] [place] [address] Right 05/22/2018    LUMBAR RFA RIGHT SIDE FIRST L3-4,4-5,5-S1 performed by Teodoro Cherry MD at [address] [place] [address] Left 06/25/2018    LUMBAR RFA  LEFT SIDE @ L3-4,4-5,5-S1, performed by Teodoro Cherry MD at [address] [facility], W IMAGE GUIDANCE, SINGLE Right 08/27/2018    SI RFA  RIGHT SIDE performed by Teodoro Cherry MD at [address] [facility] OR SACRAL, W IMAGE GUIDANCE, SINGLE Left 09/11/2018    SI RFA  LEFT SIDE performed by Teodoro Cherry MD at [address] [facility]       Family History   Problem Relation Age of Onset    Cancer Mother         melanoma    Heart Disease Father     Stroke Sister        Social History     Socioeconomic History    Marital status:       Spouse name: Not on file    Number of children: 2    Years of education: 12    Highest education level: High school graduate   Occupational History    Not on file   Tobacco Use    Smoking status: Never    Smokeless tobacco: Never   Vaping Use    Vaping Use: Never used   Substance and Sexual Activity    Alcohol use: No    Drug use: No    Sexual activity: Not on file   Other Topics Concern    Not on file   Social History Narrative    Not on file     Social Determinants of Health     Financial Resource Strain: Not on file   Food Insecurity: Not on file   Transportation Needs: Not on file   Physical Activity: Not on file   Stress: Not on file   Social Connections: Not on file   Intimate Partner Violence: Not on file   Housing Stability: Not on file       Medications & Allergies:   Current Outpatient Medications   Medication Instructions    Acetaminophen (TYLENOL ARTHRITIS EXT RELIEF PO) Oral    aspirin 81 mg, Oral, DAILY    calcium carbonate-vitamin D 600-200 MG-UNIT TABS 1 tablet, DAILY    Cholecalciferol (VITAMIN D) 2000 UNITS CAPS capsule 1 capsule, DAILY    clopidogrel (PLAVIX) 75 MG tablet TAKE 1 TABLET BY MOUTH EVERY DAY    gabapentin (NEURONTIN) 600 MG tablet TAKE 1 TABLET IN THE       MORNING, 1 TABLET IN THE   AFTERNOON AND 2 TABLETS AT BEDTIME.    levothyroxine (SYNTHROID) 50 MCG tablet TAKE 1 TABLET BY MOUTH EVERY DAY    lovastatin (MEVACOR) 20 MG tablet TAKE 1 TABLET BY MOUTH EVERY DAY AT NIGHT    meloxicam (MOBIC) 7.5 MG tablet TAKE 1 TABLET BY MOUTH EVERY DAY    Multiple Vitamins-Minerals (THERAPEUTIC MULTIVITAMIN-MINERALS) tablet 1 tablet, DAILY    pantoprazole (PROTONIX) 40 MG tablet TAKE 1 TABLET BY MOUTH EVERY DAY WITH BREAKFAST       Allergies   Allergen Reactions    Cataflam [Diclofenac Potassium] Shortness Of Breath    Augmentin [Amoxicillin-Pot Clavulanate] Diarrhea    Phenergan [Promethazine Hcl]      Confusion \"didn't know where she was at\"       Review of Systems:   Constitutional: denies any fevers or chills  Genitourinary: negative for bowel/bladder incontinence   Musculoskeletal: Positive for left shoulder pain and right knee pain  Neurological: Positive for numbness/tingling in toes of feet  Behavioral/Psych: negative for anxiety/depression   All other ROS reviewed and are negative    Objective:     Vitals:    12/14/22 0910   BP: (!) 142/66       Constitutional: Pleasant, no acute distress   Head: Normocephalic, atraumatic   Eyes: Conjunctivae normal   Neck: Supple, symmetrical   Lungs: Normal respiratory effort, non-labored breathing   Cardiovascular: Limbs warm and well perfused   Abdomen: Non-protruded   Musculoskeletal: Muscle bulk symmetric, no atrophy, no gross deformities   · Left Shoulder: Positive Garcia/Neer's  -Right knee: Tenderness to palpation over entire knee joint capsule  Neurological: Cranial nerves II-XII grossly intact. · Gait -slightly antalgic gait. Ambulates without assistive device. · Motor: No focal motor deficit appreciated in lower limbs  Skin: No rashes or lesions visibile in lower limbs  Psychological: Cooperative, no exaggerated pain behaviors       Assessment:    Diagnosis Orders   1. Chronic pain of right knee  CHG FLUOR NEEDLE/CATH SPINE/PARASPINAL DX/THER ADDON    OK INJECTION AA&/STRD OTHER PERIPHERAL NERVE/BRANCH      2. Subacromial bursitis of left shoulder joint        3. Chronic right SI joint pain        4. Other chronic pain              Cadence Monday is a 80 y. o.female presenting to the pain clinic for evaluation of right-sided low back pain. 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 She has responded significantly to her right sacroiliac joint radiofrequency ablation. In regard to her right knee, she is a failed total right knee replacement. I set her up for right genicular nerve block under fluoroscopic guidance. She underwent a left subacromial bursa injection in clinic today for associated subacromial bursitis/impingement syndrome. Plan: The following treatment recommendations and plan were discussed in detail with Cadence Monday. Imaging:   I have reviewed patients imaging of lumbar spine x-ray. Analgesics:   Patient is taking Acetaminophen. Patient informed that the maximum amount of acetaminophen taken on a regular basis should only be 4000 mg per day. Patient is taking Aleve. Patient is advised to take as prescribed and not take on an empty stomach. Adjuvants: In light of the presence of a neuropathic component of pain, the patient can continue her Gabapentin. Interventions: With examination consistent with chronic right knee pain, set the patient up for right genicular nerve block under fluoroscopic guidance. The risks of procedure were discussed in detail with patient. Patient wants to proceed with the injection. Anticoagulation/NPO Recommendations:   Patient does not need to hold any medications for the procedure. Patient will need to be NPO x 8 hours prior to the procedure. We use IV sedation. Multidisciplinary Pain Management:   In the presence of complex, chronic, and multi-factorial pain, the importance of a multidisciplinary approach to pain management in the patients management regimen was emphasized and discussed in great detail.      Referrals:  None    Prescriptions Written This Visit:   None    Follow-up: For right genicular block      Hunter Mondragon,   Interventional Pain Management/PM&R   [place]

## 2022-12-14 NOTE — PROGRESS NOTES
Pre-operative Diagnosis: Left shoulder pain     Post-operative Diagnosis: Left shoulder pain     Procedure: Left subacromial bursa injection     Procedure Description:  Patient was seated upright in a chair. Left shoulder prepped with alcohol wipes. The posterior edge of the acromium was identified and a 25-gauge 1 inch needle was inserted inferior to the posterior lateral acromium and directed in the subacromial space. 5 cc of a mixture containing 40 mg kenalog with 4 cc of 0.25% bupivacaine were injected after negative aspiration. The needle was withdrawn without any complications.         Procedural Complications: None        Hunter Palacios, DO  Interventional Pain Management/PM&R   [place]

## 2023-01-06 ENCOUNTER — PREP FOR PROCEDURE (OUTPATIENT)
Dept: PHYSICAL MEDICINE AND REHAB | Age: 88
End: 2023-01-06

## 2023-01-08 NOTE — H&P
Today, patient presents for planned right genicular nerve block    This note is reflective of the patient's previous visit for evaluation. We will proceed with today's planned procedure. Since patient's last visit for evaluation, there have been no interval changes in medical history. Patient has no new numbness, weakness, or focal neurological deficit since evaluation. Patient has no contraindications to injection (no anticoagulation or recent antibiotic intake for active infections), and has a  present or is able to drive themselves (as discussed and cleared by physician). Allergies to latex, contrast dye, and steroid medications have been confirmed with the patient prior to the procedure. NPO necessity has been assessed and accepted based on procedure complexity. The risks and benefits of the procedure have been explained including but are not limited to infection, bleeding, paralysis, immediate post procedure weakness, and dizziness; the patient acknowledges understanding and desires to proceed with the procedure. Patient has signed consent for same procedure as discussed in previous clinic encounter. All other questions and concerns were addressed at bedside. See procedure note for full details. Post procedure Instructions: The patient was advised not to drive during the day of the procedure and not to engage in any significant decision making (unless otherwise states by physician). The patient was also advised to be cautious with walking/activity for 24 hours following today's visit and asked not to engage in over-exertion (unless otherwise states by physician). After this time, it is ok to resume pre-procedure level of activity. Patient advised to apply ice to site of injection in situations of pain and discomfort. Patient advised to not submerge site of injection during bath or pool activities for approximately 24 hours post-procedure.  Patient attested to understanding post procedure directions / restrictions. All other questions and concerns addressed before patient discharge in ambulatory fashion. Chronic Pain/PM&R Clinic Note     Encounter Date: 12/14/22    Subjective:   Chief Complaint:   No chief complaint on file. History of Present Illness:   Mary Schmitt is a 80 y.o. female seen in the clinic initially on 10/8/20 upon request from Vicki Sorto DO (PCP) for her history of low back pain. She has medical history of previous TIA (on Plavix). She has a longstanding history of cervical neck and low back pain. However, she sustained a fall while trying to put her pants on 10/2/2021 where she fell on her right buttocks. She states that since this episode she has been dealing with severe low back pain with radiation down the posterior thigh and slightly down the posterior calf. She does admit the pain radiates around to her groin. She states she has numbness and tingling however these are more in the toes. He does endorse some right leg weakness which she feels like this pain limited. Her pain is sharp and stabbing in nature and 5/10. Her pain is exacerbated with any sitting or laying on the affected side. Her pain is alleviated with rest and medication. She denies any focal leg weakness, saddle anesthesia, or bowel/bladder incontinence. She states she is currently been managing her pain with over-the-counter Tylenol arthritis. She has seen a couple pain specialist in the past including receiving spinal injections with Dr. Melva Nolasco and Dr. Jhon Obrien. Today, 12/14/2022, patient presents for planned follow-up for chronic low back pain, right knee pain, and left shoulder pain. She states that her right sacroiliac joint radiofrequency ablation has provided her significant relief that was performed on 11/2/2022. She states that her low back/buttocks has not been giving her as many issues.   She states that she has been having ongoing right knee pain despite having the knee replaced back in February 2022. She is wondering why she has not seen any relief in her right knee pain and why her right knee pain is actually worse since having her knee replaced. She also has noticed some worsening pain in her left shoulder and states that this is on the lateral aspect of her shoulder. She thinks is from lifting her 8month-old great grandbaby while she was visiting her family in [place]. She states that this pain is worse when she is lifting her arm overhead. History of Intervention:   Surgery: No previous lumbar surgeries, Left TKR (2/2022)  Injections: Previous lumbar epidurals-significant relief  R SI joint injection (12/8/20) - significant (100%) x 4 months  Right ischial bursa injection (3/16/2021)-moderate relief  R SI joint injection (5/11/2021)-greater than 80% relief x 3 months  R genicular nerve block (8/4/2021)- >85% relief x 3 days  R genicular ablation (8/17/21) - minimal relief  LESI (10/12/21) - minimal relief  R SI RFA (11/2/22) - >85% relief     Current Treatment Medications:   Gabapentin 600 mg / 1200 mg  Tylenol PRN  Aleve PRN    Historical Treatment Medications:   None    Imaging:  MRI L-spine (10/4/20)    LUMBAR SPINE 2 VIEWS:         CLINICAL INFORMATION: fall         COMPARISON: No prior study. TECHNIQUE: Standard AP and lateral views of lumbar spine were obtained. Impression    1. Mild thoracolumbar dextro scoliosis. Moderate lumbar levoscoliosis. Cannot exclude muscle spasm. 2. Multifocal marked disc space narrowing and degenerative disc disease throughout the lumbar spine. Moderately severe degenerative vertebral body spondylosis scattered throughout the lumbar spine. 3. No fracture acute is seen. Mild degenerative changes right sacroiliac joint.         Past Medical History:   Diagnosis Date    Cerebral artery occlusion with cerebral infarction [facility]) 2012    tia    GERD (gastroesophageal reflux disease)     Hyperlipidemia     Hypothyroidism Osteoarthritis     Sixth nerve palsy 2020       Past Surgical History:   Procedure Laterality Date    ARTHROCENTESIS Right 04/12/2019    Right hip bursa steroid INJECTION  NO SEDATION performed by Colin Elkins MD at [facility] Right 05/11/2021    Right SI joint injection performed by Jen Beth DO at [address]  2003    COLONOSCOPY      ENDOSCOPY, COLON, DIAGNOSTIC      HC INJECTION PROCEDURE FOR SACROILIAC JOINT Right 12/08/2020    Right SI joint injection performed by Jen Beth DO at [address] [facility] Right 03/16/2021    right ischial bursa injection performed by Jen Beth DO at [facility] Bilateral 09/21/2021    bilateral ischial bursa injections under fluoroscopy.  performed by Jen Beth DO at [facility] Bilateral 06/29/2022    bilateral ischial bursa injections under fluoroscopy performed by Jen Beth DO at [address] [facility]      left    JOINT REPLACEMENT Right 02/17/2022    KNEE ARTHROCENTESIS Bilateral 09/27/2022    bilateral ischial bursa injections performed by Jen Beth DO at [id number] [place] [address] Drive ARTHROSCOPY  2010    left    LUMBAR NERVE BLOCK N/A 12/11/2018    LUMBAR INTER LAMINAR DAYANARA LESI #1@ L5 performed by Colin Elkins MD at [address] [facility] N/A 03/12/2019    LUMBAR INTER LAMINAR DAYANARA LESI @L5 performed by Colin Elkins MD at [facility] Right 08/30/2019    SI RFA  RIGHT SIDE performed by Colin Elkins MD at [facility] Left 10/01/2019    SI RFA  LEFT SIDE performed by Colin Elkins MD at [address] [facility] Right 08/27/2018    SI RFA    NERVE SURGERY Left 09/11/2018    SI RFA    OTHER SURGICAL HISTORY previous nerve blocks at [facility] Right 08/04/2021    Right genicular nerve block performed by Magi Crews DO at [facility] Right 08/17/2021    right genicular nerve radiofrequency ablation performed by Magi Crews DO at [facility] N/A 10/12/2021    lumbar epidural steroid L5/S1 performed by Magi Crews DO at [facility] Right 11/02/2022    right sacroiliac joint radiofrequency ablation performed by Magi Crews DO at [address] [place] [address] Right 05/22/2018    LUMBAR RFA RIGHT SIDE FIRST L3-4,4-5,5-S1 performed by Joshua Ridley MD at [address] [place] [address] Left 06/25/2018    LUMBAR RFA  LEFT SIDE @ L3-4,4-5,5-S1, performed by Joshua Ridley MD at [address] [facility], W IMAGE GUIDANCE, SINGLE Right 08/27/2018    SI RFA  RIGHT SIDE performed by Joshua Ridley MD at [address], W IMAGE GUIDANCE, SINGLE Left 09/11/2018    SI RFA  LEFT SIDE performed by Joshua Ridley MD at [address] [facility]       Family History   Problem Relation Age of Onset    Cancer Mother         melanoma    Heart Disease Father     Stroke Sister        Social History     Socioeconomic History    Marital status:       Spouse name: Not on file    Number of children: 2    Years of education: 12    Highest education level: High school graduate   Occupational History    Not on file   Tobacco Use    Smoking status: Never    Smokeless tobacco: Never   Vaping Use    Vaping Use: Never used   Substance and Sexual Activity    Alcohol use: No    Drug use: No    Sexual activity: Not on file   Other Topics Concern    Not on file   Social History Narrative    Not on file     Social Determinants of Health     Financial Resource Strain: Not on file   Food Insecurity: Not on file   Transportation Needs: Not on file   Physical Activity: Not on file   Stress: Not on file   Social Connections: Not on file   Intimate Partner Violence: Not on file   Housing Stability: Not on file       Medications & Allergies:   Current Outpatient Medications   Medication Instructions    Acetaminophen (TYLENOL ARTHRITIS EXT RELIEF PO) Oral    aspirin 81 mg, Oral, DAILY    calcium carbonate-vitamin D 600-200 MG-UNIT TABS 1 tablet, DAILY    Cholecalciferol (VITAMIN D) 2000 UNITS CAPS capsule 1 capsule, DAILY    clopidogrel (PLAVIX) 75 MG tablet TAKE 1 TABLET BY MOUTH EVERY DAY    gabapentin (NEURONTIN) 600 MG tablet TAKE 1 TABLET IN THE       MORNING, 1 TABLET IN THE   AFTERNOON AND 2 TABLETS AT BEDTIME.    levothyroxine (SYNTHROID) 50 MCG tablet TAKE 1 TABLET BY MOUTH EVERY DAY    lovastatin (MEVACOR) 20 MG tablet TAKE 1 TABLET BY MOUTH EVERY DAY AT NIGHT    meloxicam (MOBIC) 7.5 MG tablet TAKE 1 TABLET BY MOUTH EVERY DAY    Multiple Vitamins-Minerals (THERAPEUTIC MULTIVITAMIN-MINERALS) tablet 1 tablet, DAILY    pantoprazole (PROTONIX) 40 MG tablet TAKE 1 TABLET BY MOUTH EVERY DAY WITH BREAKFAST       Allergies   Allergen Reactions    Cataflam [Diclofenac Potassium] Shortness Of Breath    Augmentin [Amoxicillin-Pot Clavulanate] Diarrhea    Phenergan [Promethazine Hcl]      Confusion \"didn't know where she was at\"       Review of Systems:   Constitutional: denies any fevers or chills  Genitourinary: negative for bowel/bladder incontinence   Musculoskeletal: Positive for left shoulder pain and right knee pain  Neurological: Positive for numbness/tingling in toes of feet  Behavioral/Psych: negative for anxiety/depression   All other ROS reviewed and are negative    Objective: There were no vitals filed for this visit.       Constitutional: Pleasant, no acute distress   Head: Normocephalic, atraumatic Eyes: Conjunctivae normal   Neck: Supple, symmetrical   Lungs: Normal respiratory effort, non-labored breathing   Cardiovascular: Limbs warm and well perfused   Abdomen: Non-protruded   Musculoskeletal: Muscle bulk symmetric, no atrophy, no gross deformities   · Left Shoulder: Positive Garcia/Neer's  -Right knee: Tenderness to palpation over entire knee joint capsule  Neurological: Cranial nerves II-XII grossly intact. · Gait -slightly antalgic gait. Ambulates without assistive device. · Motor: No focal motor deficit appreciated in lower limbs  Skin: No rashes or lesions visibile in lower limbs  Psychological: Cooperative, no exaggerated pain behaviors       Assessment:    Diagnosis Orders   1. Chronic pain of right knee  CHG FLUOR NEEDLE/CATH SPINE/PARASPINAL DX/THER ADDON    NE INJECTION AA&/STRD OTHER PERIPHERAL NERVE/BRANCH      2. Subacromial bursitis of left shoulder joint        3. Chronic right SI joint pain        4. Other chronic pain              Dolly Ramos is a 80 y. o.female presenting to the pain clinic for evaluation of right-sided low back pain. 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 She has responded significantly to her right sacroiliac joint radiofrequency ablation. In regard to her right knee, she is a failed total right knee replacement. I set her up for right genicular nerve block under fluoroscopic guidance. She underwent a left subacromial bursa injection in clinic today for associated subacromial bursitis/impingement syndrome. Plan: The following treatment recommendations and plan were discussed in detail with Dolly Ramos. Imaging:   I have reviewed patients imaging of lumbar spine x-ray. Analgesics:   Patient is taking Acetaminophen. Patient informed that the maximum amount of acetaminophen taken on a regular basis should only be 4000 mg per day. Patient is taking Aleve. Patient is advised to take as prescribed and not take on an empty stomach. Adjuvants: In light of the presence of a neuropathic component of pain, the patient can continue her Gabapentin. Interventions: With examination consistent with chronic right knee pain, set the patient up for right genicular nerve block under fluoroscopic guidance. The risks of procedure were discussed in detail with patient. Patient wants to proceed with the injection. Anticoagulation/NPO Recommendations:   Patient does not need to hold any medications for the procedure. Patient will need to be NPO x 8 hours prior to the procedure. We use IV sedation. Multidisciplinary Pain Management:   In the presence of complex, chronic, and multi-factorial pain, the importance of a multidisciplinary approach to pain management in the patients management regimen was emphasized and discussed in great detail.      Referrals:  None    Prescriptions Written This Visit:   None    Follow-up: For right genicular block      Hunter Mondragon, DO  Interventional Pain Management/PM&R   [place]

## 2023-01-09 NOTE — DISCHARGE INSTRUCTIONS

## 2023-01-10 ENCOUNTER — APPOINTMENT (OUTPATIENT)
Dept: GENERAL RADIOLOGY | Age: 88
End: 2023-01-10
Attending: ANESTHESIOLOGY
Payer: MEDICARE

## 2023-01-10 ENCOUNTER — HOSPITAL ENCOUNTER (OUTPATIENT)
Age: 88
Setting detail: OUTPATIENT SURGERY
Discharge: HOME | End: 2023-01-10
Attending: ANESTHESIOLOGY | Admitting: ANESTHESIOLOGY
Payer: MEDICARE

## 2023-01-10 VITALS
DIASTOLIC BLOOD PRESSURE: 63 MMHG | HEART RATE: 63 BPM | RESPIRATION RATE: 16 BRPM | WEIGHT: 107.8 LBS | OXYGEN SATURATION: 95 % | TEMPERATURE: 96.8 F | BODY MASS INDEX: 21.73 KG/M2 | HEIGHT: 59 IN | SYSTOLIC BLOOD PRESSURE: 132 MMHG

## 2023-01-10 PROCEDURE — 7100000011 HC PHASE II RECOVERY - ADDTL 15 MIN: Performed by: ANESTHESIOLOGY

## 2023-01-10 PROCEDURE — 3600000054 HC PAIN LEVEL 3 BASE: Performed by: ANESTHESIOLOGY

## 2023-01-10 PROCEDURE — 6360000002 HC RX W HCPCS: Performed by: ANESTHESIOLOGY

## 2023-01-10 PROCEDURE — 99152 MOD SED SAME PHYS/QHP 5/>YRS: CPT | Performed by: ANESTHESIOLOGY

## 2023-01-10 PROCEDURE — 2500000003 HC RX 250 WO HCPCS: Performed by: ANESTHESIOLOGY

## 2023-01-10 PROCEDURE — 64454 NJX AA&/STRD GNCLR NRV BRNCH: CPT | Performed by: ANESTHESIOLOGY

## 2023-01-10 PROCEDURE — 7100000010 HC PHASE II RECOVERY - FIRST 15 MIN: Performed by: ANESTHESIOLOGY

## 2023-01-10 PROCEDURE — 3209999900 FLUORO FOR SURGICAL PROCEDURES

## 2023-01-10 PROCEDURE — 2709999900 HC NON-CHARGEABLE SUPPLY: Performed by: ANESTHESIOLOGY

## 2023-01-10 RX ORDER — MIDAZOLAM HYDROCHLORIDE 1 MG/ML
INJECTION INTRAMUSCULAR; INTRAVENOUS PRN
Status: DISCONTINUED | OUTPATIENT
Start: 2023-01-10 | End: 2023-01-10 | Stop reason: ALTCHOICE

## 2023-01-10 RX ORDER — LIDOCAINE HYDROCHLORIDE 10 MG/ML
INJECTION, SOLUTION EPIDURAL; INFILTRATION; INTRACAUDAL; PERINEURAL PRN
Status: DISCONTINUED | OUTPATIENT
Start: 2023-01-10 | End: 2023-01-10 | Stop reason: ALTCHOICE

## 2023-01-10 RX ORDER — BUPIVACAINE HYDROCHLORIDE 2.5 MG/ML
INJECTION, SOLUTION EPIDURAL; INFILTRATION; INTRACAUDAL PRN
Status: DISCONTINUED | OUTPATIENT
Start: 2023-01-10 | End: 2023-01-10 | Stop reason: ALTCHOICE

## 2023-01-10 RX ORDER — FENTANYL CITRATE 50 UG/ML
INJECTION, SOLUTION INTRAMUSCULAR; INTRAVENOUS PRN
Status: DISCONTINUED | OUTPATIENT
Start: 2023-01-10 | End: 2023-01-10 | Stop reason: ALTCHOICE

## 2023-01-10 ASSESSMENT — PAIN - FUNCTIONAL ASSESSMENT: PAIN_FUNCTIONAL_ASSESSMENT: 0-10

## 2023-01-10 ASSESSMENT — PAIN DESCRIPTION - DESCRIPTORS: DESCRIPTORS: ACHING

## 2023-01-10 NOTE — PROCEDURES
Pre-operative Diagnosis: Knee pain     Post-operative Diagnosis: Knee pain     Procedure: RIGHT genicular nerve blocks     Procedure Description:  The patient was brought to the procedure room and placed on the exam table in a comfortable supine position. The place for needle placement was obtained by manual palpation with radiographic confirmation. The sterile field was prepared by chloroprep and sterile drapes. Local anesthesia superficial and deep was provided by local infiltration of 1% Lidocaine. Three 3.5 inch spinal needles were advanced to a bony endpoint on the superiolateral portion of the femoral condyle, superiomedial portion of the femoral condyle, and the inferiomedial portion of the tibial condyle until a bony endpoint was met. Lateral x-ray views showed all the needles at 50% depth of the femur and tibia. Then, 0.5 cc of 0.5% bupivacaine was injected after negative aspiration. The needles were withdrawn. The patient tolerated the procedure well. Patient was subsequently monitored in the post procedure setting and discharged in an ambulatory fashion.       Procedural Complications: None  Estimated Blood Loss: 0 mL     IV sedation was used during the procedure:  - Moderate intravenous conscious sedation was supervised by Dr. Susan Zuniga  - The patient was independently monitored by a Registered Nurse assigned to the procedure room  - Monitoring included automated blood pressure, continuous EKG, and continuous pulse oximetry  - The detailed conscious record is permanently stored in the [place] [address]  - The following is the conscious sedation record:  Start Time: 09:05  End Time : 09:20  Duration: 15 minutes   Medications Administered: 1 mg Versed, 50 mcg Fentanyl        Hunter Mondragon DO  Interventional Pain Management/PM&R   [place]

## 2023-01-10 NOTE — PROGRESS NOTES
0914 to recovery via cart. Spont resp. VSS Report received from surgical rn. Pt denies pain numbness tingling or nausea. Drink given. Call bell in reach  0925 IV discontinued.  Up to dress self  0935 Discharge to home in stable ambulatory condition with friend

## 2023-01-10 NOTE — POST SEDATION
[address] [address]  Sedation/Analgesia Post Sedation Record    Pt Name: Adriana Brothers  MRN: [id number]  YOB: 1934  Procedure Performed By: Monique Weaver DO  Primary Care Physician: Nallely Zapata DO    POST-PROCEDURE    Physicians/Assistants: Monique Weaver DO  Procedure Performed: See Procedure Note   Sedation/Anesthesia: Versed and Fentanyl (See procedure note for amount and duration)  Estimated Blood Loss:     0  ml  Specimens Removed: None        Complications: None           Hunter Ward DO  Electronically signed 1/10/2023 at 11:07 AM

## 2023-02-01 ENCOUNTER — OFFICE VISIT (OUTPATIENT)
Dept: PHYSICAL MEDICINE AND REHAB | Age: 88
End: 2023-02-01

## 2023-02-01 VITALS
WEIGHT: 107 LBS | DIASTOLIC BLOOD PRESSURE: 66 MMHG | HEIGHT: 59 IN | SYSTOLIC BLOOD PRESSURE: 140 MMHG | BODY MASS INDEX: 21.57 KG/M2

## 2023-02-01 DIAGNOSIS — G89.29 CHRONIC PAIN OF RIGHT KNEE: Primary | ICD-10-CM

## 2023-02-01 DIAGNOSIS — G89.29 OTHER CHRONIC PAIN: ICD-10-CM

## 2023-02-01 DIAGNOSIS — M25.561 CHRONIC PAIN OF RIGHT KNEE: Primary | ICD-10-CM

## 2023-02-01 NOTE — PROGRESS NOTES
Chronic Pain/PM&R Clinic Note     Encounter Date: 2/1/23    Subjective:   Chief Complaint:   Chief Complaint   Patient presents with    Follow Up After Procedure     right genicular nerve block 1/10/23         History of Present Illness:   Florinda Galloway is a 80 y.o. female seen in the clinic initially on 10/8/20 upon request from Dinorah Mosley DO (PCP) for her history of low back pain. She has medical history of previous TIA (on Plavix). She has a longstanding history of cervical neck and low back pain. However, she sustained a fall while trying to put her pants on 10/2/2021 where she fell on her right buttocks. She states that since this episode she has been dealing with severe low back pain with radiation down the posterior thigh and slightly down the posterior calf. She does admit the pain radiates around to her groin. She states she has numbness and tingling however these are more in the toes. He does endorse some right leg weakness which she feels like this pain limited. Her pain is sharp and stabbing in nature and 5/10. Her pain is exacerbated with any sitting or laying on the affected side. Her pain is alleviated with rest and medication. She denies any focal leg weakness, saddle anesthesia, or bowel/bladder incontinence. She states she is currently been managing her pain with over-the-counter Tylenol arthritis. She has seen a couple pain specialist in the past including receiving spinal injections with Dr. Natalya Shankar and Dr. Natan Holley. Today, 02/01/2023, patient presents for planned follow up on chronic pain. She underwent the right genicular nerve block on 01/10/2023 and noticed significant improvement in her right knee pain for about one week then pain started to return. She is wondering why she is dealing with so much pain in her right knee after her replacement. She is wondering if she should follow back up with [facility] in regards to the pain in her right knee.   She states she had the left knee replaced more than 20 years ago and has never had issues with it. In regards to her left shoulder pain, she states that the shoulder injection by Dr. Julia Parks last visit worked very well. She has not had any pain in her left shoulder since that injection. She denies any new symptoms at this time. History of Intervention:   Surgery: No previous lumbar surgeries, Left TKR (2/2022)  Injections: Previous lumbar epidurals-significant relief  R SI joint injection (12/8/20) - significant (100%) x 4 months  Right ischial bursa injection (3/16/2021)-moderate relief  R SI joint injection (5/11/2021)-greater than 80% relief x 3 months  R genicular nerve block (8/4/2021)- >85% relief x 3 days  R genicular ablation (8/17/21) - minimal relief  LESI (10/12/21) - minimal relief  R SI RFA (11/2/22) - >85% relief   R genicular nerve block  - >85% relief x1 week    Current Treatment Medications:   Gabapentin 600 mg / 1200 mg  Tylenol PRN  Aleve PRN    Historical Treatment Medications:   None    Imaging:  MRI L-spine (10/4/20)    LUMBAR SPINE 2 VIEWS:         CLINICAL INFORMATION: fall         COMPARISON: No prior study. TECHNIQUE: Standard AP and lateral views of lumbar spine were obtained. Impression    1. Mild thoracolumbar dextro scoliosis. Moderate lumbar levoscoliosis. Cannot exclude muscle spasm. 2. Multifocal marked disc space narrowing and degenerative disc disease throughout the lumbar spine. Moderately severe degenerative vertebral body spondylosis scattered throughout the lumbar spine. 3. No fracture acute is seen. Mild degenerative changes right sacroiliac joint.           Past Medical History:   Diagnosis Date    Cerebral artery occlusion with cerebral infarction [facility]) 2012    tia    GERD (gastroesophageal reflux disease)     Hyperlipidemia     Hypothyroidism     Osteoarthritis     Sixth nerve palsy 2020       Past Surgical History:   Procedure Laterality Date    ARTHROCENTESIS Right 04/12/2019    Right hip bursa steroid INJECTION  NO SEDATION performed by Yvette Saunders MD at [facility] Right 05/11/2021    Right SI joint injection performed by Emily Kinney DO at [address]   2003    COLONOSCOPY      ENDOSCOPY, COLON, DIAGNOSTIC      HC INJECTION PROCEDURE FOR SACROILIAC JOINT Right 12/08/2020    Right SI joint injection performed by Emily Kinney DO at [address]  Right 03/16/2021    right ischial bursa injection performed by Emily Kinney DO at [address]  Bilateral 09/21/2021    bilateral ischial bursa injections under fluoroscopy.  performed by Emily Kinney DO at [address]  Bilateral 06/29/2022    bilateral ischial bursa injections under fluoroscopy performed by Emily Kinney DO at [address] [facility]      left    JOINT REPLACEMENT Right 02/17/2022    KNEE ARTHROCENTESIS Bilateral 09/27/2022    bilateral ischial bursa injections performed by Emily Kinney DO at [id number] [place] [address] Drive ARTHROSCOPY  2010    left    LUMBAR NERVE BLOCK N/A 12/11/2018    LUMBAR INTER LAMINAR DAYANARA LESI #1@ L5 performed by Yvette Saunders MD at [facility] N/A 03/12/2019    LUMBAR INTER LAMINAR DAYANARA LESI @L5 performed by Yvette Saunders MD at [facility] Right 08/30/2019    SI RFA  RIGHT SIDE performed by Yvette Saunders MD at [facility] Left 10/01/2019    SI RFA  LEFT SIDE performed by Yvette Saunders MD at [facility] Right 08/27/2018    SI RFA    NERVE SURGERY Left 09/11/2018    SI RFA    OTHER SURGICAL HISTORY      previous nerve blocks at [facility] Right 08/04/2021    Right genicular nerve block performed by Emily Kinney, DO at [facility] Right 08/17/2021    right genicular nerve radiofrequency ablation performed by Emily Kinney DO at [facility] N/A 10/12/2021    lumbar epidural steroid L5/S1 performed by Emily Kinney DO at [facility] Right 11/02/2022    right sacroiliac joint radiofrequency ablation performed by Emily Kinney, DO at [facility] Right 1/10/2023    [facility]    SI RFA  RIGHT SIDE [facility] Saunders MD [facility] [place] Left 09/11/2018    SI RFA  LEFT SIDE performed by Yvette Saunders MD at [facility] NEUROLYTIC AGENT OTHER PERIPHERAL NERVE Right 05/22/2018    LUMBAR RFA RIGHT SIDE FIRST L3-4,4-5,5-S1 performed by Yvette Saunders MD at [facility] NEUROLYTIC AGENT OTHER PERIPHERAL NERVE Left 06/25/2018    LUMBAR RFA  LEFT SIDE @ L3-4,4-5,5-S1, performed by Yvette Saunders MD at [address] [place] [address]       Family History   Problem Relation Age of Onset    Cancer Mother         melanoma    Heart Disease Father     Stroke Sister          Medications & Allergies:   Current Outpatient Medications   Medication Instructions    Acetaminophen (TYLENOL ARTHRITIS EXT RELIEF PO) Oral    aspirin 81 mg, Oral, DAILY    calcium carbonate-vitamin D 600-200 MG-UNIT TABS 1 tablet, DAILY    Cholecalciferol (VITAMIN D) 2000 UNITS CAPS capsule 1 capsule, DAILY    clopidogrel (PLAVIX) 75 MG tablet TAKE 1 TABLET BY MOUTH EVERY DAY    gabapentin (NEURONTIN) 600 MG tablet TAKE 1 TABLET IN THE       MORNING, 1 TABLET IN THE   AFTERNOON AND 2 TABLETS AT BEDTIME. levothyroxine (SYNTHROID) 50 MCG tablet TAKE 1 TABLET BY MOUTH EVERY DAY    lovastatin (MEVACOR) 20 MG tablet TAKE 1 TABLET BY MOUTH EVERY DAY AT NIGHT    meloxicam (MOBIC) 7.5 MG tablet TAKE 1 TABLET BY MOUTH EVERY DAY    Multiple Vitamins-Minerals (THERAPEUTIC MULTIVITAMIN-MINERALS) tablet 1 tablet, DAILY    pantoprazole (PROTONIX) 40 MG tablet TAKE 1 TABLET BY MOUTH EVERY DAY WITH BREAKFAST       Allergies   Allergen Reactions    Cataflam [Diclofenac Potassium] Shortness Of Breath    Augmentin [Amoxicillin-Pot Clavulanate] Diarrhea    Phenergan [Promethazine Hcl]      Confusion \"didn't know where she was at\"       Review of Systems:   Constitutional: denies any fevers or chills  Genitourinary: negative for bowel/bladder incontinence   Musculoskeletal: Positive for right knee pain  Neurological: Positive for numbness/tingling in toes of feet  Behavioral/Psych: negative for anxiety/depression   All other ROS reviewed and are negative    Objective:     Vitals:    02/01/23 1306   BP: (!) 140/66         Constitutional: Pleasant, no acute distress   Head: Normocephalic, atraumatic   Eyes: Conjunctivae normal   Neck: Supple, symmetrical   Lungs: Normal respiratory effort, non-labored breathing   Cardiovascular: Limbs warm and well perfused   Abdomen: Non-protruded   Musculoskeletal: Muscle bulk symmetric, no atrophy, no gross deformities   -Right knee: Tenderness to palpation over entire knee joint capsule  Neurological: Cranial nerves II-XII grossly intact. · Gait -slightly antalgic gait. Ambulates without assistive device. · Motor: No focal motor deficit appreciated in lower limbs  Skin: No rashes or lesions visibile in lower limbs  Psychological: Cooperative, no exaggerated pain behaviors       Assessment:    Diagnosis Orders   1. Chronic pain of right knee  CHG FLUOR NEEDLE/CATH SPINE/PARASPINAL DX/THER ADDON    NC DSTRJ NEUROLYTIC AGENT OTHER PERIPHERAL NERVE      2.  Other chronic pain            Sorin Chi Sonia Kelsey is a 80 y. o.female presenting to the pain clinic for evaluation of right-sided low back pain. 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 She has responded significantly to her right sacroiliac joint radiofrequency ablation and right ischial bursa injection. In regard to her right knee, she is a failed total right knee replacement. She responded well to the right genicular nerve block. I have set her up for the right genicular radiofrequency ablation. Plan: The following treatment recommendations and plan were discussed in detail with Silvano Menard. Imaging:   No imaging reviewed today. Analgesics:   Patient is taking Acetaminophen. Patient informed that the maximum amount of acetaminophen taken on a regular basis should only be 4000 mg per day. Patient is taking Aleve. Patient is advised to take as prescribed and not take on an empty stomach. Adjuvants: In light of the presence of a neuropathic component of pain, the patient can continue her Gabapentin. Interventions: With examination consistent with chronic right knee pain, set the patient up for right genicular nerve radiofrequency ablation under fluoroscopic guidance. The risks of procedure were discussed in detail with patient. Patient wants to proceed with the injection. Anticoagulation/NPO Recommendations:   Patient does not need to hold any medications for the procedure. Patient will need to be NPO x 8 hours prior to the procedure. We use IV sedation. Multidisciplinary Pain Management:   In the presence of complex, chronic, and multi-factorial pain, the importance of a multidisciplinary approach to pain management in the patients management regimen was emphasized and discussed in great detail.      Referrals:  None    Prescriptions Written This Visit:   None    Follow-up: For right genicular RFA, in office 4 weeks after      Leidy Larsen, APRN - CNP  Interventional Pain Management/PM&R   [place]

## 2023-02-03 ENCOUNTER — PREP FOR PROCEDURE (OUTPATIENT)
Dept: PHYSICAL MEDICINE AND REHAB | Age: 88
End: 2023-02-03

## 2023-02-09 ENCOUNTER — HOSPITAL ENCOUNTER (OUTPATIENT)
Age: 88
Setting detail: OUTPATIENT SURGERY
Discharge: HOME | End: 2023-02-09
Attending: ANESTHESIOLOGY | Admitting: ANESTHESIOLOGY
Payer: MEDICARE

## 2023-02-09 ENCOUNTER — APPOINTMENT (OUTPATIENT)
Dept: GENERAL RADIOLOGY | Age: 88
End: 2023-02-09
Attending: ANESTHESIOLOGY
Payer: MEDICARE

## 2023-02-09 VITALS
SYSTOLIC BLOOD PRESSURE: 142 MMHG | HEART RATE: 71 BPM | BODY MASS INDEX: 22.13 KG/M2 | HEIGHT: 59 IN | WEIGHT: 109.8 LBS | OXYGEN SATURATION: 94 % | RESPIRATION RATE: 12 BRPM | TEMPERATURE: 96.8 F | DIASTOLIC BLOOD PRESSURE: 65 MMHG

## 2023-02-09 PROCEDURE — 3600000054 HC PAIN LEVEL 3 BASE: Performed by: ANESTHESIOLOGY

## 2023-02-09 PROCEDURE — 6360000002 HC RX W HCPCS: Performed by: ANESTHESIOLOGY

## 2023-02-09 PROCEDURE — 7100000010 HC PHASE II RECOVERY - FIRST 15 MIN: Performed by: ANESTHESIOLOGY

## 2023-02-09 PROCEDURE — 2500000003 HC RX 250 WO HCPCS: Performed by: ANESTHESIOLOGY

## 2023-02-09 PROCEDURE — 2709999900 HC NON-CHARGEABLE SUPPLY: Performed by: ANESTHESIOLOGY

## 2023-02-09 PROCEDURE — 3600000055 HC PAIN LEVEL 3 ADDL 15 MIN: Performed by: ANESTHESIOLOGY

## 2023-02-09 PROCEDURE — 7100000011 HC PHASE II RECOVERY - ADDTL 15 MIN: Performed by: ANESTHESIOLOGY

## 2023-02-09 PROCEDURE — 3209999900 FLUORO FOR SURGICAL PROCEDURES

## 2023-02-09 PROCEDURE — 99152 MOD SED SAME PHYS/QHP 5/>YRS: CPT | Performed by: ANESTHESIOLOGY

## 2023-02-09 RX ORDER — LIDOCAINE HYDROCHLORIDE 10 MG/ML
INJECTION, SOLUTION EPIDURAL; INFILTRATION; INTRACAUDAL; PERINEURAL PRN
Status: DISCONTINUED | OUTPATIENT
Start: 2023-02-09 | End: 2023-02-09 | Stop reason: ALTCHOICE

## 2023-02-09 RX ORDER — LIDOCAINE HYDROCHLORIDE 20 MG/ML
INJECTION, SOLUTION EPIDURAL; INFILTRATION; INTRACAUDAL; PERINEURAL PRN
Status: DISCONTINUED | OUTPATIENT
Start: 2023-02-09 | End: 2023-02-09 | Stop reason: ALTCHOICE

## 2023-02-09 RX ORDER — MIDAZOLAM HYDROCHLORIDE 1 MG/ML
INJECTION INTRAMUSCULAR; INTRAVENOUS PRN
Status: DISCONTINUED | OUTPATIENT
Start: 2023-02-09 | End: 2023-02-09 | Stop reason: ALTCHOICE

## 2023-02-09 RX ORDER — FENTANYL CITRATE 50 UG/ML
INJECTION, SOLUTION INTRAMUSCULAR; INTRAVENOUS PRN
Status: DISCONTINUED | OUTPATIENT
Start: 2023-02-09 | End: 2023-02-09 | Stop reason: ALTCHOICE

## 2023-02-09 ASSESSMENT — PAIN DESCRIPTION - DESCRIPTORS: DESCRIPTORS: DISCOMFORT;DULL

## 2023-02-09 ASSESSMENT — PAIN SCALES - GENERAL: PAINLEVEL_OUTOF10: 0

## 2023-02-09 ASSESSMENT — PAIN - FUNCTIONAL ASSESSMENT
PAIN_FUNCTIONAL_ASSESSMENT: 0-10
PAIN_FUNCTIONAL_ASSESSMENT: PREVENTS OR INTERFERES SOME ACTIVE ACTIVITIES AND ADLS

## 2023-02-09 NOTE — H&P
Today, patient presents for planned right genicular nerve radiofrequency ablation    This note is reflective of the patient's previous visit for evaluation. We will proceed with today's planned procedure. Since patient's last visit for evaluation, there have been no interval changes in medical history. Patient has no new numbness, weakness, or focal neurological deficit since evaluation. Patient has no contraindications to injection (no anticoagulation or recent antibiotic intake for active infections), and has a  present or is able to drive themselves (as discussed and cleared by physician). Allergies to latex, contrast dye, and steroid medications have been confirmed with the patient prior to the procedure. NPO necessity has been assessed and accepted based on procedure complexity. The risks and benefits of the procedure have been explained including but are not limited to infection, bleeding, paralysis, immediate post procedure weakness, and dizziness; the patient acknowledges understanding and desires to proceed with the procedure. Patient has signed consent for same procedure as discussed in previous clinic encounter. All other questions and concerns were addressed at bedside. See procedure note for full details. Post procedure Instructions: The patient was advised not to drive during the day of the procedure and not to engage in any significant decision making (unless otherwise states by physician). The patient was also advised to be cautious with walking/activity for 24 hours following today's visit and asked not to engage in over-exertion (unless otherwise states by physician). After this time, it is ok to resume pre-procedure level of activity. Patient advised to apply ice to site of injection in situations of pain and discomfort. Patient advised to not submerge site of injection during bath or pool activities for approximately 24 hours post-procedure.  Patient attested to understanding post procedure directions / restrictions. All other questions and concerns addressed before patient discharge in ambulatory fashion. Chronic Pain/PM&R Clinic Note     Encounter Date: 2/1/23    Subjective:   Chief Complaint:   No chief complaint on file. History of Present Illness:   Adeola Camara is a 80 y.o. female seen in the clinic initially on 10/8/20 upon request from Vicky Montilla DO (PCP) for her history of low back pain. She has medical history of previous TIA (on Plavix). She has a longstanding history of cervical neck and low back pain. However, she sustained a fall while trying to put her pants on 10/2/2021 where she fell on her right buttocks. She states that since this episode she has been dealing with severe low back pain with radiation down the posterior thigh and slightly down the posterior calf. She does admit the pain radiates around to her groin. She states she has numbness and tingling however these are more in the toes. He does endorse some right leg weakness which she feels like this pain limited. Her pain is sharp and stabbing in nature and 5/10. Her pain is exacerbated with any sitting or laying on the affected side. Her pain is alleviated with rest and medication. She denies any focal leg weakness, saddle anesthesia, or bowel/bladder incontinence. She states she is currently been managing her pain with over-the-counter Tylenol arthritis. She has seen a couple pain specialist in the past including receiving spinal injections with Dr. Carly Sargent and Dr. Heather Rodriguez. Today, 02/01/2023, patient presents for planned follow up on chronic pain. She underwent the right genicular nerve block on 01/10/2023 and noticed significant improvement in her right knee pain for about one week then pain started to return. She is wondering why she is dealing with so much pain in her right knee after her replacement.   She is wondering if she should follow back up with [facility] in regards to the pain in her right knee. She states she had the left knee replaced more than 20 years ago and has never had issues with it. In regards to her left shoulder pain, she states that the shoulder injection by Dr. [employer] last visit worked very well. She has not had any pain in her left shoulder since that injection. She denies any new symptoms at this time. History of Intervention:   Surgery: No previous lumbar surgeries, Left TKR (2/2022)  Injections: Previous lumbar epidurals-significant relief  R SI joint injection (12/8/20) - significant (100%) x 4 months  Right ischial bursa injection (3/16/2021)-moderate relief  R SI joint injection (5/11/2021)-greater than 80% relief x 3 months  R genicular nerve block (8/4/2021)- >85% relief x 3 days  R genicular ablation (8/17/21) - minimal relief  LESI (10/12/21) - minimal relief  R SI RFA (11/2/22) - >85% relief   R genicular nerve block  - >85% relief x1 week    Current Treatment Medications:   Gabapentin 600 mg / 1200 mg  Tylenol PRN  Aleve PRN    Historical Treatment Medications:   None    Imaging:  MRI L-spine (10/4/20)    LUMBAR SPINE 2 VIEWS:         CLINICAL INFORMATION: fall         COMPARISON: No prior study. TECHNIQUE: Standard AP and lateral views of lumbar spine were obtained. Impression    1. Mild thoracolumbar dextro scoliosis. Moderate lumbar levoscoliosis. Cannot exclude muscle spasm. 2. Multifocal marked disc space narrowing and degenerative disc disease throughout the lumbar spine. Moderately severe degenerative vertebral body spondylosis scattered throughout the lumbar spine. 3. No fracture acute is seen. Mild degenerative changes right sacroiliac joint.           Past Medical History:   Diagnosis Date    Cerebral artery occlusion with cerebral infarction [facility]) 2012    tia    GERD (gastroesophageal reflux disease)     Hyperlipidemia     Hypothyroidism     Osteoarthritis     Sixth nerve palsy 2020       Past Surgical History: Procedure Laterality Date    ARTHROCENTESIS Right 04/12/2019    Right hip bursa steroid INJECTION  NO SEDATION performed by Nelly Fleming MD at [facility] Right 05/11/2021    Right SI joint injection performed by Jeff Rand DO at [address] [facility]  2003    COLONOSCOPY      ENDOSCOPY, COLON, DIAGNOSTIC      HC INJECTION PROCEDURE FOR SACROILIAC JOINT Right 12/08/2020    Right SI joint injection performed by Jeff Rand DO at [address] [facility] Right 03/16/2021    right ischial bursa injection performed by Jeff Rand DO at [address] [facility] Bilateral 09/21/2021    bilateral ischial bursa injections under fluoroscopy.  performed by Jeff Rand DO at [address] [facility] Bilateral 06/29/2022    bilateral ischial bursa injections under fluoroscopy performed by Jeff Rand DO at [address] [facility]      left    JOINT REPLACEMENT Right 02/17/2022    KNEE ARTHROCENTESIS Bilateral 09/27/2022    bilateral ischial bursa injections performed by Jeff Rand DO at [address] [place] [address] Drive ARTHROSCOPY  2010    left    LUMBAR NERVE BLOCK N/A 12/11/2018    LUMBAR INTER LAMINAR DAYANARA LESI #1@ L5 performed by Nelly Fleming MD at [address] [place] [address] N/A 03/12/2019    LUMBAR INTER LAMINAR DAYANARA LESI @L5 performed by Nelly Fleming MD at [facility] Right 08/30/2019    SI RFA  RIGHT SIDE performed by Nelly Fleming MD at [facility] Left 10/01/2019    SI RFA  LEFT SIDE performed by Nelly Fleming MD at [address] [place] [address] Right 08/27/2018    SI RFA    NERVE SURGERY Left 09/11/2018    SI RFA    OTHER SURGICAL HISTORY      previous nerve blocks at [facility] 08/04/2021    Right genicular nerve block performed by Estela Drew DO at [facility] Right 08/17/2021    right genicular nerve radiofrequency ablation performed by Estela Drew DO at [facility] N/A 10/12/2021    lumbar epidural steroid L5/S1 performed by Estela Drew DO at [facility] Right 11/02/2022    right sacroiliac joint radiofrequency ablation performed by Estela Drew DO at [facility] Right 1/10/2023    right genicular nerve block performed by Estela Drew DO at [facility] LMBR/SACRAL Right 08/27/2018    SI RFA  RIGHT SIDE performed by Bouchra Dalton MD at [facility] LMBR/SACRAL Left 09/11/2018    SI RFA  LEFT SIDE performed by Bouchra Dalton MD at [facility] NEUROLYTIC AGENT OTHER PERIPHERAL NERVE Right 05/22/2018    LUMBAR RFA RIGHT SIDE FIRST L3-4,4-5,5-S1 performed by Bouchra Dalton MD at [facility] NEUROLYTIC AGENT OTHER PERIPHERAL NERVE Left 06/25/2018    LUMBAR RFA  LEFT SIDE @ L3-4,4-5,5-S1, performed by Bouchra Dalton MD at [address] [place] [address]       Family History   Problem Relation Age of Onset    Cancer Mother         melanoma    Heart Disease Father     Stroke Sister          Medications & Allergies:   Current Outpatient Medications   Medication Instructions    Acetaminophen (TYLENOL ARTHRITIS EXT RELIEF PO) Oral    aspirin 81 mg, Oral, DAILY    calcium carbonate-vitamin D 600-200 MG-UNIT TABS 1 tablet, DAILY    Cholecalciferol (VITAMIN D) 2000 UNITS CAPS capsule 1 capsule, DAILY    clopidogrel (PLAVIX) 75 MG tablet TAKE 1 TABLET BY MOUTH EVERY DAY    gabapentin (NEURONTIN) 600 MG tablet TAKE 1 TABLET IN THE       MORNING, 1 TABLET IN THE   AFTERNOON AND 2 TABLETS AT BEDTIME.    levothyroxine (SYNTHROID) 50 MCG tablet TAKE 1 TABLET BY MOUTH EVERY DAY    lovastatin (MEVACOR) 20 MG tablet TAKE 1 TABLET BY MOUTH EVERY DAY AT NIGHT    meloxicam (MOBIC) 7.5 MG tablet TAKE 1 TABLET BY MOUTH EVERY DAY    Multiple Vitamins-Minerals (THERAPEUTIC MULTIVITAMIN-MINERALS) tablet 1 tablet, DAILY    pantoprazole (PROTONIX) 40 MG tablet TAKE 1 TABLET BY MOUTH EVERY DAY WITH BREAKFAST       Allergies   Allergen Reactions    Cataflam [Diclofenac Potassium] Shortness Of Breath    Augmentin [Amoxicillin-Pot Clavulanate] Diarrhea    Phenergan [Promethazine Hcl]      Confusion \"didn't know where she was at\"       Review of Systems:   Constitutional: denies any fevers or chills  Genitourinary: negative for bowel/bladder incontinence   Musculoskeletal: Positive for right knee pain  Neurological: Positive for numbness/tingling in toes of feet  Behavioral/Psych: negative for anxiety/depression   All other ROS reviewed and are negative    Objective: There were no vitals filed for this visit. Constitutional: Pleasant, no acute distress   Head: Normocephalic, atraumatic   Eyes: Conjunctivae normal   Neck: Supple, symmetrical   Lungs: Normal respiratory effort, non-labored breathing   Cardiovascular: Limbs warm and well perfused   Abdomen: Non-protruded   Musculoskeletal: Muscle bulk symmetric, no atrophy, no gross deformities   -Right knee: Tenderness to palpation over entire knee joint capsule  Neurological: Cranial nerves II-XII grossly intact. · Gait -slightly antalgic gait. Ambulates without assistive device. · Motor: No focal motor deficit appreciated in lower limbs  Skin: No rashes or lesions visibile in lower limbs  Psychological: Cooperative, no exaggerated pain behaviors       Assessment:    Diagnosis Orders   1.  Chronic pain of right knee  CHG FLUOR NEEDLE/CATH SPINE/PARASPINAL DX/THER ADDON    CT DSTRJ NEUROLYTIC AGENT OTHER PERIPHERAL NERVE      2. Other chronic pain            Jann Smith is a 80 y. o.female presenting to the pain clinic for evaluation of right-sided low back pain. 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 She has responded significantly to her right sacroiliac joint radiofrequency ablation and right ischial bursa injection. In regard to her right knee, she is a failed total right knee replacement. She responded well to the right genicular nerve block. I have set her up for the right genicular radiofrequency ablation. Plan: The following treatment recommendations and plan were discussed in detail with Jann Smith. Imaging:   No imaging reviewed today. Analgesics:   Patient is taking Acetaminophen. Patient informed that the maximum amount of acetaminophen taken on a regular basis should only be 4000 mg per day. Patient is taking Aleve. Patient is advised to take as prescribed and not take on an empty stomach. Adjuvants: In light of the presence of a neuropathic component of pain, the patient can continue her Gabapentin. Interventions: With examination consistent with chronic right knee pain, set the patient up for right genicular nerve radiofrequency ablation under fluoroscopic guidance. The risks of procedure were discussed in detail with patient. Patient wants to proceed with the injection. Anticoagulation/NPO Recommendations:   Patient does not need to hold any medications for the procedure. Patient will need to be NPO x 8 hours prior to the procedure. We use IV sedation. Multidisciplinary Pain Management:   In the presence of complex, chronic, and multi-factorial pain, the importance of a multidisciplinary approach to pain management in the patients management regimen was emphasized and discussed in great detail.      Referrals:  None    Prescriptions Written This Visit:   None    Follow-up: For right genicular RFA, in office 4 weeks after      Hunter Hogue, DO  Interventional Pain Management/PM&R   [place]

## 2023-02-09 NOTE — DISCHARGE INSTRUCTIONS
Post procedure Instructions:    No driving or making significant decisions for the remainder of the day. Be cautions with walking and activity for 24 hours, do not over exert yourself. Ok to resume pre-procedure activity level today. Apply ice to site of injection site if you have pain or discomfort. Do not submerge sit of injection during bath or pool activities for 48 hours post-procedure. Resume blood thinning medications in 24 hours. Call office [phone] if you have:  Temperature greater than 100.4  Persistent nausea and vomiting  Severe uncontrolled pain  Redness, tenderness, or signs of infection (pain, swelling, redness, odor or green/yellow discharge around the site)  Difficulty breathing, headache or visual disturbances  Hives  Persistent dizziness or light-headedness  Extreme fatigue  Any other questions or concerns you may have after discharge    In an emergency, call 911 or go to an Emergency Department at a nearby hospital    Surgical Site Infections      How can we work together to prevent Surgical Site Infections? We would like to thank you for choosing [facility] for your Surgical Care. Below you will find helpful information on how we can work together to prevent Surgical Site Infections. What is a Surgical Site Infection (SSI)? A surgical site infection is an infection that occurs after surgery in the part of the body where the surgery took place. Most patients who have surgery do not develop an infection. However, infections develop in about 1 to 3 out of every 100 patients who have surgery. Some of the common symptoms of a surgical site infection are:  Redness and pain around the area where you had surgery  Drainage of cloudy fluid from your surgical wound  Fever    Can SSIs be treated? Yes. Most surgical site infections can be treated with antibiotics. The antibiotic given to you depends on the bacteria (germs) causing the infection.  Sometimes patients with SSIs also need another surgery to treat the infection. What are some of the things that hospitals are doing to prevent SSIs? To prevent SSIs, doctors, nurses, and other healthcare providers: May remove some of your hair immediately before your surgery using electric clippers if the hair is in the same area where the procedure will occur. They should not shave you with a razor. Give you antibiotics before your surgery starts. In most cases, you should get antibiotics within 60 minutes before the surgery starts and the antibiotics should be stopped within 24 hours after surgery. Clean the skin at the site of your surgery with a special soap that kills germs. Clean their hands and arms up to their elbows with an antiseptic agent just before the surgery. Wear special hair covers, masks, gowns, and gloves during surgery to  keep the surgery area clean. Clean their hands with soap and water or an alcohol-based hand rub before and after caring for each patient. If you do not see your providers clean their hands, please     ask  them to do so. What can I do to help prevent SSIs? Before your surgery:  Tell your doctor about other medical problems you may have. Health problems such as allergies, diabetes, and obesity could affect your surgery and your treatment. Quit smoking. Patients who smoke get more infections. Talk to your doctor about how you can quit before your surgery. Do not shave near where you will have surgery. Shaving with a razor can irritate your skin and make it easier to develop an infection. At the time of your surgery:  Speak up if someone tries to shave you with a razor before surgery. Ask why you need to be shaved and talk with your surgeon if you have any concerns. Ask if you will get antibiotics before surgery.   After your surgery:  Make sure that your healthcare providers clean their hands before examining you, either with soap and water or an alcohol-based hand rub.  Family and friends who visit you should not touch the surgical wound or dressings. Family and friends should clean their hands with soap and water or an alcohol-based hand rub before and after visiting you. If you do not see them clean their hands, ask them to clean their hands. What do I need to do when I go home from the hospital?  Before you go home, your doctor or nurse should explain everything you need to know about taking care of your wound. Make sure you understand how to care for your wound before you leave the hospital.  Always clean your hands before and after caring for your wound. Before you go home, make sure you know who to contact if you have questions or problems after you get home. If you have any symptoms of an infection, such as redness and pain at the surgery site, drainage, or fever, call your doctor immediately. If you have additional questions, please ask your doctor or nurse.

## 2023-02-09 NOTE — PROCEDURES
Pre-operative Diagnosis: Knee pain     Post-operative Diagnosis: Knee pain     Procedure: RIGHT genicular thermal radiofrequency ablation     Procedure Description:  The patient was brought to the procedure room and placed on the exam table in a comfortable supine position. The knee was prepared with chloraprep and sterile drapes. Needle placement was confirmed with fluoroscopic guidance. The superficial skin and subcutaneous tissues were anesthetized by local infiltration of 1% Lidocaine. Three 5 cm cannulas were advanced in an AP view near the superiolateral portion of the femoral condyle, superiomedial portion of the femoral condyle, and the inferiomedial portion of the tibial condyle until a bony endpoint was met. Lateral x-ray views showed all the needles at 50% depth of the femur and tibia. Then, motor stimulation was tested at 2.0 volts with no leg movement. After negative aspiration, 1 cc of 2% were injected at each side port followed by a one minute wait to anesthetize the genicular nerves. Radiofrequency lesions were made at 80 degrees Celsius for a duration of 1 minutes and 30 seconds followed by removal of the probe.         RFA Log     Impedance (I)                                      Superiomedial (SM)  -                          150-300  Superiolateral (SL)   -                           150-300  Inferiomedial (IM)    -                            150-300       Procedural Complications: None  Estimated Blood Loss: 0 mL        IV sedation was used during the procedure:  - Moderate intravenous conscious sedation was supervised by Dr. Olvin Maria  - The patient was independently monitored by a Registered Nurse assigned to the procedure room  - Monitoring included automated blood pressure, continuous EKG, and continuous pulse oximetry  - The detailed conscious record is permanently stored in the [place] [address]  - The following is the conscious sedation record:  Start Time: 13:57  End Time : 14:12  Duration: 15 minutes   Medications Administered: 1 mg Versed, 50 mcg Fentanyl        Hunter Mondragon DO  Interventional Pain Management/PM&R   [facility] and [address] [place] [address]

## 2023-02-09 NOTE — PROGRESS NOTES
1410-  Patient arrived to phase II via cart. Spontaneous respiraitons even and unlabored. Placed on monitor--VSS. Report received from [facility]. 1411-  Assessment completed. Patient is alert and oriented x4. IV capped off-- no complications. Patient denies pain--will monitor. Injection sites clean and dry. 1415-  Snack and drink given to patient. Family in car.   [phone] [id number]  All belongings in room. 1425-  IV removed-- no complications. Bandage applied. 1430-  Patient dressed. [phone]  Patient discharged in stable condition with all belongings via wheelchair.

## 2023-02-09 NOTE — POST SEDATION
[address] [address]  Sedation/Analgesia Post Sedation Record    Pt Name: Gilbert Aquino  MRN: [id number]  YOB: 1934  Procedure Performed By: Marietta Zuleta DO  Primary Care Physician: Adalberto Casanova DO    POST-PROCEDURE    Physicians/Assistants: Marietta Zuleta DO  Procedure Performed: See Procedure Note   Sedation/Anesthesia: Versed and Fentanyl (See procedure note for amount and duration)  Estimated Blood Loss:     0  ml  Specimens Removed: None        Complications: None           Hunter Frankel DO  Electronically signed 2/9/2023 at 6:31 PM

## 2023-02-09 NOTE — PRE SEDATION
[address] [address]  Pre-Sedation/Analgesia History & Physical    Pt Name: Jann Smith  MRN: [id number]  YOB: 1934  Provider Performing Procedure: Gloria Hilton DO   Primary Care Physician: Negra Catherine DO      MEDICAL HISTORY       has a past medical history of Cerebral artery occlusion with cerebral infarction [facility]), GERD (gastroesophageal reflux disease), Hyperlipidemia, Hypothyroidism, Osteoarthritis, and Sixth nerve palsy. SURGICAL HISTORY   has a past surgical history that includes back surgery (2003); Neck surgery (1980s); Knee arthroscopy (2010); joint replacement; Colonoscopy; Endoscopy, colon, diagnostic; other surgical history; pr dstrj neurolytic agent other peripheral nerve (Right, 05/22/2018); pr dstrj neurolytic agent other peripheral nerve (Left, 06/25/2018); Nerve Surgery (Right, 08/27/2018); pr dstr nrolytc agnt parverteb fct sngl lmbr/sacral (Right, 08/27/2018); Nerve Surgery (Left, 09/11/2018); pr dstr nrolytc agnt parverteb fct sngl lmbr/sacral (Left, 09/11/2018); lumbar nerve block (N/A, 12/11/2018); lumbar nerve block (N/A, 03/12/2019); arthrocentesis (Right, 04/12/2019); Lumbar spine surgery (Right, 08/30/2019); Lumbar spine surgery (Left, 10/01/2019); Injection Procedure For Sacroiliac Joint (Right, 12/08/2020); hip surgery (Right, 03/16/2021); Back Injection (Right, 05/11/2021); Pain management procedure (Right, 08/04/2021); Pain management procedure (Right, 08/17/2021); hip surgery (Bilateral, 09/21/2021); Pain management procedure (N/A, 10/12/2021); hip surgery (Bilateral, 06/29/2022); Knee Arthrocentesis (Bilateral, 09/27/2022); Pain management procedure (Right, 11/02/2022); joint replacement (Right, 02/17/2022); and Pain management procedure (Right, 1/10/2023).     ALLERGIES   Allergies as of 02/01/2023 - Fully Reviewed 02/01/2023   Allergen Reaction Noted    Cataflam [diclofenac potassium] Shortness Of Breath 07/26/2012    Augmentin [amoxicillin-pot clavulanate] Diarrhea 07/10/2021    Phenergan [promethazine hcl]  03/13/2013       MEDICATIONS   Prior to Admission medications    Medication Sig Start Date End Date Taking? Authorizing Provider   gabapentin (NEURONTIN) 600 MG tablet TAKE 1 TABLET IN THE       MORNING, 1 TABLET IN THE   AFTERNOON AND 2 TABLETS AT BEDTIME. 11/30/22 11/30/23  Johnathan Rodriguez, DO   clopidogrel (PLAVIX) 75 MG tablet TAKE 1 TABLET BY MOUTH EVERY DAY 10/19/22   Johnathan Rodriguez, DO   levothyroxine (SYNTHROID) 50 MCG tablet TAKE 1 TABLET BY MOUTH EVERY DAY 10/10/22   Johnathan Rodriguez, DO   pantoprazole (PROTONIX) 40 MG tablet TAKE 1 TABLET BY MOUTH EVERY DAY WITH BREAKFAST 10/10/22   Johnathan Rodriguez, DO   meloxicam (MOBIC) 7.5 MG tablet TAKE 1 TABLET BY MOUTH EVERY DAY 10/5/22   Johnathan Rodriguez, DO   lovastatin (MEVACOR) 20 MG tablet TAKE 1 TABLET BY MOUTH EVERY DAY AT NIGHT 3/31/22   Johnathan Rodriguez, DO   aspirin 81 MG EC tablet Take 81 mg by mouth daily    Historical Provider, MD   Acetaminophen (TYLENOL ARTHRITIS EXT RELIEF PO) Take by mouth    Historical Provider, MD   calcium carbonate-vitamin D 600-200 MG-UNIT TABS Take 1 tablet by mouth daily. Historical Provider, MD   Multiple Vitamins-Minerals (THERAPEUTIC MULTIVITAMIN-MINERALS) tablet Take 1 tablet by mouth daily. Historical Provider, MD   Cholecalciferol (VITAMIN D) 2000 UNITS CAPS capsule Take 1 capsule by mouth daily. Historical Provider, MD     PHYSICAL:   Vitals:    02/09/23 1410   BP: (!) 142/65   Pulse: 71   Resp: 12   Temp: 96.8 °F (36 °C)   SpO2: 94%     PLANNED PROCEDURE   See procedure note  SEDATION  Planned agent: Versed and Fentanyl  ASA Classification: 2  Class 1: A normal healthy patient  Class 2: Pt with mild to moderate systemic disease  Class 3: Severe systemic disease or disturbance  Class 4: Severe systemic disorders that are already life threatening.   Class 5: Moribund pt with little chances of survival, for more than 24 hours.  Mallampati I Airway Classification: 2    1. Pre-procedure diagnostic studies complete and results available. 2. Previous sedation/anesthesia experiences assessed. 3. The patient is an appropriate candidate to undergo the planned procedure sedation and anesthesia. (Refer to nursing sedation/analgesia documentation record)  4. Formulation and discussion of sedation/procedure plan, risks, and expectations with patient and/or responsible adult completed. 5. Patient examined immediately prior to the procedure.  (Refer to nursing sedation/analgesia documentation record)    Gloria Hilton DO  Electronically signed 2/9/2023 at 6:31 PM

## 2023-03-06 RX ORDER — LOVASTATIN 20 MG/1
TABLET ORAL
Qty: 90 TABLET | Refills: 3 | Status: SHIPPED | OUTPATIENT
Start: 2023-03-06

## 2023-03-21 ENCOUNTER — APPOINTMENT (OUTPATIENT)
Dept: MRI IMAGING | Age: 88
End: 2023-03-21
Payer: MEDICARE

## 2023-03-21 ENCOUNTER — TELEPHONE (OUTPATIENT)
Dept: FAMILY MEDICINE CLINIC | Age: 88
End: 2023-03-21

## 2023-03-21 ENCOUNTER — HOSPITAL ENCOUNTER (EMERGENCY)
Age: 88
Discharge: HOME | End: 2023-03-21
Attending: EMERGENCY MEDICINE
Payer: MEDICARE

## 2023-03-21 ENCOUNTER — APPOINTMENT (OUTPATIENT)
Dept: CT IMAGING | Age: 88
End: 2023-03-21
Payer: MEDICARE

## 2023-03-21 VITALS
OXYGEN SATURATION: 98 % | BODY MASS INDEX: 23.18 KG/M2 | WEIGHT: 115 LBS | HEART RATE: 70 BPM | DIASTOLIC BLOOD PRESSURE: 55 MMHG | HEIGHT: 59 IN | TEMPERATURE: 97.6 F | SYSTOLIC BLOOD PRESSURE: 135 MMHG | RESPIRATION RATE: 14 BRPM

## 2023-03-21 DIAGNOSIS — I15.9 SECONDARY HYPERTENSION: Primary | ICD-10-CM

## 2023-03-21 LAB
ALBUMIN SERPL BCG-MCNC: 4 G/DL (ref 3.5–5.1)
ALP SERPL-CCNC: 82 U/L (ref 38–126)
ALT SERPL W/O P-5'-P-CCNC: 11 U/L (ref 11–66)
AMPHETAMINES UR QL SCN: NEGATIVE
ANION GAP SERPL CALC-SCNC: 6 MEQ/L (ref 8–16)
AST SERPL-CCNC: 20 U/L (ref 5–40)
BACTERIA URNS QL MICRO: ABNORMAL /HPF
BARBITURATES UR QL SCN: NEGATIVE
BASOPHILS ABSOLUTE: 0.1 THOU/MM3 (ref 0–0.1)
BASOPHILS NFR BLD AUTO: 0.8 %
BENZODIAZ UR QL SCN: NEGATIVE
BILIRUB CONJ SERPL-MCNC: < 0.2 MG/DL (ref 0–0.3)
BILIRUB SERPL-MCNC: 0.2 MG/DL (ref 0.3–1.2)
BILIRUB UR QL STRIP.AUTO: NEGATIVE
BUN SERPL-MCNC: 31 MG/DL (ref 7–22)
BZE UR QL SCN: NEGATIVE
CALCIUM SERPL-MCNC: 10.5 MG/DL (ref 8.5–10.5)
CANNABINOIDS UR QL SCN: NEGATIVE
CASTS #/AREA URNS LPF: ABNORMAL /LPF
CASTS 2: ABNORMAL /LPF
CHARACTER UR: CLEAR
CHLORIDE SERPL-SCNC: 107 MEQ/L (ref 98–111)
CO2 SERPL-SCNC: 28 MEQ/L (ref 23–33)
COLOR: YELLOW
CREAT SERPL-MCNC: 1.1 MG/DL (ref 0.4–1.2)
CRYSTALS URNS MICRO: ABNORMAL
DEPRECATED RDW RBC AUTO: 44.6 FL (ref 35–45)
EKG ATRIAL RATE: 68 BPM
EKG P AXIS: 54 DEGREES
EKG P-R INTERVAL: 200 MS
EKG Q-T INTERVAL: 360 MS
EKG QRS DURATION: 72 MS
EKG QTC CALCULATION (BAZETT): 382 MS
EKG R AXIS: -21 DEGREES
EKG T AXIS: 38 DEGREES
EKG VENTRICULAR RATE: 68 BPM
EOSINOPHIL NFR BLD AUTO: 1.5 %
EOSINOPHILS ABSOLUTE: 0.1 THOU/MM3 (ref 0–0.4)
EPITHELIAL CELLS, UA: ABNORMAL /HPF
ERYTHROCYTE [DISTWIDTH] IN BLOOD BY AUTOMATED COUNT: 12.5 % (ref 11.5–14.5)
ETHANOL SERPL-MCNC: < 0.01 %
FENTANYL: NEGATIVE
FLUAV RNA RESP QL NAA+PROBE: NOT DETECTED
FLUBV RNA RESP QL NAA+PROBE: NOT DETECTED
GFR SERPL CREATININE-BSD FRML MDRD: 48 ML/MIN/1.73M2
GLUCOSE SERPL-MCNC: 105 MG/DL (ref 70–108)
GLUCOSE UR QL STRIP.AUTO: NEGATIVE MG/DL
HCT VFR BLD AUTO: 42.4 % (ref 37–47)
HGB BLD-MCNC: 13.4 GM/DL (ref 12–16)
HGB UR QL STRIP.AUTO: NEGATIVE
IMM GRANULOCYTES # BLD AUTO: 0.07 THOU/MM3 (ref 0–0.07)
IMM GRANULOCYTES NFR BLD AUTO: 0.9 %
KETONES UR QL STRIP.AUTO: NEGATIVE
LIPASE SERPL-CCNC: 45.6 U/L (ref 5.6–51.3)
LYMPHOCYTES ABSOLUTE: 1.4 THOU/MM3 (ref 1–4.8)
LYMPHOCYTES NFR BLD AUTO: 17.6 %
MCH RBC QN AUTO: 30.8 PG (ref 26–33)
MCHC RBC AUTO-ENTMCNC: 31.6 GM/DL (ref 32.2–35.5)
MCV RBC AUTO: 97.5 FL (ref 81–99)
MISCELLANEOUS 2: ABNORMAL
MONOCYTES ABSOLUTE: 0.6 THOU/MM3 (ref 0.4–1.3)
MONOCYTES NFR BLD AUTO: 7.2 %
NEUTROPHILS NFR BLD AUTO: 72 %
NITRITE UR QL STRIP: NEGATIVE
NRBC BLD AUTO-RTO: 0 /100 WBC
NT-PROBNP SERPL IA-MCNC: 84.7 PG/ML (ref 0–449)
OPIATES UR QL SCN: NEGATIVE
OSMOLALITY SERPL CALC.SUM OF ELEC: 288.2 MOSMOL/KG (ref 275–300)
OXYCODONE: NEGATIVE
PCP UR QL SCN: NEGATIVE
PH UR STRIP.AUTO: 8 [PH] (ref 5–9)
PLATELET # BLD AUTO: 306 THOU/MM3 (ref 130–400)
PMV BLD AUTO: 9.2 FL (ref 9.4–12.4)
POTASSIUM SERPL-SCNC: 4.5 MEQ/L (ref 3.5–5.2)
PROT SERPL-MCNC: 6.9 G/DL (ref 6.1–8)
PROT UR STRIP.AUTO-MCNC: NEGATIVE MG/DL
RBC # BLD AUTO: 4.35 MILL/MM3 (ref 4.2–5.4)
RBC URINE: ABNORMAL /HPF
RENAL EPI CELLS #/AREA URNS HPF: ABNORMAL /[HPF]
SARS-COV-2 RNA RESP QL NAA+PROBE: NOT DETECTED
SEGMENTED NEUTROPHILS ABSOLUTE COUNT: 5.6 THOU/MM3 (ref 1.8–7.7)
SODIUM SERPL-SCNC: 141 MEQ/L (ref 135–145)
SP GR UR REFRACT.AUTO: > 1.03 (ref 1–1.03)
TROPONIN T: < 0.01 NG/ML
TSH SERPL DL<=0.005 MIU/L-ACNC: 1.67 UIU/ML (ref 0.4–4.2)
UROBILINOGEN, URINE: 0.2 EU/DL (ref 0–1)
WBC # BLD AUTO: 7.8 THOU/MM3 (ref 4.8–10.8)
WBC #/AREA URNS HPF: ABNORMAL /HPF
WBC #/AREA URNS HPF: ABNORMAL /[HPF]
YEAST LIKE FUNGI URNS QL MICRO: ABNORMAL

## 2023-03-21 PROCEDURE — 6360000002 HC RX W HCPCS: Performed by: EMERGENCY MEDICINE

## 2023-03-21 PROCEDURE — 84484 ASSAY OF TROPONIN QUANT: CPT

## 2023-03-21 PROCEDURE — 2500000003 HC RX 250 WO HCPCS: Performed by: EMERGENCY MEDICINE

## 2023-03-21 PROCEDURE — 82077 ASSAY SPEC XCP UR&BREATH IA: CPT

## 2023-03-21 PROCEDURE — 96375 TX/PRO/DX INJ NEW DRUG ADDON: CPT

## 2023-03-21 PROCEDURE — 6360000004 HC RX CONTRAST MEDICATION: Performed by: EMERGENCY MEDICINE

## 2023-03-21 PROCEDURE — 36415 COLL VENOUS BLD VENIPUNCTURE: CPT

## 2023-03-21 PROCEDURE — 70551 MRI BRAIN STEM W/O DYE: CPT

## 2023-03-21 PROCEDURE — 83690 ASSAY OF LIPASE: CPT

## 2023-03-21 PROCEDURE — 70450 CT HEAD/BRAIN W/O DYE: CPT

## 2023-03-21 PROCEDURE — 70496 CT ANGIOGRAPHY HEAD: CPT

## 2023-03-21 PROCEDURE — 99285 EMERGENCY DEPT VISIT HI MDM: CPT

## 2023-03-21 PROCEDURE — 93005 ELECTROCARDIOGRAM TRACING: CPT | Performed by: EMERGENCY MEDICINE

## 2023-03-21 PROCEDURE — 6370000000 HC RX 637 (ALT 250 FOR IP): Performed by: EMERGENCY MEDICINE

## 2023-03-21 PROCEDURE — 93010 ELECTROCARDIOGRAM REPORT: CPT | Performed by: INTERNAL MEDICINE

## 2023-03-21 PROCEDURE — 81001 URINALYSIS AUTO W/SCOPE: CPT

## 2023-03-21 PROCEDURE — 82248 BILIRUBIN DIRECT: CPT

## 2023-03-21 PROCEDURE — 80307 DRUG TEST PRSMV CHEM ANLYZR: CPT

## 2023-03-21 PROCEDURE — 96374 THER/PROPH/DIAG INJ IV PUSH: CPT

## 2023-03-21 PROCEDURE — 80053 COMPREHEN METABOLIC PANEL: CPT

## 2023-03-21 PROCEDURE — 87636 SARSCOV2 & INF A&B AMP PRB: CPT

## 2023-03-21 PROCEDURE — 83880 ASSAY OF NATRIURETIC PEPTIDE: CPT

## 2023-03-21 PROCEDURE — 70498 CT ANGIOGRAPHY NECK: CPT

## 2023-03-21 PROCEDURE — 84443 ASSAY THYROID STIM HORMONE: CPT

## 2023-03-21 PROCEDURE — 85025 COMPLETE CBC W/AUTO DIFF WBC: CPT

## 2023-03-21 RX ORDER — MECLIZINE HCL 25MG 25 MG/1
25 TABLET, CHEWABLE ORAL ONCE
Status: COMPLETED | OUTPATIENT
Start: 2023-03-21 | End: 2023-03-21

## 2023-03-21 RX ORDER — LISINOPRIL 5 MG/1
2.5 TABLET ORAL DAILY
Qty: 15 TABLET | Refills: 0 | Status: SHIPPED | OUTPATIENT
Start: 2023-03-21 | End: 2023-03-22 | Stop reason: SDUPTHER

## 2023-03-21 RX ORDER — ENALAPRILAT 2.5 MG/2ML
1.25 INJECTION INTRAVENOUS ONCE
Status: COMPLETED | OUTPATIENT
Start: 2023-03-21 | End: 2023-03-21

## 2023-03-21 RX ORDER — HYDRALAZINE HYDROCHLORIDE 20 MG/ML
5 INJECTION INTRAMUSCULAR; INTRAVENOUS ONCE
Status: COMPLETED | OUTPATIENT
Start: 2023-03-21 | End: 2023-03-21

## 2023-03-21 RX ADMIN — HYDRALAZINE HYDROCHLORIDE 5 MG: 20 INJECTION INTRAMUSCULAR; INTRAVENOUS at 12:11

## 2023-03-21 RX ADMIN — IOPAMIDOL 80 ML: 755 INJECTION, SOLUTION INTRAVENOUS at 12:30

## 2023-03-21 RX ADMIN — ENALAPRILAT 1.25 MG: 2.5 INJECTION INTRAVENOUS at 15:41

## 2023-03-21 RX ADMIN — MECLIZINE HYDROCHLORIDE 25 MG: 25 TABLET, CHEWABLE ORAL at 13:21

## 2023-03-21 ASSESSMENT — PAIN SCALES - GENERAL: PAINLEVEL_OUTOF10: 5

## 2023-03-21 ASSESSMENT — ENCOUNTER SYMPTOMS
CONSTIPATION: 0
TROUBLE SWALLOWING: 0
CHEST TIGHTNESS: 0
SHORTNESS OF BREATH: 0
BLOOD IN STOOL: 0
ABDOMINAL DISTENTION: 0
NAUSEA: 0
CHOKING: 0
EYE DISCHARGE: 0
PHOTOPHOBIA: 0
ABDOMINAL PAIN: 0
VOICE CHANGE: 0
EYE ITCHING: 0
SINUS PRESSURE: 0
RHINORRHEA: 0
BACK PAIN: 0
SORE THROAT: 0
WHEEZING: 0
COUGH: 0
DIARRHEA: 0
VOMITING: 0
EYE PAIN: 0
EYE REDNESS: 0

## 2023-03-21 ASSESSMENT — PAIN DESCRIPTION - LOCATION: LOCATION: HEAD

## 2023-03-21 ASSESSMENT — PAIN - FUNCTIONAL ASSESSMENT: PAIN_FUNCTIONAL_ASSESSMENT: 0-10

## 2023-03-21 ASSESSMENT — PAIN DESCRIPTION - DESCRIPTORS: DESCRIPTORS: ACHING

## 2023-03-21 NOTE — ED NOTES
Assumed pt care. PT resting in bed. Resp regular. Denies all needs. Call light in reach.       Leny Amaya RN  03/21/23 2905

## 2023-03-21 NOTE — ED NOTES
MRI called. States coming to get pt. PT denies needs. Resp regular. Call light in reach.       Nikolas Lim RN  03/21/23 ARIES Danielle  03/21/23 2354

## 2023-03-21 NOTE — TELEPHONE ENCOUNTER
The patient called and stated that last night she had a headache and went to bed. States that today the headache is gone but her balance is off and when she tries to walk she is leaning to the left. States that even when she is sitting she is leaning to the left. Advised her that she needed to go to the ED for evaluation.   She voiced understanding and will go to [facility] ED.

## 2023-03-21 NOTE — ED NOTES
Pt lying in bed. Resp regular. Denies all needs. Family at side. Call light in reach.       Juana Yang RN  03/21/23 3003

## 2023-03-21 NOTE — ED PROVIDER NOTES
Normocephalic and atraumatic. Right Ear: External ear normal.      Left Ear: External ear normal.      Nose: Nose normal.      Mouth/Throat:      Pharynx: No oropharyngeal exudate. Eyes:      General: No scleral icterus. Right eye: No discharge. Left eye: No discharge. Extraocular Movements: Extraocular movements intact. Right eye: Normal extraocular motion and no nystagmus. Left eye: Normal extraocular motion and no nystagmus. Conjunctiva/sclera: Conjunctivae normal.      Pupils: Pupils are equal, round, and reactive to light. Funduscopic exam:     Right eye: No hemorrhage, exudate or papilledema. Left eye: No hemorrhage, exudate or papilledema. Neck:      Thyroid: No thyromegaly. Vascular: No JVD. Trachea: Trachea normal. No tracheal deviation. Meningeal: Brudzinski's sign and Kernig's sign absent. Cardiovascular:      Rate and Rhythm: Normal rate and regular rhythm. Pulses:           Carotid pulses are 2+ on the right side and 2+ on the left side. Radial pulses are 2+ on the right side and 2+ on the left side. Femoral pulses are 2+ on the right side and 2+ on the left side. Popliteal pulses are 2+ on the right side and 2+ on the left side. Dorsalis pedis pulses are 2+ on the right side and 2+ on the left side. Posterior tibial pulses are 2+ on the right side and 2+ on the left side. Heart sounds: Normal heart sounds, S1 normal and S2 normal. No murmur heard. No friction rub. No gallop. Pulmonary:      Effort: Pulmonary effort is normal.      Breath sounds: Normal breath sounds. No stridor. No decreased breath sounds, wheezing, rhonchi or rales. Chest:      Chest wall: No tenderness. Abdominal:      General: Bowel sounds are normal. There is no distension. Palpations: Abdomen is soft. There is no mass. Tenderness: There is no abdominal tenderness. There is no guarding or rebound.

## 2023-03-21 NOTE — ED NOTES
Pt resting in bed. Family at side. Resp regular. Denies needs. Call light in reach.       Jossy Morales RN  03/21/23 8149

## 2023-03-21 NOTE — ED NOTES
Pt to ER from home due to high blood pressure, headache, and leaning to the left. Pt reports her symptoms started last night around 1900. She reports she doesn't have any numbness of tingling or chest pain.  EKG completed upon arrival.      Martinez Julien RN  03/21/23 9029

## 2023-03-21 NOTE — DISCHARGE INSTRUCTIONS
Patient has been appears to be hypertension. Patient is restarted on low-dose lisinopril, she is instructed to follow-up with the primary care physician for titration of the medication. She is instructed to take all other medications as prescribed. She is instructed return to the nearest emergency room immediately for any new or worsening complaints.

## 2023-03-22 ENCOUNTER — OFFICE VISIT (OUTPATIENT)
Dept: FAMILY MEDICINE CLINIC | Age: 88
End: 2023-03-22

## 2023-03-22 ENCOUNTER — TELEPHONE (OUTPATIENT)
Dept: FAMILY MEDICINE CLINIC | Age: 88
End: 2023-03-22

## 2023-03-22 VITALS
WEIGHT: 111.8 LBS | RESPIRATION RATE: 12 BRPM | HEART RATE: 68 BPM | DIASTOLIC BLOOD PRESSURE: 70 MMHG | BODY MASS INDEX: 22.58 KG/M2 | SYSTOLIC BLOOD PRESSURE: 146 MMHG

## 2023-03-22 DIAGNOSIS — Z86.73 OLD CEREBROVASCULAR ACCIDENT (CVA) WITHOUT LATE EFFECT: ICD-10-CM

## 2023-03-22 DIAGNOSIS — M46.1 SACROILIITIS, NOT ELSEWHERE CLASSIFIED (HCC): ICD-10-CM

## 2023-03-22 DIAGNOSIS — I10 PRIMARY HYPERTENSION: Primary | ICD-10-CM

## 2023-03-22 DIAGNOSIS — M75.41 SHOULDER IMPINGEMENT, RIGHT: ICD-10-CM

## 2023-03-22 LAB
EKG ATRIAL RATE: 79 BPM
EKG P AXIS: 69 DEGREES
EKG P-R INTERVAL: 198 MS
EKG Q-T INTERVAL: 360 MS
EKG QRS DURATION: 80 MS
EKG QTC CALCULATION (BAZETT): 412 MS
EKG R AXIS: -21 DEGREES
EKG T AXIS: 30 DEGREES
EKG VENTRICULAR RATE: 79 BPM

## 2023-03-22 PROCEDURE — 93010 ELECTROCARDIOGRAM REPORT: CPT | Performed by: INTERNAL MEDICINE

## 2023-03-22 RX ORDER — METHYLPREDNISOLONE ACETATE 80 MG/ML
80 INJECTION, SUSPENSION INTRA-ARTICULAR; INTRALESIONAL; INTRAMUSCULAR; SOFT TISSUE ONCE
Status: COMPLETED | OUTPATIENT
Start: 2023-03-22 | End: 2023-03-22

## 2023-03-22 RX ORDER — LISINOPRIL 5 MG/1
5 TABLET ORAL DAILY
Qty: 90 TABLET | Refills: 3 | Status: SHIPPED | OUTPATIENT
Start: 2023-03-22

## 2023-03-22 RX ADMIN — METHYLPREDNISOLONE ACETATE 80 MG: 80 INJECTION, SUSPENSION INTRA-ARTICULAR; INTRALESIONAL; INTRAMUSCULAR; SOFT TISSUE at 13:13

## 2023-03-22 SDOH — ECONOMIC STABILITY: HOUSING INSECURITY
IN THE LAST 12 MONTHS, WAS THERE A TIME WHEN YOU DID NOT HAVE A STEADY PLACE TO SLEEP OR SLEPT IN A SHELTER (INCLUDING NOW)?: NO

## 2023-03-22 SDOH — ECONOMIC STABILITY: FOOD INSECURITY: WITHIN THE PAST 12 MONTHS, THE FOOD YOU BOUGHT JUST DIDN'T LAST AND YOU DIDN'T HAVE MONEY TO GET MORE.: NEVER TRUE

## 2023-03-22 SDOH — ECONOMIC STABILITY: FOOD INSECURITY: WITHIN THE PAST 12 MONTHS, YOU WORRIED THAT YOUR FOOD WOULD RUN OUT BEFORE YOU GOT MONEY TO BUY MORE.: NEVER TRUE

## 2023-03-22 SDOH — ECONOMIC STABILITY: INCOME INSECURITY: HOW HARD IS IT FOR YOU TO PAY FOR THE VERY BASICS LIKE FOOD, HOUSING, MEDICAL CARE, AND HEATING?: NOT HARD AT ALL

## 2023-03-22 ASSESSMENT — ENCOUNTER SYMPTOMS
ABDOMINAL PAIN: 0
NAUSEA: 0
COUGH: 0
SHORTNESS OF BREATH: 0

## 2023-03-22 ASSESSMENT — PATIENT HEALTH QUESTIONNAIRE - PHQ9
SUM OF ALL RESPONSES TO PHQ QUESTIONS 1-9: 0
SUM OF ALL RESPONSES TO PHQ QUESTIONS 1-9: 0
2. FEELING DOWN, DEPRESSED OR HOPELESS: 0
SUM OF ALL RESPONSES TO PHQ QUESTIONS 1-9: 0
SUM OF ALL RESPONSES TO PHQ9 QUESTIONS 1 & 2: 0
SUM OF ALL RESPONSES TO PHQ QUESTIONS 1-9: 0
1. LITTLE INTEREST OR PLEASURE IN DOING THINGS: 0

## 2023-03-22 NOTE — PROGRESS NOTES
Liane Youssef (12/21/1934) 80 y.o. female here for evaluation of the following chief complaint(s):      HPI:  Chief Complaint   Patient presents with    ED Follow-up     Seen in ED 3/21/23:     [facility] Silvia Vanegas is a 80 y.o. female who presents headache and dizziness, she has a feeling that she is going to fall to the left. Patient states that she had this feeling last night before she went to bed she slept fine she woke up this morning is still persisted she finally decided to come in for evaluation and treatment. Patient denies any ringing roaring or rushing in her ears. Patient states that she has not any nausea or vomiting. No change in taste or smell. No visual disturbances. Patient states she was on blood pressure medication however she was taken off of that last fall. She stated that today her blood pressure was close to 170/90. Patient became concerned with the symptoms so she decided to come in for evaluation and treatment. Here today the patient denies any chest pain or shortness of breath. She is not having any abdominal pain or changes in bowel or bladder habits. Patient states she has never had anything like this before. Patient is on aspirin and Plavix as well as a statin no longer on blood pressure medication. Patient states she has not been sick recently although she has had a cough which has been persistent. Patient denies any loss of function of either side of her body, she is not having any speech problems she is not having any understanding problems. Patient is chronically hard of hearing and has bilateral hearing aids. Patient is otherwise resting comfortably on the cot no clear distress no other physical complaints at this time. STROKE Work up with CTA Head, Neck and MRI BRAIN. Unremarkable. Feeling better today. BP is improved. Taking 2.5 mg. Hx of HTN and was taken off Lisinopril 10 mg in Fall 2022 by PCP due to low symptomatic BP.

## 2023-03-22 NOTE — PROGRESS NOTES
Administrations This Visit       methylPREDNISolone acetate (DEPO-MEDROL) injection 80 mg       Admin Date  03/22/2023 Action  Given Dose  80 mg Route  Intra-artICUlar Administered By  Mauricio Barry LPN

## 2023-03-30 ENCOUNTER — TELEPHONE (OUTPATIENT)
Dept: FAMILY MEDICINE CLINIC | Age: 88
End: 2023-03-30

## 2023-03-30 ENCOUNTER — CARE COORDINATION (OUTPATIENT)
Dept: CARE COORDINATION | Age: 88
End: 2023-03-30

## 2023-03-30 DIAGNOSIS — M47.816 LUMBAR SPONDYLOSIS: ICD-10-CM

## 2023-03-30 RX ORDER — MELOXICAM 7.5 MG/1
TABLET ORAL
Qty: 90 TABLET | Refills: 1 | Status: SHIPPED | OUTPATIENT
Start: 2023-03-30

## 2023-03-30 SDOH — HEALTH STABILITY: PHYSICAL HEALTH: ON AVERAGE, HOW MANY DAYS PER WEEK DO YOU ENGAGE IN MODERATE TO STRENUOUS EXERCISE (LIKE A BRISK WALK)?: 2 DAYS

## 2023-03-30 SDOH — HEALTH STABILITY: PHYSICAL HEALTH: ON AVERAGE, HOW MANY MINUTES DO YOU ENGAGE IN EXERCISE AT THIS LEVEL?: 20 MIN

## 2023-03-30 SDOH — ECONOMIC STABILITY: INCOME INSECURITY: IN THE LAST 12 MONTHS, WAS THERE A TIME WHEN YOU WERE NOT ABLE TO PAY THE MORTGAGE OR RENT ON TIME?: NO

## 2023-03-30 SDOH — ECONOMIC STABILITY: HOUSING INSECURITY: IN THE LAST 12 MONTHS, HOW MANY PLACES HAVE YOU LIVED?: 1

## 2023-03-30 ASSESSMENT — SOCIAL DETERMINANTS OF HEALTH (SDOH)
HOW OFTEN DO YOU GET TOGETHER WITH FRIENDS OR RELATIVES?: MORE THAN THREE TIMES A WEEK
HOW OFTEN DO YOU ATTENT MEETINGS OF THE CLUB OR ORGANIZATION YOU BELONG TO?: MORE THAN 4 TIMES PER YEAR
HOW OFTEN DO YOU ATTEND CHURCH OR RELIGIOUS SERVICES?: MORE THAN 4 TIMES PER YEAR
DO YOU BELONG TO ANY CLUBS OR ORGANIZATIONS SUCH AS CHURCH GROUPS UNIONS, FRATERNAL OR ATHLETIC GROUPS, OR SCHOOL GROUPS?: YES
IN A TYPICAL WEEK, HOW MANY TIMES DO YOU TALK ON THE PHONE WITH FAMILY, FRIENDS, OR NEIGHBORS?: MORE THAN THREE TIMES A WEEK

## 2023-03-30 NOTE — TELEPHONE ENCOUNTER
----- Message from Fabricio Tellez sent at 3/30/2023  2:00 PM EDT -----  Subject: Refill Request    QUESTIONS  Name of Medication? lisinopril (PRINIVIL;ZESTRIL) 5 MG tablet  Patient-reported dosage and instructions? 5mg One tab by mouth in AM  How many days do you have left? 2  Preferred Pharmacy? [id number] Pharmacy phone number (if available)? [phone]  ---------------------------------------------------------------------------  --------------  CALL BACK INFO  What is the best way for the office to contact you? OK to leave message on   voicemail  Preferred Call Back Phone Number? [phone]  ---------------------------------------------------------------------------  --------------  SCRIPT ANSWERS  Relationship to Patient?  Self

## 2023-03-30 NOTE — CARE COORDINATION
Ambulatory Care Coordination Note  3/30/2023    Patient Current Location:  Home: [address] [facility] [place] [address]    ACM contacted the patient by telephone. Verified name and  with patient as identifiers. Provided introduction to self, and explanation of the ACM role. ACM: Odilia Gallagher RN    Challenges to be reviewed by the provider   Additional needs identified to be addressed with provider: No  none               Method of communication with provider: none. Abelardojackie  was referred to care coordination following ED visit for HTN. Spoke with Anthony Todd today. Introduced self and role. Pt has h/o: HTN, HLD, hypothyroidism  Pt was seen in ED 3/21 for HTN. Had same day appt next day and lisinopril was increased from 2.5mg up to 5mg daily. Pt is monitoring BP 1-3 times daily. Pt has seen improvement in readings since taking 5mg daily. Reports that this is a medication she has taken previously and didn't have any problems with it. Below is update of most recent BP readings:  3/25: 146/69, 133/62, 128/71  3/26: 122/62, 137/66  3/27: 138/62  3/28: 136/56  3/29: 125/61  Encouraged pt to check 1-2 x daily. Pt is keeping a log. Checking 30min-1hr after takign AM medications. Educated pt on same day appts. Encouraged to call myself or PCP office with issues or concerns  Pt had right shoulder inj. At PCP office on 3/22. Reports that pain has improved slightly. Primarily notices pain when turning. Currently in outpt PT for knee and back. Going to [facility]. Feels exercises are helping. Pt has cane to use if walking long distances. Has walker but does not use. Pt remains very independent. Not current with any services. Plan of care:  AWV in Sept  Continue going to outpt PT  Continue monitoring BP. Cut back to checking 1-2x daily. Call with any concerns.    Work to stay active  Will discuss RPM at next call  Will review ACP documents at next call  General Assessment    Do you have any symptoms that are

## 2023-03-30 NOTE — CARE COORDINATION
Attempted to reach Silvia Shankar today for care coordination enrollment. No answer. Message left requesting return call.

## 2023-04-06 ENCOUNTER — APPOINTMENT (OUTPATIENT)
Dept: GENERAL RADIOLOGY | Age: 88
DRG: 179 | End: 2023-04-06
Payer: MEDICARE

## 2023-04-06 ENCOUNTER — APPOINTMENT (OUTPATIENT)
Dept: CT IMAGING | Age: 88
DRG: 179 | End: 2023-04-06
Payer: MEDICARE

## 2023-04-06 ENCOUNTER — HOSPITAL ENCOUNTER (INPATIENT)
Age: 88
LOS: 2 days | Discharge: HOME HEALTH | DRG: 179 | End: 2023-04-10
Attending: EMERGENCY MEDICINE | Admitting: INTERNAL MEDICINE
Payer: MEDICARE

## 2023-04-06 ENCOUNTER — CARE COORDINATION (OUTPATIENT)
Dept: CARE COORDINATION | Age: 88
End: 2023-04-06

## 2023-04-06 DIAGNOSIS — R53.1 GENERALIZED WEAKNESS: Primary | ICD-10-CM

## 2023-04-06 DIAGNOSIS — R74.8 ELEVATED CPK: ICD-10-CM

## 2023-04-06 DIAGNOSIS — U07.1 COVID-19 VIRUS INFECTION: ICD-10-CM
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
## 2023-04-06 DIAGNOSIS — M62.82 NON-TRAUMATIC RHABDOMYOLYSIS: ICD-10-CM

## 2023-04-06 DIAGNOSIS — E86.0 DEHYDRATION: ICD-10-CM

## 2023-04-06 LAB
ALBUMIN SERPL BCG-MCNC: 4.5 G/DL (ref 3.5–5.1)
ALP SERPL-CCNC: 81 U/L (ref 38–126)
ALT SERPL W/O P-5'-P-CCNC: 17 U/L (ref 11–66)
ANION GAP SERPL CALC-SCNC: 14 MEQ/L (ref 8–16)
AST SERPL-CCNC: 39 U/L (ref 5–40)
BACTERIA URNS QL MICRO: ABNORMAL /HPF
BASOPHILS ABSOLUTE: 0 THOU/MM3 (ref 0–0.1)
BASOPHILS NFR BLD AUTO: 0.2 %
BILIRUB SERPL-MCNC: 0.4 MG/DL (ref 0.3–1.2)
BILIRUB UR QL STRIP.AUTO: NEGATIVE
BUN SERPL-MCNC: 26 MG/DL (ref 7–22)
CALCIUM SERPL-MCNC: 10.1 MG/DL (ref 8.5–10.5)
CASTS #/AREA URNS LPF: ABNORMAL /LPF
CHARACTER UR: CLEAR
CHLORIDE SERPL-SCNC: 98 MEQ/L (ref 98–111)
CK SERPL-CCNC: 1321 U/L (ref 30–135)
CO2 SERPL-SCNC: 23 MEQ/L (ref 23–33)
COLOR: YELLOW
CREAT SERPL-MCNC: 1.1 MG/DL (ref 0.4–1.2)
CRYSTALS URNS MICRO: ABNORMAL
DEPRECATED RDW RBC AUTO: 43 FL (ref 35–45)
EOSINOPHIL NFR BLD AUTO: 0 %
EOSINOPHILS ABSOLUTE: 0 THOU/MM3 (ref 0–0.4)
EPITHELIAL CELLS, UA: ABNORMAL /HPF
ERYTHROCYTE [DISTWIDTH] IN BLOOD BY AUTOMATED COUNT: 12.4 % (ref 11.5–14.5)
FLUAV RNA RESP QL NAA+PROBE: NOT DETECTED
FLUBV RNA RESP QL NAA+PROBE: NOT DETECTED
GFR SERPL CREATININE-BSD FRML MDRD: 48 ML/MIN/1.73M2
GLUCOSE SERPL-MCNC: 116 MG/DL (ref 70–108)
GLUCOSE UR QL STRIP.AUTO: NEGATIVE MG/DL
HCT VFR BLD AUTO: 42.1 % (ref 37–47)
HGB BLD-MCNC: 13.5 GM/DL (ref 12–16)
HGB UR QL STRIP.AUTO: ABNORMAL
HGB UR QL STRIP.AUTO: POSITIVE
IMM GRANULOCYTES # BLD AUTO: 0.06 THOU/MM3 (ref 0–0.07)
IMM GRANULOCYTES NFR BLD AUTO: 0.4 %
KETONES UR QL STRIP.AUTO: NEGATIVE
LYMPHOCYTES ABSOLUTE: 0.5 THOU/MM3 (ref 1–4.8)
LYMPHOCYTES NFR BLD AUTO: 3.7 %
MCH RBC QN AUTO: 30.3 PG (ref 26–33)
MCHC RBC AUTO-ENTMCNC: 32.1 GM/DL (ref 32.2–35.5)
MCV RBC AUTO: 94.4 FL (ref 81–99)
MONOCYTES ABSOLUTE: 0.8 THOU/MM3 (ref 0.4–1.3)
MONOCYTES NFR BLD AUTO: 6 %
NEUTROPHILS NFR BLD AUTO: 89.7 %
NITRITE UR QL STRIP: NEGATIVE
NRBC BLD AUTO-RTO: 0 /100 WBC
NT-PROBNP SERPL IA-MCNC: 771.6 PG/ML (ref 0–449)
OSMOLALITY SERPL CALC.SUM OF ELEC: 275.8 MOSMOL/KG (ref 275–300)
PH UR STRIP.AUTO: 6.5 [PH] (ref 5–9)
PLATELET # BLD AUTO: 230 THOU/MM3 (ref 130–400)
PMV BLD AUTO: 9.9 FL (ref 9.4–12.4)
POTASSIUM SERPL-SCNC: 4 MEQ/L (ref 3.5–5.2)
PROT SERPL-MCNC: 7.4 G/DL (ref 6.1–8)
PROT UR STRIP.AUTO-MCNC: ABNORMAL MG/DL
RBC # BLD AUTO: 4.46 MILL/MM3 (ref 4.2–5.4)
RBC URINE: ABNORMAL /HPF
SARS-COV-2 RNA RESP QL NAA+PROBE: DETECTED
SEGMENTED NEUTROPHILS ABSOLUTE COUNT: 12.5 THOU/MM3 (ref 1.8–7.7)
SODIUM SERPL-SCNC: 135 MEQ/L (ref 135–145)
SP GR UR REFRACT.AUTO: 1.02 (ref 1–1.03)
TROPONIN T: < 0.01 NG/ML
TSH SERPL DL<=0.005 MIU/L-ACNC: 0.51 UIU/ML (ref 0.4–4.2)
UROBILINOGEN, URINE: 0.2 EU/DL (ref 0–1)
WBC # BLD AUTO: 13.9 THOU/MM3 (ref 4.8–10.8)
WBC #/AREA URNS HPF: ABNORMAL /HPF
WBC #/AREA URNS HPF: NEGATIVE /[HPF]

## 2023-04-06 PROCEDURE — 93010 ELECTROCARDIOGRAM REPORT: CPT | Performed by: NUCLEAR MEDICINE

## 2023-04-06 PROCEDURE — 86140 C-REACTIVE PROTEIN: CPT

## 2023-04-06 PROCEDURE — 6360000002 HC RX W HCPCS

## 2023-04-06 PROCEDURE — 96372 THER/PROPH/DIAG INJ SC/IM: CPT

## 2023-04-06 PROCEDURE — 82550 ASSAY OF CK (CPK): CPT

## 2023-04-06 PROCEDURE — 2580000003 HC RX 258: Performed by: EMERGENCY MEDICINE

## 2023-04-06 PROCEDURE — G0378 HOSPITAL OBSERVATION PER HR: HCPCS

## 2023-04-06 PROCEDURE — 96361 HYDRATE IV INFUSION ADD-ON: CPT

## 2023-04-06 PROCEDURE — 99285 EMERGENCY DEPT VISIT HI MDM: CPT

## 2023-04-06 PROCEDURE — 84484 ASSAY OF TROPONIN QUANT: CPT

## 2023-04-06 PROCEDURE — 96360 HYDRATION IV INFUSION INIT: CPT

## 2023-04-06 PROCEDURE — 80053 COMPREHEN METABOLIC PANEL: CPT

## 2023-04-06 PROCEDURE — 87040 BLOOD CULTURE FOR BACTERIA: CPT

## 2023-04-06 PROCEDURE — 70450 CT HEAD/BRAIN W/O DYE: CPT

## 2023-04-06 PROCEDURE — 81001 URINALYSIS AUTO W/SCOPE: CPT

## 2023-04-06 PROCEDURE — 71046 X-RAY EXAM CHEST 2 VIEWS: CPT

## 2023-04-06 PROCEDURE — 84443 ASSAY THYROID STIM HORMONE: CPT

## 2023-04-06 PROCEDURE — 83874 ASSAY OF MYOGLOBIN: CPT

## 2023-04-06 PROCEDURE — 83615 LACTATE (LD) (LDH) ENZYME: CPT

## 2023-04-06 PROCEDURE — 84145 PROCALCITONIN (PCT): CPT

## 2023-04-06 PROCEDURE — 85025 COMPLETE CBC W/AUTO DIFF WBC: CPT

## 2023-04-06 PROCEDURE — 36415 COLL VENOUS BLD VENIPUNCTURE: CPT

## 2023-04-06 PROCEDURE — 83880 ASSAY OF NATRIURETIC PEPTIDE: CPT

## 2023-04-06 PROCEDURE — 82728 ASSAY OF FERRITIN: CPT

## 2023-04-06 PROCEDURE — 6370000000 HC RX 637 (ALT 250 FOR IP)

## 2023-04-06 PROCEDURE — 93005 ELECTROCARDIOGRAM TRACING: CPT | Performed by: EMERGENCY MEDICINE

## 2023-04-06 PROCEDURE — 87636 SARSCOV2 & INF A&B AMP PRB: CPT

## 2023-04-06 PROCEDURE — 99223 1ST HOSP IP/OBS HIGH 75: CPT

## 2023-04-06 RX ORDER — ONDANSETRON 4 MG/1
4 TABLET, ORALLY DISINTEGRATING ORAL EVERY 8 HOURS PRN
Status: DISCONTINUED | OUTPATIENT
Start: 2023-04-06 | End: 2023-04-10 | Stop reason: HOSPADM

## 2023-04-06 RX ORDER — CLOPIDOGREL BISULFATE 75 MG/1
75 TABLET ORAL DAILY
Status: DISCONTINUED | OUTPATIENT
Start: 2023-04-07 | End: 2023-04-10 | Stop reason: HOSPADM

## 2023-04-06 RX ORDER — SODIUM CHLORIDE 9 MG/ML
INJECTION, SOLUTION INTRAVENOUS PRN
Status: DISCONTINUED | OUTPATIENT
Start: 2023-04-06 | End: 2023-04-10 | Stop reason: HOSPADM

## 2023-04-06 RX ORDER — LEVOTHYROXINE SODIUM 0.05 MG/1
50 TABLET ORAL DAILY
Status: DISCONTINUED | OUTPATIENT
Start: 2023-04-07 | End: 2023-04-10 | Stop reason: HOSPADM

## 2023-04-06 RX ORDER — SODIUM CHLORIDE 0.9 % (FLUSH) 0.9 %
10 SYRINGE (ML) INJECTION PRN
Status: DISCONTINUED | OUTPATIENT
Start: 2023-04-06 | End: 2023-04-10 | Stop reason: HOSPADM

## 2023-04-06 RX ORDER — ONDANSETRON 2 MG/ML
4 INJECTION INTRAMUSCULAR; INTRAVENOUS EVERY 6 HOURS PRN
Status: DISCONTINUED | OUTPATIENT
Start: 2023-04-06 | End: 2023-04-10 | Stop reason: HOSPADM

## 2023-04-06 RX ORDER — LISINOPRIL 5 MG/1
5 TABLET ORAL DAILY
Status: DISCONTINUED | OUTPATIENT
Start: 2023-04-07 | End: 2023-04-09

## 2023-04-06 RX ORDER — PANTOPRAZOLE SODIUM 40 MG/1
40 TABLET, DELAYED RELEASE ORAL
Status: DISCONTINUED | OUTPATIENT
Start: 2023-04-07 | End: 2023-04-10 | Stop reason: HOSPADM

## 2023-04-06 RX ORDER — MULTIVITAMIN WITH IRON
1 TABLET ORAL DAILY
Status: DISCONTINUED | OUTPATIENT
Start: 2023-04-07 | End: 2023-04-10 | Stop reason: HOSPADM

## 2023-04-06 RX ORDER — POLYETHYLENE GLYCOL 3350 17 G/17G
17 POWDER, FOR SOLUTION ORAL DAILY PRN
Status: DISCONTINUED | OUTPATIENT
Start: 2023-04-06 | End: 2023-04-10 | Stop reason: HOSPADM

## 2023-04-06 RX ORDER — SODIUM CHLORIDE 0.9 % (FLUSH) 0.9 %
10 SYRINGE (ML) INJECTION EVERY 12 HOURS SCHEDULED
Status: DISCONTINUED | OUTPATIENT
Start: 2023-04-06 | End: 2023-04-10 | Stop reason: HOSPADM

## 2023-04-06 RX ORDER — ATORVASTATIN CALCIUM 10 MG/1
10 TABLET, FILM COATED ORAL DAILY
Status: DISCONTINUED | OUTPATIENT
Start: 2023-04-07 | End: 2023-04-10 | Stop reason: HOSPADM

## 2023-04-06 RX ORDER — HEPARIN SODIUM 5000 [USP'U]/ML
5000 INJECTION, SOLUTION INTRAVENOUS; SUBCUTANEOUS 2 TIMES DAILY
Status: DISCONTINUED | OUTPATIENT
Start: 2023-04-06 | End: 2023-04-10 | Stop reason: HOSPADM

## 2023-04-06 RX ORDER — ACETAMINOPHEN 325 MG/1
650 TABLET ORAL EVERY 6 HOURS PRN
Status: DISCONTINUED | OUTPATIENT
Start: 2023-04-06 | End: 2023-04-10 | Stop reason: HOSPADM

## 2023-04-06 RX ORDER — GUAIFENESIN 600 MG/1
600 TABLET, EXTENDED RELEASE ORAL 2 TIMES DAILY
Status: DISCONTINUED | OUTPATIENT
Start: 2023-04-06 | End: 2023-04-10 | Stop reason: HOSPADM

## 2023-04-06 RX ORDER — VITAMIN B COMPLEX
2000 TABLET ORAL DAILY
Status: DISCONTINUED | OUTPATIENT
Start: 2023-04-07 | End: 2023-04-10 | Stop reason: HOSPADM

## 2023-04-06 RX ORDER — 0.9 % SODIUM CHLORIDE 0.9 %
1000 INTRAVENOUS SOLUTION INTRAVENOUS ONCE
Status: COMPLETED | OUTPATIENT
Start: 2023-04-06 | End: 2023-04-06

## 2023-04-06 RX ORDER — ASPIRIN 81 MG/1
81 TABLET ORAL DAILY
Status: DISCONTINUED | OUTPATIENT
Start: 2023-04-07 | End: 2023-04-10 | Stop reason: HOSPADM

## 2023-04-06 RX ORDER — SODIUM CHLORIDE 9 MG/ML
INJECTION, SOLUTION INTRAVENOUS CONTINUOUS
Status: ACTIVE | OUTPATIENT
Start: 2023-04-06 | End: 2023-04-07

## 2023-04-06 RX ORDER — GABAPENTIN 600 MG/1
600 TABLET ORAL 2 TIMES DAILY
Status: DISCONTINUED | OUTPATIENT
Start: 2023-04-06 | End: 2023-04-10 | Stop reason: HOSPADM

## 2023-04-06 RX ORDER — SODIUM CHLORIDE 9 MG/ML
INJECTION, SOLUTION INTRAVENOUS CONTINUOUS
Status: DISCONTINUED | OUTPATIENT
Start: 2023-04-06 | End: 2023-04-06

## 2023-04-06 RX ORDER — ACETAMINOPHEN 650 MG/1
650 SUPPOSITORY RECTAL EVERY 6 HOURS PRN
Status: DISCONTINUED | OUTPATIENT
Start: 2023-04-06 | End: 2023-04-10 | Stop reason: HOSPADM

## 2023-04-06 RX ADMIN — SODIUM CHLORIDE: 9 INJECTION, SOLUTION INTRAVENOUS at 15:31

## 2023-04-06 RX ADMIN — GUAIFENESIN 600 MG: 600 TABLET, EXTENDED RELEASE ORAL at 21:27

## 2023-04-06 RX ADMIN — HEPARIN SODIUM 5000 UNITS: 5000 INJECTION INTRAVENOUS; SUBCUTANEOUS at 21:27

## 2023-04-06 RX ADMIN — GABAPENTIN 600 MG: 600 TABLET, FILM COATED ORAL at 21:27

## 2023-04-06 RX ADMIN — SODIUM CHLORIDE 1000 ML: 9 INJECTION, SOLUTION INTRAVENOUS at 14:42

## 2023-04-06 ASSESSMENT — ENCOUNTER SYMPTOMS
CONSTIPATION: 0
VOMITING: 0
RHINORRHEA: 1
SHORTNESS OF BREATH: 1
DIARRHEA: 0
NAUSEA: 0
ABDOMINAL PAIN: 0
COUGH: 1
SORE THROAT: 0
COLOR CHANGE: 1

## 2023-04-06 NOTE — CARE COORDINATION
Ambulatory Care Coordination Note  2023    Patient Current Location:  Home: [address] [facility] [place] [address]     ACM contacted the patient by telephone. Verified name and  with patient as identifiers. Provided introduction to self, and explanation of the ACM role. Challenges to be reviewed by the provider   Additional needs identified to be addressed with provider: Yes  Pt has fallen 2 x in last 24 hrs. Last fall was this morning in the BR. Reports significant weakness over the last couple days. Has developed cold like s/s: cough, head and chest congestion. Pt was unable to get up on her own this morning and was on the floor for appx 4 hrs before she could get to the phone and call for help. Pt has been in contact with her family and they are coming to take her to ED. Neighbor is sitting with pt until they arrive. Does not feel need to go by squad. Denies hitting head with either fall. Method of communication with provider: chart routing. ACM: ARISE Rose was referred to care coordination following ED visit for HTN. Spoke with Geronimo Del Toro today. Introduced self and role. Pt has h/o: HTN, HLD, hypothyroidism. Spoke with pt today for f/u call. Pt developed weakness and cold like symptoms over last couple of days. Pt reports that she fell out of bed yesterday. Pt hit her alert button when trying to get to phone. Fire dept came and got her up. She declined eval in ED at that time. Pt was up today in BR when she started to feel extremely weak and fell again. Both times fell on her butt. Denies hitting head. Pt states that lied on the floor for appx 4 hrs before she was able to get to the phone. Neighbor came and helped her up. Neighbor sitting with pt at time of call. Pt has been in contact with her family and is in agreement to go to ED for evaluation. Family is on their way to take her. Pt denies feeling dizzy when up.    Continues to monitor BP:

## 2023-04-07 LAB
ANION GAP SERPL CALC-SCNC: 9 MEQ/L (ref 8–16)
BASOPHILS ABSOLUTE: 0 THOU/MM3 (ref 0–0.1)
BASOPHILS NFR BLD AUTO: 0.2 %
BUN SERPL-MCNC: 21 MG/DL (ref 7–22)
CALCIUM SERPL-MCNC: 8.2 MG/DL (ref 8.5–10.5)
CHLORIDE SERPL-SCNC: 104 MEQ/L (ref 98–111)
CK SERPL-CCNC: 1452 U/L (ref 30–135)
CK SERPL-CCNC: 1545 U/L (ref 30–135)
CO2 SERPL-SCNC: 21 MEQ/L (ref 23–33)
CREAT SERPL-MCNC: 1 MG/DL (ref 0.4–1.2)
CRP SERPL-MCNC: 10.5 MG/DL (ref 0–1)
DEPRECATED RDW RBC AUTO: 42.8 FL (ref 35–45)
EOSINOPHIL NFR BLD AUTO: 0 %
EOSINOPHILS ABSOLUTE: 0 THOU/MM3 (ref 0–0.4)
ERYTHROCYTE [DISTWIDTH] IN BLOOD BY AUTOMATED COUNT: 12.5 % (ref 11.5–14.5)
FERRITIN SERPL IA-MCNC: 256 NG/ML (ref 10–291)
GFR SERPL CREATININE-BSD FRML MDRD: 54 ML/MIN/1.73M2
GLUCOSE SERPL-MCNC: 110 MG/DL (ref 70–108)
HCT VFR BLD AUTO: 32.5 % (ref 37–47)
HGB BLD-MCNC: 10.9 GM/DL (ref 12–16)
IMM GRANULOCYTES # BLD AUTO: 0.03 THOU/MM3 (ref 0–0.07)
IMM GRANULOCYTES NFR BLD AUTO: 0.3 %
LACTIC ACID, SEPSIS: 0.6 MMOL/L (ref 0.5–1.9)
LDH SERPL L TO P-CCNC: 260 U/L (ref 100–190)
LYMPHOCYTES ABSOLUTE: 0.8 THOU/MM3 (ref 1–4.8)
LYMPHOCYTES NFR BLD AUTO: 8.4 %
MCH RBC QN AUTO: 31.5 PG (ref 26–33)
MCHC RBC AUTO-ENTMCNC: 33.5 GM/DL (ref 32.2–35.5)
MCV RBC AUTO: 93.9 FL (ref 81–99)
MONOCYTES ABSOLUTE: 0.8 THOU/MM3 (ref 0.4–1.3)
MONOCYTES NFR BLD AUTO: 7.6 %
NEUTROPHILS NFR BLD AUTO: 83.5 %
NRBC BLD AUTO-RTO: 0 /100 WBC
PLATELET # BLD AUTO: 177 THOU/MM3 (ref 130–400)
PMV BLD AUTO: 9.7 FL (ref 9.4–12.4)
POTASSIUM SERPL-SCNC: 3.8 MEQ/L (ref 3.5–5.2)
PROCALCITONIN SERPL IA-MCNC: 0.25 NG/ML (ref 0.01–0.09)
RBC # BLD AUTO: 3.46 MILL/MM3 (ref 4.2–5.4)
SEGMENTED NEUTROPHILS ABSOLUTE COUNT: 8.4 THOU/MM3 (ref 1.8–7.7)
SODIUM SERPL-SCNC: 134 MEQ/L (ref 135–145)
WBC # BLD AUTO: 10 THOU/MM3 (ref 4.8–10.8)

## 2023-04-07 PROCEDURE — G0378 HOSPITAL OBSERVATION PER HR: HCPCS

## 2023-04-07 PROCEDURE — 36415 COLL VENOUS BLD VENIPUNCTURE: CPT

## 2023-04-07 PROCEDURE — 83605 ASSAY OF LACTIC ACID: CPT

## 2023-04-07 PROCEDURE — 82550 ASSAY OF CK (CPK): CPT

## 2023-04-07 PROCEDURE — 6370000000 HC RX 637 (ALT 250 FOR IP)

## 2023-04-07 PROCEDURE — 6360000002 HC RX W HCPCS

## 2023-04-07 PROCEDURE — 96361 HYDRATE IV INFUSION ADD-ON: CPT

## 2023-04-07 PROCEDURE — 2580000003 HC RX 258

## 2023-04-07 PROCEDURE — 99232 SBSQ HOSP IP/OBS MODERATE 35: CPT | Performed by: PHYSICIAN ASSISTANT

## 2023-04-07 PROCEDURE — 85025 COMPLETE CBC W/AUTO DIFF WBC: CPT

## 2023-04-07 PROCEDURE — 80048 BASIC METABOLIC PNL TOTAL CA: CPT

## 2023-04-07 PROCEDURE — 96372 THER/PROPH/DIAG INJ SC/IM: CPT

## 2023-04-07 RX ORDER — GUAIFENESIN 600 MG/1
600 TABLET, EXTENDED RELEASE ORAL 2 TIMES DAILY
Qty: 15 TABLET | Refills: 0 | Status: SHIPPED | OUTPATIENT
Start: 2023-04-07

## 2023-04-07 RX ADMIN — Medication 1 TABLET: at 10:45

## 2023-04-07 RX ADMIN — GUAIFENESIN 600 MG: 600 TABLET, EXTENDED RELEASE ORAL at 22:30

## 2023-04-07 RX ADMIN — CLOPIDOGREL BISULFATE 75 MG: 75 TABLET ORAL at 10:45

## 2023-04-07 RX ADMIN — ACETAMINOPHEN 650 MG: 325 TABLET ORAL at 19:54

## 2023-04-07 RX ADMIN — HEPARIN SODIUM 5000 UNITS: 5000 INJECTION INTRAVENOUS; SUBCUTANEOUS at 10:45

## 2023-04-07 RX ADMIN — Medication 2000 UNITS: at 10:55

## 2023-04-07 RX ADMIN — LEVOTHYROXINE SODIUM 50 MCG: 0.05 TABLET ORAL at 06:43

## 2023-04-07 RX ADMIN — GABAPENTIN 600 MG: 600 TABLET, FILM COATED ORAL at 10:44

## 2023-04-07 RX ADMIN — SODIUM CHLORIDE: 9 INJECTION, SOLUTION INTRAVENOUS at 10:38

## 2023-04-07 RX ADMIN — ACETAMINOPHEN 650 MG: 325 TABLET ORAL at 03:50

## 2023-04-07 RX ADMIN — ATORVASTATIN CALCIUM 10 MG: 10 TABLET, FILM COATED ORAL at 10:45

## 2023-04-07 RX ADMIN — ASPIRIN 81 MG: 81 TABLET, COATED ORAL at 10:45

## 2023-04-07 RX ADMIN — GUAIFENESIN 600 MG: 600 TABLET, EXTENDED RELEASE ORAL at 10:44

## 2023-04-07 RX ADMIN — PANTOPRAZOLE SODIUM 40 MG: 40 TABLET, DELAYED RELEASE ORAL at 06:43

## 2023-04-07 RX ADMIN — HEPARIN SODIUM 5000 UNITS: 5000 INJECTION INTRAVENOUS; SUBCUTANEOUS at 22:30

## 2023-04-07 RX ADMIN — GABAPENTIN 600 MG: 600 TABLET, FILM COATED ORAL at 22:30

## 2023-04-07 ASSESSMENT — PAIN SCALES - GENERAL: PAINLEVEL_OUTOF10: 3

## 2023-04-08 PROBLEM — R53.1 WEAKNESS: Status: ACTIVE | Noted: 2023-04-08

## 2023-04-08 LAB
ANION GAP SERPL CALC-SCNC: 8 MEQ/L (ref 8–16)
BASOPHILS ABSOLUTE: 0 THOU/MM3 (ref 0–0.1)
BASOPHILS NFR BLD AUTO: 0.6 %
BUN SERPL-MCNC: 16 MG/DL (ref 7–22)
CALCIUM SERPL-MCNC: 8.6 MG/DL (ref 8.5–10.5)
CHLORIDE SERPL-SCNC: 113 MEQ/L (ref 98–111)
CK SERPL-CCNC: 1492 U/L (ref 30–135)
CO2 SERPL-SCNC: 22 MEQ/L (ref 23–33)
CREAT SERPL-MCNC: 0.9 MG/DL (ref 0.4–1.2)
DEPRECATED RDW RBC AUTO: 44.6 FL (ref 35–45)
EOSINOPHIL NFR BLD AUTO: 0.8 %
EOSINOPHILS ABSOLUTE: 0 THOU/MM3 (ref 0–0.4)
ERYTHROCYTE [DISTWIDTH] IN BLOOD BY AUTOMATED COUNT: 12.7 % (ref 11.5–14.5)
GFR SERPL CREATININE-BSD FRML MDRD: > 60 ML/MIN/1.73M2
GLUCOSE SERPL-MCNC: 90 MG/DL (ref 70–108)
HCT VFR BLD AUTO: 33.7 % (ref 37–47)
HGB BLD-MCNC: 10.8 GM/DL (ref 12–16)
IMM GRANULOCYTES # BLD AUTO: 0.01 THOU/MM3 (ref 0–0.07)
IMM GRANULOCYTES NFR BLD AUTO: 0.2 %
LYMPHOCYTES ABSOLUTE: 1.4 THOU/MM3 (ref 1–4.8)
LYMPHOCYTES NFR BLD AUTO: 26.4 %
MCH RBC QN AUTO: 30.9 PG (ref 26–33)
MCHC RBC AUTO-ENTMCNC: 32 GM/DL (ref 32.2–35.5)
MCV RBC AUTO: 96.3 FL (ref 81–99)
MONOCYTES ABSOLUTE: 0.7 THOU/MM3 (ref 0.4–1.3)
MONOCYTES NFR BLD AUTO: 12.7 %
MYOGLOBIN SERPL-MCNC: 929 NG/ML
NEUTROPHILS NFR BLD AUTO: 59.3 %
NRBC BLD AUTO-RTO: 0 /100 WBC
PLATELET # BLD AUTO: 173 THOU/MM3 (ref 130–400)
PMV BLD AUTO: 10.3 FL (ref 9.4–12.4)
POTASSIUM SERPL-SCNC: 4.8 MEQ/L (ref 3.5–5.2)
RBC # BLD AUTO: 3.5 MILL/MM3 (ref 4.2–5.4)
SEGMENTED NEUTROPHILS ABSOLUTE COUNT: 3.1 THOU/MM3 (ref 1.8–7.7)
SODIUM SERPL-SCNC: 143 MEQ/L (ref 135–145)
WBC # BLD AUTO: 5.3 THOU/MM3 (ref 4.8–10.8)

## 2023-04-08 PROCEDURE — 97165 OT EVAL LOW COMPLEX 30 MIN: CPT

## 2023-04-08 PROCEDURE — 2580000003 HC RX 258

## 2023-04-08 PROCEDURE — 82550 ASSAY OF CK (CPK): CPT

## 2023-04-08 PROCEDURE — 6370000000 HC RX 637 (ALT 250 FOR IP)

## 2023-04-08 PROCEDURE — 80048 BASIC METABOLIC PNL TOTAL CA: CPT

## 2023-04-08 PROCEDURE — 97535 SELF CARE MNGMENT TRAINING: CPT

## 2023-04-08 PROCEDURE — 85025 COMPLETE CBC W/AUTO DIFF WBC: CPT

## 2023-04-08 PROCEDURE — 96372 THER/PROPH/DIAG INJ SC/IM: CPT

## 2023-04-08 PROCEDURE — 1200000000 HC SEMI PRIVATE

## 2023-04-08 PROCEDURE — 94669 MECHANICAL CHEST WALL OSCILL: CPT

## 2023-04-08 PROCEDURE — 97530 THERAPEUTIC ACTIVITIES: CPT

## 2023-04-08 PROCEDURE — 99232 SBSQ HOSP IP/OBS MODERATE 35: CPT | Performed by: NURSE PRACTITIONER

## 2023-04-08 PROCEDURE — 36415 COLL VENOUS BLD VENIPUNCTURE: CPT

## 2023-04-08 PROCEDURE — 6360000002 HC RX W HCPCS

## 2023-04-08 RX ADMIN — HEPARIN SODIUM 5000 UNITS: 5000 INJECTION INTRAVENOUS; SUBCUTANEOUS at 08:08

## 2023-04-08 RX ADMIN — HEPARIN SODIUM 5000 UNITS: 5000 INJECTION INTRAVENOUS; SUBCUTANEOUS at 21:16

## 2023-04-08 RX ADMIN — SODIUM CHLORIDE, PRESERVATIVE FREE 10 ML: 5 INJECTION INTRAVENOUS at 21:35

## 2023-04-08 RX ADMIN — ASPIRIN 81 MG: 81 TABLET, COATED ORAL at 08:07

## 2023-04-08 RX ADMIN — PANTOPRAZOLE SODIUM 40 MG: 40 TABLET, DELAYED RELEASE ORAL at 08:07

## 2023-04-08 RX ADMIN — Medication 1 TABLET: at 08:07

## 2023-04-08 RX ADMIN — GUAIFENESIN 600 MG: 600 TABLET, EXTENDED RELEASE ORAL at 08:07

## 2023-04-08 RX ADMIN — SODIUM CHLORIDE, PRESERVATIVE FREE 10 ML: 5 INJECTION INTRAVENOUS at 08:07

## 2023-04-08 RX ADMIN — GABAPENTIN 600 MG: 600 TABLET, FILM COATED ORAL at 08:08

## 2023-04-08 RX ADMIN — GUAIFENESIN 600 MG: 600 TABLET, EXTENDED RELEASE ORAL at 21:17

## 2023-04-08 RX ADMIN — ATORVASTATIN CALCIUM 10 MG: 10 TABLET, FILM COATED ORAL at 08:07

## 2023-04-08 RX ADMIN — CLOPIDOGREL BISULFATE 75 MG: 75 TABLET ORAL at 08:08

## 2023-04-08 RX ADMIN — LEVOTHYROXINE SODIUM 50 MCG: 0.05 TABLET ORAL at 08:08

## 2023-04-08 RX ADMIN — Medication 2000 UNITS: at 08:07

## 2023-04-08 RX ADMIN — GABAPENTIN 600 MG: 600 TABLET, FILM COATED ORAL at 21:16

## 2023-04-08 ASSESSMENT — PAIN DESCRIPTION - ONSET
ONSET: ON-GOING
ONSET: ON-GOING

## 2023-04-08 ASSESSMENT — PAIN SCALES - GENERAL
PAINLEVEL_OUTOF10: 3
PAINLEVEL_OUTOF10: 2

## 2023-04-08 ASSESSMENT — PAIN DESCRIPTION - LOCATION
LOCATION: HIP
LOCATION: KNEE
LOCATION: KNEE

## 2023-04-08 ASSESSMENT — PAIN DESCRIPTION - FREQUENCY
FREQUENCY: CONTINUOUS
FREQUENCY: CONTINUOUS

## 2023-04-08 ASSESSMENT — PAIN DESCRIPTION - DESCRIPTORS
DESCRIPTORS: ACHING
DESCRIPTORS: ACHING;DULL
DESCRIPTORS: ACHING;DULL

## 2023-04-08 ASSESSMENT — PAIN DESCRIPTION - ORIENTATION
ORIENTATION: LEFT
ORIENTATION: RIGHT
ORIENTATION: LEFT

## 2023-04-08 ASSESSMENT — PAIN - FUNCTIONAL ASSESSMENT
PAIN_FUNCTIONAL_ASSESSMENT: ACTIVITIES ARE NOT PREVENTED
PAIN_FUNCTIONAL_ASSESSMENT: ACTIVITIES ARE NOT PREVENTED

## 2023-04-08 ASSESSMENT — PAIN DESCRIPTION - PAIN TYPE
TYPE: CHRONIC PAIN

## 2023-04-09 LAB
ANION GAP SERPL CALC-SCNC: 9 MEQ/L (ref 8–16)
BUN SERPL-MCNC: 14 MG/DL (ref 7–22)
CALCIUM SERPL-MCNC: 9.2 MG/DL (ref 8.5–10.5)
CHLORIDE SERPL-SCNC: 108 MEQ/L (ref 98–111)
CK SERPL-CCNC: 1226 U/L (ref 30–135)
CO2 SERPL-SCNC: 24 MEQ/L (ref 23–33)
CREAT SERPL-MCNC: 0.9 MG/DL (ref 0.4–1.2)
GFR SERPL CREATININE-BSD FRML MDRD: > 60 ML/MIN/1.73M2
GLUCOSE SERPL-MCNC: 92 MG/DL (ref 70–108)
MYOGLOBIN UR-MCNC: < 1 MG/L (ref 0–1)
POTASSIUM SERPL-SCNC: 4.6 MEQ/L (ref 3.5–5.2)
SODIUM SERPL-SCNC: 141 MEQ/L (ref 135–145)

## 2023-04-09 PROCEDURE — 6370000000 HC RX 637 (ALT 250 FOR IP)

## 2023-04-09 PROCEDURE — 80048 BASIC METABOLIC PNL TOTAL CA: CPT

## 2023-04-09 PROCEDURE — 6370000000 HC RX 637 (ALT 250 FOR IP): Performed by: NURSE PRACTITIONER

## 2023-04-09 PROCEDURE — 99232 SBSQ HOSP IP/OBS MODERATE 35: CPT | Performed by: NURSE PRACTITIONER

## 2023-04-09 PROCEDURE — 6360000002 HC RX W HCPCS

## 2023-04-09 PROCEDURE — 36415 COLL VENOUS BLD VENIPUNCTURE: CPT

## 2023-04-09 PROCEDURE — 97165 OT EVAL LOW COMPLEX 30 MIN: CPT

## 2023-04-09 PROCEDURE — 97530 THERAPEUTIC ACTIVITIES: CPT

## 2023-04-09 PROCEDURE — 97535 SELF CARE MNGMENT TRAINING: CPT

## 2023-04-09 PROCEDURE — 2580000003 HC RX 258

## 2023-04-09 PROCEDURE — 1200000000 HC SEMI PRIVATE

## 2023-04-09 PROCEDURE — 94669 MECHANICAL CHEST WALL OSCILL: CPT

## 2023-04-09 PROCEDURE — 82550 ASSAY OF CK (CPK): CPT

## 2023-04-09 RX ORDER — LISINOPRIL 5 MG/1
5 TABLET ORAL DAILY
Status: DISCONTINUED | OUTPATIENT
Start: 2023-04-09 | End: 2023-04-10 | Stop reason: HOSPADM

## 2023-04-09 RX ADMIN — GABAPENTIN 600 MG: 600 TABLET, FILM COATED ORAL at 21:38

## 2023-04-09 RX ADMIN — PANTOPRAZOLE SODIUM 40 MG: 40 TABLET, DELAYED RELEASE ORAL at 09:03

## 2023-04-09 RX ADMIN — HEPARIN SODIUM 5000 UNITS: 5000 INJECTION INTRAVENOUS; SUBCUTANEOUS at 21:38

## 2023-04-09 RX ADMIN — HEPARIN SODIUM 5000 UNITS: 5000 INJECTION INTRAVENOUS; SUBCUTANEOUS at 09:12

## 2023-04-09 RX ADMIN — GUAIFENESIN 600 MG: 600 TABLET, EXTENDED RELEASE ORAL at 09:06

## 2023-04-09 RX ADMIN — Medication 1 TABLET: at 09:06

## 2023-04-09 RX ADMIN — ACETAMINOPHEN 650 MG: 325 TABLET ORAL at 21:39

## 2023-04-09 RX ADMIN — GUAIFENESIN 600 MG: 600 TABLET, EXTENDED RELEASE ORAL at 21:38

## 2023-04-09 RX ADMIN — SODIUM CHLORIDE, PRESERVATIVE FREE 10 ML: 5 INJECTION INTRAVENOUS at 09:12

## 2023-04-09 RX ADMIN — LISINOPRIL 5 MG: 5 TABLET ORAL at 14:57

## 2023-04-09 RX ADMIN — SODIUM CHLORIDE, PRESERVATIVE FREE 10 ML: 5 INJECTION INTRAVENOUS at 21:38

## 2023-04-09 RX ADMIN — ATORVASTATIN CALCIUM 10 MG: 10 TABLET, FILM COATED ORAL at 09:07

## 2023-04-09 RX ADMIN — CLOPIDOGREL BISULFATE 75 MG: 75 TABLET ORAL at 09:03

## 2023-04-09 RX ADMIN — LEVOTHYROXINE SODIUM 50 MCG: 0.05 TABLET ORAL at 09:06

## 2023-04-09 RX ADMIN — ASPIRIN 81 MG: 81 TABLET, COATED ORAL at 09:03

## 2023-04-09 RX ADMIN — GABAPENTIN 600 MG: 600 TABLET, FILM COATED ORAL at 09:06

## 2023-04-09 RX ADMIN — Medication 2000 UNITS: at 09:03

## 2023-04-09 RX ADMIN — Medication 1 TABLET: at 09:08

## 2023-04-09 ASSESSMENT — PAIN SCALES - GENERAL
PAINLEVEL_OUTOF10: 2
PAINLEVEL_OUTOF10: 3
PAINLEVEL_OUTOF10: 2

## 2023-04-09 ASSESSMENT — PAIN DESCRIPTION - ORIENTATION
ORIENTATION: RIGHT;LEFT
ORIENTATION: LEFT
ORIENTATION: LEFT;RIGHT

## 2023-04-09 ASSESSMENT — PAIN DESCRIPTION - PAIN TYPE
TYPE: CHRONIC PAIN

## 2023-04-09 ASSESSMENT — PAIN DESCRIPTION - LOCATION
LOCATION: KNEE

## 2023-04-09 ASSESSMENT — PAIN - FUNCTIONAL ASSESSMENT
PAIN_FUNCTIONAL_ASSESSMENT: ACTIVITIES ARE NOT PREVENTED

## 2023-04-09 ASSESSMENT — PAIN DESCRIPTION - DESCRIPTORS
DESCRIPTORS: ACHING;DULL
DESCRIPTORS: ACHING;DULL
DESCRIPTORS: ACHING

## 2023-04-09 ASSESSMENT — PAIN DESCRIPTION - ONSET
ONSET: ON-GOING
ONSET: ON-GOING

## 2023-04-09 ASSESSMENT — PAIN DESCRIPTION - FREQUENCY: FREQUENCY: CONTINUOUS

## 2023-04-10 VITALS
DIASTOLIC BLOOD PRESSURE: 58 MMHG | BODY MASS INDEX: 23.56 KG/M2 | RESPIRATION RATE: 14 BRPM | TEMPERATURE: 97.5 F | SYSTOLIC BLOOD PRESSURE: 133 MMHG | OXYGEN SATURATION: 99 % | WEIGHT: 116.84 LBS | HEART RATE: 67 BPM | HEIGHT: 59 IN

## 2023-04-10 LAB
ANION GAP SERPL CALC-SCNC: 12 MEQ/L (ref 8–16)
BUN SERPL-MCNC: 18 MG/DL (ref 7–22)
CALCIUM SERPL-MCNC: 9.2 MG/DL (ref 8.5–10.5)
CHLORIDE SERPL-SCNC: 106 MEQ/L (ref 98–111)
CK SERPL-CCNC: 822 U/L (ref 30–135)
CO2 SERPL-SCNC: 24 MEQ/L (ref 23–33)
CREAT SERPL-MCNC: 0.9 MG/DL (ref 0.4–1.2)
GFR SERPL CREATININE-BSD FRML MDRD: > 60 ML/MIN/1.73M2
GLUCOSE SERPL-MCNC: 84 MG/DL (ref 70–108)
POTASSIUM SERPL-SCNC: 4.1 MEQ/L (ref 3.5–5.2)
SODIUM SERPL-SCNC: 142 MEQ/L (ref 135–145)

## 2023-04-10 PROCEDURE — 97161 PT EVAL LOW COMPLEX 20 MIN: CPT

## 2023-04-10 PROCEDURE — 99239 HOSP IP/OBS DSCHRG MGMT >30: CPT | Performed by: NURSE PRACTITIONER

## 2023-04-10 PROCEDURE — 6370000000 HC RX 637 (ALT 250 FOR IP)

## 2023-04-10 PROCEDURE — 36415 COLL VENOUS BLD VENIPUNCTURE: CPT

## 2023-04-10 PROCEDURE — 82550 ASSAY OF CK (CPK): CPT

## 2023-04-10 PROCEDURE — 6370000000 HC RX 637 (ALT 250 FOR IP): Performed by: NURSE PRACTITIONER

## 2023-04-10 PROCEDURE — 6360000002 HC RX W HCPCS

## 2023-04-10 PROCEDURE — 2580000003 HC RX 258

## 2023-04-10 PROCEDURE — 97116 GAIT TRAINING THERAPY: CPT

## 2023-04-10 PROCEDURE — 80048 BASIC METABOLIC PNL TOTAL CA: CPT

## 2023-04-10 RX ADMIN — GUAIFENESIN 600 MG: 600 TABLET, EXTENDED RELEASE ORAL at 10:39

## 2023-04-10 RX ADMIN — SODIUM CHLORIDE, PRESERVATIVE FREE 10 ML: 5 INJECTION INTRAVENOUS at 10:39

## 2023-04-10 RX ADMIN — PANTOPRAZOLE SODIUM 40 MG: 40 TABLET, DELAYED RELEASE ORAL at 10:39

## 2023-04-10 RX ADMIN — Medication 1 TABLET: at 10:39

## 2023-04-10 RX ADMIN — LISINOPRIL 5 MG: 5 TABLET ORAL at 10:39

## 2023-04-10 RX ADMIN — HEPARIN SODIUM 5000 UNITS: 5000 INJECTION INTRAVENOUS; SUBCUTANEOUS at 10:39

## 2023-04-10 RX ADMIN — ATORVASTATIN CALCIUM 10 MG: 10 TABLET, FILM COATED ORAL at 08:55

## 2023-04-10 RX ADMIN — ASPIRIN 81 MG: 81 TABLET, COATED ORAL at 10:39

## 2023-04-10 RX ADMIN — GABAPENTIN 600 MG: 600 TABLET, FILM COATED ORAL at 08:56

## 2023-04-10 RX ADMIN — CLOPIDOGREL BISULFATE 75 MG: 75 TABLET ORAL at 08:55

## 2023-04-10 RX ADMIN — Medication 2000 UNITS: at 10:39

## 2023-04-10 RX ADMIN — LEVOTHYROXINE SODIUM 50 MCG: 0.05 TABLET ORAL at 10:39

## 2023-04-10 ASSESSMENT — PAIN SCALES - GENERAL: PAINLEVEL_OUTOF10: 2

## 2023-04-10 NOTE — PALLIATIVE CARE
Follow Up / Progress Note        Patient:   Miko Gold  YOB: 1934  Age:  80 y.o. Room:  [address] [place] [address]-  MRN:  [id number]              Spoke with primary RN. Patient's daughter is currently at bedside assisting patient to get dressed to be discharged. Did not speak with patient due to her being in the process of leaving.  Please call if palliative can further assist.     Electronically signed by Radha Wood RN on 4/10/2023 at 2:20 PM             Palliative Care Office: [phone]

## 2023-04-10 NOTE — DISCHARGE INSTR - DIET
Good nutrition is important when healing from an illness, injury, or surgery. Follow any nutrition recommendations given to you during your hospital stay. If you were given an oral nutrition supplement while in the hospital, continue to take this supplement at home. You can take it with meals, in-between meals, and/or before bedtime. These supplements can be purchased at most local grocery stores, pharmacies, and chain [employer]-stores. If you have any questions about your diet or nutrition, call the hospital and ask for the dietitian.       Regular diet as tolerated

## 2023-04-10 NOTE — CARE COORDINATION
Ordered wheeled walker from [address] (patient's preferred provider) and it was delivered to her bedside. Robel Tadeo

## 2023-04-10 NOTE — DISCHARGE SUMMARY
Hospital Medicine Discharge Summary      Patient Identification:   Dale Rao   : 1934  MRN: [id number]   Account: [de-identified]      Patient's PCP: Hannah Belle DO    Admit Date: 2023     Discharge Date:   4/10/2023    Admitting Physician: No admitting provider for patient encounter. Discharging Nurse Practitioner: TANNER Barber CNP     Discharge Diagnoses with Assessment/Plan:  Probable rhabdomyolysis with elevated CK secondary to unwitnessed fall--CK trending down 822; voiding without issues per patient  COVID-19 infection--symptomatic treatment as patient is afebrile and on room air; encourage good lung hygiene  Primary hypertension, uncontrolled--lisinopril  Hypothyroidism--treated with Synthroid  Hyperlipidemia--on statin   CKD stage IIIa--creatinine at 0.9  GERD--treated with Protonix  Leukocytosis--resolved  Physical deconditioning/debility--likely secondary to advanced age along with comorbid medical conditions and COVID-19; PT/OT,  on the case and planning home as patient is ambulating well with a rolling walker and DME order placed; ending home with home health RN, aide, PT and OT  History of CVA--on aspirin, statin, Plavix     The patient was seen and examined on day of discharge and this discharge summary is in conjunction with any daily progress note from day of discharge. Hospital Course:   Dale Rao is a 80 y.o. female admitted to [address] [place] [address] on 2023 for fall and weakness;  Per H&P done 2023: Ari Tirado is an 69-year-old  female with a past medical history of TIA, hypothyroidism who presents to [facility] ED today for the evaluation of falling 2 times at home. Patient states she fell yesterday early in the morning, had to call the EMS because she was unable to get up by herself. Patient states she then fell again this morning as she was walking to the bathroom.   She was reportedly down for about 4 hours, trying to

## 2023-04-10 NOTE — CARE COORDINATION
4/10/23, 11:04 AM EDT    DISCHARGE PLANNING EVALUATION    Spoke with patient, she is feeling better today. She is walking around unit. PT recommending walker, order has been placed. Patient is agreeable to [facility], nursing, aide, PT & OT.  [facility] list provided, reviewed star rating system. She will talk with her daughter regarding [facility] agency. Patient daughter called, explained HH vs SNF, meets level of HH at this point. Preference is [facility]. Left message for [facility] for new referral.    Spoke with Antonella Dumont at [facility], informed patient no longer needs SNF level of Care. 4/10/23, 11:13 AM EDT    Patient goals/plan/ treatment preferences discussed by  and . Patient goals/plan/ treatment preferences reviewed with patient/ family. Patient/ family verbalize understanding of discharge plan and are in agreement with goal/plan/treatment preferences. Understanding was demonstrated using the teach back method. AVS provided by RN at time of discharge, which includes all necessary medical information pertaining to the patients current course of illness, treatment, post-discharge goals of care, and treatment preferences.      Services At/After Discharge: Home Health, Aide services, Nursing service, OT, and PT - [facility]       IMM Letter  IMM Letter given to Patient/Family/Significant other/Guardian/POA/by[de-identified] Chase Gloria RN   IMM Letter date given[de-identified] 04/08/23  IMM Letter time given[de-identified] 1350  Observation Status Letter date given[de-identified] 04/06/23  Observation Status Letter time given[de-identified] 1644  Observation Status Letter given to Patient/Family/Significant other/Guardian/POA/by[de-identified] patient registration

## 2023-04-11 LAB
BACTERIA BLD AEROBE CULT: NORMAL
BACTERIA BLD AEROBE CULT: NORMAL

## 2023-04-17 ENCOUNTER — CARE COORDINATION (OUTPATIENT)
Dept: CARE COORDINATION | Age: 88
End: 2023-04-17

## 2023-04-17 NOTE — CARE COORDINATION
will schedule office visits, as directed by my provider. I will keep my appointment or reschedule if I have to cancel. I will notify my provider of any barriers to my plan of care. I will follow my Zone Management tool to seek urgent or emergent care. I will notify my provider of any symptoms that indicate a worsening of my condition. Barriers: lack of support and lack of education  Plan for overcoming my barriers: education and support on same day malka, VV's.  Support from [facility], PCP  Confidence: 9/10  Anticipated Goal Completion Date: 7/17/23                Future Appointments   Date Time Provider Twan Hernandez   4/18/2023  1:00 PM Mea Zayas DO [address]   9/12/2023  1:15 PM Mae aZyas DO [address]

## 2023-04-18 ENCOUNTER — OFFICE VISIT (OUTPATIENT)
Dept: FAMILY MEDICINE CLINIC | Age: 88
End: 2023-04-18

## 2023-04-18 VITALS
BODY MASS INDEX: 22.4 KG/M2 | WEIGHT: 110.9 LBS | RESPIRATION RATE: 20 BRPM | SYSTOLIC BLOOD PRESSURE: 124 MMHG | HEART RATE: 80 BPM | DIASTOLIC BLOOD PRESSURE: 50 MMHG

## 2023-04-18 DIAGNOSIS — N18.30 STAGE 3 CHRONIC KIDNEY DISEASE, UNSPECIFIED WHETHER STAGE 3A OR 3B CKD (HCC): ICD-10-CM

## 2023-04-18 DIAGNOSIS — M25.511 CHRONIC RIGHT SHOULDER PAIN: ICD-10-CM

## 2023-04-18 DIAGNOSIS — T79.6XXA TRAUMATIC RHABDOMYOLYSIS, INITIAL ENCOUNTER (HCC): ICD-10-CM

## 2023-04-18 DIAGNOSIS — Z09 HOSPITAL DISCHARGE FOLLOW-UP: Primary | ICD-10-CM

## 2023-04-18 DIAGNOSIS — E03.9 HYPOTHYROIDISM, UNSPECIFIED TYPE: ICD-10-CM

## 2023-04-18 DIAGNOSIS — G89.29 CHRONIC RIGHT SHOULDER PAIN: ICD-10-CM

## 2023-04-18 DIAGNOSIS — Z91.81 HISTORY OF RECENT FALL: ICD-10-CM

## 2023-04-18 DIAGNOSIS — I10 PRIMARY HYPERTENSION: ICD-10-CM

## 2023-04-18 DIAGNOSIS — U07.1 COVID-19: ICD-10-CM

## 2023-04-18 RX ORDER — METHYLPREDNISOLONE ACETATE 80 MG/ML
80 INJECTION, SUSPENSION INTRA-ARTICULAR; INTRALESIONAL; INTRAMUSCULAR; SOFT TISSUE ONCE
Status: COMPLETED | OUTPATIENT
Start: 2023-04-18 | End: 2023-04-18

## 2023-04-18 RX ADMIN — METHYLPREDNISOLONE ACETATE 80 MG: 80 INJECTION, SUSPENSION INTRA-ARTICULAR; INTRALESIONAL; INTRAMUSCULAR; SOFT TISSUE at 13:20

## 2023-04-18 NOTE — PROGRESS NOTES
Administrations This Visit       methylPREDNISolone acetate (DEPO-MEDROL) injection 80 mg       Admin Date  04/18/2023  13:20 Action  Given Dose  80 mg Route  Intra-artICUlar Site  Shoulder Right Administered By  Eulogio Marino, [address] [place] [address] ([address] [place] [address])    Ordering Provider: Evelin Torres DO    NDC: [address]-0    Lot#: [id number]    : [address] [place] [address]    Patient Supplied?: No    Comments: mixed with 2ml Lidocaine 1% lot#[id number]. 1 Exp 05/2023    injection drawn up and given by Dr. Hina Currie
stable  -  Continue current medications  -  Follow up with specialists as scheduled  -  Risks/benefits discussed. After patient consent obtained, the right shoulder cleansed and the shoulder injected using a posterior approach with DepoMedrol 80mg mixed with 2cc of larcaine.   Ice then applied for 10 minutes  -  KKA in September    Diagnostic test results reviewed: inpatient labs, EKG, chest x-ray, and CT-head    Patient risk of morbidity and mortality: high    Medical Decision Making: high complexity

## 2023-04-19 NOTE — PROGRESS NOTES
Dayan Cooper was evaluated today and a DME order was entered for a wheeled walker because she requires this to successfully complete daily living tasks of toileting, personal cares, ambulating, and meal preparation. A wheeled walker is necessary due to the patient's unsteady gait, upper body weakness, and inability to  an ambulation device; and she can ambulate only by pushing a walker instead of a lesser assistive device such as a cane, crutch, or standard walker. The need for this equipment was discussed with the patient and she understands and is in agreement.
Physician Progress Note      Alfredito Chicas  CSN #:                  [id number]  :                       1934  ADMIT DATE:       2023 2:07 PM  DISCH DATE:  RESPONDING  PROVIDER #:        Gary Bernard CNP          QUERY TEXT:    Patient admitted with Weakness, fall, covid 19 infection . Documentation   reflects Sepsis in Ed note(s) dated . If possible, please make selection   below, document in the progress notes and discharge summary if Sepsis  was: The medical record reflects the following:  Risk Factors: Covid 19 infection  Clinical Indicators: WBC on admission 13.9, with Tachypnea , tachycardia, CRP   10.50, Procal 0.25, Covid positive with symptoms starting 2 days ago with   cough is intermittently productive of yellow sputum. , short of breath at   times, max temp 100.4  during this admission  Treatment: Admission , imaging , labs , isolation protocols, IV fluid bolus   and infusion ,  standard treatment for covid infection and symptoms    Thank You ,  Tabitha SAAVEDRA, RN, [address] [place] [address]   P: [phone]  F: [phone]  Options provided:  -- Sepsis ruled out after study  -- Sepsis POA and  confirmed after study  -- Sepsis POA and evolving after study  -- Other - I will add my own diagnosis  -- Disagree - Not applicable / Not valid  -- Disagree - Clinically unable to determine / Unknown  -- Refer to Clinical Documentation Reviewer    PROVIDER RESPONSE TEXT:    Provider disagreed with this query.   no sepsis    Query created by: Kristen Glover on 4/10/2023 9:16 AM      Electronically signed by:  Gary Bernard CNP 4/10/2023 9:48 AM
Physician Progress Note      PATIENT:               Bhargavi Obando  CSN #:                  [id number]  :                       1934  ADMIT DATE:       2023 2:07 PM  [address] [place] DATE:        4/10/2023 3:26 PM  RESPONDING  PROVIDER #:        Rosalinda Easton CNP          QUERY TEXT:    Pt admitted with Weakness. Pt noted to have traumatic rhabdomyolysis and   positive Covid test . If possible, please make selection below, and /or addend   your discharge summary the suspected etiology  of the weakness. The medical record reflects the following:  Risk Factors: advanced age , debility , covid 23, co-morbid medical conditions  Clinical Indicators: positive covid 19 test, new onset weakness, negative   imaging for acute event , patient 80 yrs old  Treatment: admission , imaging labs,  IV fluids, Mucinex, IS/AC, and PT/OT. Thank Ella MARTINN, RN, CRCR  Clinical   P: [phone]  F: [phone]  Options provided:  -- Weakness is multi-factorial due to age related debility, rhabdomyolysis and   covid 19  -- Weakness due to Covid 19  -- Weakness due to traumatic rhabdomyolysis  -- Other - I will add my own diagnosis  -- Disagree - Not applicable / Not valid  -- Disagree - Clinically unable to determine / Unknown  -- Refer to Clinical Documentation Reviewer    PROVIDER RESPONSE TEXT:    This patient has Weakness that is multi-factorial due to age related debility,   rhabdomyolysis and covid 19.     Query created by: Monica Comer on 2023 12:07 PM      Electronically signed by:  Rosalinda Easton CNP 2023 12:09 PM
Physician Progress Note      PATIENT:               Lucia Breaux  CSN #:                  [id number]  :                       1934  ADMIT DATE:       2023 2:07 PM  [place] DATE:        4/10/2023 3:26 PM  RESPONDING  PROVIDER #:        Madelyn Trotter CNP          QUERY TEXT:    Pt admitted with fall at home . Pt noted to have \". In discharge summary   probable rhabdomyolysis with elevated CK secondary to unwitnessed fall. In   case management states non-traumatic rhabdomyolysis\"  If possible, please make   selection below  and or specify in DC summary if you are evaluated and/or   treated any of the following: The medical record reflects the following:  Risk Factors: Reported falls in 2 days prior to admission , unwitnessed  Clinical Indicators: on admission -CK 1321, Myoglobin noted in urine,  Treatment: admission , imaging, labs, IV fluid bolus and infusion , PCP follow   up    Thank you,  Cheyenne SAAVEDRA, RN, [address] [place] [address]   P: [phone]  F: [phone]        Per [email or web address]  Traumatic rhabdomyolysis cause examples: crush syndrome, prolonged   immobilization  Nontraumatic rhabdomyolysis cause examples:  marked exertion, hyperthermia,   metabolic myopathy, drugs or toxins, infections, electrolyte disorders. Options provided:  -- Traumatic rhabdomyolysis  -- Nontraumatic rhabdomyolysis  -- Other - I will add my own diagnosis  -- Disagree - Not applicable / Not valid  -- Disagree - Clinically unable to determine / Unknown  -- Refer to Clinical Documentation Reviewer    PROVIDER RESPONSE TEXT:    This patient has traumatic rhabdomyolysis.     Query created by: Taylor Bernard on 2023 4:58 PM      Electronically signed by:  Madelyn Trotter CNP 2023 7:11 PM
Pt given discharge instructions with permission to allow daughter to remain in room during teaching obtained. No rx sent with pt. Advised rx sent to Eula Shay at [facility] 1st floor and to  from there. Follow up advised. All personal belongings sent with pt. To car via wheelchair. Pt wore mask in herrera due to covid precautions.
Pt is much steadier on her feet but still voices concerns about being able to do the things she needs to do to continue to live independently at home. Pt pending eval for possible TAM prior to returning home, but is also expressing desire to see if she could qualify for IPR.
concerns       Pain: no pain per pt    Vitals: Vitals not assessed per clinical judgement, see nursing flowsheet    Social/Functional History:    Lives With: Alone  Type of Home: Condo  Home Layout: One level  Home Access: Stairs to enter without rails  Entrance Stairs - Number of Steps: 1+1 steps from the garage or through the font entryway  Home Equipment: Alert Button, Reacher, Walker, standard     Bathroom Shower/Tub: Tub/Shower unit, Shower chair with back  [employer]: Standard  Bathroom Accessibility: Accessible    Receives Help From: Friend(s)  ADL Assistance: Independent  Homemaking Assistance: Independent  Homemaking Responsibilities: Yes  Ambulation Assistance: Independent  Transfer Assistance: Independent    Active : Yes  Occupation: Retired, Volunteer work  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 She did her own housekeeping and shopping. She had been doing her own laundry. She does not need to do any outdoor work. She drives locally and avoids driving on the highway.     OBJECTIVE:  Range of Motion:  Bilateral Lower Extremity: WFL    Strength:  Bilateral Lower Extremity: WFL    Balance:  Static Sitting Balance:  Modified Independent  Dynamic Sitting Balance: Modified Independent, for toileting and for donning socks at EOB  Static Standing Balance: Supervision, with 1UE support  Dynamic Standing Balance: Supervision, to wash hands at sink    Bed Mobility:  Rolling to Left: Modified Independent   Supine to Sit: Modified Independent  Scooting: Modified Independent    Transfers:  Sit to Stand: Stand By Assistance  Stand to Sit:Stand By Assistance    Ambulation:  Stand By Assistance  Distance: 10'x1, 150'x1  Surface: Level Tile  Device:Rolling Walker  Gait Deviations:  Mild Path Deviations and min unsteady with fatigue, for
WFL

## 2023-04-23 LAB
EKG ATRIAL RATE: 90 BPM
EKG P AXIS: 46 DEGREES
EKG P-R INTERVAL: 184 MS
EKG Q-T INTERVAL: 324 MS
EKG QRS DURATION: 82 MS
EKG QTC CALCULATION (BAZETT): 396 MS
EKG R AXIS: -15 DEGREES
EKG T AXIS: 31 DEGREES
EKG VENTRICULAR RATE: 90 BPM

## 2023-04-27 ENCOUNTER — CARE COORDINATION (OUTPATIENT)
Dept: CARE COORDINATION | Age: 88
End: 2023-04-27

## 2023-04-27 NOTE — CARE COORDINATION
Attempted to reach patient for continued Care Coordination follow up and education. Patient was unavailable at the time of my call, and a generic voicemail message was left asking patient to return my call at [phone].

## 2023-04-28 ENCOUNTER — CARE COORDINATION (OUTPATIENT)
Dept: CARE COORDINATION | Age: 88
End: 2023-04-28

## 2023-04-28 NOTE — CARE COORDINATION
Ambulatory Care Coordination Note  2023    Patient Current Location:  Home: [address] [facility] [place] [address]     [facility], [address] contacted the patient by telephone. Verified name and  with patient as identifiers. Provided introduction to self, and explanation of the LINDA HATFIELD role. Challenges to be reviewed by the provider   Additional needs identified to be addressed with provider: No  none               Method of communication with provider: none. Patient returned call. I spoke with the patient for continued Care Coordination follow up and education. Patient states she is doing okay. Breathing is at baseline. She reports the cough is intermittently productive of yellow sputum. She denies fever or chills. Overall states she feels much better. Educated on how to identify sx's that are worse than the baseline and the importance of early symptom recognition and reporting to prevent exacerbation which may lead to ED visits and hospital admissions. [facility] has not discharged patient as of yet, but patient states it should be soon. Patient states one year ago she had a right knee replacement and she is having shooting pain. Patient does use Salonpas patches which does help. Patient is going to [place] for two weeks and states when she returns she may call for a appointment with PCP re: this issue. Educated on how to identify sx's that are worse than the baseline and the importance of early symptom recognition and reporting to prevent exacerbation which may lead to ED visits and hospital admissions. I advised patient to contact PCP office if needed. No further needs at this time.       Lab Results       None            Care Coordination Interventions    Referral from Primary Care Provider: No  Suggested Interventions and Community Resources  Physical Therapy: Completed (Comment: going to [facility] for knee and back)          Goals Addressed    None         Future Appointments   Date Time Provider Department

## 2023-05-12 ENCOUNTER — CARE COORDINATION (OUTPATIENT)
Dept: CARE COORDINATION | Age: 88
End: 2023-05-12

## 2023-05-24 ENCOUNTER — CARE COORDINATION (OUTPATIENT)
Dept: CARE COORDINATION | Age: 88
End: 2023-05-24

## 2023-05-24 NOTE — CARE COORDINATION
Ambulatory Care Coordination Note  2023    Patient Current Location:  Home: [address] [facility] [place] [address]     ACM contacted the patient by telephone. Verified name and  with patient as identifiers. Provided introduction to self, and explanation of the ACM role. Challenges to be reviewed by the provider   Additional needs identified to be addressed with provider: No  none               Method of communication with provider: none. ACM: Lisa Schmitt RN    [employer] was referred to care coordination following ED visit for HTN. Spoke with [employer] today. Introduced self and role. Pt has h/o: HTN, HLD, hypothyroidism. Spoke with [place] [facility] briefly today. She reports that she is doing very well. Currently has family that is in town. Following with [facility] for her back. Will be getting an epidural in the upcoming wks. Reports COVID s/s have resolved. No longer being followed by [place]. Denies concerns or barriers today. Plan of care:  Continue f/u with ortho for back  Reinforce safety precautions  Evaluate new needs. Offered patient enrollment in the Remote Patient Monitoring (RPM) program for in-home monitoring: Patient declined. Lab Results       None            Care Coordination Interventions    Referral from Primary Care Provider: No  Suggested Interventions and Community Resources  Physical Therapy: Completed (Comment: going to [facility] for [facility] and back)          Goals Addressed                   This Visit's Progress     Conditions and Symptoms   On track     I will schedule office visits, as directed by my provider. I will keep my appointment or reschedule if I have to cancel. I will notify my provider of any barriers to my plan of care. I will follow my Zone Management tool to seek urgent or emergent care. I will notify my provider of any symptoms that indicate a worsening of my condition.     Barriers: lack of support and lack of education  Plan for overcoming my barriers: education and

## 2023-06-01 ENCOUNTER — CARE COORDINATION (OUTPATIENT)
Dept: CARE COORDINATION | Age: 88
End: 2023-06-01

## 2023-06-01 NOTE — CARE COORDINATION
Attempted to reach patient for continued Care Coordination follow up and education. Patient was unavailable at the time of my call, and a generic voicemail message was left asking patient to return my call at [phone].

## 2023-06-23 ENCOUNTER — OFFICE VISIT (OUTPATIENT)
Dept: FAMILY MEDICINE CLINIC | Age: 88
End: 2023-06-23

## 2023-06-23 VITALS
DIASTOLIC BLOOD PRESSURE: 62 MMHG | BODY MASS INDEX: 22.76 KG/M2 | SYSTOLIC BLOOD PRESSURE: 146 MMHG | HEART RATE: 64 BPM | RESPIRATION RATE: 16 BRPM | WEIGHT: 112.7 LBS

## 2023-06-23 DIAGNOSIS — M25.561 RIGHT KNEE PAIN, UNSPECIFIED CHRONICITY: Primary | ICD-10-CM

## 2023-06-23 DIAGNOSIS — Z96.651 HISTORY OF KNEE REPLACEMENT, TOTAL, RIGHT: ICD-10-CM

## 2023-06-23 DIAGNOSIS — M51.36 DDD (DEGENERATIVE DISC DISEASE), LUMBAR: ICD-10-CM

## 2023-06-23 RX ORDER — MELOXICAM 15 MG/1
15 TABLET ORAL DAILY
Qty: 90 TABLET | Refills: 1 | Status: SHIPPED | OUTPATIENT
Start: 2023-06-23

## 2023-06-23 ASSESSMENT — ENCOUNTER SYMPTOMS
BACK PAIN: 1
GASTROINTESTINAL NEGATIVE: 1
RESPIRATORY NEGATIVE: 1

## 2023-06-23 NOTE — PROGRESS NOTES
Lul Bradshaw (:  1934) is a 80 y.o. female,Established patient, here for evaluation of the following chief complaint(s):  Knee Pain (Right knee pain \"all the time\" since knee surgery and getting worse, h/o knee replacement 2022. Followed up with Ortho and was told everything is fine)          Subjective   SUBJECTIVE/OBJECTIVE:  HPI  Chief Complaint   Patient presents with    Knee Pain     Right knee pain \"all the time\" since knee surgery and getting worse, h/o knee replacement 2022. Followed up with Ortho and was told everything is fine     Pt presents for opinion on her right knee. Has had pain since her replacement. Following with Dr. Aspen Devi who referred her to Dr. Danielle Washington to rule out radicular pain. Dr. Danielle Washington recommended epidural with Dr. Frank Gupta but she prefers to stick with Dr. Ronan Reza.     Patient Active Problem List   Diagnosis    Hypertension    Hyperlipidemia    GERD (gastroesophageal reflux disease)    Lumbar spondylosis    Hypothyroidism    Vertebro basilar insufficiency    Cervical spondylosis with myelopathy    Light headed    Stenosis, spinal, lumbar    TIA involving vertebral artery    Sacroiliac inflammation (HCC)    Trochanteric bursitis of right hip    Ischial bursitis    Hypoxia    Pneumonia due to COVID-19 virus    Acquired trigger finger    Osteoarthritis of knee    Pain in right knee    Elevated CK    Weakness       Current Outpatient Medications   Medication Sig Dispense Refill    meloxicam (MOBIC) 15 MG tablet Take 1 tablet by mouth daily 90 tablet 1    guaiFENesin (MUCINEX) 600 MG extended release tablet Take 1 tablet by mouth 2 times daily 15 tablet 0    Glucosamine-Chondroitin 250-200 MG TABS Take 1 tablet by mouth daily      lisinopril (PRINIVIL;ZESTRIL) 5 MG tablet Take 1 tablet by mouth daily 90 tablet 3    lovastatin (MEVACOR) 20 MG tablet TAKE 1 TABLET BY MOUTH EVERY DAY AT NIGHT 90 tablet 3    gabapentin (NEURONTIN) 600 MG tablet TAKE 1 TABLET IN THE

## 2023-06-28 ENCOUNTER — TELEPHONE (OUTPATIENT)
Dept: PHYSICAL MEDICINE AND REHAB | Age: 88
End: 2023-06-28

## 2023-06-28 ENCOUNTER — CARE COORDINATION (OUTPATIENT)
Dept: CARE COORDINATION | Age: 88
End: 2023-06-28

## 2023-07-14 ENCOUNTER — CARE COORDINATION (OUTPATIENT)
Dept: CARE COORDINATION | Age: 88
End: 2023-07-14

## 2023-07-14 NOTE — CARE COORDINATION
Attempted to reach patient for continued Care Coordination follow up and education. Patient was unavailable at the time of my call, and a generic voicemail message was left asking patient to return my call at [phone].

## 2023-07-19 ENCOUNTER — TELEPHONE (OUTPATIENT)
Dept: FAMILY MEDICINE CLINIC | Age: 88
End: 2023-07-19

## 2023-07-19 RX ORDER — SULFAMETHOXAZOLE AND TRIMETHOPRIM 800; 160 MG/1; MG/1
1 TABLET ORAL 2 TIMES DAILY
Qty: 6 TABLET | Refills: 0 | Status: SHIPPED | OUTPATIENT
Start: 2023-07-19 | End: 2023-07-22

## 2023-07-19 NOTE — TELEPHONE ENCOUNTER
The patient called in and stated that she thinks she has a UTI. She started last night with burning with urination, urgency and frequency. There are no available appts. She is requests a ATB be sent to [facility]. Please advise.

## 2023-07-26 ENCOUNTER — CARE COORDINATION (OUTPATIENT)
Dept: CARE COORDINATION | Age: 88
End: 2023-07-26

## 2023-07-26 NOTE — CARE COORDINATION
Ambulatory Care Coordination Note  2023    Patient Current Location:  Home: [address] [place] [address] Drive [address]     ACM contacted the patient by telephone. Verified name and  with patient as identifiers. Provided introduction to self, and explanation of the ACM role. Challenges to be reviewed by the provider   Additional needs identified to be addressed with provider: No  none               Method of communication with provider: none. ACM: Anamaria Portillo, RN    {Uvaldo was referred to care coordination following ED visit for HTN. Spoke with Tonia today. Introduced self and role. Pt has h/o: HTN, HLD, hypothyroidism. Ongoing right knee pain. Has upcoming appt with Dr. Tami Robledo. Arnold Aldrich reports that she is doing well. Having right knee pain. Using OTC Voltaren gel. Ins. Did not cover prescription strength. Pt considering hiring a cleaning lady. Was treated for UTI s/s last wk. Reports s/s have resolved. Plan of care: Pt aware of resources. No new concerns at this time. Has f/u with pain mgmt. Has this ACM's contact number to reach out with any future care coordination needs. Will graduate from care coordination at this time. General Assessment    Do you have any symptoms that are causing concern?: Yes  Progression since Onset: Unchanged  Reported Symptoms: Pain (Comment: right knee pain. has appt with Dr. Tami Robledo for shoulder later in the wk and is going to talk with him about her knee. using voltaren gel at present time.)         Offered patient enrollment in the Remote Patient Monitoring (RPM) program for in-home monitoring: Patient declined.     Lab Results       None            Care Coordination Interventions    Referral from Primary Care Provider: No  Suggested Interventions and Community Resources  Physical Therapy: Completed (Comment: going to [facility] for knee and back)          Goals Addressed                   This Visit's Progress     COMPLETED: Conditions and Symptoms

## 2023-07-28 ENCOUNTER — OFFICE VISIT (OUTPATIENT)
Dept: PHYSICAL MEDICINE AND REHAB | Age: 88
End: 2023-07-28
Payer: MEDICARE

## 2023-07-28 VITALS
WEIGHT: 112 LBS | DIASTOLIC BLOOD PRESSURE: 68 MMHG | BODY MASS INDEX: 22.58 KG/M2 | SYSTOLIC BLOOD PRESSURE: 98 MMHG | HEIGHT: 59 IN

## 2023-07-28 DIAGNOSIS — G89.29 OTHER CHRONIC PAIN: ICD-10-CM

## 2023-07-28 DIAGNOSIS — M25.561 CHRONIC PAIN OF RIGHT KNEE: Primary | ICD-10-CM

## 2023-07-28 DIAGNOSIS — M53.3 CHRONIC RIGHT SI JOINT PAIN: ICD-10-CM

## 2023-07-28 DIAGNOSIS — G89.29 CHRONIC RIGHT SI JOINT PAIN: ICD-10-CM

## 2023-07-28 DIAGNOSIS — G89.29 CHRONIC PAIN OF RIGHT KNEE: Primary | ICD-10-CM

## 2023-07-28 DIAGNOSIS — M75.51 SUBACROMIAL BURSITIS OF RIGHT SHOULDER JOINT: ICD-10-CM

## 2023-07-28 PROCEDURE — 1036F TOBACCO NON-USER: CPT | Performed by: ANESTHESIOLOGY

## 2023-07-28 PROCEDURE — 1090F PRES/ABSN URINE INCON ASSESS: CPT | Performed by: ANESTHESIOLOGY

## 2023-07-28 PROCEDURE — 20610 DRAIN/INJ JOINT/BURSA W/O US: CPT | Performed by: ANESTHESIOLOGY

## 2023-07-28 PROCEDURE — 99214 OFFICE O/P EST MOD 30 MIN: CPT | Performed by: ANESTHESIOLOGY

## 2023-07-28 PROCEDURE — G8428 CUR MEDS NOT DOCUMENT: HCPCS | Performed by: ANESTHESIOLOGY

## 2023-07-28 PROCEDURE — 1123F ACP DISCUSS/DSCN MKR DOCD: CPT | Performed by: ANESTHESIOLOGY

## 2023-07-28 PROCEDURE — G8420 CALC BMI NORM PARAMETERS: HCPCS | Performed by: ANESTHESIOLOGY

## 2023-07-28 RX ORDER — TRIAMCINOLONE ACETONIDE 40 MG/ML
40 INJECTION, SUSPENSION INTRA-ARTICULAR; INTRAMUSCULAR ONCE
Status: COMPLETED | OUTPATIENT
Start: 2023-07-28 | End: 2023-07-28

## 2023-07-28 RX ADMIN — TRIAMCINOLONE ACETONIDE 40 MG: 40 INJECTION, SUSPENSION INTRA-ARTICULAR; INTRAMUSCULAR at 12:09

## 2023-07-28 NOTE — PROGRESS NOTES
Pre-operative Diagnosis: RIGHT shoulder pain     Post-operative Diagnosis: RIGHT shoulder pain     Procedure: RIGHT subacromial bursa injection     Procedure Description:  Patient was seated upright in a chair. RIGHT shoulder prepped with alcohol wipes. The posterior edge of the acromium was identified and a 25-gauge 1 inch needle was inserted inferior to the posterior lateral acromium and directed in the subacromial space. 5 cc of a mixture containing 40 mg kenalog with 4 cc of 0.25% bupivacaine were injected after negative aspiration. The needle was withdrawn without any complications.         Procedural Complications: None        Hunter Whiting DO  Interventional Pain Management/PM&R   [address]

## 2023-07-28 NOTE — PROGRESS NOTES
Chronic Pain/PM&R Clinic Note     Encounter Date: 7/28/23    Subjective:   Chief Complaint:   Chief Complaint   Patient presents with    Injections     Pt would like to discuss right knee pain. History of Present Illness:   Tracy Hubbard is a 80 y.o. female seen in the clinic initially on 10/8/20 upon request from Shabbir Stahl DO (PCP) for her history of low back pain. She has medical history of previous TIA (on Plavix). She has a longstanding history of cervical neck and low back pain. However, she sustained a fall while trying to put her pants on 10/2/2021 where she fell on her right buttocks. She states that since this episode she has been dealing with severe low back pain with radiation down the posterior thigh and slightly down the posterior calf. She does admit the pain radiates around to her groin. She states she has numbness and tingling however these are more in the toes. He does endorse some right leg weakness which she feels like this pain limited. Her pain is sharp and stabbing in nature and 5/10. Her pain is exacerbated with any sitting or laying on the affected side. Her pain is alleviated with rest and medication. She denies any focal leg weakness, saddle anesthesia, or bowel/bladder incontinence. She states she is currently been managing her pain with over-the-counter Tylenol arthritis. She has seen a couple pain specialist in the past including receiving spinal injections with Dr. Dalila Olmstead and Dr. Angie Rutledge. 12/14/2022, patient presents for planned follow-up for chronic low back pain, right knee pain, and left shoulder pain. She states that her right sacroiliac joint radiofrequency ablation has provided her significant relief that was performed on 11/2/2022. She states that her low back/buttocks has not been giving her as many issues. She states that she has been having ongoing right knee pain despite having the knee replaced back in February 2022.   She is wondering why she

## 2023-08-08 ENCOUNTER — CARE COORDINATION (OUTPATIENT)
Dept: CARE COORDINATION | Age: 88
End: 2023-08-08

## 2023-08-08 NOTE — CARE COORDINATION
7/28/23 2130: 125/55-77  7/29/23 1030: 879/28-36 2853: 400/13-50 7353: 067/75-61  8/1/23 1200: 176/44-69  8/3/23 1500: 100/08-31  8/4/23 1200: 140/62-70    Pt reports that she is doing well. Continues to have knee pain. Following with Dr. Tano Vicente for knee pain.

## 2023-08-15 ENCOUNTER — APPOINTMENT (OUTPATIENT)
Dept: GENERAL RADIOLOGY | Age: 88
End: 2023-08-15
Attending: ANESTHESIOLOGY
Payer: MEDICARE

## 2023-08-15 ENCOUNTER — HOSPITAL ENCOUNTER (OUTPATIENT)
Age: 88
Setting detail: OUTPATIENT SURGERY
Discharge: HOME | End: 2023-08-15
Attending: ANESTHESIOLOGY | Admitting: ANESTHESIOLOGY
Payer: MEDICARE

## 2023-08-15 VITALS
SYSTOLIC BLOOD PRESSURE: 142 MMHG | RESPIRATION RATE: 16 BRPM | TEMPERATURE: 96.1 F | HEART RATE: 62 BPM | WEIGHT: 110 LBS | DIASTOLIC BLOOD PRESSURE: 62 MMHG | HEIGHT: 59 IN | OXYGEN SATURATION: 95 % | BODY MASS INDEX: 22.18 KG/M2

## 2023-08-15 PROCEDURE — 7100000010 HC PHASE II RECOVERY - FIRST 15 MIN: Performed by: ANESTHESIOLOGY

## 2023-08-15 PROCEDURE — 99153 MOD SED SAME PHYS/QHP EA: CPT | Performed by: ANESTHESIOLOGY

## 2023-08-15 PROCEDURE — 2709999900 HC NON-CHARGEABLE SUPPLY: Performed by: ANESTHESIOLOGY

## 2023-08-15 PROCEDURE — 99152 MOD SED SAME PHYS/QHP 5/>YRS: CPT | Performed by: ANESTHESIOLOGY

## 2023-08-15 PROCEDURE — 3600000054 HC PAIN LEVEL 3 BASE: Performed by: ANESTHESIOLOGY

## 2023-08-15 PROCEDURE — 7100000011 HC PHASE II RECOVERY - ADDTL 15 MIN: Performed by: ANESTHESIOLOGY

## 2023-08-15 PROCEDURE — 64624 DSTRJ NULYT AGT GNCLR NRV: CPT | Performed by: ANESTHESIOLOGY

## 2023-08-15 PROCEDURE — 2500000003 HC RX 250 WO HCPCS: Performed by: ANESTHESIOLOGY

## 2023-08-15 PROCEDURE — 6360000002 HC RX W HCPCS: Performed by: ANESTHESIOLOGY

## 2023-08-15 PROCEDURE — 3600000055 HC PAIN LEVEL 3 ADDL 15 MIN: Performed by: ANESTHESIOLOGY

## 2023-08-15 RX ORDER — LIDOCAINE HYDROCHLORIDE 20 MG/ML
INJECTION, SOLUTION EPIDURAL; INFILTRATION; INTRACAUDAL; PERINEURAL PRN
Status: DISCONTINUED | OUTPATIENT
Start: 2023-08-15 | End: 2023-08-15 | Stop reason: ALTCHOICE

## 2023-08-15 RX ORDER — LIDOCAINE HYDROCHLORIDE 10 MG/ML
INJECTION, SOLUTION EPIDURAL; INFILTRATION; INTRACAUDAL; PERINEURAL PRN
Status: DISCONTINUED | OUTPATIENT
Start: 2023-08-15 | End: 2023-08-15 | Stop reason: ALTCHOICE

## 2023-08-15 RX ORDER — MIDAZOLAM HYDROCHLORIDE 1 MG/ML
INJECTION INTRAMUSCULAR; INTRAVENOUS PRN
Status: DISCONTINUED | OUTPATIENT
Start: 2023-08-15 | End: 2023-08-15 | Stop reason: ALTCHOICE

## 2023-08-15 RX ORDER — FENTANYL CITRATE 50 UG/ML
INJECTION, SOLUTION INTRAMUSCULAR; INTRAVENOUS PRN
Status: DISCONTINUED | OUTPATIENT
Start: 2023-08-15 | End: 2023-08-15 | Stop reason: ALTCHOICE

## 2023-08-15 ASSESSMENT — PAIN - FUNCTIONAL ASSESSMENT: PAIN_FUNCTIONAL_ASSESSMENT: 0-10

## 2023-08-15 NOTE — PROGRESS NOTES
8420 Patient arrives to recovery room via cart. Spontaneous respirations, vss, report received from surgical RN. Patient denies pain, numbness, tingling , nausea. Injection site clean, dry, intact. HOB elevated. IV capped. Snack and drink given. Call light within reach. 1010 IV discontinued.  Up to dress self  1020 Discharge to home in stable ambulatory condition with friend

## 2023-08-15 NOTE — PROCEDURES
Pre-operative Diagnosis: Knee pain     Post-operative Diagnosis: Knee pain     Procedure: RIGHT genicular thermal radiofrequency ablation     Procedure Description:  The patient was brought to the procedure room and placed on the exam table in a comfortable supine position. The knee was prepared with chloraprep and sterile drapes. Needle placement was confirmed with fluoroscopic guidance. The superficial skin and subcutaneous tissues were anesthetized by local infiltration of 1% Lidocaine. Three 5 cm cannulas were advanced in an AP view near the superiolateral portion of the femoral condyle, superiomedial portion of the femoral condyle, and the inferiomedial portion of the tibial condyle until a bony endpoint was met. Lateral x-ray views showed all the needles at 50% depth of the femur and tibia. Then, motor stimulation was tested at 2.0 volts with no leg movement. After negative aspiration, 1 cc of 2% were injected at each side port followed by a one minute wait to anesthetize the genicular nerves. Radiofrequency lesions were made at 80 degrees Celsius for a duration of 1 minutes and 30 seconds followed by removal of the probe.         RFA Log                                               Impedance (I)                                      Superiomedial (SM)  -                          150-300  Superiolateral (SL)   -                           150-300  Inferiomedial (IM)    -                            150-300        Procedural Complications: None  Estimated Blood Loss: 0 mL        IV sedation was used during the procedure:  - Moderate intravenous conscious sedation was supervised by Dr. Shannan Del Rio  - The patient was independently monitored by a Registered Nurse assigned to the procedure room  - Monitoring included automated blood pressure, continuous EKG, and continuous pulse oximetry  - The detailed conscious record is permanently stored in the [address] [place] [address]  - The following is the conscious
details…

## 2023-08-15 NOTE — POST SEDATION
[address]  Sedation/Analgesia Post Sedation Record    Pt Name: Erin Rucker  MRN: [id number]  YOB: 1934  Procedure Performed By: Alysia Trotter DO  Primary Care Physician: Belia Dubon DO    POST-PROCEDURE    Physicians/Assistants: Alysia Trotter DO  Procedure Performed: See Procedure Note   Sedation/Anesthesia: Versed and Fentanyl (See procedure note for amount and duration)  Estimated Blood Loss:     0  ml  Specimens Removed: None        Complications: None           Hunter Clifton DO  Electronically signed 8/15/2023 at 4:34 PM

## 2023-08-29 ENCOUNTER — OFFICE VISIT (OUTPATIENT)
Dept: FAMILY MEDICINE CLINIC | Age: 88
End: 2023-08-29
Payer: MEDICARE

## 2023-08-29 VITALS
HEART RATE: 60 BPM | RESPIRATION RATE: 12 BRPM | SYSTOLIC BLOOD PRESSURE: 132 MMHG | DIASTOLIC BLOOD PRESSURE: 72 MMHG | BODY MASS INDEX: 22.18 KG/M2 | WEIGHT: 109.8 LBS

## 2023-08-29 DIAGNOSIS — N30.00 ACUTE CYSTITIS WITHOUT HEMATURIA: Primary | ICD-10-CM

## 2023-08-29 PROCEDURE — G8420 CALC BMI NORM PARAMETERS: HCPCS | Performed by: FAMILY MEDICINE

## 2023-08-29 PROCEDURE — G8427 DOCREV CUR MEDS BY ELIG CLIN: HCPCS | Performed by: FAMILY MEDICINE

## 2023-08-29 PROCEDURE — 1036F TOBACCO NON-USER: CPT | Performed by: FAMILY MEDICINE

## 2023-08-29 PROCEDURE — 99213 OFFICE O/P EST LOW 20 MIN: CPT | Performed by: FAMILY MEDICINE

## 2023-08-29 PROCEDURE — 1123F ACP DISCUSS/DSCN MKR DOCD: CPT | Performed by: FAMILY MEDICINE

## 2023-08-29 PROCEDURE — 1090F PRES/ABSN URINE INCON ASSESS: CPT | Performed by: FAMILY MEDICINE

## 2023-08-29 RX ORDER — SULFAMETHOXAZOLE AND TRIMETHOPRIM 800; 160 MG/1; MG/1
1 TABLET ORAL 2 TIMES DAILY
Qty: 6 TABLET | Refills: 0 | Status: SHIPPED | OUTPATIENT
Start: 2023-08-29 | End: 2023-09-01

## 2023-08-29 ASSESSMENT — ENCOUNTER SYMPTOMS
GASTROINTESTINAL NEGATIVE: 1
RESPIRATORY NEGATIVE: 1

## 2023-08-29 NOTE — PROGRESS NOTES
Jeneal Landau (:  1934) is a 80 y.o. female,Established patient, here for evaluation of the following chief complaint(s):  Urinary Tract Infection (C/o urgency, frequency and \"discomfort\" x few days. Denies hematuria)          Subjective   SUBJECTIVE/OBJECTIVE:  HPI  Chief Complaint   Patient presents with    Urinary Tract Infection     C/o urgency, frequency and \"discomfort\" x few days. Denies hematuria     Pt c/o dysuria, frequency, urgency for the last few days. Denies FC, NV. Denies flank pain.       Patient Active Problem List   Diagnosis    Hypertension    Hyperlipidemia    GERD (gastroesophageal reflux disease)    Lumbar spondylosis    Hypothyroidism    Vertebro basilar insufficiency    Cervical spondylosis with myelopathy    Light headed    Stenosis, spinal, lumbar    TIA involving vertebral artery    Sacroiliac inflammation (HCC)    Trochanteric bursitis of right hip    Ischial bursitis    Hypoxia    Pneumonia due to COVID-19 virus    Acquired trigger finger    Osteoarthritis of knee    Pain in right knee    Elevated CK    Weakness       Current Outpatient Medications   Medication Sig Dispense Refill    sulfamethoxazole-trimethoprim (BACTRIM DS) 800-160 MG per tablet Take 1 tablet by mouth 2 times daily for 3 days 6 tablet 0    meloxicam (MOBIC) 15 MG tablet Take 1 tablet by mouth daily 90 tablet 1    Glucosamine-Chondroitin 250-200 MG TABS Take 1 tablet by mouth daily      lisinopril (PRINIVIL;ZESTRIL) 5 MG tablet Take 1 tablet by mouth daily 90 tablet 3    lovastatin (MEVACOR) 20 MG tablet TAKE 1 TABLET BY MOUTH EVERY DAY AT NIGHT 90 tablet 3    gabapentin (NEURONTIN) 600 MG tablet TAKE 1 TABLET IN THE       MORNING, 1 TABLET IN THE   AFTERNOON AND 2 TABLETS AT BEDTIME. 360 tablet 3    clopidogrel (PLAVIX) 75 MG tablet TAKE 1 TABLET BY MOUTH EVERY DAY 90 tablet 3    levothyroxine (SYNTHROID) 50 MCG tablet TAKE 1 TABLET BY MOUTH EVERY DAY 90 tablet 3    pantoprazole (PROTONIX) 40 MG

## 2023-09-12 ENCOUNTER — HOSPITAL ENCOUNTER (OUTPATIENT)
Age: 88
Discharge: HOME | End: 2023-09-12
Payer: MEDICARE

## 2023-09-12 ENCOUNTER — HOSPITAL ENCOUNTER (OUTPATIENT)
Dept: GENERAL RADIOLOGY | Age: 88
Discharge: HOME | End: 2023-09-12
Payer: MEDICARE

## 2023-09-12 ENCOUNTER — OFFICE VISIT (OUTPATIENT)
Dept: FAMILY MEDICINE CLINIC | Age: 88
End: 2023-09-12
Payer: MEDICARE

## 2023-09-12 VITALS
BODY MASS INDEX: 22.54 KG/M2 | SYSTOLIC BLOOD PRESSURE: 118 MMHG | DIASTOLIC BLOOD PRESSURE: 58 MMHG | HEART RATE: 68 BPM | WEIGHT: 111.8 LBS | HEIGHT: 59 IN | RESPIRATION RATE: 16 BRPM

## 2023-09-12 DIAGNOSIS — Z00.00 INITIAL MEDICARE ANNUAL WELLNESS VISIT: Primary | ICD-10-CM

## 2023-09-12 DIAGNOSIS — Z96.651 HISTORY OF KNEE REPLACEMENT, TOTAL, RIGHT: ICD-10-CM

## 2023-09-12 DIAGNOSIS — I10 PRIMARY HYPERTENSION: ICD-10-CM

## 2023-09-12 DIAGNOSIS — M25.511 CHRONIC RIGHT SHOULDER PAIN: ICD-10-CM

## 2023-09-12 DIAGNOSIS — M25.561 RIGHT KNEE PAIN, UNSPECIFIED CHRONICITY: ICD-10-CM

## 2023-09-12 DIAGNOSIS — N18.30 STAGE 3 CHRONIC KIDNEY DISEASE, UNSPECIFIED WHETHER STAGE 3A OR 3B CKD (HCC): ICD-10-CM

## 2023-09-12 DIAGNOSIS — G89.29 CHRONIC RIGHT SHOULDER PAIN: ICD-10-CM

## 2023-09-12 DIAGNOSIS — M51.36 DDD (DEGENERATIVE DISC DISEASE), LUMBAR: ICD-10-CM

## 2023-09-12 PROCEDURE — 1123F ACP DISCUSS/DSCN MKR DOCD: CPT | Performed by: FAMILY MEDICINE

## 2023-09-12 PROCEDURE — 1090F PRES/ABSN URINE INCON ASSESS: CPT | Performed by: FAMILY MEDICINE

## 2023-09-12 PROCEDURE — G0438 PPPS, INITIAL VISIT: HCPCS | Performed by: FAMILY MEDICINE

## 2023-09-12 PROCEDURE — G8420 CALC BMI NORM PARAMETERS: HCPCS | Performed by: FAMILY MEDICINE

## 2023-09-12 PROCEDURE — 99214 OFFICE O/P EST MOD 30 MIN: CPT | Performed by: FAMILY MEDICINE

## 2023-09-12 PROCEDURE — 73030 X-RAY EXAM OF SHOULDER: CPT

## 2023-09-12 PROCEDURE — 1036F TOBACCO NON-USER: CPT | Performed by: FAMILY MEDICINE

## 2023-09-12 PROCEDURE — G8427 DOCREV CUR MEDS BY ELIG CLIN: HCPCS | Performed by: FAMILY MEDICINE

## 2023-09-12 ASSESSMENT — PATIENT HEALTH QUESTIONNAIRE - PHQ9
SUM OF ALL RESPONSES TO PHQ QUESTIONS 1-9: 0
1. LITTLE INTEREST OR PLEASURE IN DOING THINGS: 0
SUM OF ALL RESPONSES TO PHQ9 QUESTIONS 1 & 2: 0
SUM OF ALL RESPONSES TO PHQ QUESTIONS 1-9: 0
2. FEELING DOWN, DEPRESSED OR HOPELESS: 0

## 2023-09-12 ASSESSMENT — LIFESTYLE VARIABLES
HOW MANY STANDARD DRINKS CONTAINING ALCOHOL DO YOU HAVE ON A TYPICAL DAY: PATIENT DOES NOT DRINK
HOW OFTEN DO YOU HAVE A DRINK CONTAINING ALCOHOL: NEVER

## 2023-09-12 ASSESSMENT — ENCOUNTER SYMPTOMS
RESPIRATORY NEGATIVE: 1
GASTROINTESTINAL NEGATIVE: 1

## 2023-09-12 NOTE — PATIENT INSTRUCTIONS
You may receive a survey about your visit with us today. The feedback from our patients helps us identify what is working well and where the service to all patients can be enhanced. Thank you! Preventing Falls: Care Instructions    Talk to your doctor about the medicines you take. Ask if any of them increase the risk of falls and whether they can be changed or stopped. Try to exercise regularly. It can help improve your strength and balance. This can help lower your risk of falling. Practice fall safety and prevention. Wear low-heeled shoes that fit well and give your feet good support. Talk to your doctor if you have foot problems that make this hard. Carry a cellphone or wear a medical alert device that you can use to call for help. Use stepladders instead of chairs to reach high objects. Don't climb if you're at risk for falls. Ask for help, if needed. Wear the correct eyeglasses, if you need them. Make your home safer. Remove rugs, cords, clutter, and furniture from walkways. Keep your house well lit. Use night-lights in hallways and bathrooms. Install and use sturdy handrails on stairways. Wear nonskid footwear, even inside. Don't walk barefoot or in socks without shoes. Be safe outside. Use handrails, curb cuts, and ramps whenever possible. Keep your hands free by using a shoulder bag or backpack. Try to walk in well-lit areas. Watch out for uneven ground, changes in pavement, and debris. Be careful in the winter. Walk on the grass or gravel when sidewalks are slippery. Use de-icer on steps and walkways. Add non-slip devices to shoes. Put grab bars and nonskid mats in your shower or tub and near the toilet. Try to use a shower chair or bath bench when bathing. Get into a tub or shower by putting in your weaker leg first. Get out with your strong side first. Have a phone or medical alert device in the bathroom with you. Where can you learn more?   Go to

## 2023-09-12 NOTE — PROGRESS NOTES
INJECTION PROCEDURE FOR SACROILIAC JOINT Right 12/08/2020    Right SI joint injection performed by Shen Casper DO at [address] [facility] [address] Right 03/16/2021    right ischial bursa injection performed by Shen Casper DO at [address] [place] [address] Bilateral 09/21/2021    bilateral ischial bursa injections under fluoroscopy.  performed by Sehn Casper DO at [address] [facility] [address] Bilateral 06/29/2022    bilateral ischial bursa injections under fluoroscopy performed by Shen Casper DO at [address] [place] [address]      left    JOINT REPLACEMENT Right 02/17/2022    KNEE ARTHROCENTESIS Bilateral 09/27/2022    bilateral ischial bursa injections performed by Shen Casper DO at [address] [place] [address] ARTHROSCOPY  2010    left    LUMBAR NERVE BLOCK N/A 12/11/2018    LUMBAR INTER LAMINAR DAYANARA LESI #1@ L5 performed by Jaelyn Ernst MD at [facility] N/A 03/12/2019    LUMBAR INTER LAMINAR DAYANARA LESI @L5 performed by Jaelyn Ernst MD at [address] [place] [address] Right 08/30/2019    SI RFA  RIGHT SIDE performed by Jaelyn Ernst MD at [facility] Left 10/01/2019    SI RFA  LEFT SIDE performed by Jaelyn Ernst MD at [id number] [place] [address] Right 08/27/2018    SI RFA    NERVE SURGERY Left 09/11/2018    SI RFA    OTHER SURGICAL HISTORY      previous nerve blocks at [address] [place] [address] Right 08/04/2021    Right genicular nerve block performed by Shen Casper DO at [address] Right 08/17/2021    right genicular nerve radiofrequency ablation performed by Shen Casper DO at [address] N/A 10/12/2021    lumbar epidural steroid L5/S1 performed by Shen Casper DO at [address] [facility]

## 2023-10-06 RX ORDER — CLOPIDOGREL BISULFATE 75 MG/1
TABLET ORAL
Qty: 90 TABLET | Refills: 3 | Status: SHIPPED | OUTPATIENT
Start: 2023-10-06

## 2023-10-12 ENCOUNTER — OFFICE VISIT (OUTPATIENT)
Dept: FAMILY MEDICINE CLINIC | Age: 88
End: 2023-10-12
Payer: MEDICARE

## 2023-10-12 VITALS
RESPIRATION RATE: 16 BRPM | DIASTOLIC BLOOD PRESSURE: 50 MMHG | HEART RATE: 84 BPM | WEIGHT: 110.5 LBS | BODY MASS INDEX: 22.32 KG/M2 | SYSTOLIC BLOOD PRESSURE: 118 MMHG

## 2023-10-12 DIAGNOSIS — B34.9 VIRAL SYNDROME: Primary | ICD-10-CM

## 2023-10-12 DIAGNOSIS — R26.89 BALANCE PROBLEM: ICD-10-CM

## 2023-10-12 PROCEDURE — 1036F TOBACCO NON-USER: CPT | Performed by: FAMILY MEDICINE

## 2023-10-12 PROCEDURE — 1090F PRES/ABSN URINE INCON ASSESS: CPT | Performed by: FAMILY MEDICINE

## 2023-10-12 PROCEDURE — G8427 DOCREV CUR MEDS BY ELIG CLIN: HCPCS | Performed by: FAMILY MEDICINE

## 2023-10-12 PROCEDURE — 99213 OFFICE O/P EST LOW 20 MIN: CPT | Performed by: FAMILY MEDICINE

## 2023-10-12 PROCEDURE — G8420 CALC BMI NORM PARAMETERS: HCPCS | Performed by: FAMILY MEDICINE

## 2023-10-12 PROCEDURE — 1123F ACP DISCUSS/DSCN MKR DOCD: CPT | Performed by: FAMILY MEDICINE

## 2023-10-12 PROCEDURE — G8484 FLU IMMUNIZE NO ADMIN: HCPCS | Performed by: FAMILY MEDICINE

## 2023-10-12 NOTE — PROGRESS NOTES
Leon Melgar (:  1934) is a 80 y.o. female,Established patient, here for evaluation of the following chief complaint(s):  Cough, Gait Problem (Pt says she does not feel dizzy, but when she stands she feels off balance, unsteady), and Headache          Subjective   SUBJECTIVE/OBJECTIVE:  HPI  Chief Complaint   Patient presents with    Cough    Gait Problem     Pt says she does not feel dizzy, but when she stands she feels off balance, unsteady    Headache     Pt presents today with feeling off balance over the last several days. Feels that this is related to her current cold-like symptoms. Pt c/o sinus pressure, congestion, cough x 4 days. No fevers. Denies chest tightness, wheezing. Mild HA, denies vision changes. Denies dizziness.     Patient Active Problem List   Diagnosis    Hypertension    Hyperlipidemia    GERD (gastroesophageal reflux disease)    Lumbar spondylosis    Hypothyroidism    Vertebro basilar insufficiency    Cervical spondylosis with myelopathy    Light headed    Stenosis, spinal, lumbar    TIA involving vertebral artery    Sacroiliac inflammation (HCC)    Trochanteric bursitis of right hip    Ischial bursitis    Hypoxia    Pneumonia due to COVID-19 virus    Acquired trigger finger    Osteoarthritis of knee    Pain in right knee    Elevated CK    Weakness       Current Outpatient Medications   Medication Sig Dispense Refill    clopidogrel (PLAVIX) 75 MG tablet TAKE 1 TABLET BY MOUTH EVERY DAY 90 tablet 3    meloxicam (MOBIC) 15 MG tablet Take 1 tablet by mouth daily 90 tablet 1    guaiFENesin (MUCINEX) 600 MG extended release tablet Take 1 tablet by mouth 2 times daily 15 tablet 0    Glucosamine-Chondroitin 250-200 MG TABS Take 1 tablet by mouth daily      lisinopril (PRINIVIL;ZESTRIL) 5 MG tablet Take 1 tablet by mouth daily 90 tablet 3    lovastatin (MEVACOR) 20 MG tablet TAKE 1 TABLET BY MOUTH EVERY DAY AT NIGHT 90 tablet 3    gabapentin (NEURONTIN) 600 MG tablet TAKE

## 2023-10-16 ENCOUNTER — OFFICE VISIT (OUTPATIENT)
Dept: PHYSICAL MEDICINE AND REHAB | Age: 88
End: 2023-10-16
Payer: MEDICARE

## 2023-10-16 VITALS
DIASTOLIC BLOOD PRESSURE: 54 MMHG | HEIGHT: 59 IN | SYSTOLIC BLOOD PRESSURE: 136 MMHG | WEIGHT: 110 LBS | BODY MASS INDEX: 22.18 KG/M2

## 2023-10-16 DIAGNOSIS — M79.2 NEUROPATHIC PAIN: ICD-10-CM

## 2023-10-16 DIAGNOSIS — M54.17 RADICULOPATHY, LUMBOSACRAL REGION: Primary | ICD-10-CM

## 2023-10-16 DIAGNOSIS — G89.29 OTHER CHRONIC PAIN: ICD-10-CM

## 2023-10-16 DIAGNOSIS — M25.561 CHRONIC PAIN OF RIGHT KNEE: ICD-10-CM

## 2023-10-16 DIAGNOSIS — G89.29 CHRONIC PAIN OF RIGHT KNEE: ICD-10-CM

## 2023-10-16 PROCEDURE — G8428 CUR MEDS NOT DOCUMENT: HCPCS | Performed by: ANESTHESIOLOGY

## 2023-10-16 PROCEDURE — 1090F PRES/ABSN URINE INCON ASSESS: CPT | Performed by: ANESTHESIOLOGY

## 2023-10-16 PROCEDURE — G8420 CALC BMI NORM PARAMETERS: HCPCS | Performed by: ANESTHESIOLOGY

## 2023-10-16 PROCEDURE — 1123F ACP DISCUSS/DSCN MKR DOCD: CPT | Performed by: ANESTHESIOLOGY

## 2023-10-16 PROCEDURE — 99214 OFFICE O/P EST MOD 30 MIN: CPT | Performed by: ANESTHESIOLOGY

## 2023-10-16 PROCEDURE — G8484 FLU IMMUNIZE NO ADMIN: HCPCS | Performed by: ANESTHESIOLOGY

## 2023-10-16 PROCEDURE — 1036F TOBACCO NON-USER: CPT | Performed by: ANESTHESIOLOGY

## 2023-10-16 NOTE — PROGRESS NOTES
Functionality Assessment/Goals Worksheet     On a scale of 0 (Does not Interfere) to 10 (Completely Interferes)     1. Which number describes how during the past week pain has interfered with           the following:  A. General Activity:  8  B. Mood: 5  C. Walking Ability:  8  D. Normal Work (Includes both work outside the home and housework):  8  E. Relations with Other People:   5  F. Sleep:   5  G. Enjoyment of Life:   8    2. Patient Prefers to Take their Pain Medications:     [x]  On a regular basis   []  Only when necessary    []  Does not take pain medications    3. What are the Patient's Goals/Expectations for Visiting Pain Management?      [x]  Learn about my pain    []  Receive Medication   []  Physical Therapy     []  Treat Depression   [x]  Receive Injections    []  Treat Sleep   []  Deal with Anxiety and Stress   []  Treat Opoid Dependence/Addiction   []  Other:
why she has not seen any relief in her right knee pain and why her right knee pain is actually worse since having her knee replaced. She also has noticed some worsening pain in her left shoulder and states that this is on the lateral aspect of her shoulder. She thinks is from lifting her 8month-old great grandbaby while she was visiting her family in [place]. She states that this pain is worse when she is lifting her arm overhead. Today, 10/16/2023, patient presents for planned follow-up for chronic low back and right leg pain. She says she met with the surgeons at [facility] and states that her knee is doing well from a surgical standpoint but they feel like a lot of her pain is coming from her back and pinched nerves in the back. She still has a lot of pain radiating from her right hip all the way down to her lateral calf with associated numbness/tingling. She feels like her right leg is weaker at times and she feels like she will trip over this leg when she is walking for an extended duration of time. She is wondering what potential interventions are possible to help with this pain as she does not want to have surgery on her back. She states this pain is a constant 6/10 shooting, stabbing pain.     History of Intervention:   Surgery: No previous lumbar surgeries, Left TKR (2/2022)  Injections: Previous lumbar epidurals-significant relief  R SI joint injection (12/8/20) - significant (100%) x 4 months  Right ischial bursa injection (3/16/2021)-moderate relief  R SI joint injection (5/11/2021)-greater than 80% relief x 3 months  R genicular nerve block (8/4/2021)- >85% relief x 3 days  R genicular ablation (8/17/21) - minimal relief  LESI (10/12/21) - minimal relief  R SI RFA (11/2/22) - >85% relief x 7 months    Current Treatment Medications:   Gabapentin 600 mg / 1200 mg  Tylenol PRN  Aleve PRN    Historical Treatment Medications:   None    Imaging:  MRI L-spine (10/4/20)    LUMBAR SPINE 2 VIEWS:

## 2023-10-17 ASSESSMENT — ENCOUNTER SYMPTOMS
COUGH: 1
GASTROINTESTINAL NEGATIVE: 1
SINUS PRESSURE: 1

## 2023-10-17 NOTE — DISCHARGE INSTRUCTIONS
Post procedure Instructions:    No driving or making significant decisions for the remainder of the day. Be cautions with walking and activity for 24 hours, do not over exert yourself. Ok to resume pre-procedure activity level today. Apply ice to site of injection site if you have pain or discomfort. Do not submerge sit of injection during bath or pool activities for 48 hours post-procedure. Resume blood thinning medications in 24 hours. Call office [phone] if you have:  Temperature greater than 100.4  Persistent nausea and vomiting  Severe uncontrolled pain  Redness, tenderness, or signs of infection (pain, swelling, redness, odor or green/yellow discharge around the site)  Difficulty breathing, headache or visual disturbances  Hives  Persistent dizziness or light-headedness  Extreme fatigue  Any other questions or concerns you may have after discharge    In an emergency, call 911 or go to an Emergency Department at a nearby hospital    How Do you Prevent Surgical Site Infections? *HAND WASHING     *STOP SHAVING   Wash your hands multiple times daily  Do not shave incisional area 48 hours before   with soap for 20 seconds. or until 2 weeks after surgery. This can   Before eating and wound care. cause tiny cuts in the skin which can lead to infection. After using the bathroom or touching pets. *BALANCE      Times of activity and rest.      Stay away from people who may be sick. *QUIT SMOKING      Cigarette smoking leads to slow wound      healing. [facility] YOUR BODY  Follow your doctor's instructions on washing the night before and the day of your surgery. You may use products like:  Dial antibacterial soap, Hibiclens, Leyla-hex. Do not share personal items such as towels, wash cloths, or toothbrush.      *BLOOD SUGAR CONTROL                  Lower blood sugars help to prevent                          infections, scarring, and

## 2023-10-18 ENCOUNTER — HOSPITAL ENCOUNTER (OUTPATIENT)
Age: 88
Setting detail: OUTPATIENT SURGERY
Discharge: HOME | End: 2023-10-18
Attending: ANESTHESIOLOGY | Admitting: ANESTHESIOLOGY
Payer: MEDICARE

## 2023-10-18 ENCOUNTER — APPOINTMENT (OUTPATIENT)
Dept: GENERAL RADIOLOGY | Age: 88
End: 2023-10-18
Attending: ANESTHESIOLOGY
Payer: MEDICARE

## 2023-10-18 VITALS
SYSTOLIC BLOOD PRESSURE: 132 MMHG | WEIGHT: 109.8 LBS | TEMPERATURE: 97.3 F | HEIGHT: 59 IN | DIASTOLIC BLOOD PRESSURE: 58 MMHG | OXYGEN SATURATION: 95 % | HEART RATE: 67 BPM | RESPIRATION RATE: 12 BRPM | BODY MASS INDEX: 22.13 KG/M2

## 2023-10-18 PROCEDURE — 64483 NJX AA&/STRD TFRM EPI L/S 1: CPT | Performed by: ANESTHESIOLOGY

## 2023-10-18 PROCEDURE — 7100000011 HC PHASE II RECOVERY - ADDTL 15 MIN: Performed by: ANESTHESIOLOGY

## 2023-10-18 PROCEDURE — 64484 NJX AA&/STRD TFRM EPI L/S EA: CPT | Performed by: ANESTHESIOLOGY

## 2023-10-18 PROCEDURE — 2500000003 HC RX 250 WO HCPCS: Performed by: ANESTHESIOLOGY

## 2023-10-18 PROCEDURE — 99152 MOD SED SAME PHYS/QHP 5/>YRS: CPT | Performed by: ANESTHESIOLOGY

## 2023-10-18 PROCEDURE — 6360000002 HC RX W HCPCS: Performed by: ANESTHESIOLOGY

## 2023-10-18 PROCEDURE — 7100000010 HC PHASE II RECOVERY - FIRST 15 MIN: Performed by: ANESTHESIOLOGY

## 2023-10-18 PROCEDURE — 2709999900 HC NON-CHARGEABLE SUPPLY: Performed by: ANESTHESIOLOGY

## 2023-10-18 PROCEDURE — 3600000054 HC PAIN LEVEL 3 BASE: Performed by: ANESTHESIOLOGY

## 2023-10-18 PROCEDURE — 6360000004 HC RX CONTRAST MEDICATION: Performed by: ANESTHESIOLOGY

## 2023-10-18 RX ORDER — LIDOCAINE HYDROCHLORIDE 10 MG/ML
INJECTION, SOLUTION EPIDURAL; INFILTRATION; INTRACAUDAL; PERINEURAL PRN
Status: DISCONTINUED | OUTPATIENT
Start: 2023-10-18 | End: 2023-10-18 | Stop reason: ALTCHOICE

## 2023-10-18 RX ORDER — MIDAZOLAM HYDROCHLORIDE 1 MG/ML
INJECTION INTRAMUSCULAR; INTRAVENOUS PRN
Status: DISCONTINUED | OUTPATIENT
Start: 2023-10-18 | End: 2023-10-18 | Stop reason: ALTCHOICE

## 2023-10-18 RX ORDER — DEXAMETHASONE SODIUM PHOSPHATE 4 MG/ML
INJECTION, SOLUTION INTRA-ARTICULAR; INTRALESIONAL; INTRAMUSCULAR; INTRAVENOUS; SOFT TISSUE PRN
Status: DISCONTINUED | OUTPATIENT
Start: 2023-10-18 | End: 2023-10-18 | Stop reason: ALTCHOICE

## 2023-10-18 RX ORDER — FENTANYL CITRATE 50 UG/ML
INJECTION, SOLUTION INTRAMUSCULAR; INTRAVENOUS PRN
Status: DISCONTINUED | OUTPATIENT
Start: 2023-10-18 | End: 2023-10-18 | Stop reason: ALTCHOICE

## 2023-10-18 RX ORDER — BUPIVACAINE HYDROCHLORIDE 5 MG/ML
INJECTION, SOLUTION PERINEURAL PRN
Status: DISCONTINUED | OUTPATIENT
Start: 2023-10-18 | End: 2023-10-18 | Stop reason: ALTCHOICE

## 2023-10-18 ASSESSMENT — PAIN - FUNCTIONAL ASSESSMENT: PAIN_FUNCTIONAL_ASSESSMENT: 0-10

## 2023-10-18 NOTE — POST SEDATION
[facility]  Sedation/Analgesia Post Sedation Record    Pt Name: Con Stubbs  MRN: [id number]  YOB: 1934  Procedure Performed By: Yeny Bazan DO  Primary Care Physician: Vince Aldana DO    POST-PROCEDURE    Physicians/Assistants: Yeny Bazan DO  Procedure Performed: See Procedure Note   Sedation/Anesthesia: Versed and Fentanyl (See procedure note for amount and duration)  Estimated Blood Loss:     0  ml  Specimens Removed: None        Complications: None           Hunter Rodriguez DO  Electronically signed 10/18/2023 at 2:16 PM

## 2023-10-18 NOTE — PROCEDURES
Pre-operative Diagnosis:  lumbar radicular leg pain     Post-operative Diagnosis:  lumbar radicular leg pain     Procedure: RIGHT L4 and L5 transforaminal epidural steroid injection     Procedure Description:  The patient was taken to the fluoroscopy suite and placed in a prone position on the fluoroscopy table. A pillow was placed under the abdomen to reduce the lumbar lordosis. The patient was monitored using electrocardiogram, noninvasive blood pressure and pulse oximeter. The skin over the lumbar spine was prepped with Chloraprep and draped in a sterile fashion. The fluoroscope was brought into the field and a PA image was obtained to identify the L5 vertebral body. The anterior and posterior aspects of the superior endplate of the vertebral body were superimposed upon one another. The fluoroscope was then rotated in an oblique angle toward the RIGHT side such that the superior articulating process was positioned midway between the anterior and posterior aspects of the vertebral body superior endplate. A point on the skin overlying the proposed site of needle entry just inferolateral to the pedicle was marked. The skin and subcutaneous tissues overlying the proposed needle entry site were anesthetized with 2 cc of 1% lidocaine. A 22 gauge, 5 inch spinal needle was placed through the skin and advanced coaxially. Needle depth was periodically evaluated using AP fluoroscopic imaging. Correct final needle position was confirmed with AP fluoroscopic imaging. 0.5 cc of Omnipaque 300 contrast dye was injected under live fluoroscopy to confirm appropriate spread of contrast and to rule out intravascular or intrathecal spread. 5 mg dexamethasone was injected following negative aspiration for heme, CSF and air followed by 0.5 cc of 0.5% bupivacaine. This exact procedure was repeated at the RIGHT L4 level. The injection site was cleaned and dried.  The patient was transferred to the recovery area and discharged

## 2023-10-18 NOTE — PROGRESS NOTES
1126 Patient to phase 2. Report from [facility]. Patient alert, oriented, appropriate. Call light in reach. Injection site clean, dry, intact. Vitals stable on room air. Patient denies any pain at this time. Patient denies any numbness or tingling in BLE.  1130 Snack and drink given. 1138 IV taken out and dressing applied with no complications. 1147 Patient taken to discharge doors in stable condition. Discharged via wheelchair.  Denies any questions regarding AVS.

## 2023-10-18 NOTE — PROGRESS NOTES
Patient did not hold hold Plavix x7 days prior to the procedure. I discussed the risks of doing her transforaminal epidural steroid injection in the setting of NOT holding her Plavix which include hematoma development and hematoma development in the neuroforamen. Patient is aware of these risks and understands these risks. Patient is going to proceed with the injection as discussed knowing the risks involved.       Adriana Hernandez, DO  Interventional Pain Management/PM&R   [address]

## 2023-10-18 NOTE — PRE SEDATION
note  SEDATION  Planned agent: Versed and Fentanyl  ASA Classification: 2  Class 1: A normal healthy patient  Class 2: Pt with mild to moderate systemic disease  Class 3: Severe systemic disease or disturbance  Class 4: Severe systemic disorders that are already life threatening. Class 5: Moribund pt with little chances of survival, for more than 24 hours. Mallampati I Airway Classification: 2    1. Pre-procedure diagnostic studies complete and results available. 2. Previous sedation/anesthesia experiences assessed. 3. The patient is an appropriate candidate to undergo the planned procedure sedation and anesthesia. (Refer to nursing sedation/analgesia documentation record)  4. Formulation and discussion of sedation/procedure plan, risks, and expectations with patient and/or responsible adult completed. 5. Patient examined immediately prior to the procedure.  (Refer to nursing sedation/analgesia documentation record)    Jayson Lovell DO  Electronically signed 10/18/2023 at 2:16 PM

## 2023-10-18 NOTE — H&P
Today, patient presents for planned transforaminal epidural steroid injection approach at RIGHT L4 and L5    This note is reflective of the patient's previous visit for evaluation. We will proceed with today's planned procedure. Since patient's last visit for evaluation, there have been no interval changes in medical history. Patient has no new numbness, weakness, or focal neurological deficit since evaluation. Patient has no contraindications to injection (no anticoagulation or recent antibiotic intake for active infections), and has a  present or is able to drive themselves (as discussed and cleared by physician). Allergies to latex, contrast dye, and steroid medications have been confirmed with the patient prior to the procedure. NPO necessity has been assessed and accepted based on procedure complexity. The risks and benefits of the procedure have been explained including but are not limited to infection, bleeding, paralysis, immediate post procedure weakness, and dizziness; the patient acknowledges understanding and desires to proceed with the procedure. Patient has signed consent for same procedure as discussed in previous clinic encounter. All other questions and concerns were addressed at bedside. See procedure note for full details. Post procedure Instructions: The patient was advised not to drive during the day of the procedure and not to engage in any significant decision making (unless otherwise states by physician). The patient was also advised to be cautious with walking/activity for 24 hours following today's visit and asked not to engage in over-exertion (unless otherwise states by physician). After this time, it is ok to resume pre-procedure level of activity. Patient advised to apply ice to site of injection in situations of pain and discomfort. Patient advised to not submerge site of injection during bath or pool activities for approximately 24 hours post-procedure.  Patient attested

## 2023-10-26 RX ORDER — LEVOTHYROXINE SODIUM 0.05 MG/1
TABLET ORAL
Qty: 90 TABLET | Refills: 3 | Status: SHIPPED | OUTPATIENT
Start: 2023-10-26

## 2023-10-26 RX ORDER — PANTOPRAZOLE SODIUM 40 MG/1
TABLET, DELAYED RELEASE ORAL
Qty: 90 TABLET | Refills: 3 | Status: SHIPPED | OUTPATIENT
Start: 2023-10-26

## 2023-10-30 ENCOUNTER — OFFICE VISIT (OUTPATIENT)
Dept: PHYSICAL MEDICINE AND REHAB | Age: 88
End: 2023-10-30
Payer: MEDICARE

## 2023-10-30 VITALS
DIASTOLIC BLOOD PRESSURE: 62 MMHG | WEIGHT: 109 LBS | BODY MASS INDEX: 21.97 KG/M2 | HEIGHT: 59 IN | SYSTOLIC BLOOD PRESSURE: 120 MMHG

## 2023-10-30 DIAGNOSIS — G89.29 CHRONIC RIGHT SI JOINT PAIN: ICD-10-CM

## 2023-10-30 DIAGNOSIS — M53.3 CHRONIC RIGHT SI JOINT PAIN: ICD-10-CM

## 2023-10-30 DIAGNOSIS — M70.70 ISCHIAL BURSITIS, UNSPECIFIED LATERALITY: Primary | ICD-10-CM

## 2023-10-30 DIAGNOSIS — G89.29 OTHER CHRONIC PAIN: ICD-10-CM

## 2023-10-30 DIAGNOSIS — M53.3 SACROILIAC PAIN: ICD-10-CM

## 2023-10-30 PROCEDURE — G8484 FLU IMMUNIZE NO ADMIN: HCPCS | Performed by: ANESTHESIOLOGY

## 2023-10-30 PROCEDURE — 1036F TOBACCO NON-USER: CPT | Performed by: ANESTHESIOLOGY

## 2023-10-30 PROCEDURE — G8427 DOCREV CUR MEDS BY ELIG CLIN: HCPCS | Performed by: ANESTHESIOLOGY

## 2023-10-30 PROCEDURE — 1123F ACP DISCUSS/DSCN MKR DOCD: CPT | Performed by: ANESTHESIOLOGY

## 2023-10-30 PROCEDURE — 1090F PRES/ABSN URINE INCON ASSESS: CPT | Performed by: ANESTHESIOLOGY

## 2023-10-30 PROCEDURE — G8420 CALC BMI NORM PARAMETERS: HCPCS | Performed by: ANESTHESIOLOGY

## 2023-10-30 PROCEDURE — 99214 OFFICE O/P EST MOD 30 MIN: CPT | Performed by: ANESTHESIOLOGY

## 2023-10-30 NOTE — H&P
directions / restrictions. All other questions and concerns addressed before patient discharge in ambulatory fashion. Chronic Pain/PM&R Clinic Note     Encounter Date: 10/30/23    Subjective:   Chief Complaint:   No chief complaint on file. History of Present Illness:   Sarahi Garsia is a 80 y.o. female seen in the clinic initially on 10/8/20 upon request from Aviva Raines DO (PCP) for her history of low back pain. She has medical history of previous TIA (on Plavix). She has a longstanding history of cervical neck and low back pain. However, she sustained a fall while trying to put her pants on 10/2/2021 where she fell on her right buttocks. She states that since this episode she has been dealing with severe low back pain with radiation down the posterior thigh and slightly down the posterior calf. She does admit the pain radiates around to her groin. She states she has numbness and tingling however these are more in the toes. He does endorse some right leg weakness which she feels like this pain limited. Her pain is sharp and stabbing in nature and 5/10. Her pain is exacerbated with any sitting or laying on the affected side. Her pain is alleviated with rest and medication. She denies any focal leg weakness, saddle anesthesia, or bowel/bladder incontinence. She states she is currently been managing her pain with over-the-counter Tylenol arthritis. She has seen a couple pain specialist in the past including receiving spinal injections with Dr. Ale Escobar and Dr. Logan Zhou. Today, 10/30/2023, patient presents for planned follow-up for chronic low back and right leg pain. She underwent a right L4 and L5 transforaminal epidural steroid injection on 10/18/2023. She reports phenomenal relief in her right-sided leg pain since completing this procedure but states that she has noticed some significant worsening pain in her buttocks on both sides particularly on the lower part of her buttocks.   She

## 2023-10-31 ENCOUNTER — APPOINTMENT (OUTPATIENT)
Dept: GENERAL RADIOLOGY | Age: 88
End: 2023-10-31
Attending: ANESTHESIOLOGY
Payer: MEDICARE

## 2023-10-31 ENCOUNTER — HOSPITAL ENCOUNTER (OUTPATIENT)
Age: 88
Setting detail: OUTPATIENT SURGERY
Discharge: HOME | End: 2023-10-31
Attending: ANESTHESIOLOGY | Admitting: ANESTHESIOLOGY
Payer: MEDICARE

## 2023-10-31 VITALS
DIASTOLIC BLOOD PRESSURE: 63 MMHG | RESPIRATION RATE: 16 BRPM | WEIGHT: 112.4 LBS | OXYGEN SATURATION: 96 % | SYSTOLIC BLOOD PRESSURE: 146 MMHG | BODY MASS INDEX: 22.66 KG/M2 | HEIGHT: 59 IN | HEART RATE: 73 BPM | TEMPERATURE: 97.4 F

## 2023-10-31 PROCEDURE — 6360000002 HC RX W HCPCS: Performed by: ANESTHESIOLOGY

## 2023-10-31 PROCEDURE — 2709999900 HC NON-CHARGEABLE SUPPLY: Performed by: ANESTHESIOLOGY

## 2023-10-31 PROCEDURE — 2500000003 HC RX 250 WO HCPCS: Performed by: ANESTHESIOLOGY

## 2023-10-31 PROCEDURE — 3600000054 HC PAIN LEVEL 3 BASE: Performed by: ANESTHESIOLOGY

## 2023-10-31 PROCEDURE — 20610 DRAIN/INJ JOINT/BURSA W/O US: CPT | Performed by: ANESTHESIOLOGY

## 2023-10-31 PROCEDURE — 6360000004 HC RX CONTRAST MEDICATION: Performed by: ANESTHESIOLOGY

## 2023-10-31 PROCEDURE — 77002 NEEDLE LOCALIZATION BY XRAY: CPT | Performed by: ANESTHESIOLOGY

## 2023-10-31 PROCEDURE — 7100000010 HC PHASE II RECOVERY - FIRST 15 MIN: Performed by: ANESTHESIOLOGY

## 2023-10-31 RX ORDER — BUPIVACAINE HYDROCHLORIDE 5 MG/ML
INJECTION, SOLUTION PERINEURAL PRN
Status: DISCONTINUED | OUTPATIENT
Start: 2023-10-31 | End: 2023-10-31 | Stop reason: ALTCHOICE

## 2023-10-31 RX ORDER — METHYLPREDNISOLONE ACETATE 40 MG/ML
INJECTION, SUSPENSION INTRA-ARTICULAR; INTRALESIONAL; INTRAMUSCULAR; SOFT TISSUE PRN
Status: DISCONTINUED | OUTPATIENT
Start: 2023-10-31 | End: 2023-10-31 | Stop reason: ALTCHOICE

## 2023-10-31 RX ORDER — LIDOCAINE HYDROCHLORIDE 10 MG/ML
INJECTION, SOLUTION EPIDURAL; INFILTRATION; INTRACAUDAL; PERINEURAL PRN
Status: DISCONTINUED | OUTPATIENT
Start: 2023-10-31 | End: 2023-10-31 | Stop reason: ALTCHOICE

## 2023-10-31 ASSESSMENT — PAIN - FUNCTIONAL ASSESSMENT: PAIN_FUNCTIONAL_ASSESSMENT: 0-10

## 2023-10-31 NOTE — PROGRESS NOTES
1202 Patient arrived to phase II via cart. Spontaneous respiraitons even and unlabored. Placed on monitor--VSS. Report received from [facility] Assessment completed. Patient is alert and oriented x4. Patient denies pain--will monitor. Injection sites clean and dry. 1204 Pt. Denies all needs at this time. Pt states readiness for discharge. 1205 Pt. Standing at bedside. With stand by assist of RN. Pt. Denies weakness or dizziness. 1206 Pt. Getting self dressed. 1208 Pt. Ambulated to private vehicle in stable condition with stand by assist of RN.

## 2023-10-31 NOTE — DISCHARGE INSTRUCTIONS
Post procedure Instructions:    No driving or making significant decisions for the remainder of the day. Be cautions with walking and activity for 24 hours, do not over exert yourself. Ok to resume pre-procedure activity level today. Apply ice to site of injection site if you have pain or discomfort. Do not submerge sit of injection during bath or pool activities for 48 hours post-procedure. Resume blood thinning medications in 24 hours. Call office [phone] if you have:  Temperature greater than 100.4  Persistent nausea and vomiting  Severe uncontrolled pain  Redness, tenderness, or signs of infection (pain, swelling, redness, odor or green/yellow discharge around the site)  Difficulty breathing, headache or visual disturbances  Hives  Persistent dizziness or light-headedness  Extreme fatigue  Any other questions or concerns you may have after discharge    In an emergency, call 911 or go to an Emergency Department at a nearby hospital    How Do you Prevent Surgical Site Infections? *HAND WASHING     *STOP SHAVING   Wash your hands multiple times daily  Do not shave incisional area 48 hours before   with soap for 20 seconds. or until 2 weeks after surgery. This can   Before eating and wound care. cause tiny cuts in the skin which can lead to infection. After using the bathroom or touching pets. *BALANCE      Times of activity and rest.      Stay away from people who may be sick. *QUIT SMOKING      Cigarette smoking leads to slow wound      healing. [facility] YOUR BODY  Follow your doctor's instructions on washing the night before and the day of your surgery. You may use products like:  Dial antibacterial soap, Hibiclens, Leyla-hex. Do not share personal items such as towels, wash cloths, or toothbrush.      *BLOOD SUGAR CONTROL                  Lower blood sugars help to prevent                          infections, scarring, and

## 2023-10-31 NOTE — PROCEDURES
Pre-operative Diagnosis:  Ischial bursitis     Post-operative Diagnosis: Ischial bursitis     Procedure: Bilateral ischial bursa injection(s)     Procedure Description:  After having signed the informed consent, the patient was placed in the prone position. The patient's back/buttocks was prepped with chloraprep solution, and draped in a sterile fashion. A total of 1 ml of 1% lidocaine were used to anesthetize the skin and underlying tissues. Under fluoroscopic guidance a single 22-gauge, 3.5 inch spinal needle was advanced to lie at the inferior pole of the RIGHT ischium. There were no paresthesias or heme aspiration. Needle placement was confirmed in the AP view. After negative aspiration, 0.5 ml of Omnipaque 300 contrast was injected with appropriate spread observed. A total of 2 ml of 0.5% bupivicaine mixed with 40 mg depo-medrol were injected into the ischial bursa. The needle was withdrawn without any complications. This exact procedure was repeated on the contralateral side. The patient tolerated the procedure well, was transported to the recovery room and observed for 15 minutes and discharged in an ambulatory fashion. No immediate reported complications.      Procedural Complications: None  Estimated Blood Loss: 0 mL        Hunter Mondragon DO  Interventional Pain Management/PM&R    [facility]

## 2023-11-03 RX ORDER — MELOXICAM 15 MG/1
15 TABLET ORAL DAILY
Qty: 90 TABLET | Refills: 1 | Status: SHIPPED | OUTPATIENT
Start: 2023-11-03

## 2023-11-14 ENCOUNTER — OFFICE VISIT (OUTPATIENT)
Dept: PHYSICAL MEDICINE AND REHAB | Age: 88
End: 2023-11-14
Payer: MEDICARE

## 2023-11-14 VITALS
HEIGHT: 59 IN | SYSTOLIC BLOOD PRESSURE: 122 MMHG | WEIGHT: 112.43 LBS | DIASTOLIC BLOOD PRESSURE: 60 MMHG | BODY MASS INDEX: 22.67 KG/M2

## 2023-11-14 DIAGNOSIS — M25.561 CHRONIC PAIN OF RIGHT KNEE: ICD-10-CM

## 2023-11-14 DIAGNOSIS — G89.4 CHRONIC PAIN SYNDROME: ICD-10-CM

## 2023-11-14 DIAGNOSIS — M70.70 ISCHIAL BURSITIS, UNSPECIFIED LATERALITY: Primary | ICD-10-CM

## 2023-11-14 DIAGNOSIS — G89.29 OTHER CHRONIC PAIN: ICD-10-CM

## 2023-11-14 DIAGNOSIS — G89.29 CHRONIC PAIN OF RIGHT KNEE: ICD-10-CM

## 2023-11-14 PROCEDURE — 99214 OFFICE O/P EST MOD 30 MIN: CPT | Performed by: NURSE PRACTITIONER

## 2023-11-14 PROCEDURE — G8427 DOCREV CUR MEDS BY ELIG CLIN: HCPCS | Performed by: NURSE PRACTITIONER

## 2023-11-14 PROCEDURE — G8420 CALC BMI NORM PARAMETERS: HCPCS | Performed by: NURSE PRACTITIONER

## 2023-11-14 PROCEDURE — 1036F TOBACCO NON-USER: CPT | Performed by: NURSE PRACTITIONER

## 2023-11-14 PROCEDURE — 1123F ACP DISCUSS/DSCN MKR DOCD: CPT | Performed by: NURSE PRACTITIONER

## 2023-11-14 PROCEDURE — 1090F PRES/ABSN URINE INCON ASSESS: CPT | Performed by: NURSE PRACTITIONER

## 2023-11-14 PROCEDURE — G8484 FLU IMMUNIZE NO ADMIN: HCPCS | Performed by: NURSE PRACTITIONER

## 2023-12-04 ENCOUNTER — OFFICE VISIT (OUTPATIENT)
Dept: PAIN MANAGEMENT | Age: 88
End: 2023-12-04
Payer: MEDICARE

## 2023-12-04 VITALS
BODY MASS INDEX: 22.58 KG/M2 | HEIGHT: 59 IN | SYSTOLIC BLOOD PRESSURE: 120 MMHG | DIASTOLIC BLOOD PRESSURE: 58 MMHG | WEIGHT: 112 LBS

## 2023-12-04 DIAGNOSIS — M79.18 MYOFASCIAL PAIN: ICD-10-CM

## 2023-12-04 DIAGNOSIS — G89.4 CHRONIC PAIN SYNDROME: ICD-10-CM

## 2023-12-04 DIAGNOSIS — M47.817 LUMBOSACRAL SPONDYLOSIS WITHOUT MYELOPATHY: Primary | ICD-10-CM

## 2023-12-04 DIAGNOSIS — M70.62 TROCHANTERIC BURSITIS OF BOTH HIPS: ICD-10-CM

## 2023-12-04 DIAGNOSIS — M70.61 TROCHANTERIC BURSITIS OF BOTH HIPS: ICD-10-CM

## 2023-12-04 PROCEDURE — 1123F ACP DISCUSS/DSCN MKR DOCD: CPT | Performed by: ANESTHESIOLOGY

## 2023-12-04 PROCEDURE — G8427 DOCREV CUR MEDS BY ELIG CLIN: HCPCS | Performed by: ANESTHESIOLOGY

## 2023-12-04 PROCEDURE — G8420 CALC BMI NORM PARAMETERS: HCPCS | Performed by: ANESTHESIOLOGY

## 2023-12-04 PROCEDURE — G8484 FLU IMMUNIZE NO ADMIN: HCPCS | Performed by: ANESTHESIOLOGY

## 2023-12-04 PROCEDURE — 1090F PRES/ABSN URINE INCON ASSESS: CPT | Performed by: ANESTHESIOLOGY

## 2023-12-04 PROCEDURE — 99214 OFFICE O/P EST MOD 30 MIN: CPT | Performed by: ANESTHESIOLOGY

## 2023-12-04 PROCEDURE — 1036F TOBACCO NON-USER: CPT | Performed by: ANESTHESIOLOGY

## 2023-12-05 RX ORDER — NAPROXEN 500 MG/1
500 TABLET ORAL 2 TIMES DAILY WITH MEALS
Qty: 90 TABLET | Refills: 1 | Status: SHIPPED | OUTPATIENT
Start: 2023-12-05

## 2024-01-09 ENCOUNTER — OFFICE VISIT (OUTPATIENT)
Dept: FAMILY MEDICINE CLINIC | Age: 89
End: 2024-01-09
Payer: MEDICARE

## 2024-01-09 VITALS
RESPIRATION RATE: 12 BRPM | SYSTOLIC BLOOD PRESSURE: 138 MMHG | BODY MASS INDEX: 23.03 KG/M2 | DIASTOLIC BLOOD PRESSURE: 60 MMHG | HEART RATE: 56 BPM | WEIGHT: 114.1 LBS

## 2024-01-09 DIAGNOSIS — G89.29 CHRONIC PAIN OF RIGHT KNEE: ICD-10-CM

## 2024-01-09 DIAGNOSIS — M25.561 CHRONIC PAIN OF RIGHT KNEE: ICD-10-CM

## 2024-01-09 DIAGNOSIS — M46.1 SACROILIITIS, NOT ELSEWHERE CLASSIFIED (HCC): ICD-10-CM

## 2024-01-09 DIAGNOSIS — H81.13 BENIGN PAROXYSMAL POSITIONAL VERTIGO DUE TO BILATERAL VESTIBULAR DISORDER: Primary | ICD-10-CM

## 2024-01-09 DIAGNOSIS — Z96.651 STATUS POST TOTAL RIGHT KNEE REPLACEMENT: ICD-10-CM

## 2024-01-09 DIAGNOSIS — N18.30 STAGE 3 CHRONIC KIDNEY DISEASE, UNSPECIFIED WHETHER STAGE 3A OR 3B CKD (HCC): ICD-10-CM

## 2024-01-09 PROCEDURE — G8484 FLU IMMUNIZE NO ADMIN: HCPCS | Performed by: NURSE PRACTITIONER

## 2024-01-09 PROCEDURE — 1123F ACP DISCUSS/DSCN MKR DOCD: CPT | Performed by: NURSE PRACTITIONER

## 2024-01-09 PROCEDURE — 99213 OFFICE O/P EST LOW 20 MIN: CPT | Performed by: NURSE PRACTITIONER

## 2024-01-09 PROCEDURE — 1036F TOBACCO NON-USER: CPT | Performed by: NURSE PRACTITIONER

## 2024-01-09 PROCEDURE — G8427 DOCREV CUR MEDS BY ELIG CLIN: HCPCS | Performed by: NURSE PRACTITIONER

## 2024-01-09 PROCEDURE — 1090F PRES/ABSN URINE INCON ASSESS: CPT | Performed by: NURSE PRACTITIONER

## 2024-01-09 PROCEDURE — G8420 CALC BMI NORM PARAMETERS: HCPCS | Performed by: NURSE PRACTITIONER

## 2024-01-09 ASSESSMENT — PATIENT HEALTH QUESTIONNAIRE - PHQ9
2. FEELING DOWN, DEPRESSED OR HOPELESS: 0
SUM OF ALL RESPONSES TO PHQ QUESTIONS 1-9: 0
SUM OF ALL RESPONSES TO PHQ9 QUESTIONS 1 & 2: 0
SUM OF ALL RESPONSES TO PHQ QUESTIONS 1-9: 0
1. LITTLE INTEREST OR PLEASURE IN DOING THINGS: 0

## 2024-01-09 ASSESSMENT — ENCOUNTER SYMPTOMS
SHORTNESS OF BREATH: 0
NAUSEA: 0
COUGH: 0
ABDOMINAL PAIN: 0

## 2024-01-09 NOTE — PROGRESS NOTES
Daisy Dan (12/21/1934) 89 y.o. female here for evaluation of the following chief complaint(s):      HPI:  Chief Complaint   Patient presents with    Other     Balance issues upon standing.  Denies dizziness       Onset of 5-6 days with off balance feel.  In bed she will notice it turning over.  Most noticeable when getting up from sitting.  Feels like she is on a boat rocking.   Denies nausea.     Denies sinus congestion.   Dry cough. Denies muffled hearing or ear complaints.     Vitals:    01/09/24 1446   BP: 138/60   Pulse: 56   Resp: 12       S/p 2 years with right TKR. Still havign pain.  Pain no better than pre-surgery.  Dr. Hairston at [facility] performed surgery.  Follow up XR have been satisfactory    Patient Active Problem List   Diagnosis    Hypertension    Hyperlipidemia    GERD (gastroesophageal reflux disease)    Lumbar spondylosis    Hypothyroidism    Vertebro basilar insufficiency    Cervical spondylosis with myelopathy    Light headed    Stenosis, spinal, lumbar    TIA involving vertebral artery    Sacroiliac inflammation (HCC)    Trochanteric bursitis of right hip    Ischial bursitis    Hypoxia    Pneumonia due to COVID-19 virus    Acquired trigger finger    Osteoarthritis of knee    Pain in right knee    Elevated CK    Weakness    Stage 3 chronic kidney disease, unspecified whether stage 3a or 3b CKD (HCC)       SUBJECTIVE/OBJECTIVE:  Review of Systems   Constitutional:  Negative for chills and fever.   HENT: Negative.     Respiratory:  Negative for cough and shortness of breath.    Cardiovascular:  Negative for chest pain.   Gastrointestinal:  Negative for abdominal pain and nausea.   Musculoskeletal:  Positive for arthralgias.   Skin:  Negative for rash.   Neurological:  Positive for dizziness. Negative for light-headedness and headaches.   Psychiatric/Behavioral: Negative.         Physical Exam  Constitutional:       General: She is not in acute distress.  HENT:      Right Ear: No middle ear

## 2024-01-09 NOTE — PROGRESS NOTES
Referral faxed to Dr Imer Howard at #[phone].  Patient aware they will call her to schedule appt.

## 2024-01-16 DIAGNOSIS — I10 PRIMARY HYPERTENSION: ICD-10-CM

## 2024-01-16 RX ORDER — LISINOPRIL 5 MG/1
5 TABLET ORAL DAILY
Qty: 90 TABLET | Refills: 3 | Status: SHIPPED | OUTPATIENT
Start: 2024-01-16

## 2024-01-29 ENCOUNTER — OFFICE VISIT (OUTPATIENT)
Dept: PHYSICAL MEDICINE AND REHAB | Age: 89
End: 2024-01-29
Payer: MEDICARE

## 2024-01-29 VITALS
DIASTOLIC BLOOD PRESSURE: 56 MMHG | WEIGHT: 114 LBS | SYSTOLIC BLOOD PRESSURE: 106 MMHG | BODY MASS INDEX: 22.98 KG/M2 | HEIGHT: 59 IN

## 2024-01-29 DIAGNOSIS — G89.4 CHRONIC PAIN SYNDROME: ICD-10-CM

## 2024-01-29 DIAGNOSIS — M70.70 ISCHIAL BURSITIS, UNSPECIFIED LATERALITY: Primary | ICD-10-CM

## 2024-01-29 DIAGNOSIS — M25.561 CHRONIC PAIN OF RIGHT KNEE: ICD-10-CM

## 2024-01-29 DIAGNOSIS — G89.29 CHRONIC PAIN OF RIGHT KNEE: ICD-10-CM

## 2024-01-29 DIAGNOSIS — G89.29 OTHER CHRONIC PAIN: ICD-10-CM

## 2024-01-29 PROCEDURE — 1123F ACP DISCUSS/DSCN MKR DOCD: CPT | Performed by: ANESTHESIOLOGY

## 2024-01-29 PROCEDURE — 96372 THER/PROPH/DIAG INJ SC/IM: CPT | Performed by: ANESTHESIOLOGY

## 2024-01-29 PROCEDURE — 99214 OFFICE O/P EST MOD 30 MIN: CPT | Performed by: ANESTHESIOLOGY

## 2024-01-29 RX ORDER — KETOROLAC TROMETHAMINE 30 MG/ML
30 INJECTION, SOLUTION INTRAMUSCULAR; INTRAVENOUS ONCE
Status: COMPLETED | OUTPATIENT
Start: 2024-01-29 | End: 2024-01-29

## 2024-01-29 RX ADMIN — KETOROLAC TROMETHAMINE 30 MG: 30 INJECTION, SOLUTION INTRAMUSCULAR; INTRAVENOUS at 15:53

## 2024-01-29 NOTE — PROGRESS NOTES
Administrations This Visit       ketorolac (TORADOL) injection 30 mg       Admin Date  01/29/2024  15:53 Action  Given Dose  30 mg Route  IntraMUSCular Site  Deltoid Left Administered By  David Richards MA    Ordering Provider: Hunter Mondragon DO    NDC: 5166-1575-35    Lot#: [id number]    : HOSPIRA    Patient Supplied?: No                  
Functionality Assessment/Goals Worksheet     On a scale of 0 (Does not Interfere) to 10 (Completely Interferes)     1.  Which number describes how during the past week pain has interfered with           the following:  A.  General Activity:  4  B.  Mood: 6  C.  Walking Ability:  4  D.  Normal Work (Includes both work outside the home and housework):  4  E.  Relations with Other People:   5  F.  Sleep:   5  G.  Enjoyment of Life:   4    2.  Patient Prefers to Take their Pain Medications:     [x]  On a regular basis   []  Only when necessary    []  Does not take pain medications    3.  What are the Patient's Goals/Expectations for Visiting Pain Management?     [x]  Learn about my pain    [x]  Receive Medication   []  Physical Therapy     []  Treat Depression   []  Receive Injections    []  Treat Sleep   [x]  Deal with Anxiety and Stress   []  Treat Opoid Dependence/Addiction   []  Other:                                
discussed in detail with Daisy Dan.    Imaging:   I have reviewed patient’s imaging of lumbar spine x-ray.    Analgesics:   Patient is taking Acetaminophen. Patient informed that the maximum amount of acetaminophen taken on a regular basis should only be 4000 mg per day.     Patient is taking Naproxen. Patient is advised to take as prescribed and not take on an empty stomach.     Patient given 30 mg intramuscular Toradol injection in clinic.    Adjuvants:   In light of the presence of a neuropathic component of pain, the patient can continue her Gabapentin.    Interventions:   With examination consistent with bilateral ischial bursitis, I set the patient up for bilateral ischial bursa injection under fluoroscopic guidance.  The risks of the procedure discussed in detail the patient.  Patient wants to proceed with the injection therapy.     Anticoagulation/NPO Recommendations:   Patient does not need to hold any medications prior to procedure.  Patient does not need to be NPO prior to the procedure.  We will perform a LOCAL injection.  Patient does NOT need a .    Multidisciplinary Pain Management:   In the presence of complex, chronic, and multi-factorial pain, the importance of a multidisciplinary approach to pain management in the patient’s management regimen was emphasized and discussed in great detail.     Referrals:  None    Prescriptions Written This Visit:   None    Follow-up: B/L ischial bursitis      Hunter Mondragon DO  Interventional Pain Management/PM&R   [facility] [place]

## 2024-02-02 ENCOUNTER — TELEPHONE (OUTPATIENT)
Dept: PHYSICAL MEDICINE AND REHAB | Age: 89
End: 2024-02-02

## 2024-02-08 NOTE — DISCHARGE INSTRUCTIONS
SSIs also need another surgery to treat the infection.    What are some of the things that hospitals are doing to prevent SSIs?  To prevent SSIs, doctors, nurses, and other healthcare providers:  May remove some of your hair immediately before your surgery using electric clippers if the hair is in the same area where the procedure will occur. They should not shave you with a razor.  Give you antibiotics before your surgery starts. In most cases, you should get antibiotics within 60 minutes before the surgery starts and the antibiotics should be stopped within 24 hours after surgery.  Clean the skin at the site of your surgery with a special soap that kills germs.  Clean their hands and arms up to their elbows with an antiseptic agent just before the surgery.  Wear special hair covers, masks, gowns, and gloves during surgery to  keep the surgery area clean.  Clean their hands with soap and water or an alcohol-based hand rub before and after caring for each patient.             If you do not see your providers clean their hands, please     ask  them to do so.     What can I do to help prevent SSIs?  Before your surgery:  Tell your doctor about other medical problems you may have.  Health problems such as allergies, diabetes, and obesity could affect your surgery and your treatment.   Quit smoking. Patients who smoke get more infections. Talk to your doctor about how you can quit before your surgery.  Do not shave near where you will have surgery. Shaving with a razor can irritate your skin and make it easier to develop an infection.  At the time of your surgery:  Speak up if someone tries to shave you with a razor before surgery. Ask why you need to be shaved and talk with your surgeon if you have any concerns.  Ask if you will get antibiotics before surgery.  After your surgery:  Make sure that your healthcare providers clean their hands before examining you, either with soap and water or an alcohol-based hand

## 2024-02-12 NOTE — H&P
visit.        Constitutional: Pleasant, no acute distress   Head: Normocephalic, atraumatic   Eyes: Conjunctivae normal   Neck: Supple, symmetrical   Lungs: Normal respiratory effort, non-labored breathing   Cardiovascular: Limbs warm and well perfused   Abdomen: Non-protruded   Musculoskeletal: Muscle bulk symmetric, no atrophy, no gross deformities   Spine: ROM reduced in extension.  Marked tenderness palpation over bilateral ischial bursa.  Neurological: Cranial nerves II-XII grossly intact.    · Gait -slightly antalgic gait. Ambulates without assistive device.   · Motor: No focal motor deficit appreciated in lower limbs  Skin: No rashes or lesions visibile in lower limbs  Psychological: Cooperative, no exaggerated pain behaviors       Assessment:    Diagnosis Orders   1. Ischial bursitis, unspecified laterality  ketorolac (TORADOL) injection 30 mg    CHG FLUOR NEEDLE/CATH SPINE/PARASPINAL DX/THER ADDON    MN ARTHROCENTESIS ASPIR&/INJ MAJOR JT/BURSA W/O US      2. Chronic pain of right knee        3. Other chronic pain        4. Chronic pain syndrome              Daisy Dan is a 89 y.o.female presenting to the pain clinic for evaluation of right-sided low back pain.  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  She has responded significantly to her right sacroiliac joint radiofrequency ablation.    In regard to her right knee, she is a failed total right knee replacement responded significantly to her previous right genicular nerve radiofrequency ablation.  She appears to have some worsening lumbar radiculopathy particular in the L4 and L5 distribution on the right and responded significantly to her right transforaminal epidural steroid injections.  She continues to respond well to injection therapy and making adjustments to her medications.    For repeat bilateral ischial bursa injections under fluoroscopy.  She underwent Toradol

## 2024-02-13 ENCOUNTER — APPOINTMENT (OUTPATIENT)
Dept: GENERAL RADIOLOGY | Age: 89
End: 2024-02-13
Attending: ANESTHESIOLOGY
Payer: MEDICARE

## 2024-02-13 ENCOUNTER — HOSPITAL ENCOUNTER (OUTPATIENT)
Age: 89
Setting detail: OUTPATIENT SURGERY
Discharge: HOME | End: 2024-02-13
Attending: ANESTHESIOLOGY | Admitting: ANESTHESIOLOGY
Payer: MEDICARE

## 2024-02-13 VITALS
DIASTOLIC BLOOD PRESSURE: 60 MMHG | HEIGHT: 59 IN | HEART RATE: 67 BPM | OXYGEN SATURATION: 97 % | TEMPERATURE: 97.8 F | RESPIRATION RATE: 17 BRPM | WEIGHT: 116.8 LBS | SYSTOLIC BLOOD PRESSURE: 137 MMHG | BODY MASS INDEX: 23.55 KG/M2

## 2024-02-13 PROCEDURE — 7100000010 HC PHASE II RECOVERY - FIRST 15 MIN: Performed by: ANESTHESIOLOGY

## 2024-02-13 PROCEDURE — 2500000003 HC RX 250 WO HCPCS: Performed by: ANESTHESIOLOGY

## 2024-02-13 PROCEDURE — 2709999900 HC NON-CHARGEABLE SUPPLY: Performed by: ANESTHESIOLOGY

## 2024-02-13 PROCEDURE — 77002 NEEDLE LOCALIZATION BY XRAY: CPT | Performed by: ANESTHESIOLOGY

## 2024-02-13 PROCEDURE — 6360000004 HC RX CONTRAST MEDICATION: Performed by: ANESTHESIOLOGY

## 2024-02-13 PROCEDURE — 20610 DRAIN/INJ JOINT/BURSA W/O US: CPT | Performed by: ANESTHESIOLOGY

## 2024-02-13 PROCEDURE — 6360000002 HC RX W HCPCS: Performed by: ANESTHESIOLOGY

## 2024-02-13 PROCEDURE — 3600000054 HC PAIN LEVEL 3 BASE: Performed by: ANESTHESIOLOGY

## 2024-02-13 RX ORDER — NAPROXEN 500 MG/1
500 TABLET ORAL 2 TIMES DAILY WITH MEALS
Qty: 60 TABLET | Refills: 1 | Status: SHIPPED | OUTPATIENT
Start: 2024-02-13 | End: 2024-04-13

## 2024-02-13 RX ORDER — BUPIVACAINE HYDROCHLORIDE 5 MG/ML
INJECTION, SOLUTION PERINEURAL PRN
Status: DISCONTINUED | OUTPATIENT
Start: 2024-02-13 | End: 2024-02-13 | Stop reason: ALTCHOICE

## 2024-02-13 RX ORDER — METHYLPREDNISOLONE ACETATE 40 MG/ML
INJECTION, SUSPENSION INTRA-ARTICULAR; INTRALESIONAL; INTRAMUSCULAR; SOFT TISSUE PRN
Status: DISCONTINUED | OUTPATIENT
Start: 2024-02-13 | End: 2024-02-13 | Stop reason: ALTCHOICE

## 2024-02-13 RX ORDER — LIDOCAINE HYDROCHLORIDE 10 MG/ML
INJECTION, SOLUTION EPIDURAL; INFILTRATION; INTRACAUDAL; PERINEURAL PRN
Status: DISCONTINUED | OUTPATIENT
Start: 2024-02-13 | End: 2024-02-13 | Stop reason: ALTCHOICE

## 2024-02-13 NOTE — PROGRESS NOTES
1336- Patient to Phase II via cart. Report received from [place]rett RN. Patient drowsy but responsive.Vitals obtained and stable. Respirations even and unlabored on room air. Patient denies pain, nausea, numbness and tingling. Patient able to move all extremities. No drainage noted at injection sites. Patient instructed to stay in bed. Instructed on call light use.     1338- Pt drinking at this time    1342- Pt getting changed at this time.    1345- Patient meets discharge criteria.  Discharged in stable condition with responsible . All belongings given to patient. Patient ambulated to car with assistance from RN. Patient tolerated well.

## 2024-02-13 NOTE — TELEPHONE ENCOUNTER
OARRS reviewed. UDS: Negative   Last seen: 1/29/2024. Follow-up:   Future Appointments   Date Time Provider Department Center   4/22/2024 11:40 AM Hunter Mondragon DO [facility]

## 2024-02-14 DIAGNOSIS — M54.16 LUMBAR RADICULOPATHY: ICD-10-CM

## 2024-02-14 RX ORDER — GABAPENTIN 600 MG/1
TABLET ORAL
Qty: 360 TABLET | Refills: 3 | Status: SHIPPED | OUTPATIENT
Start: 2024-02-14 | End: 2025-04-29

## 2024-02-14 NOTE — PROCEDURES
Pre-operative Diagnosis:  Ischial bursitis     Post-operative Diagnosis: Ischial bursitis     Procedure: Bilateral ischial bursa injection(s)     Procedure Description:  After having signed the informed consent, the patient was placed in the prone position.  The patient's back/buttocks was prepped with chloraprep solution, and draped in a sterile fashion. A total of 1 ml of 1% lidocaine were used to anesthetize the skin and underlying tissues.  Under fluoroscopic guidance a single 22-gauge, 3.5 inch spinal needle was advanced to lie at the inferior pole of the RIGHT ischium.  There were no paresthesias or heme aspiration. Needle placement was confirmed in the AP view.  After negative aspiration, 0.5 ml of Omnipaque 300 contrast was injected with appropriate spread observed.  A total of 4 ml of 0.5% bupivicaine mixed with 40 mg depo-medrol were injected into the ischial bursa. The needle was withdrawn without any complications. This exact procedure was repeated on the contralateral side. The patient tolerated the procedure well, was transported to the recovery room and observed for 15 minutes and discharged in an ambulatory fashion. No immediate reported complications.     Procedural Complications: None  Estimated Blood Loss: 0 mL        Hunter Mondragon DO  Interventional Pain Management/PM&R   [facility] [place]

## 2024-02-20 ENCOUNTER — TELEPHONE (OUTPATIENT)
Dept: FAMILY MEDICINE CLINIC | Age: 89
End: 2024-02-20

## 2024-02-20 DIAGNOSIS — M54.16 LUMBAR RADICULOPATHY: ICD-10-CM

## 2024-02-20 RX ORDER — GABAPENTIN 600 MG/1
TABLET ORAL
Qty: 360 TABLET | Refills: 3 | Status: SHIPPED | OUTPATIENT
Start: 2024-02-20 | End: 2025-05-05

## 2024-02-20 NOTE — TELEPHONE ENCOUNTER
Pt states she no longer has Erydelmark. She needs gabapentin ordered through [facility]. All other medications are filled at local pharmacy.

## 2024-04-17 ENCOUNTER — TELEPHONE (OUTPATIENT)
Dept: PHYSICAL MEDICINE AND REHAB | Age: 89
End: 2024-04-17

## 2024-04-17 ENCOUNTER — TELEPHONE (OUTPATIENT)
Dept: FAMILY MEDICINE CLINIC | Age: 89
End: 2024-04-17

## 2024-04-17 NOTE — TELEPHONE ENCOUNTER
Sheeba from Dr. Bolanos orthopedic office called and wanted clarification on what pt. Is taking for meds from you. Per you note to be on naproxen and gabapentin. Use tylenol prn.  Med list shows also on meloxicam. They also had got from  pharmacy that she is on plavix.  Not on med list and family dr  told them he thought you had stopped the plavix. They are concerned over risk of bleeding Please clarify and will call Sheeba back at [phone]

## 2024-04-17 NOTE — TELEPHONE ENCOUNTER
Sheeba with Dr Howard's office calling with concern that patient is on Plavix, Meloxicam and Naprosyn.  Per med log, patient no longer on Plavix.  Was D/C 12/2023.  Also Dr Duarte refilled Mobic on 11/3/23 and then Dr. Mondragon prescribed Naproxen on 2/13/24.  She will confirm with patient medications and then call [facility] regarding Plavix Rx and Dr Mondragon's office regarding Naproxen.  HAILEE

## 2024-04-22 ENCOUNTER — OFFICE VISIT (OUTPATIENT)
Dept: PHYSICAL MEDICINE AND REHAB | Age: 89
End: 2024-04-22
Payer: MEDICARE

## 2024-04-22 VITALS
BODY MASS INDEX: 23.39 KG/M2 | SYSTOLIC BLOOD PRESSURE: 130 MMHG | DIASTOLIC BLOOD PRESSURE: 66 MMHG | WEIGHT: 116 LBS | HEIGHT: 59 IN

## 2024-04-22 DIAGNOSIS — M79.18 CHRONIC GLUTEAL PAIN: ICD-10-CM

## 2024-04-22 DIAGNOSIS — G89.4 CHRONIC PAIN SYNDROME: ICD-10-CM

## 2024-04-22 DIAGNOSIS — G89.29 OTHER CHRONIC PAIN: ICD-10-CM

## 2024-04-22 DIAGNOSIS — G89.29 CHRONIC GLUTEAL PAIN: ICD-10-CM

## 2024-04-22 DIAGNOSIS — M25.561 CHRONIC PAIN OF RIGHT KNEE: ICD-10-CM

## 2024-04-22 DIAGNOSIS — M70.70 ISCHIAL BURSITIS, UNSPECIFIED LATERALITY: Primary | ICD-10-CM

## 2024-04-22 DIAGNOSIS — G89.29 CHRONIC PAIN OF RIGHT KNEE: ICD-10-CM

## 2024-04-22 PROCEDURE — G8427 DOCREV CUR MEDS BY ELIG CLIN: HCPCS | Performed by: ANESTHESIOLOGY

## 2024-04-22 PROCEDURE — G8420 CALC BMI NORM PARAMETERS: HCPCS | Performed by: ANESTHESIOLOGY

## 2024-04-22 PROCEDURE — 1123F ACP DISCUSS/DSCN MKR DOCD: CPT | Performed by: ANESTHESIOLOGY

## 2024-04-22 PROCEDURE — 1036F TOBACCO NON-USER: CPT | Performed by: ANESTHESIOLOGY

## 2024-04-22 PROCEDURE — 1090F PRES/ABSN URINE INCON ASSESS: CPT | Performed by: ANESTHESIOLOGY

## 2024-04-22 NOTE — PROGRESS NOTES
Functionality Assessment/Goals Worksheet     On a scale of 0 (Does not Interfere) to 10 (Completely Interferes)     1.  Which number describes how during the past week pain has interfered with           the following:  A.  General Activity:  8  B.  Mood: 6  C.  Walking Ability:  6  D.  Normal Work (Includes both work outside the home and housework):  6  E.  Relations with Other People:   8  F.  Sleep:   8  G.  Enjoyment of Life:   6    2.  Patient Prefers to Take their Pain Medications:     [x]  On a regular basis   []  Only when necessary    []  Does not take pain medications    3.  What are the Patient's Goals/Expectations for Visiting Pain Management?     [x]  Learn about my pain    []  Receive Medication   []  Physical Therapy     []  Treat Depression   []  Receive Injections    []  Treat Sleep   []  Deal with Anxiety and Stress   []  Treat Opoid Dependence/Addiction   []  Other:                                
cluneal nerves under fluoroscopic guidance.  Risk the procedure discussed in detail the patient.  Patient wants to proceed with the implant therapy.     Anticoagulation/NPO Recommendations:   Patient does not need to hold any medications prior to the procedure.  Patient does not have to be NPO prior to the procedure.  We do a LOCAL injection    Multidisciplinary Pain Management:   In the presence of complex, chronic, and multi-factorial pain, the importance of a multidisciplinary approach to pain management in the patient’s management regimen was emphasized and discussed in great detail.     Referrals:  None    Prescriptions Written This Visit:   None    Follow-up: B/L inferior cluneal n PNS implant    I spent 30 minutes direct involved patient care and greater than 50% of this time was discussing other options to treat her shoulder status including potential peripheral nerve stimulator implant, showing patient Sprint peripheral nerve system implant, discussing procedure in detail the patient and risks involved, and discussing limitations with this system in regards to other injection therapy.      Hunter Mondragon,   Interventional Pain Management/PM&R   [facility] [place]

## 2024-04-26 ENCOUNTER — TELEPHONE (OUTPATIENT)
Dept: PHYSICAL MEDICINE AND REHAB | Age: 89
End: 2024-04-26

## 2024-04-26 NOTE — TELEPHONE ENCOUNTER
Pt. Contacted. Procedure and follow up scheduled. Educated on pre-procedure instructions, also mailed. Verbalized understanding.

## 2024-04-29 NOTE — DISCHARGE INSTRUCTIONS
Peripheral Nerve Stimulation System      Activity  Limit strenuous activity and motion such as twisting, bending, climbing and lifting near the implant site for 1 week.    Resume exercise from guidance of your doctor.    Do not use system while driving.    You may shower but no submerging in a pool, hot tub, bath tub while system in place. Refer to handouts from the stimulator representative regarding use around water and water proof bandages.     Refer to handout from representative for all other questions regarding use and operation.     Post procedure Instructions:    No driving or making significant decisions for the remainder of the day due to sedation  Be cautions with walking and activity for 24 hours, do not over exert yourself.      Call office [phone] if you have:  Temperature greater than 100.4  Persistent nausea and vomiting  Severe uncontrolled pain  Redness, tenderness, or signs of infection (pain, swelling, redness, odor or green/yellow discharge around the site)  Difficulty breathing, headache or visual disturbances  Hives  Persistent dizziness or light-headedness  Extreme fatigue  Any other questions or concerns you may have after discharge    In an emergency, call 911 or go to an Emergency Department at a nearby hospital    “Surgical Site Infections”      How can we work together to prevent Surgical Site Infections?   We would like to thank you for choosing [facility] for your Surgical Care.  Below you will find helpful information on how we can work together to prevent Surgical Site Infections.    What is a Surgical Site Infection (SSI)?  A surgical site infection is an infection that occurs after surgery in the part of the body where the surgery took place. Most patients who have surgery do not develop an infection. However, infections develop in about 1 to 3 out of every 100 patients who have surgery.  Some of the common symptoms of a surgical site infection are:  Redness and

## 2024-04-29 NOTE — TELEPHONE ENCOUNTER
OARRS reviewed. UDS: negative   Last seen: 4/22/2024. Follow-up:   Future Appointments   Date Time Provider Department Center   5/1/2024 11:15 AM Lexi Yang PT [facility] [place] [place] [facility]""   6/28/2024  9:20 AM Hunter Mondragon DO N [facility]

## 2024-04-30 NOTE — TELEPHONE ENCOUNTER
I called patient and she states she is taking Meloxicam and Naproxen and she wanted to discuss those medications being taken together. She states she has a procedure tomorrow and wanted to bring it up to you then.

## 2024-04-30 NOTE — H&P
Today, patient presents for planned Sprint peripheral nerve stimulator implant targeting BILATERAL inferior cluneal nerves    This note is reflective of the patient's previous visit for evaluation. We will proceed with today's planned procedure. Since patient's last visit for evaluation, there have been no interval changes in medical history. Patient has no new numbness, weakness, or focal neurological deficit since evaluation. Patient has no contraindications to injection (no anticoagulation or recent antibiotic intake for active infections), and has a  present or is able to drive themselves (as discussed and cleared by physician).  Allergies to latex, contrast dye, and steroid medications have been confirmed with the patient prior to the procedure.  NPO necessity has been assessed and accepted based on procedure complexity. The risks and benefits of the procedure have been explained including but are not limited to infection, bleeding, paralysis, immediate post procedure weakness, and dizziness; the patient acknowledges understanding and desires to proceed with the procedure. Patient has signed consent for same procedure as discussed in previous clinic encounter. All other questions and concerns were addressed at bedside. See procedure note for full details.       Post procedure Instructions: The patient was advised not to drive during the day of the procedure and not to engage in any significant decision making (unless otherwise states by physician). The patient was also advised to be cautious with walking/activity for 24 hours following today's visit and asked not to engage in over-exertion (unless otherwise states by physician). After this time, it is ok to resume pre-procedure level of activity. Patient advised to apply ice to site of injection in situations of pain and discomfort. Patient advised to not submerge site of injection during bath or pool activities for approximately 24 hours post-procedure.  lisinopril (PRINIVIL;ZESTRIL) 5 mg, Oral, DAILY    lovastatin (MEVACOR) 20 MG tablet TAKE 1 TABLET BY MOUTH EVERY DAY AT NIGHT    meloxicam (MOBIC) 15 mg, Oral, DAILY    Multiple Vitamins-Minerals (THERAPEUTIC MULTIVITAMIN-MINERALS) tablet 1 tablet, Oral, DAILY    naproxen (NAPROSYN) 500 mg, Oral, 2 TIMES DAILY WITH MEALS    pantoprazole (PROTONIX) 40 MG tablet TAKE 1 TABLET BY MOUTH EVERY DAY WITH BREAKFAST       Allergies   Allergen Reactions    Cataflam [Diclofenac Potassium] Shortness Of Breath    Augmentin [Amoxicillin-Pot Clavulanate] Diarrhea    Phenergan [Promethazine Hcl]      Confusion \"didn't know where she was at\"       Review of Systems:   Constitutional: denies any fevers or chills  Genitourinary: negative for bowel/bladder incontinence   Musculoskeletal: Positive for buttocks pain  Neurological: Positive for numbness/tingling in toes of feet  Behavioral/Psych: negative for anxiety/depression   All other ROS reviewed and are negative    Objective:     There were no vitals filed for this visit.        Constitutional: Pleasant, no acute distress   Head: Normocephalic, atraumatic   Eyes: Conjunctivae normal   Neck: Supple, symmetrical   Lungs: Normal respiratory effort, non-labored breathing   Cardiovascular: Limbs warm and well perfused   Abdomen: Non-protruded   Musculoskeletal: Muscle bulk symmetric, no atrophy, no gross deformities   Spine: ROM reduced in extension.  Marked tenderness palpation over bilateral ischial bursa.  Neurological: Cranial nerves II-XII grossly intact.    · Gait -slightly antalgic gait. Ambulates without assistive device.   · Motor: No focal motor deficit appreciated in lower limbs  Skin: No rashes or lesions visibile in lower limbs  Psychological: Cooperative, no exaggerated pain behaviors       Assessment:    Diagnosis Orders   1. Ischial bursitis, unspecified laterality        2. Chronic pain of right knee        3. Other chronic pain        4. Chronic pain syndrome

## 2024-05-01 ENCOUNTER — APPOINTMENT (OUTPATIENT)
Dept: GENERAL RADIOLOGY | Age: 89
End: 2024-05-01
Attending: ANESTHESIOLOGY
Payer: MEDICARE

## 2024-05-01 ENCOUNTER — HOSPITAL ENCOUNTER (OUTPATIENT)
Age: 89
Setting detail: OUTPATIENT SURGERY
Discharge: HOME | End: 2024-05-01
Attending: ANESTHESIOLOGY | Admitting: ANESTHESIOLOGY
Payer: MEDICARE

## 2024-05-01 VITALS
OXYGEN SATURATION: 97 % | BODY MASS INDEX: 23.79 KG/M2 | TEMPERATURE: 96.4 F | SYSTOLIC BLOOD PRESSURE: 139 MMHG | RESPIRATION RATE: 16 BRPM | HEART RATE: 62 BPM | HEIGHT: 59 IN | DIASTOLIC BLOOD PRESSURE: 75 MMHG | WEIGHT: 118 LBS

## 2024-05-01 PROCEDURE — 7100000010 HC PHASE II RECOVERY - FIRST 15 MIN: Performed by: ANESTHESIOLOGY

## 2024-05-01 PROCEDURE — 2709999900 HC NON-CHARGEABLE SUPPLY: Performed by: ANESTHESIOLOGY

## 2024-05-01 PROCEDURE — 3600000002 HC SURGERY LEVEL 2 BASE: Performed by: ANESTHESIOLOGY

## 2024-05-01 PROCEDURE — C1778 LEAD, NEUROSTIMULATOR: HCPCS | Performed by: ANESTHESIOLOGY

## 2024-05-01 PROCEDURE — 3600000012 HC SURGERY LEVEL 2 ADDTL 15MIN: Performed by: ANESTHESIOLOGY

## 2024-05-01 PROCEDURE — 2500000003 HC RX 250 WO HCPCS: Performed by: ANESTHESIOLOGY

## 2024-05-01 PROCEDURE — 64555 IMPLANT NEUROELECTRODES: CPT | Performed by: ANESTHESIOLOGY

## 2024-05-01 PROCEDURE — 7100000011 HC PHASE II RECOVERY - ADDTL 15 MIN: Performed by: ANESTHESIOLOGY

## 2024-05-01 RX ORDER — LIDOCAINE HYDROCHLORIDE 10 MG/ML
INJECTION, SOLUTION EPIDURAL; INFILTRATION; INTRACAUDAL; PERINEURAL PRN
Status: DISCONTINUED | OUTPATIENT
Start: 2024-05-01 | End: 2024-05-01 | Stop reason: ALTCHOICE

## 2024-05-01 ASSESSMENT — PAIN - FUNCTIONAL ASSESSMENT
PAIN_FUNCTIONAL_ASSESSMENT: NONE - DENIES PAIN
PAIN_FUNCTIONAL_ASSESSMENT: 0-10

## 2024-05-01 NOTE — PROGRESS NOTES
0905 Patient arrives to recovery room via cart.  Spontaneous respirations, vss, report received from surgical RN.  Patient denies pain, numbness, tingling , nausea.  Incision dressing  site clean, dry, intact. HOB elevated. Drink given.  Call light within reach.    0915  in to educate patient  1015 Discharge to home in stable ambulatory condition by self

## 2024-05-01 NOTE — PROCEDURES
Pre-operative Diagnosis:  Bilateral Chronic Gluteal Pain     Post-operative Diagnosis:  Bilateral Chronic Gluteal Pain     Procedure: Bilateral inferior cluneal nerve(s) peripheral nerve stimulator implantation using fluoroscopy     Procedure Description:  After having signed the informed consent, the patient was placed in the prone position.  The patient's back was prepped with chloraprep solution, and draped in a sterile fashion. After identifying and marking the intended target along the course of the RIGHT inferior cluneal nerve, the skin around the planned entry point and the subcutaneous tissues were injected with local anesthetic. A percutaneous sleeve and stimulating probe lead introduction system were assembled, inserted and advanced along the intended course of the RIGHT inferior cluneal nerve as it traverses along the lateral foramen of S3 of the sacrum. The introducer needle was delivered to a location in proximity to the nerve. Paresthesia mapping with stimulation was performed with good coverage of the inferior aspect of the patient's sacrum overlapping the distribution of the patient's typical region of pain.      Another percutaneous sleeve and stimulating probe lead introduction system was assembled, inserted and advanced along the intended course of the LEFT inferior cluneal nerve as it traverses along the lateral foramen of S3 of the sacrum. The introducer needle was delivered to a location in proximity to the nerve. Paresthesia mapping with stimulation was performed with good coverage of the inferior aspect of the patient's sacrum overlapping the distribution of the patient's typical region of pain.     The stimulator probe was removed from the introducer and percutaneous lead was guided through the needle and delivered to a location in similar proximity to the nerve.  Final location was verified with electrostimulation and documented with fluoroscopy. The introducer needle was removed, and the

## 2024-05-01 NOTE — PROGRESS NOTES
Pt. Admitted in stable condition. Consent signed. VS obtained and WNL. Pt. Denies all other needs. Warm blanket provided. Call light left within reach.

## 2024-05-02 RX ORDER — MELOXICAM 15 MG/1
15 TABLET ORAL DAILY
Qty: 90 TABLET | Refills: 1 | Status: SHIPPED | OUTPATIENT
Start: 2024-05-02

## 2024-05-06 RX ORDER — NAPROXEN 500 MG/1
500 TABLET ORAL 2 TIMES DAILY WITH MEALS
Qty: 60 TABLET | Refills: 1 | OUTPATIENT
Start: 2024-05-06 | End: 2024-07-05

## 2024-05-06 RX ORDER — LOVASTATIN 20 MG/1
TABLET ORAL
Qty: 90 TABLET | Refills: 3 | Status: SHIPPED | OUTPATIENT
Start: 2024-05-06

## 2024-05-07 ENCOUNTER — OFFICE VISIT (OUTPATIENT)
Dept: PHYSICAL MEDICINE AND REHAB | Age: 89
End: 2024-05-07
Payer: MEDICARE

## 2024-05-07 ENCOUNTER — TELEPHONE (OUTPATIENT)
Dept: PHYSICAL MEDICINE AND REHAB | Age: 89
End: 2024-05-07

## 2024-05-07 VITALS
BODY MASS INDEX: 23.79 KG/M2 | WEIGHT: 118 LBS | SYSTOLIC BLOOD PRESSURE: 126 MMHG | DIASTOLIC BLOOD PRESSURE: 60 MMHG | HEIGHT: 59 IN

## 2024-05-07 DIAGNOSIS — M25.561 CHRONIC PAIN OF RIGHT KNEE: ICD-10-CM

## 2024-05-07 DIAGNOSIS — M70.70 ISCHIAL BURSITIS, UNSPECIFIED LATERALITY: Primary | ICD-10-CM

## 2024-05-07 DIAGNOSIS — G89.29 CHRONIC PAIN OF RIGHT KNEE: ICD-10-CM

## 2024-05-07 DIAGNOSIS — G89.29 OTHER CHRONIC PAIN: ICD-10-CM

## 2024-05-07 DIAGNOSIS — G89.4 CHRONIC PAIN SYNDROME: ICD-10-CM

## 2024-05-07 DIAGNOSIS — G89.29 CHRONIC GLUTEAL PAIN: ICD-10-CM

## 2024-05-07 DIAGNOSIS — M79.18 CHRONIC GLUTEAL PAIN: ICD-10-CM

## 2024-05-07 PROCEDURE — 1123F ACP DISCUSS/DSCN MKR DOCD: CPT | Performed by: NURSE PRACTITIONER

## 2024-05-07 PROCEDURE — 99214 OFFICE O/P EST MOD 30 MIN: CPT | Performed by: NURSE PRACTITIONER

## 2024-05-07 PROCEDURE — G8420 CALC BMI NORM PARAMETERS: HCPCS | Performed by: NURSE PRACTITIONER

## 2024-05-07 PROCEDURE — 1090F PRES/ABSN URINE INCON ASSESS: CPT | Performed by: NURSE PRACTITIONER

## 2024-05-07 PROCEDURE — 1036F TOBACCO NON-USER: CPT | Performed by: NURSE PRACTITIONER

## 2024-05-07 PROCEDURE — G8427 DOCREV CUR MEDS BY ELIG CLIN: HCPCS | Performed by: NURSE PRACTITIONER

## 2024-05-07 NOTE — PROGRESS NOTES
Chronic Pain/PM&R Clinic Note     Encounter Date: 5/7/24    Subjective:   Chief Complaint:   Chief Complaint   Patient presents with    Pain     Follow-up       History of Present Illness:   Daisy Dan is a 89 y.o. female seen in the clinic initially on 10/8/20 upon request from Paul Duarte DO (PCP) for her history of low back pain.  She has medical history of previous TIA (on Plavix).  She has a longstanding history of cervical neck and low back pain.  However, she sustained a fall while trying to put her pants on 10/2/2021 where she fell on her right buttocks.  She states that since this episode she has been dealing with severe low back pain with radiation down the posterior thigh and slightly down the posterior calf.  She does admit the pain radiates around to her groin.  She states she has numbness and tingling however these are more in the toes.  He does endorse some right leg weakness which she feels like this pain limited.  Her pain is sharp and stabbing in nature and 5/10.  Her pain is exacerbated with any sitting or laying on the affected side.  Her pain is alleviated with rest and medication.  She denies any focal leg weakness, saddle anesthesia, or bowel/bladder incontinence.  She states she is currently been managing her pain with over-the-counter Tylenol arthritis.     She has seen a couple pain specialist in the past including receiving spinal injections with Dr. Joyce and Dr. Genao.     Today, 1/29/2024, patient presents for planned follow-up for management of chronic low back pain and leg pain.  She continues to respond well to her ischial bursa injections in her epidural injections.  She states that her buttocks pain has returned as previously described in her lower buttock cheeks on the right and left-hand side focally without radiation.  She rates this is a constant 7/10 stabbing pain particularly worse when she is in any prolonged sitting position.  She denies any associated leg weakness

## 2024-05-07 NOTE — TELEPHONE ENCOUNTER
Pt called and LVM that one of her leads to her SCS implant came out. She is very distraught and wanting to be seen today to address despite knowing you are not in the office. Do you want her to go to ER to have remove remainder of leads? Please advise.

## 2024-05-07 NOTE — PROGRESS NOTES
Functionality Assessment/Goals Worksheet     On a scale of 0 (Does not Interfere) to 10 (Completely Interferes)     1.  Which number describes how during the past week pain has interfered with           the following:  A.  General Activity:  2  B.  Mood: 0  C.  Walking Ability:  4  D.  Normal Work (Includes both work outside the home and housework):  3  E.  Relations with Other People:   1  F.  Sleep:   0  G.  Enjoyment of Life:   2    2.  Patient Prefers to Take their Pain Medications:     [x]  On a regular basis   []  Only when necessary    []  Does not take pain medications    3.  What are the Patient's Goals/Expectations for Visiting Pain Management?     [x]  Learn about my pain    []  Receive Medication   []  Physical Therapy     []  Treat Depression   []  Receive Injections    []  Treat Sleep   [x]  Deal with Anxiety and Stress   []  Treat Opoid Dependence/Addiction   []  Other:

## 2024-05-08 NOTE — TELEPHONE ENCOUNTER
Daisy is requesting a refill of their   Requested Prescriptions     Pending Prescriptions Disp Refills    naproxen (NAPROSYN) 500 MG tablet 60 tablet 1     Sig: Take 1 tablet by mouth 2 times daily (with meals)   . Please advise.      Last Appt:  Visit date not found  Next Appt:   Visit date not found  Preferred pharmacy: [facility]/pharmacy [facility][id number] - [place] [place] - [address] [phone] -  [phone]

## 2024-05-09 RX ORDER — NAPROXEN 500 MG/1
500 TABLET ORAL 2 TIMES DAILY WITH MEALS
Qty: 60 TABLET | Refills: 1 | Status: SHIPPED | OUTPATIENT
Start: 2024-05-09 | End: 2024-07-08

## 2024-05-09 NOTE — TELEPHONE ENCOUNTER
Patient is calling and asking if Dr. Mondragon is wanting her to continue taking this medication. If so, she is needing a new script sent to the pharmacy for her.    DOLV 05/07/2024 DONV 06/28/2024    [facility]/PHARMACY #[id number] - [place], [place] - [address] [phone] -  [phone] [[id number]]

## 2024-05-13 NOTE — H&P
SIDE performed by Paul Bowling MD at [facility] [place] [facility]    NECK SURGERY  1980s    NERVE SURGERY Right 08/27/2018    SI RFA    NERVE SURGERY Left 09/11/2018    SI RFA    OTHER SURGICAL HISTORY      previous nerve blocks at [facility]    PAIN MANAGEMENT PROCEDURE Right 08/04/2021    Right genicular nerve block performed by Hunter Mondragon DO at [facility]    PAIN MANAGEMENT PROCEDURE Right 08/17/2021    right genicular nerve radiofrequency ablation performed by Hunter Mondragon DO at [facility]    PAIN MANAGEMENT PROCEDURE N/A 10/12/2021    lumbar epidural steroid L5/S1 performed by Hunter Mondragon DO at [facility]    PAIN MANAGEMENT PROCEDURE Right 11/02/2022    right sacroiliac joint radiofrequency ablation performed by Hunter Mondrgaon DO at [facility]    PAIN MANAGEMENT PROCEDURE Right 1/10/2023    right genicular nerve block performed by Hunter Mondragon DO at [facility]    PAIN MANAGEMENT PROCEDURE Right 2/9/2023    right genicular nerve radiofrequency ablation performed by Hunter Mondragon DO at [facility]    PAIN MANAGEMENT PROCEDURE Right 8/15/2023    RIGHT genicular radiofrequency ablation performed by Hunter Mondragon DO at [facility]    PAIN MANAGEMENT PROCEDURE Right 10/18/2023    transforaminal epidural steroid injection  RIGHT Lumbar 4-5 performed by Hunter Mondragon DO at [facility]    PAIN MANAGEMENT PROCEDURE Bilateral 5/1/2024    peripheral nerve stimulator implant BILATERAL inferior cluneal nerves performed by Hunter Mondragon DO at [facility]    NM DSTR NROLYTC AGNJOAQUIN PARVERTEB FCT SNGL LMBR/SACRAL Right 08/27/2018    SI RFA  RIGHT SIDE performed by Paul Bowling MD at [facility]    NM DSTR NROLYTC AGNT PARVERTEB FCT SNGL LMBR/SACRAL Left 09/11/2018    SI RFA  LEFT SIDE performed by Paul Bowling MD at [facility]    NM

## 2024-05-13 NOTE — DISCHARGE INSTRUCTIONS
pain around the area where you had surgery  Drainage of cloudy fluid from your surgical wound  Fever    Can SSIs be treated?  Yes. Most surgical site infections can be treated with antibiotics. The antibiotic given to you depends on the bacteria (germs) causing the infection. Sometimes patients with SSIs also need another surgery to treat the infection.    What are some of the things that hospitals are doing to prevent SSIs?  To prevent SSIs, doctors, nurses, and other healthcare providers:  May remove some of your hair immediately before your surgery using electric clippers if the hair is in the same area where the procedure will occur. They should not shave you with a razor.  Give you antibiotics before your surgery starts. In most cases, you should get antibiotics within 60 minutes before the surgery starts and the antibiotics should be stopped within 24 hours after surgery.  Clean the skin at the site of your surgery with a special soap that kills germs.  Clean their hands and arms up to their elbows with an antiseptic agent just before the surgery.  Wear special hair covers, masks, gowns, and gloves during surgery to  keep the surgery area clean.  Clean their hands with soap and water or an alcohol-based hand rub before and after caring for each patient.             If you do not see your providers clean their hands, please     ask  them to do so.     What can I do to help prevent SSIs?  Before your surgery:  Tell your doctor about other medical problems you may have.  Health problems such as allergies, diabetes, and obesity could affect your surgery and your treatment.   Quit smoking. Patients who smoke get more infections. Talk to your doctor about how you can quit before your surgery.  Do not shave near where you will have surgery. Shaving with a razor can irritate your skin and make it easier to develop an infection.  At the time of your surgery:  Speak up if someone tries to shave you with a razor before

## 2024-05-14 ENCOUNTER — APPOINTMENT (OUTPATIENT)
Dept: GENERAL RADIOLOGY | Age: 89
End: 2024-05-14
Attending: ANESTHESIOLOGY
Payer: MEDICARE

## 2024-05-14 ENCOUNTER — HOSPITAL ENCOUNTER (OUTPATIENT)
Age: 89
Setting detail: OUTPATIENT SURGERY
Discharge: HOME | End: 2024-05-14
Attending: ANESTHESIOLOGY | Admitting: ANESTHESIOLOGY
Payer: MEDICARE

## 2024-05-14 VITALS
DIASTOLIC BLOOD PRESSURE: 71 MMHG | SYSTOLIC BLOOD PRESSURE: 159 MMHG | WEIGHT: 118.2 LBS | OXYGEN SATURATION: 96 % | TEMPERATURE: 97.6 F | HEART RATE: 67 BPM | RESPIRATION RATE: 16 BRPM | BODY MASS INDEX: 23.83 KG/M2 | HEIGHT: 59 IN

## 2024-05-14 PROCEDURE — 3600000012 HC SURGERY LEVEL 2 ADDTL 15MIN: Performed by: ANESTHESIOLOGY

## 2024-05-14 PROCEDURE — 7100000010 HC PHASE II RECOVERY - FIRST 15 MIN: Performed by: ANESTHESIOLOGY

## 2024-05-14 PROCEDURE — C1713 ANCHOR/SCREW BN/BN,TIS/BN: HCPCS | Performed by: ANESTHESIOLOGY

## 2024-05-14 PROCEDURE — 2709999900 HC NON-CHARGEABLE SUPPLY: Performed by: ANESTHESIOLOGY

## 2024-05-14 PROCEDURE — 3600000002 HC SURGERY LEVEL 2 BASE: Performed by: ANESTHESIOLOGY

## 2024-05-14 PROCEDURE — 7100000011 HC PHASE II RECOVERY - ADDTL 15 MIN: Performed by: ANESTHESIOLOGY

## 2024-05-14 PROCEDURE — 2500000003 HC RX 250 WO HCPCS: Performed by: ANESTHESIOLOGY

## 2024-05-14 PROCEDURE — 64555 IMPLANT NEUROELECTRODES: CPT | Performed by: ANESTHESIOLOGY

## 2024-05-14 RX ORDER — LIDOCAINE HYDROCHLORIDE 10 MG/ML
INJECTION, SOLUTION EPIDURAL; INFILTRATION; INTRACAUDAL; PERINEURAL PRN
Status: DISCONTINUED | OUTPATIENT
Start: 2024-05-14 | End: 2024-05-14 | Stop reason: ALTCHOICE

## 2024-05-14 ASSESSMENT — PAIN - FUNCTIONAL ASSESSMENT
PAIN_FUNCTIONAL_ASSESSMENT: 0-10
PAIN_FUNCTIONAL_ASSESSMENT: 0-10

## 2024-05-14 NOTE — PROCEDURES
Pre-operative Diagnosis:  RIGHT Chronic Gluteal Pain     Post-operative Diagnosis:  RIGHT Chronic Gluteal Pain     Procedure: RIGHT nferior cluneal nerve(s) peripheral nerve stimulator re-implantation using fluoroscopy     Procedure Description:  After having signed the informed consent, the patient was placed in the prone position.  The patient's back was prepped with chloraprep solution, and draped in a sterile fashion. After identifying and marking the intended target along the course of the RIGHT inferior cluneal nerve, the skin around the planned entry point and the subcutaneous tissues were injected with local anesthetic. A percutaneous sleeve and stimulating probe lead introduction system were assembled, inserted and advanced along the intended course of the RIGHT inferior cluneal nerve as it traverses along the lateral foramen of S3 of the sacrum. The introducer needle was delivered to a location in proximity to the nerve. Paresthesia mapping with stimulation was performed with good coverage of the inferior aspect of the patient's sacrum overlapping the distribution of the patient's typical region of pain.     The stimulator probe was removed from the introducer and percutaneous lead was guided through the needle and delivered to a location in similar proximity to the nerve.  Final location was verified with electrostimulation and documented with fluoroscopy. The introducer needle was removed, and the exposed end of the percutaneous lead was attached to an external stimulator unit.  Various electrical parameter combinations were again tested until the patient indicated paresthesia overlapping the distribution of the patient's typical region of pain.    After confirming that lead impedance was in the normal range, the external unit was detached, the needle was removed, and the lead(s) were anchored at the skin with Histoacryl. The lead(s) were threaded into the connector block and electrical continuity and

## 2024-05-14 NOTE — PROGRESS NOTES
1251 Patient arrived to phase II via cart.  Spontaneous respiraitons even and unlabored.  Placed on monitor--VSS.  Report received from OR RN    1252 Assessment completed.  Patient is alert and oriented x4.  IV capped off-- no complications.  Patient denies pain--will monitor.  Injection sites clean and dry.      1253 Snack and drink provided. Pt. Denies all other needs at this time. Side rails up X2. Call light handed to pt. Bed locked and in low position.    1254 Stimulator rep at bedside.    1306 Rep remains at the bedside providing education. Discharge care reviewed.    1310 RN at bedside. Pt states readiness for discharge.    1312 Pt. Standing at bedside. With stand by assist of RN. Pt. Denies weakness or dizziness.    1313 Pt. Getting self dressed.     1318 Pt. Ambulated to private vehicle in stable condition with stand by assist of RN.

## 2024-05-28 ENCOUNTER — TELEPHONE (OUTPATIENT)
Dept: PHYSICAL MEDICINE AND REHAB | Age: 89
End: 2024-05-28

## 2024-05-28 NOTE — TELEPHONE ENCOUNTER
Pt. Called to say her left lead for her SCS accidentally got pulled out. Do you want to resee ASAP to setup putting back in?

## 2024-05-30 ENCOUNTER — OFFICE VISIT (OUTPATIENT)
Dept: PHYSICAL MEDICINE AND REHAB | Age: 89
End: 2024-05-30
Payer: MEDICARE

## 2024-05-30 VITALS
SYSTOLIC BLOOD PRESSURE: 144 MMHG | HEIGHT: 59 IN | WEIGHT: 118 LBS | BODY MASS INDEX: 23.79 KG/M2 | DIASTOLIC BLOOD PRESSURE: 70 MMHG

## 2024-05-30 DIAGNOSIS — M53.3 SACROILIAC PAIN: ICD-10-CM

## 2024-05-30 DIAGNOSIS — G89.29 CHRONIC GLUTEAL PAIN: Primary | ICD-10-CM

## 2024-05-30 DIAGNOSIS — M79.18 CHRONIC GLUTEAL PAIN: Primary | ICD-10-CM

## 2024-05-30 DIAGNOSIS — G89.4 CHRONIC PAIN SYNDROME: ICD-10-CM

## 2024-05-30 DIAGNOSIS — M70.70 ISCHIAL BURSITIS, UNSPECIFIED LATERALITY: ICD-10-CM

## 2024-05-30 PROCEDURE — 1123F ACP DISCUSS/DSCN MKR DOCD: CPT | Performed by: NURSE PRACTITIONER

## 2024-05-30 PROCEDURE — 1036F TOBACCO NON-USER: CPT | Performed by: NURSE PRACTITIONER

## 2024-05-30 PROCEDURE — 1090F PRES/ABSN URINE INCON ASSESS: CPT | Performed by: NURSE PRACTITIONER

## 2024-05-30 PROCEDURE — G8420 CALC BMI NORM PARAMETERS: HCPCS | Performed by: NURSE PRACTITIONER

## 2024-05-30 PROCEDURE — G8427 DOCREV CUR MEDS BY ELIG CLIN: HCPCS | Performed by: NURSE PRACTITIONER

## 2024-05-30 PROCEDURE — 99213 OFFICE O/P EST LOW 20 MIN: CPT | Performed by: NURSE PRACTITIONER

## 2024-05-30 NOTE — PROGRESS NOTES
Chronic Pain/PM&R Clinic Note     Encounter Date: 5/30/24    Subjective:   Chief Complaint:   Chief Complaint   Patient presents with    Follow-up     Patient requested follow       History of Present Illness:   Daisy Dan is a 89 y.o. female seen in the clinic initially on 10/8/20 upon request from Paul Duarte DO (PCP) for her history of low back pain.  She has medical history of previous TIA (on Plavix).  She has a longstanding history of cervical neck and low back pain.  However, she sustained a fall while trying to put her pants on 10/2/2021 where she fell on her right buttocks.  She states that since this episode she has been dealing with severe low back pain with radiation down the posterior thigh and slightly down the posterior calf.  She does admit the pain radiates around to her groin.  She states she has numbness and tingling however these are more in the toes.  He does endorse some right leg weakness which she feels like this pain limited.  Her pain is sharp and stabbing in nature and 5/10.  Her pain is exacerbated with any sitting or laying on the affected side.  Her pain is alleviated with rest and medication.  She denies any focal leg weakness, saddle anesthesia, or bowel/bladder incontinence.  She states she is currently been managing her pain with over-the-counter Tylenol arthritis.     She has seen a couple pain specialist in the past including receiving spinal injections with Dr. Joyce and Dr. Genao.     Today, 1/29/2024, patient presents for planned follow-up for management of chronic low back pain and leg pain.  She continues to respond well to her ischial bursa injections in her epidural injections.  She states that her buttocks pain has returned as previously described in her lower buttock cheeks on the right and left-hand side focally without radiation.  She rates this is a constant 7/10 stabbing pain particularly worse when she is in any prolonged sitting position.  She denies any

## 2024-05-30 NOTE — PROGRESS NOTES
Functionality Assessment/Goals Worksheet     On a scale of 0 (Does not Interfere) to 10 (Completely Interferes)     1.  Which number describes how during the past week pain has interfered with           the following:  A.  General Activity:  4  B.  Mood: 4  C.  Walking Ability:  4  D.  Normal Work (Includes both work outside the home and housework):  8  E.  Relations with Other People:   8  F.  Sleep:   5  G.  Enjoyment of Life:   5    2.  Patient Prefers to Take their Pain Medications:     [x]  On a regular basis   []  Only when necessary    []  Does not take pain medications    3.  What are the Patient's Goals/Expectations for Visiting Pain Management?     [x]  Learn about my pain    []  Receive Medication   []  Physical Therapy     []  Treat Depression   []  Receive Injections    []  Treat Sleep   [x]  Deal with Anxiety and Stress   []  Treat Opoid Dependence/Addiction   []  Other:

## 2024-06-05 ENCOUNTER — HOSPITAL ENCOUNTER (OUTPATIENT)
Age: 89
Setting detail: OUTPATIENT SURGERY
Discharge: HOME | End: 2024-06-05
Attending: ANESTHESIOLOGY | Admitting: ANESTHESIOLOGY
Payer: MEDICARE

## 2024-06-05 ENCOUNTER — APPOINTMENT (OUTPATIENT)
Dept: GENERAL RADIOLOGY | Age: 89
End: 2024-06-05
Attending: ANESTHESIOLOGY
Payer: MEDICARE

## 2024-06-05 VITALS
SYSTOLIC BLOOD PRESSURE: 151 MMHG | RESPIRATION RATE: 16 BRPM | WEIGHT: 116 LBS | DIASTOLIC BLOOD PRESSURE: 67 MMHG | OXYGEN SATURATION: 98 % | HEART RATE: 70 BPM | TEMPERATURE: 97.4 F | BODY MASS INDEX: 23.39 KG/M2 | HEIGHT: 59 IN

## 2024-06-05 PROCEDURE — C1713 ANCHOR/SCREW BN/BN,TIS/BN: HCPCS | Performed by: ANESTHESIOLOGY

## 2024-06-05 PROCEDURE — 7100000011 HC PHASE II RECOVERY - ADDTL 15 MIN: Performed by: ANESTHESIOLOGY

## 2024-06-05 PROCEDURE — 2709999900 HC NON-CHARGEABLE SUPPLY: Performed by: ANESTHESIOLOGY

## 2024-06-05 PROCEDURE — 7100000010 HC PHASE II RECOVERY - FIRST 15 MIN: Performed by: ANESTHESIOLOGY

## 2024-06-05 PROCEDURE — 64555 IMPLANT NEUROELECTRODES: CPT | Performed by: ANESTHESIOLOGY

## 2024-06-05 PROCEDURE — 3600000003 HC SURGERY LEVEL 3 BASE: Performed by: ANESTHESIOLOGY

## 2024-06-05 PROCEDURE — 2500000003 HC RX 250 WO HCPCS: Performed by: ANESTHESIOLOGY

## 2024-06-05 PROCEDURE — 3600000013 HC SURGERY LEVEL 3 ADDTL 15MIN: Performed by: ANESTHESIOLOGY

## 2024-06-05 RX ORDER — LIDOCAINE HYDROCHLORIDE 10 MG/ML
INJECTION, SOLUTION EPIDURAL; INFILTRATION; INTRACAUDAL; PERINEURAL PRN
Status: DISCONTINUED | OUTPATIENT
Start: 2024-06-05 | End: 2024-06-05 | Stop reason: ALTCHOICE

## 2024-06-05 ASSESSMENT — PAIN - FUNCTIONAL ASSESSMENT
PAIN_FUNCTIONAL_ASSESSMENT: 0-10
PAIN_FUNCTIONAL_ASSESSMENT: 0-10

## 2024-06-05 NOTE — PROGRESS NOTES
1141 - Patient arrived to Phase II via bed, report from Cherrie CHRIS. Patient awake and alert without any complaints. VSS. Dressing dry and intact. Patient positioned for comfort, drink provided. Call light in reach.  1152 - Stimulator representative at bedside.  1214 - Patient sat edge of bed, tolerated well. Patient assisted to chair - patient dressed. Dressing remains dry and intact.  1221- Patient discharged ambulatory to private vehicle.

## 2024-06-05 NOTE — H&P
a 89 y.o.female presenting to the pain clinic for evaluation of right-sided low back pain.  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  She has responded significantly to her right sacroiliac joint radiofrequency ablation.     In regard to her right knee, she is a failed total right knee replacement responded significantly to her previous right genicular nerve radiofrequency ablation.  She appears to have some worsening lumbar radiculopathy particular in the L4 and L5 distribution on the right and responded significantly to her right transforaminal epidural steroid injections.  She continues to respond well to injection therapy and making adjustments to her medications.     She continues ongoing ischial bursitis refractory to injection therapy and we may consider bilateral peripheral nerve stimulator for inferior cluneal nerve entrapment.    I did discuss with patient as lead was pulled out, that we will replace the lead on her right side with Dr. Mondragon.  I have set her up for the replacement PNS for inferior cluneal nerve entrapment on the right side to be scheduled next week with Dr. Mondragon.  She voiced agreement understanding.  I have also requested records from outside sources, Dr. De La Rosa in [place] regarding her shoulder and knee concerns to be addressed in the future after taking care of her PNS implant.  She voiced agreement understanding.  She already has implant and follow-up scheduled.    With left leg being pulled out again, we did discuss we can reimplant this.  I did discuss with her this would be the final time would be able to do this.  I did instruct her on proper care, having someone assist her when she does do the bandage removal or cleaning.  She voiced agreement understanding.  Will go ahead and set up for implant, and follow-up that is scheduled with Dr. Mondragon.       Plan:   The following treatment

## 2024-06-05 NOTE — PROCEDURES
Pre-operative Diagnosis:  LEFT Chronic Gluteal Pain     Post-operative Diagnosis:  LEFT Chronic Gluteal Pain     Procedure: LEFT inferior cluneal nerve(s) peripheral nerve stimulator re-implantation using fluoroscopy     Procedure Description:  After having signed the informed consent, the patient was placed in the prone position.  The patient's back was prepped with chloraprep solution, and draped in a sterile fashion. After identifying and marking the intended target along the course of the LEFT inferior cluneal nerve, the skin around the planned entry point and the subcutaneous tissues were injected with local anesthetic. A percutaneous sleeve and stimulating probe lead introduction system were assembled, inserted and advanced along the intended course of the LEFT inferior cluneal nerve as it traverses along the lateral foramen of S3 of the sacrum. The introducer needle was delivered to a location in proximity to the nerve. Paresthesia mapping with stimulation was performed with good coverage of the inferior aspect of the patient's sacrum overlapping the distribution of the patient's typical region of pain.     The stimulator probe was removed from the introducer and percutaneous lead was guided through the needle and delivered to a location in similar proximity to the nerve.  Final location was verified with electrostimulation and documented with fluoroscopy. The introducer needle was removed, and the exposed end of the percutaneous lead was attached to an external stimulator unit.  Various electrical parameter combinations were again tested until the patient indicated paresthesia overlapping the distribution of the patient's typical region of pain.    After confirming that lead impedance was in the normal range, the external unit was detached, the needle was removed, and the lead(s) were anchored at the skin with Histoacryl. The lead(s) were threaded into the connector block and electrical continuity and

## 2024-06-05 NOTE — DISCHARGE INSTR - OTHER ORDERS
surgery. Ask why you need to be shaved and talk with your surgeon if you have any concerns.  Ask if you will get antibiotics before surgery.  After your surgery:  Make sure that your healthcare providers clean their hands before examining you, either with soap and water or an alcohol-based hand rub.  Family and friends who visit you should not touch the surgical wound or dressings.  Family and friends should clean their hands with soap and water or an alcohol-based hand rub before and after visiting you. If you do not see them clean their hands, ask them to clean their hands.    What do I need to do when I go home from the hospital?  Before you go home, your doctor or nurse should explain everything you need to know about taking care of your wound. Make sure you understand how to care for your wound before you leave the hospital.  Always clean your hands before and after caring for your wound.  Before you go home, make sure you know who to contact if you have questions or problems after you get home.  If you have any symptoms of an infection, such as redness and pain at the surgery site, drainage, or fever, call your doctor immediately.    If you have additional questions, please ask your doctor or nurse.

## 2024-06-10 ENCOUNTER — TELEPHONE (OUTPATIENT)
Dept: FAMILY MEDICINE CLINIC | Age: 89
End: 2024-06-10

## 2024-06-10 NOTE — TELEPHONE ENCOUNTER
Patient currently taking Mobic and Naproxen.  Questing if she should be only taking one of these medications or safe to take both.  She doesn't notice any better pain relief with one or the other.  Please advise

## 2024-06-19 ENCOUNTER — OFFICE VISIT (OUTPATIENT)
Dept: FAMILY MEDICINE CLINIC | Age: 89
End: 2024-06-19

## 2024-06-19 VITALS
DIASTOLIC BLOOD PRESSURE: 72 MMHG | RESPIRATION RATE: 16 BRPM | HEART RATE: 72 BPM | HEIGHT: 59 IN | SYSTOLIC BLOOD PRESSURE: 118 MMHG | TEMPERATURE: 98.6 F | WEIGHT: 116.6 LBS | BODY MASS INDEX: 23.51 KG/M2 | OXYGEN SATURATION: 97 %

## 2024-06-19 DIAGNOSIS — G89.29 CHRONIC PAIN OF RIGHT KNEE: ICD-10-CM

## 2024-06-19 DIAGNOSIS — M19.011 OSTEOARTHRITIS OF RIGHT SHOULDER, UNSPECIFIED OSTEOARTHRITIS TYPE: ICD-10-CM

## 2024-06-19 DIAGNOSIS — M54.16 LUMBAR RADICULOPATHY: ICD-10-CM

## 2024-06-19 DIAGNOSIS — G89.29 CHRONIC RIGHT SHOULDER PAIN: Primary | ICD-10-CM

## 2024-06-19 DIAGNOSIS — M25.561 CHRONIC PAIN OF RIGHT KNEE: ICD-10-CM

## 2024-06-19 DIAGNOSIS — M25.511 CHRONIC RIGHT SHOULDER PAIN: Primary | ICD-10-CM

## 2024-06-19 RX ORDER — METHYLPREDNISOLONE ACETATE 80 MG/ML
80 INJECTION, SUSPENSION INTRA-ARTICULAR; INTRALESIONAL; INTRAMUSCULAR; SOFT TISSUE ONCE
Status: COMPLETED | OUTPATIENT
Start: 2024-06-19 | End: 2024-06-19

## 2024-06-19 RX ORDER — BUPIVACAINE HYDROCHLORIDE 5 MG/ML
2 INJECTION, SOLUTION PERINEURAL ONCE
Status: DISCONTINUED | OUTPATIENT
Start: 2024-06-19 | End: 2024-06-19

## 2024-06-19 RX ORDER — BUPIVACAINE HYDROCHLORIDE 5 MG/ML
2 INJECTION, SOLUTION PERINEURAL ONCE
Status: COMPLETED | OUTPATIENT
Start: 2024-06-19 | End: 2024-06-19

## 2024-06-19 RX ADMIN — BUPIVACAINE HYDROCHLORIDE 10 MG: 5 INJECTION, SOLUTION PERINEURAL at 15:45

## 2024-06-19 RX ADMIN — METHYLPREDNISOLONE ACETATE 80 MG: 80 INJECTION, SUSPENSION INTRA-ARTICULAR; INTRALESIONAL; INTRAMUSCULAR; SOFT TISSUE at 15:02

## 2024-06-19 SDOH — ECONOMIC STABILITY: FOOD INSECURITY: WITHIN THE PAST 12 MONTHS, YOU WORRIED THAT YOUR FOOD WOULD RUN OUT BEFORE YOU GOT MONEY TO BUY MORE.: NEVER TRUE

## 2024-06-19 SDOH — ECONOMIC STABILITY: FOOD INSECURITY: WITHIN THE PAST 12 MONTHS, THE FOOD YOU BOUGHT JUST DIDN'T LAST AND YOU DIDN'T HAVE MONEY TO GET MORE.: NEVER TRUE

## 2024-06-19 SDOH — ECONOMIC STABILITY: INCOME INSECURITY: HOW HARD IS IT FOR YOU TO PAY FOR THE VERY BASICS LIKE FOOD, HOUSING, MEDICAL CARE, AND HEATING?: NOT HARD AT ALL

## 2024-06-19 NOTE — PROGRESS NOTES
After obtaining consent, and per orders of Dr. Duarte, injection of Marcaine 0.5% 10mg and Depo-medrol 80mg given in Right shoulder by Dr. Felicia DO. Patient tolerated injection well.   
epidural steroid injection  RIGHT Lumbar 4-5 performed by Hunter Mondragon DO at [facility] [place]    PAIN MANAGEMENT PROCEDURE Bilateral 5/1/2024    peripheral nerve stimulator implant BILATERAL inferior cluneal nerves performed by Hunter Mondragon DO at [facility] [place]    PAIN MANAGEMENT PROCEDURE Bilateral 5/14/2024    peripheral nerve stimulator implant right inferior cluneal nerves performed by Hunter Mondragon DO at [facility] OR    PAIN MANAGEMENT PROCEDURE Left 6/5/2024    peripheral nerve stimulator implant LEFT inferior cluneal nerves performed by Hunter Mondragon DO at [facility] OR    AZ DSTR NROLYTC AGNT PARVERTEB FCT SNGL LMBR/SACRAL Right 08/27/2018    SI RFA  RIGHT SIDE performed by Paul Bowling MD at [facility] OR    AZ DSTR NROLYTC AGNT PARVERTEB FCT SNGL LMBR/SACRAL Left 09/11/2018    SI RFA  LEFT SIDE performed by Paul Bowling MD at [facility] OR    AZ DSTRJ NEUROLYTIC AGENT OTHER PERIPHERAL NERVE Right 05/22/2018    LUMBAR RFA RIGHT SIDE FIRST L3-4,4-5,5-S1 performed by Paul Bowling MD at [facility] OR    AZ DSTRJ NEUROLYTIC AGENT OTHER PERIPHERAL NERVE Left 06/25/2018    LUMBAR RFA  LEFT SIDE @ L3-4,4-5,5-S1, performed by Paul Bowling MD at [facility] OR       Review of Systems   Constitutional: Negative.    HENT: Negative.     Respiratory: Negative.     Cardiovascular: Negative.    Gastrointestinal: Negative.    Musculoskeletal:  Positive for arthralgias (right shoulder, right knee) and back pain.   All other systems reviewed and are negative.         Objective   Physical Exam  Vitals and nursing note reviewed.   Constitutional:       General: She is not in acute distress.     Appearance: Normal appearance. She is well-developed.   HENT:      Head: Normocephalic and atraumatic.      Right Ear: Tympanic membrane normal.      Left Ear: Tympanic membrane normal.   Eyes:

## 2024-06-20 ASSESSMENT — ENCOUNTER SYMPTOMS
BACK PAIN: 1
RESPIRATORY NEGATIVE: 1
GASTROINTESTINAL NEGATIVE: 1

## 2024-06-29 RX ORDER — NAPROXEN 500 MG/1
500 TABLET ORAL 2 TIMES DAILY WITH MEALS
Qty: 60 TABLET | Refills: 1 | Status: SHIPPED | OUTPATIENT
Start: 2024-06-29

## 2024-07-09 ENCOUNTER — OFFICE VISIT (OUTPATIENT)
Dept: PHYSICAL MEDICINE AND REHAB | Age: 89
End: 2024-07-09
Payer: MEDICARE

## 2024-07-09 VITALS
DIASTOLIC BLOOD PRESSURE: 70 MMHG | WEIGHT: 116.62 LBS | HEIGHT: 59 IN | SYSTOLIC BLOOD PRESSURE: 124 MMHG | BODY MASS INDEX: 23.51 KG/M2

## 2024-07-09 DIAGNOSIS — G89.29 CHRONIC PAIN OF RIGHT KNEE: ICD-10-CM

## 2024-07-09 DIAGNOSIS — M25.561 CHRONIC PAIN OF RIGHT KNEE: ICD-10-CM

## 2024-07-09 DIAGNOSIS — G89.29 CHRONIC RIGHT SI JOINT PAIN: Primary | ICD-10-CM

## 2024-07-09 DIAGNOSIS — M53.3 CHRONIC RIGHT SI JOINT PAIN: Primary | ICD-10-CM

## 2024-07-09 DIAGNOSIS — G89.4 CHRONIC PAIN SYNDROME: ICD-10-CM

## 2024-07-09 DIAGNOSIS — M70.70 ISCHIAL BURSITIS, UNSPECIFIED LATERALITY: ICD-10-CM

## 2024-07-09 PROCEDURE — 1123F ACP DISCUSS/DSCN MKR DOCD: CPT | Performed by: NURSE PRACTITIONER

## 2024-07-09 PROCEDURE — 1090F PRES/ABSN URINE INCON ASSESS: CPT | Performed by: NURSE PRACTITIONER

## 2024-07-09 PROCEDURE — G8427 DOCREV CUR MEDS BY ELIG CLIN: HCPCS | Performed by: NURSE PRACTITIONER

## 2024-07-09 PROCEDURE — G8420 CALC BMI NORM PARAMETERS: HCPCS | Performed by: NURSE PRACTITIONER

## 2024-07-09 PROCEDURE — 1036F TOBACCO NON-USER: CPT | Performed by: NURSE PRACTITIONER

## 2024-07-09 PROCEDURE — 99215 OFFICE O/P EST HI 40 MIN: CPT | Performed by: NURSE PRACTITIONER

## 2024-07-09 NOTE — PROGRESS NOTES
Chronic Pain/PM&R Clinic Note     Encounter Date: 7/9/24    Subjective:   Chief Complaint:   Chief Complaint   Patient presents with    Follow-up     PNS pull, pt would also like to talk about hip and knee injections        History of Present Illness:   Daisy Dan is a 89 y.o. female seen in the clinic initially on 10/8/20 upon request from Paul Duarte DO (PCP) for her history of low back pain.  She has medical history of previous TIA (on Plavix).  She has a longstanding history of cervical neck and low back pain.  However, she sustained a fall while trying to put her pants on 10/2/2021 where she fell on her right buttocks.  She states that since this episode she has been dealing with severe low back pain with radiation down the posterior thigh and slightly down the posterior calf.  She does admit the pain radiates around to her groin.  She states she has numbness and tingling however these are more in the toes.  He does endorse some right leg weakness which she feels like this pain limited.  Her pain is sharp and stabbing in nature and 5/10.  Her pain is exacerbated with any sitting or laying on the affected side.  Her pain is alleviated with rest and medication.  She denies any focal leg weakness, saddle anesthesia, or bowel/bladder incontinence.  She states she is currently been managing her pain with over-the-counter Tylenol arthritis.     She has seen a couple pain specialist in the past including receiving spinal injections with Dr. Joyce and Dr. Genao.     Today, 1/29/2024, patient presents for planned follow-up for management of chronic low back pain and leg pain.  She continues to respond well to her ischial bursa injections in her epidural injections.  She states that her buttocks pain has returned as previously described in her lower buttock cheeks on the right and left-hand side focally without radiation.  She rates this is a constant 7/10 stabbing pain particularly worse when she is in any

## 2024-07-09 NOTE — PROGRESS NOTES
[facility] PROFESSIONAL SERVS  [facility] NEUROSCIENCE AND REHABILITATION [address]  [facility] [address]  Dept: [phone]  Dept Fax: [phone]  Loc: [phone]    Visit Date: 7/9/2024    Functionality Assessment/Goals Worksheet     On a scale of 0 (Does not Interfere) to 10 (Completely Interferes)     1.  Which number describes how during the past week pain has interfered with       the following:  A.  General Activity:  5  B.  Mood: 2  C.  Walking Ability:  5  D.  Normal Work (Includes both work outside the home and housework):  5  E.  Relations with Other People:   2  F.  Sleep:   0  G.  Enjoyment of Life:   0    2.  Patient Prefers to Take their Pain Medications:     []  On a regular basis   []  Only when necessary    []  Does not take pain medications    3.  What are the Patient's Goals/Expectations for Visiting Pain Management?     []  Learn about my pain    []  Receive Medication   []  Physical Therapy     []  Treat Depression   []  Receive Injections    []  Treat Sleep   []  Deal with Anxiety and Stress   []  Treat Opoid Dependence/Addiction   []  Other:

## 2024-07-18 NOTE — DISCHARGE INSTRUCTIONS
Post procedure Instructions:    No driving or making significant decisions for the remainder of the day.   Be cautions with walking and activity for 24 hours, do not over exert yourself.  Ok to resume pre-procedure activity level today.  Apply ice to site of injection site if you have pain or discomfort.  Do not submerge sit of injection during bath or pool activities for 48 hours post-procedure.  Resume blood thinning medications in 24 hours.     Call office [phone] if you have:  Temperature greater than 100.4  Persistent nausea and vomiting  Severe uncontrolled pain  Redness, tenderness, or signs of infection (pain, swelling, redness, odor or green/yellow discharge around the site)  Difficulty breathing, headache or visual disturbances  Hives  Persistent dizziness or light-headedness  Extreme fatigue  Any other questions or concerns you may have after discharge    In an emergency, call 911 or go to an Emergency Department at a nearby hospital    “Surgical Site Infections”      How can we work together to prevent Surgical Site Infections?   We would like to thank you for choosing [facility] for your Surgical Care.  Below you will find helpful information on how we can work together to prevent Surgical Site Infections.    What is a Surgical Site Infection (SSI)?  A surgical site infection is an infection that occurs after surgery in the part of the body where the surgery took place. Most patients who have surgery do not develop an infection. However, infections develop in about 1 to 3 out of every 100 patients who have surgery.  Some of the common symptoms of a surgical site infection are:  Redness and pain around the area where you had surgery  Drainage of cloudy fluid from your surgical wound  Fever    Can SSIs be treated?  Yes. Most surgical site infections can be treated with antibiotics. The antibiotic given to you depends on the bacteria (germs) causing the infection. Sometimes patients with

## 2024-07-22 NOTE — H&P
Today, patient presents for planned right genicular nerve radiofrequency ablation    This note is reflective of the patient's previous visit for evaluation. We will proceed with today's planned procedure. Since patient's last visit for evaluation, there have been no interval changes in medical history. Patient has no new numbness, weakness, or focal neurological deficit since evaluation. Patient has no contraindications to injection (no anticoagulation or recent antibiotic intake for active infections), and has a  present or is able to drive themselves (as discussed and cleared by physician).  Allergies to latex, contrast dye, and steroid medications have been confirmed with the patient prior to the procedure.  NPO necessity has been assessed and accepted based on procedure complexity. The risks and benefits of the procedure have been explained including but are not limited to infection, bleeding, paralysis, immediate post procedure weakness, and dizziness; the patient acknowledges understanding and desires to proceed with the procedure. Patient has signed consent for same procedure as discussed in previous clinic encounter. All other questions and concerns were addressed at bedside. See procedure note for full details.       Post procedure Instructions: The patient was advised not to drive during the day of the procedure and not to engage in any significant decision making (unless otherwise states by physician). The patient was also advised to be cautious with walking/activity for 24 hours following today's visit and asked not to engage in over-exertion (unless otherwise states by physician). After this time, it is ok to resume pre-procedure level of activity. Patient advised to apply ice to site of injection in situations of pain and discomfort. Patient advised to not submerge site of injection during bath or pool activities for approximately 24 hours post-procedure. Patient attested to understanding post

## 2024-07-23 ENCOUNTER — APPOINTMENT (OUTPATIENT)
Dept: GENERAL RADIOLOGY | Age: 89
End: 2024-07-23
Attending: ANESTHESIOLOGY
Payer: MEDICARE

## 2024-07-23 ENCOUNTER — HOSPITAL ENCOUNTER (OUTPATIENT)
Age: 89
Setting detail: OUTPATIENT SURGERY
Discharge: HOME | End: 2024-07-23
Attending: ANESTHESIOLOGY | Admitting: ANESTHESIOLOGY
Payer: MEDICARE

## 2024-07-23 VITALS
HEIGHT: 59 IN | BODY MASS INDEX: 23.63 KG/M2 | OXYGEN SATURATION: 97 % | WEIGHT: 117.2 LBS | TEMPERATURE: 96.7 F | DIASTOLIC BLOOD PRESSURE: 67 MMHG | HEART RATE: 70 BPM | RESPIRATION RATE: 24 BRPM | SYSTOLIC BLOOD PRESSURE: 150 MMHG

## 2024-07-23 PROCEDURE — 3600000054 HC PAIN LEVEL 3 BASE: Performed by: ANESTHESIOLOGY

## 2024-07-23 PROCEDURE — 2500000003 HC RX 250 WO HCPCS: Performed by: ANESTHESIOLOGY

## 2024-07-23 PROCEDURE — 2709999900 HC NON-CHARGEABLE SUPPLY: Performed by: ANESTHESIOLOGY

## 2024-07-23 PROCEDURE — 64624 DSTRJ NULYT AGT GNCLR NRV: CPT | Performed by: ANESTHESIOLOGY

## 2024-07-23 PROCEDURE — 7100000010 HC PHASE II RECOVERY - FIRST 15 MIN: Performed by: ANESTHESIOLOGY

## 2024-07-23 RX ORDER — LIDOCAINE HYDROCHLORIDE 10 MG/ML
INJECTION, SOLUTION EPIDURAL; INFILTRATION; INTRACAUDAL; PERINEURAL PRN
Status: DISCONTINUED | OUTPATIENT
Start: 2024-07-23 | End: 2024-07-23 | Stop reason: ALTCHOICE

## 2024-07-23 RX ORDER — LIDOCAINE HYDROCHLORIDE 20 MG/ML
INJECTION, SOLUTION EPIDURAL; INFILTRATION; INTRACAUDAL; PERINEURAL PRN
Status: DISCONTINUED | OUTPATIENT
Start: 2024-07-23 | End: 2024-07-23 | Stop reason: ALTCHOICE

## 2024-07-23 NOTE — PROGRESS NOTES
1333: Pt arrived to Phase II recovery via cart. Awake and alert. Report received from ARIES Beckett. VSS. Patient denies pain. HOB elevated. Pt sat edge of bed and dressed with this RN's assistance.   1345: Patient escorted to vehicle and discharged home in stable condition with daughter.

## 2024-07-24 NOTE — PROCEDURES
Pre-operative Diagnosis: Knee pain     Post-operative Diagnosis: Knee pain     Procedure: RIGHT genicular thermal radiofrequency ablation     Procedure Description:  The patient was brought to the procedure room and placed on the exam table in a comfortable supine position. The knee was prepared with chloraprep and sterile drapes. Needle placement was confirmed with fluoroscopic guidance. The superficial skin and subcutaneous tissues were anesthetized by local infiltration of 1% Lidocaine. Three 5 cm cannulas were advanced in an AP view near the superiolateral portion of the femoral condyle, superiomedial portion of the femoral condyle, and the inferiomedial portion of the tibial condyle until a bony endpoint was met. Lateral x-ray views showed all the needles at 50% depth of the femur and tibia.      Then, motor stimulation was tested at 2.0 volts with no leg movement. After negative aspiration, 1 cc of 2% were injected at each side port followed by a one minute wait to anesthetize the genicular nerves. Radiofrequency lesions were made at 80 degrees Celsius for a duration of 1 minute and 30 seconds followed by removal of the probe.        RFA Log     Impedance (I)                                      Superiomedial (SM)  -                          150-300  Superiolateral (SL)   -                           150-300  Inferiomedial (IM)    -                            150-300       Procedural Complications: None  Estimated Blood Loss: 0 mL              Hunter Mondragon DO  Interventional Pain Management/PM&R   [facility] [place]

## 2024-08-06 ENCOUNTER — TELEPHONE (OUTPATIENT)
Dept: PHYSICAL MEDICINE AND REHAB | Age: 89
End: 2024-08-06

## 2024-08-06 NOTE — DISCHARGE INSTRUCTIONS
Post procedure Instructions:    No driving or making significant decisions for the remainder of the day.   Be cautions with walking and activity for 24 hours, do not over exert yourself.  Ok to resume pre-procedure activity level today.  Apply ice to site of injection site if you have pain or discomfort.  Do not submerge sit of injection during bath or pool activities for 48 hours post-procedure.  Resume blood thinning medications in 24 hours.     Call office [phone] if you have:  Temperature greater than 100.4  Persistent nausea and vomiting  Severe uncontrolled pain  Redness, tenderness, or signs of infection (pain, swelling, redness, odor or green/yellow discharge around the site)  Difficulty breathing, headache or visual disturbances  Hives  Persistent dizziness or light-headedness  Extreme fatigue  Any other questions or concerns you may have after discharge    In an emergency, call 911 or go to an Emergency Department at a nearby hospital    “Surgical Site Infections”      How can we work together to prevent Surgical Site Infections?   We would like to thank you for choosing [facility] for your Surgical Care.  Below you will find helpful information on how we can work together to prevent Surgical Site Infections.    What is a Surgical Site Infection (SSI)?  A surgical site infection is an infection that occurs after surgery in the part of the body where the surgery took place. Most patients who have surgery do not develop an infection. However, infections develop in about 1 to 3 out of every 100 patients who have surgery.  Some of the common symptoms of a surgical site infection are:  Redness and pain around the area where you had surgery  Drainage of cloudy fluid from your surgical wound  Fever    Can SSIs be treated?  Yes. Most surgical site infections can be treated with antibiotics. The antibiotic given to you depends on the bacteria (germs) causing the infection. Sometimes patients with

## 2024-08-06 NOTE — H&P
Today, patient presents for planned right sacroiliac joint injection.    This note is reflective of the patient's previous visit for evaluation. We will proceed with today's planned procedure. Since patient's last visit for evaluation, there have been no interval changes in medical history. Patient has no new numbness, weakness, or focal neurological deficit since evaluation. Patient has no contraindications to injection (no anticoagulation or recent antibiotic intake for active infections), and has a  present or is able to drive themselves (as discussed and cleared by physician).  Allergies to latex, contrast dye, and steroid medications have been confirmed with the patient prior to the procedure.  NPO necessity has been assessed and accepted based on procedure complexity. The risks and benefits of the procedure have been explained including but are not limited to infection, bleeding, paralysis, immediate post procedure weakness, and dizziness; the patient acknowledges understanding and desires to proceed with the procedure. Patient has signed consent for same procedure as discussed in previous clinic encounter. All other questions and concerns were addressed at bedside. See procedure note for full details.       Post procedure Instructions: The patient was advised not to drive during the day of the procedure and not to engage in any significant decision making (unless otherwise states by physician). The patient was also advised to be cautious with walking/activity for 24 hours following today's visit and asked not to engage in over-exertion (unless otherwise states by physician). After this time, it is ok to resume pre-procedure level of activity. Patient advised to apply ice to site of injection in situations of pain and discomfort. Patient advised to not submerge site of injection during bath or pool activities for approximately 24 hours post-procedure. Patient attested to understanding post procedure

## 2024-08-07 ENCOUNTER — APPOINTMENT (OUTPATIENT)
Dept: GENERAL RADIOLOGY | Age: 89
End: 2024-08-07
Attending: ANESTHESIOLOGY
Payer: MEDICARE

## 2024-08-07 ENCOUNTER — HOSPITAL ENCOUNTER (OUTPATIENT)
Age: 89
Setting detail: OUTPATIENT SURGERY
Discharge: HOME | End: 2024-08-07
Attending: ANESTHESIOLOGY | Admitting: ANESTHESIOLOGY
Payer: MEDICARE

## 2024-08-07 VITALS
HEIGHT: 59 IN | OXYGEN SATURATION: 97 % | HEART RATE: 67 BPM | WEIGHT: 116.4 LBS | DIASTOLIC BLOOD PRESSURE: 88 MMHG | BODY MASS INDEX: 23.47 KG/M2 | TEMPERATURE: 98.3 F | SYSTOLIC BLOOD PRESSURE: 141 MMHG | RESPIRATION RATE: 14 BRPM

## 2024-08-07 PROCEDURE — 2709999900 HC NON-CHARGEABLE SUPPLY: Performed by: ANESTHESIOLOGY

## 2024-08-07 PROCEDURE — 2500000003 HC RX 250 WO HCPCS: Performed by: ANESTHESIOLOGY

## 2024-08-07 PROCEDURE — 7100000010 HC PHASE II RECOVERY - FIRST 15 MIN: Performed by: ANESTHESIOLOGY

## 2024-08-07 PROCEDURE — 3600000054 HC PAIN LEVEL 3 BASE: Performed by: ANESTHESIOLOGY

## 2024-08-07 PROCEDURE — 6360000004 HC RX CONTRAST MEDICATION: Performed by: ANESTHESIOLOGY

## 2024-08-07 PROCEDURE — 27096 INJECT SACROILIAC JOINT: CPT | Performed by: ANESTHESIOLOGY

## 2024-08-07 PROCEDURE — 6360000002 HC RX W HCPCS: Performed by: ANESTHESIOLOGY

## 2024-08-07 RX ORDER — BUPIVACAINE HYDROCHLORIDE 5 MG/ML
INJECTION, SOLUTION PERINEURAL PRN
Status: DISCONTINUED | OUTPATIENT
Start: 2024-08-07 | End: 2024-08-07 | Stop reason: ALTCHOICE

## 2024-08-07 RX ORDER — LIDOCAINE HYDROCHLORIDE 10 MG/ML
INJECTION, SOLUTION EPIDURAL; INFILTRATION; INTRACAUDAL; PERINEURAL PRN
Status: DISCONTINUED | OUTPATIENT
Start: 2024-08-07 | End: 2024-08-07 | Stop reason: ALTCHOICE

## 2024-08-07 RX ORDER — METHYLPREDNISOLONE ACETATE 40 MG/ML
INJECTION, SUSPENSION INTRA-ARTICULAR; INTRALESIONAL; INTRAMUSCULAR; SOFT TISSUE PRN
Status: DISCONTINUED | OUTPATIENT
Start: 2024-08-07 | End: 2024-08-07 | Stop reason: ALTCHOICE

## 2024-08-07 ASSESSMENT — PAIN - FUNCTIONAL ASSESSMENT: PAIN_FUNCTIONAL_ASSESSMENT: 0-10

## 2024-08-07 NOTE — PROGRESS NOTES
1037: Pt arrived to Phase II Recovery via cart. Awake and alert. Report received from ARIES Emery.  1038: VSS. Patient denies pain. HOB elevated. Denies snack or drink. Patient stood edge of bed without difficulty and dressed independently.  1048: Patient escorted to vehicle and discharged home in stable condition.

## 2024-08-07 NOTE — PROCEDURES
Pre-operative Diagnosis:  SI joint pain     Post-operative Diagnosis:  SI joint pain     Procedure: RIGHT SI joint injection     Procedure Description:  After having signed the informed consent, the patient was placed in the prone position.  The patient's back was prepped with chloraprep solution, and draped in a sterile fashion. A total of 2 ml of 1% lidocaine were used to anesthetize the skin and underlying tissues.  Under fluoroscopic guidance a single 22-gauge, 3.5 inch spinal needle was advanced to lie within the inferior pole of the RIGHT sacroiliac joint.  There were no paresthesias or heme aspiration. Needle placement was confirmed in the AP view.  After negative aspiration, 0.5 ml of Omnipaque 300 contrast was injected with appropriate spread observed.  A total of 4 ml of 0.5% bupivicaine mixed with 40 mg depo-medrol were injected into the sacroiliac joint. The needle was withdrawn without any complications.  The patient tolerated the procedure well, was transported to the recovery room and observed for 15 minutes and discharged in an ambulatory fashion. No immediate reported complications.    Procedural Complications: None  Estimated Blood Loss: 0 mL    Hunter Mondragon DO  Interventional Pain Management/PM&R   [facility]

## 2024-08-28 RX ORDER — NAPROXEN 500 MG/1
500 TABLET ORAL 2 TIMES DAILY WITH MEALS
Qty: 60 TABLET | Refills: 1 | Status: SHIPPED | OUTPATIENT
Start: 2024-08-28

## 2024-09-24 ENCOUNTER — OFFICE VISIT (OUTPATIENT)
Dept: PHYSICAL MEDICINE AND REHAB | Age: 89
End: 2024-09-24

## 2024-09-24 ENCOUNTER — HOSPITAL ENCOUNTER (OUTPATIENT)
Dept: GENERAL RADIOLOGY | Age: 89
Discharge: HOME | End: 2024-09-24
Payer: MEDICARE

## 2024-09-24 ENCOUNTER — HOSPITAL ENCOUNTER (OUTPATIENT)
Age: 89
Discharge: HOME | End: 2024-09-24
Payer: MEDICARE

## 2024-09-24 VITALS
HEIGHT: 59 IN | DIASTOLIC BLOOD PRESSURE: 58 MMHG | BODY MASS INDEX: 23.39 KG/M2 | SYSTOLIC BLOOD PRESSURE: 126 MMHG | WEIGHT: 116 LBS

## 2024-09-24 DIAGNOSIS — M53.3 SACROILIAC PAIN: ICD-10-CM

## 2024-09-24 DIAGNOSIS — G89.29 CHRONIC RIGHT HIP PAIN: ICD-10-CM

## 2024-09-24 DIAGNOSIS — M53.3 SI (SACROILIAC) JOINT DYSFUNCTION: ICD-10-CM

## 2024-09-24 DIAGNOSIS — M16.11 ARTHRITIS OF RIGHT HIP: Primary | ICD-10-CM

## 2024-09-24 DIAGNOSIS — M16.11 ARTHRITIS OF RIGHT HIP: ICD-10-CM

## 2024-09-24 DIAGNOSIS — M79.2 NEUROPATHIC PAIN: ICD-10-CM

## 2024-09-24 DIAGNOSIS — M75.51 SUBACROMIAL BURSITIS OF RIGHT SHOULDER JOINT: ICD-10-CM

## 2024-09-24 DIAGNOSIS — G89.29 CHRONIC PAIN OF RIGHT KNEE: ICD-10-CM

## 2024-09-24 DIAGNOSIS — Z96.651 STATUS POST TOTAL RIGHT KNEE REPLACEMENT: ICD-10-CM

## 2024-09-24 DIAGNOSIS — M25.551 CHRONIC RIGHT HIP PAIN: ICD-10-CM

## 2024-09-24 DIAGNOSIS — M25.561 CHRONIC PAIN OF RIGHT KNEE: ICD-10-CM

## 2024-09-24 PROCEDURE — 73564 X-RAY EXAM KNEE 4 OR MORE: CPT

## 2024-09-24 RX ORDER — TRIAMCINOLONE ACETONIDE 40 MG/ML
40 INJECTION, SUSPENSION INTRA-ARTICULAR; INTRAMUSCULAR ONCE
Status: COMPLETED | OUTPATIENT
Start: 2024-09-24 | End: 2024-09-26

## 2024-09-25 ENCOUNTER — HOSPITAL ENCOUNTER (OUTPATIENT)
Dept: GENERAL RADIOLOGY | Age: 89
Discharge: HOME | End: 2024-09-25
Payer: MEDICARE

## 2024-09-25 ENCOUNTER — HOSPITAL ENCOUNTER (OUTPATIENT)
Age: 89
Discharge: HOME | End: 2024-09-25
Payer: MEDICARE

## 2024-09-25 DIAGNOSIS — M16.11 ARTHRITIS OF RIGHT HIP: Primary | ICD-10-CM

## 2024-09-25 DIAGNOSIS — M16.11 ARTHRITIS OF RIGHT HIP: ICD-10-CM

## 2024-09-25 PROCEDURE — 73502 X-RAY EXAM HIP UNI 2-3 VIEWS: CPT

## 2024-09-26 ENCOUNTER — OFFICE VISIT (OUTPATIENT)
Dept: PHYSICAL MEDICINE AND REHAB | Age: 89
End: 2024-09-26
Payer: MEDICARE

## 2024-09-26 VITALS
SYSTOLIC BLOOD PRESSURE: 142 MMHG | BODY MASS INDEX: 23.39 KG/M2 | WEIGHT: 116 LBS | DIASTOLIC BLOOD PRESSURE: 62 MMHG | HEIGHT: 59 IN

## 2024-09-26 DIAGNOSIS — M79.18 CHRONIC GLUTEAL PAIN: Primary | ICD-10-CM

## 2024-09-26 DIAGNOSIS — G89.29 CHRONIC GLUTEAL PAIN: Primary | ICD-10-CM

## 2024-09-26 PROCEDURE — G8427 DOCREV CUR MEDS BY ELIG CLIN: HCPCS | Performed by: NURSE PRACTITIONER

## 2024-09-26 PROCEDURE — 1123F ACP DISCUSS/DSCN MKR DOCD: CPT | Performed by: NURSE PRACTITIONER

## 2024-09-26 PROCEDURE — 1036F TOBACCO NON-USER: CPT | Performed by: NURSE PRACTITIONER

## 2024-09-26 PROCEDURE — 99214 OFFICE O/P EST MOD 30 MIN: CPT | Performed by: NURSE PRACTITIONER

## 2024-09-26 PROCEDURE — G8420 CALC BMI NORM PARAMETERS: HCPCS | Performed by: NURSE PRACTITIONER

## 2024-09-26 PROCEDURE — 1090F PRES/ABSN URINE INCON ASSESS: CPT | Performed by: NURSE PRACTITIONER

## 2024-09-26 RX ADMIN — TRIAMCINOLONE ACETONIDE 40 MG: 40 INJECTION, SUSPENSION INTRA-ARTICULAR; INTRAMUSCULAR at 14:20

## 2024-10-02 ENCOUNTER — TELEPHONE (OUTPATIENT)
Dept: PHYSICAL MEDICINE AND REHAB | Age: 88
End: 2024-10-02

## 2024-10-02 ENCOUNTER — IMMUNIZATION (OUTPATIENT)
Dept: FAMILY MEDICINE CLINIC | Age: 89
End: 2024-10-02
Payer: MEDICARE

## 2024-10-02 DIAGNOSIS — Z23 NEED FOR INFLUENZA VACCINATION: Primary | ICD-10-CM

## 2024-10-02 DIAGNOSIS — M53.3 SACROILIAC PAIN: Primary | ICD-10-CM

## 2024-10-02 PROCEDURE — 90653 IIV ADJUVANT VACCINE IM: CPT | Performed by: FAMILY MEDICINE

## 2024-10-02 PROCEDURE — G0008 ADMIN INFLUENZA VIRUS VAC: HCPCS | Performed by: FAMILY MEDICINE

## 2024-10-02 RX ORDER — PANTOPRAZOLE SODIUM 40 MG/1
TABLET, DELAYED RELEASE ORAL
Qty: 90 TABLET | Refills: 3 | Status: SHIPPED | OUTPATIENT
Start: 2024-10-02

## 2024-10-02 RX ORDER — CLOPIDOGREL BISULFATE 75 MG/1
TABLET ORAL
Qty: 90 TABLET | Refills: 3 | Status: SHIPPED | OUTPATIENT
Start: 2024-10-02

## 2024-10-02 RX ORDER — LEVOTHYROXINE SODIUM 50 UG/1
TABLET ORAL
Qty: 90 TABLET | Refills: 3 | Status: SHIPPED | OUTPATIENT
Start: 2024-10-02

## 2024-10-02 NOTE — PROGRESS NOTES
Immunizations Administered       Name Date Dose Route    Influenza, FLUAD, (age 65 y+), IM, Trivalent PF, 0.5mL 10/2/2024 0.5 mL Intramuscular    Site: Deltoid- Left    Lot: [id number]    NDC: 66923-445-41          Patient was given FLUAD high dose trivalent flu vaccine 0.5 ml IM in left deltoid per oral.renan Duarte.  Patient tolerated well and without incident.  VIS given and reviewed.  NDC# 07423-326-34 LOT# [id number] Exp: 5/2/2025

## 2024-10-02 NOTE — TELEPHONE ENCOUNTER
Called and spoke to patient regarding sacroiliac joint injections versus peripheral nerve stimulator.  She states she has difficulty having someone come in to help her with showers and dressing changes sent, so she would like to try the advanced injection like we discussed.  I have gone ahead and reordered this, just in my note.  Please call patient to set up bilateral sacroiliac advanced joint injections, and canceled peripheral nerve stimulators that are already scheduled in the follow-up.    Previous note was adjusted and can go off that for order, but I did place order in this encounter.  She states she will be home tomorrow, 10/3/2024, or can get a call next week sometime.

## 2024-10-02 NOTE — TELEPHONE ENCOUNTER
Pt called requesting to have an SI injection instead of a PNS. Will she need a new f/u appt for this to take place?

## 2024-10-03 NOTE — DISCHARGE INSTRUCTIONS
Post procedure Instructions:    No driving or making significant decisions for the remainder of the day.   Be cautions with walking and activity for 24 hours, do not over exert yourself.  Ok to resume pre-procedure activity level today.  Apply ice to site of injection site if you have pain or discomfort.  Do not submerge sit of injection during bath or pool activities for 48 hours post-procedure.  Resume blood thinning medications in 24 hours.     Call office [phone] if you have:  Temperature greater than 100.4  Persistent nausea and vomiting  Severe uncontrolled pain  Redness, tenderness, or signs of infection (pain, swelling, redness, odor or green/yellow discharge around the site)  Difficulty breathing, headache or visual disturbances  Hives  Persistent dizziness or light-headedness  Extreme fatigue  Any other questions or concerns you may have after discharge    In an emergency, call 911 or go to an Emergency Department at a nearby hospital    “Surgical Site Infections”      How can we work together to prevent Surgical Site Infections?   We would like to thank you for choosing [facility] for your Surgical Care.  Below you will find helpful information on how we can work together to prevent Surgical Site Infections.    What is a Surgical Site Infection (SSI)?  A surgical site infection is an infection that occurs after surgery in the part of the body where the surgery took place. Most patients who have surgery do not develop an infection. However, infections develop in about 1 to 3 out of every 100 patients who have surgery.  Some of the common symptoms of a surgical site infection are:  Redness and pain around the area where you had surgery  Drainage of cloudy fluid from your surgical wound  Fever    Can SSIs be treated?  Yes. Most surgical site infections can be treated with antibiotics. The antibiotic given to you depends on the bacteria (germs) causing the infection. Sometimes patients with

## 2024-10-07 NOTE — H&P
Constitutional: denies any fevers or chills  Genitourinary: negative for bowel/bladder incontinence   Musculoskeletal: Positive for buttocks pain  Neurological: Positive for numbness/tingling in toes of feet  Behavioral/Psych: negative for anxiety/depression   All other ROS reviewed and are negative    Objective:     There were no vitals filed for this visit.      Constitutional: Pleasant, no acute distress   Head: Normocephalic, atraumatic   Eyes: Conjunctivae normal   Neck: Supple, symmetrical   Lungs: Normal respiratory effort, non-labored breathing   Cardiovascular: Limbs warm and well perfused   Abdomen: Non-protruded   Musculoskeletal: Muscle bulk symmetric, no atrophy, no gross deformities   Spine: ROM reduced in extension.  Marked tenderness palpation over bilateral ischial bursa.  Tenderness palpation over  bilateral  superior iliac crest and medial aspect of sacrum consistent with cluneal neuralgia.  Neurological: Cranial nerves II-XII grossly intact.    · Gait -slightly antalgic gait. Ambulates without assistive device.   · Motor: No focal motor deficit appreciated in lower limbs  Skin: No rashes or lesions visibile in lower limbs  Psychological: Cooperative, no exaggerated pain behaviors     Assessment:    Diagnosis Orders   1. Chronic gluteal pain  SCHEDULE PROCEDURE    SCHEDULE PROCEDURE                 Daisy Dan is a 89 y.o.female presenting to the pain clinic for evaluation of right-sided low back pain.  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      I did discuss with her as she had minimal results from genicular ablation of the right knee, we could look into trialing a peripheral nerve stimulator for her right knee pain complaints as she does have a right knee arthroplasty.  She is agreeable to look into this.  As she is also complaining of some of this right outer pain, she is noted to have some greater

## 2024-10-08 ENCOUNTER — HOSPITAL ENCOUNTER (OUTPATIENT)
Age: 89
Setting detail: OUTPATIENT SURGERY
Discharge: HOME | End: 2024-10-08
Attending: ANESTHESIOLOGY | Admitting: ANESTHESIOLOGY
Payer: MEDICARE

## 2024-10-08 ENCOUNTER — APPOINTMENT (OUTPATIENT)
Dept: GENERAL RADIOLOGY | Age: 89
End: 2024-10-08
Attending: ANESTHESIOLOGY
Payer: MEDICARE

## 2024-10-08 VITALS
SYSTOLIC BLOOD PRESSURE: 165 MMHG | WEIGHT: 114 LBS | RESPIRATION RATE: 16 BRPM | BODY MASS INDEX: 22.98 KG/M2 | HEIGHT: 59 IN | OXYGEN SATURATION: 97 % | TEMPERATURE: 96.4 F | DIASTOLIC BLOOD PRESSURE: 70 MMHG | HEART RATE: 65 BPM

## 2024-10-08 PROCEDURE — 6360000002 HC RX W HCPCS: Performed by: ANESTHESIOLOGY

## 2024-10-08 PROCEDURE — 2709999900 HC NON-CHARGEABLE SUPPLY: Performed by: ANESTHESIOLOGY

## 2024-10-08 PROCEDURE — 3600000054 HC PAIN LEVEL 3 BASE: Performed by: ANESTHESIOLOGY

## 2024-10-08 PROCEDURE — 7100000010 HC PHASE II RECOVERY - FIRST 15 MIN: Performed by: ANESTHESIOLOGY

## 2024-10-08 PROCEDURE — 64451 NJX AA&/STRD NRV NRVTG SI JT: CPT | Performed by: ANESTHESIOLOGY

## 2024-10-08 PROCEDURE — 2500000003 HC RX 250 WO HCPCS: Performed by: ANESTHESIOLOGY

## 2024-10-08 RX ORDER — METHYLPREDNISOLONE ACETATE 80 MG/ML
INJECTION, SUSPENSION INTRA-ARTICULAR; INTRALESIONAL; INTRAMUSCULAR; SOFT TISSUE PRN
Status: DISCONTINUED | OUTPATIENT
Start: 2024-10-08 | End: 2024-10-08 | Stop reason: ALTCHOICE

## 2024-10-08 RX ORDER — LIDOCAINE HYDROCHLORIDE 10 MG/ML
INJECTION, SOLUTION EPIDURAL; INFILTRATION; INTRACAUDAL; PERINEURAL PRN
Status: DISCONTINUED | OUTPATIENT
Start: 2024-10-08 | End: 2024-10-08 | Stop reason: ALTCHOICE

## 2024-10-08 RX ORDER — BUPIVACAINE HYDROCHLORIDE 5 MG/ML
INJECTION, SOLUTION PERINEURAL PRN
Status: DISCONTINUED | OUTPATIENT
Start: 2024-10-08 | End: 2024-10-08 | Stop reason: ALTCHOICE

## 2024-10-08 ASSESSMENT — PAIN - FUNCTIONAL ASSESSMENT
PAIN_FUNCTIONAL_ASSESSMENT: NONE - DENIES PAIN
PAIN_FUNCTIONAL_ASSESSMENT: 0-10

## 2024-10-08 NOTE — PROCEDURES
Pre-operative Diagnosis:  SI joint pain     Post-operative Diagnosis:  SI joint pain     Procedure: Bilateral SI joint block     Procedure Description:  After having signed the informed consent, the patient was placed in the prone position.  The patient's low back and buttocks was prepped with chloraprep solution, and draped in a sterile fashion. A total of 1 ml of 1% lidocaine was used to anesthetize the skin and underlying tissues at each level. Next, 3.5 inch 22 g spinal needles were advanced to the anatomic location of the RIGHT L5 primary dorsal ramus at the junction of the superior articular process and sacral ala utilizing intermittent fluoroscopy, as well as the RIGHT S1, S2, and S3 lateral branch nerves at the lateral border of the S1, S2, and S3 posterior/dorsal foramen. Then, a 1 cc mixture of 40 mg depo-medrol and 0.25% bupivacaine was injected at each site. The needles were removed without any complications. This exact procedure was repeated on the contralateral side. The patient tolerated the procedure well, was transported to the recovery room and observed for 15 minutes and discharged in an ambulatory fashion. No immediate reported complications.     Procedural Complications: None  Estimated Blood Loss: 0 mL              Hunter Mondragon DO  Interventional Pain Management/PM&R   [facility] and [facility] [place]

## 2024-10-08 NOTE — PROGRESS NOTES
1151 Patient arrives to recovery room via cart.  Spontaneous respirations, vss, report received from surgical RN.  Patient denies pain, numbness, tingling , nausea.  Injection site clean, dry, intact. HOB elevated. Drink given.  Call light within reach.    1155 Up to dress self  1201 Discharge to home in stable ambulatory condition by self

## 2024-11-08 RX ORDER — NAPROXEN 500 MG/1
500 TABLET ORAL 2 TIMES DAILY WITH MEALS
Qty: 60 TABLET | Refills: 1 | Status: SHIPPED | OUTPATIENT
Start: 2024-11-08 | End: 2024-12-11 | Stop reason: SDUPTHER

## 2024-11-18 ENCOUNTER — OFFICE VISIT (OUTPATIENT)
Dept: FAMILY MEDICINE CLINIC | Age: 89
End: 2024-11-18

## 2024-11-18 VITALS
HEART RATE: 72 BPM | DIASTOLIC BLOOD PRESSURE: 60 MMHG | SYSTOLIC BLOOD PRESSURE: 132 MMHG | WEIGHT: 115.5 LBS | BODY MASS INDEX: 23.33 KG/M2 | RESPIRATION RATE: 16 BRPM

## 2024-11-18 DIAGNOSIS — H92.12 OTORRHEA OF LEFT EAR: ICD-10-CM

## 2024-11-18 DIAGNOSIS — H72.92 PERFORATED EAR DRUM, LEFT: ICD-10-CM

## 2024-11-18 DIAGNOSIS — H81.13 BENIGN PAROXYSMAL POSITIONAL VERTIGO DUE TO BILATERAL VESTIBULAR DISORDER: Primary | ICD-10-CM

## 2024-11-18 ASSESSMENT — ENCOUNTER SYMPTOMS
COUGH: 0
SHORTNESS OF BREATH: 0
NAUSEA: 0
ABDOMINAL PAIN: 0

## 2024-11-18 NOTE — PROGRESS NOTES
Daisy Dan (12/21/1934) 89 y.o. female here for evaluation of the following chief complaint(s):      HPI:  Chief Complaint   Patient presents with    Ear Fullness     Left, \"[facility] and they though something was punctured in my ear\"       Was being seen at Audiology this AM due to left ear drainage and unbalance.  Onset of 1 days with unsteady gait.  Unsteady when standing like on a boat that is rocking.   Left ear, clear drainage.  No smell.   Denies ear pain.   Has hearing aids.     Vitals:    11/18/24 0959   BP: 132/60   Pulse: 72   Resp: 16       Patient Active Problem List   Diagnosis    Hypertension    Hyperlipidemia    GERD (gastroesophageal reflux disease)    Lumbar spondylosis    Hypothyroidism    Vertebro basilar insufficiency    Cervical spondylosis with myelopathy    Light headed    Stenosis, spinal, lumbar    TIA involving vertebral artery    Sacroiliac inflammation (HCC)    Trochanteric bursitis of right hip    Ischial bursitis    Hypoxia    Pneumonia due to COVID-19 virus    Acquired trigger finger    Osteoarthritis of knee    Pain in right knee    Elevated CK    Weakness    Stage 3 chronic kidney disease, unspecified whether stage 3a or 3b CKD (HCC)       SUBJECTIVE/OBJECTIVE:  Review of Systems   Constitutional:  Negative for chills and fever.   HENT:  Positive for ear discharge. Negative for ear pain.    Respiratory:  Negative for cough and shortness of breath.    Cardiovascular:  Negative for chest pain.   Gastrointestinal:  Negative for abdominal pain and nausea.   Skin:  Negative for rash.   Neurological:  Negative for dizziness, light-headedness and headaches.   Psychiatric/Behavioral: Negative.         Physical Exam  Constitutional:       General: She is not in acute distress.  HENT:      Left Ear: Drainage present. No swelling or tenderness. Tympanic membrane is perforated.   Eyes:      Pupils: Pupils are equal, round, and reactive to light.   Cardiovascular:

## 2024-11-25 ENCOUNTER — OFFICE VISIT (OUTPATIENT)
Dept: PHYSICAL MEDICINE AND REHAB | Age: 88
End: 2024-11-25
Payer: MEDICARE

## 2024-11-25 VITALS
HEIGHT: 59 IN | WEIGHT: 115.52 LBS | BODY MASS INDEX: 23.29 KG/M2 | SYSTOLIC BLOOD PRESSURE: 120 MMHG | DIASTOLIC BLOOD PRESSURE: 80 MMHG

## 2024-11-25 DIAGNOSIS — M53.3 SI (SACROILIAC) JOINT DYSFUNCTION: Primary | ICD-10-CM

## 2024-11-25 DIAGNOSIS — M53.3 SACROILIAC PAIN: ICD-10-CM

## 2024-11-25 DIAGNOSIS — G89.4 CHRONIC PAIN SYNDROME: ICD-10-CM

## 2024-11-25 DIAGNOSIS — M79.2 NEUROPATHIC PAIN: ICD-10-CM

## 2024-11-25 PROCEDURE — 1123F ACP DISCUSS/DSCN MKR DOCD: CPT | Performed by: NURSE PRACTITIONER

## 2024-11-25 PROCEDURE — 99214 OFFICE O/P EST MOD 30 MIN: CPT | Performed by: NURSE PRACTITIONER

## 2024-11-25 PROCEDURE — G8482 FLU IMMUNIZE ORDER/ADMIN: HCPCS | Performed by: NURSE PRACTITIONER

## 2024-11-25 PROCEDURE — G8427 DOCREV CUR MEDS BY ELIG CLIN: HCPCS | Performed by: NURSE PRACTITIONER

## 2024-11-25 PROCEDURE — 1036F TOBACCO NON-USER: CPT | Performed by: NURSE PRACTITIONER

## 2024-11-25 PROCEDURE — G8420 CALC BMI NORM PARAMETERS: HCPCS | Performed by: NURSE PRACTITIONER

## 2024-11-25 PROCEDURE — 1125F AMNT PAIN NOTED PAIN PRSNT: CPT | Performed by: NURSE PRACTITIONER

## 2024-11-25 PROCEDURE — 1159F MED LIST DOCD IN RCRD: CPT | Performed by: NURSE PRACTITIONER

## 2024-11-25 PROCEDURE — 1090F PRES/ABSN URINE INCON ASSESS: CPT | Performed by: NURSE PRACTITIONER

## 2024-11-25 NOTE — PROGRESS NOTES
[facility] PROFESSIONAL SERVS  [facility] NEUROSCIENCE AND REHABILITATION [address]  [facility] [address]  Dept: [phone]  Dept Fax: [phone]  Loc: [phone]    Visit Date: 11/25/2024    Functionality Assessment/Goals Worksheet     On a scale of 0 (Does not Interfere) to 10 (Completely Interferes)     1.  Which number describes how during the past week pain has interfered with       the following:  A.  General Activity:  5  B.  Mood: 5  C.  Walking Ability:  3  D.  Normal Work (Includes both work outside the home and housework):  3  E.  Relations with Other People:   2  F.  Sleep:   5  G.  Enjoyment of Life:   4    2.  Patient Prefers to Take their Pain Medications:     [x]  On a regular basis   []  Only when necessary    []  Does not take pain medications    3.  What are the Patient's Goals/Expectations for Visiting Pain Management?     []  Learn about my pain    [x]  Receive Medication   []  Physical Therapy     []  Treat Depression   [x]  Receive Injections    []  Treat Sleep   []  Deal with Anxiety and Stress   []  Treat Opoid Dependence/Addiction   []  Other:

## 2024-11-25 NOTE — PROGRESS NOTES
Chronic Pain/PM&R Clinic Note     Encounter Date: 11/25/24    Subjective:   Chief Complaint:   Chief Complaint   Patient presents with    Follow-up    Follow Up After Procedure     Procedure follow-up       History of Present Illness:   Daisy Dan is a 89 y.o. female seen in the clinic initially on 10/8/20 upon request from Paul Duarte DO (PCP) for her history of low back pain.  She has medical history of previous TIA (on Plavix).  She has a longstanding history of cervical neck and low back pain.  However, she sustained a fall while trying to put her pants on 10/2/2021 where she fell on her right buttocks.  She states that since this episode she has been dealing with severe low back pain with radiation down the posterior thigh and slightly down the posterior calf.  She does admit the pain radiates around to her groin.  She states she has numbness and tingling however these are more in the toes.  He does endorse some right leg weakness which she feels like this pain limited.  Her pain is sharp and stabbing in nature and 5/10.  Her pain is exacerbated with any sitting or laying on the affected side.  Her pain is alleviated with rest and medication.  She denies any focal leg weakness, saddle anesthesia, or bowel/bladder incontinence.  She states she is currently been managing her pain with over-the-counter Tylenol arthritis.     She has seen a couple pain specialist in the past including receiving spinal injections with Dr. Joyce and Dr. Genao.      Today, 9/26/2024, patient presents to review her hip x-ray.  She states that her posterior hip, her buttocks area is what is most bothersome to her with only occasional groin pain.  She states she feels like the buttocks pain is worse, and is affecting both sides but right side is worse.  She states it starts in the top of her buttocks and can sometimes shoot into the posterior thigh, but does not go any further than her knee.  She states that it is a sharp,

## 2024-12-11 ENCOUNTER — LAB (OUTPATIENT)
Dept: LAB | Age: 88
End: 2024-12-11

## 2024-12-11 ENCOUNTER — OFFICE VISIT (OUTPATIENT)
Dept: FAMILY MEDICINE CLINIC | Age: 88
End: 2024-12-11

## 2024-12-11 VITALS
DIASTOLIC BLOOD PRESSURE: 68 MMHG | BODY MASS INDEX: 23.44 KG/M2 | WEIGHT: 116.3 LBS | HEART RATE: 68 BPM | RESPIRATION RATE: 16 BRPM | SYSTOLIC BLOOD PRESSURE: 138 MMHG | HEIGHT: 59 IN

## 2024-12-11 DIAGNOSIS — M54.16 LUMBAR RADICULOPATHY: ICD-10-CM

## 2024-12-11 DIAGNOSIS — Z00.00 MEDICARE ANNUAL WELLNESS VISIT, SUBSEQUENT: Primary | ICD-10-CM

## 2024-12-11 DIAGNOSIS — Z51.81 MEDICATION MONITORING ENCOUNTER: ICD-10-CM

## 2024-12-11 DIAGNOSIS — N18.30 STAGE 3 CHRONIC KIDNEY DISEASE, UNSPECIFIED WHETHER STAGE 3A OR 3B CKD (HCC): ICD-10-CM

## 2024-12-11 DIAGNOSIS — E03.9 HYPOTHYROIDISM, UNSPECIFIED TYPE: ICD-10-CM

## 2024-12-11 DIAGNOSIS — I10 PRIMARY HYPERTENSION: ICD-10-CM

## 2024-12-11 DIAGNOSIS — M47.816 LUMBAR SPONDYLOSIS: ICD-10-CM

## 2024-12-11 LAB
ALBUMIN SERPL BCG-MCNC: 3.9 G/DL (ref 3.5–5.1)
ALP SERPL-CCNC: 83 U/L (ref 38–126)
ALT SERPL W/O P-5'-P-CCNC: 9 U/L (ref 11–66)
ANION GAP SERPL CALC-SCNC: 13 MEQ/L (ref 8–16)
AST SERPL-CCNC: 22 U/L (ref 5–40)
BASOPHILS ABSOLUTE: 0.1 THOU/MM3 (ref 0–0.1)
BASOPHILS NFR BLD AUTO: 0.8 %
BILIRUB SERPL-MCNC: 0.2 MG/DL (ref 0.3–1.2)
BUN SERPL-MCNC: 27 MG/DL (ref 7–22)
CALCIUM SERPL-MCNC: 10.1 MG/DL (ref 8.5–10.5)
CHLORIDE SERPL-SCNC: 104 MEQ/L (ref 98–111)
CO2 SERPL-SCNC: 24 MEQ/L (ref 23–33)
CREAT SERPL-MCNC: 1.3 MG/DL (ref 0.4–1.2)
DEPRECATED RDW RBC AUTO: 47.8 FL (ref 35–45)
EOSINOPHIL NFR BLD AUTO: 6.6 %
EOSINOPHILS ABSOLUTE: 0.5 THOU/MM3 (ref 0–0.4)
ERYTHROCYTE [DISTWIDTH] IN BLOOD BY AUTOMATED COUNT: 13.5 % (ref 11.5–14.5)
GFR SERPL CREATININE-BSD FRML MDRD: 39 ML/MIN/1.73M2
GLUCOSE SERPL-MCNC: 88 MG/DL (ref 70–108)
HCT VFR BLD AUTO: 38.5 % (ref 37–47)
HGB BLD-MCNC: 12 GM/DL (ref 12–16)
IMM GRANULOCYTES # BLD AUTO: 0.13 THOU/MM3 (ref 0–0.07)
IMM GRANULOCYTES NFR BLD AUTO: 1.6 %
LYMPHOCYTES ABSOLUTE: 2 THOU/MM3 (ref 1–4.8)
LYMPHOCYTES NFR BLD AUTO: 24.7 %
MCH RBC QN AUTO: 29.9 PG (ref 26–33)
MCHC RBC AUTO-ENTMCNC: 31.2 GM/DL (ref 32.2–35.5)
MCV RBC AUTO: 96 FL (ref 81–99)
MONOCYTES ABSOLUTE: 0.6 THOU/MM3 (ref 0.4–1.3)
MONOCYTES NFR BLD AUTO: 7.5 %
NEUTROPHILS ABSOLUTE: 4.7 THOU/MM3 (ref 1.8–7.7)
NEUTROPHILS NFR BLD AUTO: 58.8 %
NRBC BLD AUTO-RTO: 0 /100 WBC
PLATELET # BLD AUTO: 381 THOU/MM3 (ref 130–400)
PMV BLD AUTO: 9.8 FL (ref 9.4–12.4)
POTASSIUM SERPL-SCNC: 5.3 MEQ/L (ref 3.5–5.2)
PROT SERPL-MCNC: 6.7 G/DL (ref 6.1–8)
RBC # BLD AUTO: 4.01 MILL/MM3 (ref 4.2–5.4)
SODIUM SERPL-SCNC: 141 MEQ/L (ref 135–145)
TSH SERPL DL<=0.005 MIU/L-ACNC: 2.52 UIU/ML (ref 0.4–4.2)
WBC # BLD AUTO: 8 THOU/MM3 (ref 4.8–10.8)

## 2024-12-11 RX ORDER — NAPROXEN 500 MG/1
500 TABLET ORAL 2 TIMES DAILY WITH MEALS
Qty: 180 TABLET | Refills: 3 | Status: SHIPPED | OUTPATIENT
Start: 2024-12-11

## 2024-12-11 ASSESSMENT — PATIENT HEALTH QUESTIONNAIRE - PHQ9
SUM OF ALL RESPONSES TO PHQ QUESTIONS 1-9: 0
SUM OF ALL RESPONSES TO PHQ QUESTIONS 1-9: 0
SUM OF ALL RESPONSES TO PHQ9 QUESTIONS 1 & 2: 0
SUM OF ALL RESPONSES TO PHQ QUESTIONS 1-9: 0
SUM OF ALL RESPONSES TO PHQ QUESTIONS 1-9: 0
2. FEELING DOWN, DEPRESSED OR HOPELESS: NOT AT ALL
1. LITTLE INTEREST OR PLEASURE IN DOING THINGS: NOT AT ALL

## 2024-12-11 ASSESSMENT — ENCOUNTER SYMPTOMS
BACK PAIN: 1
GASTROINTESTINAL NEGATIVE: 1
RESPIRATORY NEGATIVE: 1

## 2024-12-11 NOTE — PROGRESS NOTES
Provider, MD Pepper   Multiple Vitamins-Minerals (THERAPEUTIC MULTIVITAMIN-MINERALS) tablet Take 1 tablet by mouth daily Yes Provider, MD Pepper   Cholecalciferol (VITAMIN D) 2000 UNITS CAPS capsule Take 1 capsule by mouth daily Yes Provider, MD Pepper       CareTeam (Including outside providers/suppliers regularly involved in providing care):   Patient Care Team:  Paul Duarte DO as PCP - General (Family Medicine)  Paul Duarte DO as PCP - Empaneled Provider      Reviewed and updated this visit:  Tobacco  Allergies  Meds  Problems  Med Hx  Surg Hx  Soc Hx  Fam Hx

## 2024-12-17 ENCOUNTER — TELEPHONE (OUTPATIENT)
Dept: PHYSICAL MEDICINE AND REHAB | Age: 88
End: 2024-12-17

## 2024-12-18 ENCOUNTER — APPOINTMENT (OUTPATIENT)
Dept: GENERAL RADIOLOGY | Age: 88
End: 2024-12-18
Attending: ANESTHESIOLOGY
Payer: MEDICARE

## 2024-12-18 ENCOUNTER — HOSPITAL ENCOUNTER (OUTPATIENT)
Age: 88
Setting detail: OUTPATIENT SURGERY
Discharge: HOME | End: 2024-12-18
Attending: ANESTHESIOLOGY | Admitting: ANESTHESIOLOGY
Payer: MEDICARE

## 2024-12-18 VITALS
TEMPERATURE: 97.3 F | WEIGHT: 113.2 LBS | OXYGEN SATURATION: 96 % | BODY MASS INDEX: 22.82 KG/M2 | SYSTOLIC BLOOD PRESSURE: 130 MMHG | DIASTOLIC BLOOD PRESSURE: 66 MMHG | HEART RATE: 92 BPM | RESPIRATION RATE: 16 BRPM | HEIGHT: 59 IN

## 2024-12-18 PROCEDURE — 64451 NJX AA&/STRD NRV NRVTG SI JT: CPT | Performed by: ANESTHESIOLOGY

## 2024-12-18 PROCEDURE — 2709999900 HC NON-CHARGEABLE SUPPLY: Performed by: ANESTHESIOLOGY

## 2024-12-18 PROCEDURE — 6360000002 HC RX W HCPCS: Performed by: ANESTHESIOLOGY

## 2024-12-18 PROCEDURE — 3600000054 HC PAIN LEVEL 3 BASE: Performed by: ANESTHESIOLOGY

## 2024-12-18 PROCEDURE — 7100000010 HC PHASE II RECOVERY - FIRST 15 MIN: Performed by: ANESTHESIOLOGY

## 2024-12-18 RX ORDER — METHYLPREDNISOLONE ACETATE 80 MG/ML
INJECTION, SUSPENSION INTRA-ARTICULAR; INTRALESIONAL; INTRAMUSCULAR; SOFT TISSUE PRN
Status: DISCONTINUED | OUTPATIENT
Start: 2024-12-18 | End: 2024-12-18 | Stop reason: ALTCHOICE

## 2024-12-18 RX ORDER — LIDOCAINE HYDROCHLORIDE 10 MG/ML
INJECTION, SOLUTION EPIDURAL; INFILTRATION; INTRACAUDAL; PERINEURAL PRN
Status: DISCONTINUED | OUTPATIENT
Start: 2024-12-18 | End: 2024-12-18 | Stop reason: ALTCHOICE

## 2024-12-18 RX ORDER — BUPIVACAINE HYDROCHLORIDE 5 MG/ML
INJECTION, SOLUTION PERINEURAL PRN
Status: DISCONTINUED | OUTPATIENT
Start: 2024-12-18 | End: 2024-12-18 | Stop reason: ALTCHOICE

## 2024-12-18 ASSESSMENT — PAIN - FUNCTIONAL ASSESSMENT: PAIN_FUNCTIONAL_ASSESSMENT: 0-10

## 2024-12-18 NOTE — PROGRESS NOTES
1446 Patient to phase 2, report from Trisha CHRIS. Patient alert, oriented, appropriate. Vitals stable on room air. Injection site with no complications. Call light in reach.   1456 Patient walked to discharge doors in stable condition. Discharged with AVS and all belongings.

## 2024-12-18 NOTE — H&P
rashes or lesions visibile in lower limbs  Psychological: Cooperative, no exaggerated pain behaviors     Assessment:    Diagnosis Orders   1. SI (sacroiliac) joint dysfunction  SCHEDULE PROCEDURE    SCHEDULE PROCEDURE      2. Sacroiliac pain        3. Neuropathic pain        4. Chronic pain syndrome                     Daisy Dan is a 89 y.o.female presenting to the pain clinic for evaluation of right-sided low back pain.  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      I did discuss with her as she had minimal results from genicular ablation of the right knee, we could look into trialing a peripheral nerve stimulator for her right knee pain complaints as she does have a right knee arthroplasty.  She is agreeable to look into this.  As she is also complaining of some of this right outer pain, she is noted to have some greater trochanter bursitis pain on the right side, but also positive fadir and groin pain.  I did discuss with her updating a right hip x-ray at this time to determine how changes look in there before we move forward with injections other injections.  She is agreeable this.  As she is complained of this right shoulder plain, and has not had an injection in about 3-1/2 months from her primary into it, I did discuss we can take over and we do this today.  She is agreeable to this, please see note below.  I would like to see her back on Thursday after she completes her right hip x-ray to determine if we can do injection therapy versus moving forward with peripheral nerve stimulator for her right knee.    As she is complaining of worsening sacroiliac joint pain, with some mild relief but not long-lasting from the therapeutic sacroiliac joint injections, we did discuss more advanced injections.  We did discuss potential advanced sacroiliac joint injections targeting bilateral joints, versus a Sprint peripheral nerve

## 2024-12-19 NOTE — PROCEDURES
Pre-operative Diagnosis:  SI joint pain     Post-operative Diagnosis:  SI joint pain     Procedure: Bilateral SI joint block     Procedure Description:  After having signed the informed consent, the patient was placed in the prone position.  The patient's low back and buttocks was prepped with chloraprep solution, and draped in a sterile fashion. A total of 1 ml of 1% lidocaine was used to anesthetize the skin and underlying tissues at each level. Next, 3.5 inch 22 g spinal needles were advanced to the anatomic location of the RIGHT L5 primary dorsal ramus at the junction of the superior articular process and sacral ala utilizing intermittent fluoroscopy, as well as the RIGHT S1, S2, and S3 lateral branch nerves at the lateral border of the S1, S2, and S3 posterior/dorsal foramen. Then, a 1 cc mixture of 40 mg depo-medrol and 0.25% bupivacaine was injected at each site. The needles were removed without any complications. This procedure was repeated on the contralateral side. The patient tolerated the procedure well, was transported to the recovery room and observed for 15 minutes and discharged in an ambulatory fashion. No immediate reported complications.     Procedural Complications: None  Estimated Blood Loss: 0 mL           Hunter Mondragon DO  Interventional Pain Management/PM&R   [facility]

## 2025-01-19 ENCOUNTER — HOSPITAL ENCOUNTER (EMERGENCY)
Age: 89
Discharge: HOME | End: 2025-01-19
Attending: EMERGENCY MEDICINE
Payer: MEDICARE

## 2025-01-19 VITALS
OXYGEN SATURATION: 99 % | DIASTOLIC BLOOD PRESSURE: 63 MMHG | HEART RATE: 89 BPM | BODY MASS INDEX: 22.82 KG/M2 | WEIGHT: 113 LBS | RESPIRATION RATE: 15 BRPM | TEMPERATURE: 97.6 F | SYSTOLIC BLOOD PRESSURE: 144 MMHG

## 2025-01-19 DIAGNOSIS — R30.0 DYSURIA: Primary | ICD-10-CM

## 2025-01-19 DIAGNOSIS — R31.9 URINARY TRACT INFECTION WITH HEMATURIA, SITE UNSPECIFIED: ICD-10-CM

## 2025-01-19 DIAGNOSIS — N39.0 URINARY TRACT INFECTION WITH HEMATURIA, SITE UNSPECIFIED: ICD-10-CM

## 2025-01-19 LAB
BACTERIA URNS QL MICRO: ABNORMAL /HPF
BILIRUB UR QL STRIP.AUTO: NEGATIVE
CASTS #/AREA URNS LPF: ABNORMAL /LPF
CHARACTER UR: ABNORMAL
COLOR, UA: YELLOW
CRYSTALS URNS MICRO: ABNORMAL
EPITHELIAL CELLS, UA: ABNORMAL /HPF
GLUCOSE UR QL STRIP.AUTO: NEGATIVE MG/DL
HGB UR QL STRIP.AUTO: ABNORMAL
KETONES UR QL STRIP.AUTO: NEGATIVE
NITRITE UR QL STRIP: NEGATIVE
PH UR STRIP.AUTO: 6 [PH] (ref 5–9)
PROT UR STRIP.AUTO-MCNC: 100 MG/DL
RBC URINE: ABNORMAL /HPF
SP GR UR REFRACT.AUTO: >= 1.03 (ref 1–1.03)
UROBILINOGEN, URINE: 0.2 EU/DL (ref 0–1)
WBC #/AREA URNS HPF: > 100 /HPF
WBC #/AREA URNS HPF: ABNORMAL /[HPF]

## 2025-01-19 PROCEDURE — 81001 URINALYSIS AUTO W/SCOPE: CPT

## 2025-01-19 PROCEDURE — 99283 EMERGENCY DEPT VISIT LOW MDM: CPT

## 2025-01-19 PROCEDURE — 87186 SC STD MICRODIL/AGAR DIL: CPT

## 2025-01-19 PROCEDURE — 87077 CULTURE AEROBIC IDENTIFY: CPT

## 2025-01-19 PROCEDURE — 87086 URINE CULTURE/COLONY COUNT: CPT

## 2025-01-19 RX ORDER — CEPHALEXIN 500 MG/1
500 CAPSULE ORAL 2 TIMES DAILY
Qty: 14 CAPSULE | Refills: 0 | Status: SHIPPED | OUTPATIENT
Start: 2025-01-19 | End: 2025-01-26

## 2025-01-19 ASSESSMENT — PAIN - FUNCTIONAL ASSESSMENT: PAIN_FUNCTIONAL_ASSESSMENT: NONE - DENIES PAIN

## 2025-01-19 NOTE — ED NOTES
Patient to the ED with complaint of dysuria. Patient states that since yesterday she has been having burning with urination as well as urinary frequency. Patient denies any further complaints. Patient ambulates to room briskly, and without difficulty. Patient ambulates to bathroom without difficulty to provide urine sample. Patient is pleasant and alert/oriented x4. Patient is resting in bed with easy and unlabored respirations. Call light in reach. Side rails up x2. Patient denies further complaints or concerns. Will monitor.

## 2025-01-19 NOTE — ED PROVIDER NOTES
ATTENDING NOTE:    I supervised and discussed the history, physical exam and the management of this patient with the resident. I reviewed the resident's note and agree with the documented findings and plan of care.  Please see my additional note.    I personally saw and examined the patient.  I have reviewed and agree with the resident's findings, including all diagnostic interpretations and treatment plans as written.  I was present for the key portion of any procedures performed and the inclusive time noted in any critical care statement.    Electronically verified by Irene Gibson MD  01/20/25 6624    
  DISPOSITION CONDITION Stable           PATIENT REFERRED TO:  Paul Duarte, DO  [address], [address]  [place] [address]  [phone]    In 1 week        DISCHARGE MEDICATIONS:  Discharge Medication List as of 1/19/2025 11:22 AM        START taking these medications    Details   cephALEXin (KEFLEX) 500 MG capsule Take 1 capsule by mouth 2 times daily for 7 days, Disp-14 capsule, R-0Normal                    (Please note that portions of this note were completed with a voice recognition program.  Efforts were made to edit the dictations but occasionally words are mis-transcribed.)

## 2025-01-21 LAB
BACTERIA UR CULT: ABNORMAL
ORGANISM: ABNORMAL

## 2025-01-22 NOTE — PROGRESS NOTES
Pharmacy Note  ED Culture Follow-up    Daisy Dan is a 89 y.o. female.     Allergies: Cataflam [diclofenac potassium], Augmentin [amoxicillin-pot clavulanate], and Phenergan [promethazine hcl]     Current antimicrobials:   Reviewed patient's urine culture - culture positive for E. Coli.  Patient was discharged on cephalexin, and culture is sensitive to prescribed medication.  Antibiotic prescribed at discharge is appropriate - no changes made to antibiotic regimen.    Please call with any questions. Ext. [phone]    Ivette Wagner RPH, PharmD 2:40 PM 1/22/2025

## 2025-01-27 ENCOUNTER — OFFICE VISIT (OUTPATIENT)
Dept: FAMILY MEDICINE CLINIC | Age: 89
End: 2025-01-27
Payer: MEDICARE

## 2025-01-27 VITALS
BODY MASS INDEX: 23.17 KG/M2 | DIASTOLIC BLOOD PRESSURE: 60 MMHG | HEART RATE: 108 BPM | SYSTOLIC BLOOD PRESSURE: 138 MMHG | RESPIRATION RATE: 16 BRPM | WEIGHT: 114.7 LBS

## 2025-01-27 DIAGNOSIS — M54.16 LUMBAR RADICULOPATHY: ICD-10-CM

## 2025-01-27 DIAGNOSIS — G89.29 CHRONIC RIGHT SHOULDER PAIN: ICD-10-CM

## 2025-01-27 DIAGNOSIS — M25.511 CHRONIC RIGHT SHOULDER PAIN: ICD-10-CM

## 2025-01-27 DIAGNOSIS — N30.00 ACUTE CYSTITIS WITHOUT HEMATURIA: Primary | ICD-10-CM

## 2025-01-27 PROCEDURE — G8427 DOCREV CUR MEDS BY ELIG CLIN: HCPCS | Performed by: FAMILY MEDICINE

## 2025-01-27 PROCEDURE — 99214 OFFICE O/P EST MOD 30 MIN: CPT | Performed by: FAMILY MEDICINE

## 2025-01-27 PROCEDURE — 1123F ACP DISCUSS/DSCN MKR DOCD: CPT | Performed by: FAMILY MEDICINE

## 2025-01-27 PROCEDURE — 1159F MED LIST DOCD IN RCRD: CPT | Performed by: FAMILY MEDICINE

## 2025-01-27 PROCEDURE — 1036F TOBACCO NON-USER: CPT | Performed by: FAMILY MEDICINE

## 2025-01-27 PROCEDURE — 96372 THER/PROPH/DIAG INJ SC/IM: CPT | Performed by: FAMILY MEDICINE

## 2025-01-27 PROCEDURE — G8420 CALC BMI NORM PARAMETERS: HCPCS | Performed by: FAMILY MEDICINE

## 2025-01-27 PROCEDURE — 1090F PRES/ABSN URINE INCON ASSESS: CPT | Performed by: FAMILY MEDICINE

## 2025-01-27 RX ORDER — METHYLPREDNISOLONE ACETATE 40 MG/ML
40 INJECTION, SUSPENSION INTRA-ARTICULAR; INTRALESIONAL; INTRAMUSCULAR; SOFT TISSUE ONCE
Status: COMPLETED | OUTPATIENT
Start: 2025-01-27 | End: 2025-01-27

## 2025-01-27 RX ORDER — METHYLPREDNISOLONE ACETATE 80 MG/ML
80 INJECTION, SUSPENSION INTRA-ARTICULAR; INTRALESIONAL; INTRAMUSCULAR; SOFT TISSUE ONCE
Status: COMPLETED | OUTPATIENT
Start: 2025-01-27 | End: 2025-01-27

## 2025-01-27 RX ORDER — GABAPENTIN 600 MG/1
TABLET ORAL
Qty: 360 TABLET | Refills: 3 | Status: SHIPPED | OUTPATIENT
Start: 2025-01-27 | End: 2026-04-12

## 2025-01-27 RX ADMIN — METHYLPREDNISOLONE ACETATE 80 MG: 80 INJECTION, SUSPENSION INTRA-ARTICULAR; INTRALESIONAL; INTRAMUSCULAR; SOFT TISSUE at 14:19

## 2025-01-27 RX ADMIN — METHYLPREDNISOLONE ACETATE 40 MG: 40 INJECTION, SUSPENSION INTRA-ARTICULAR; INTRALESIONAL; INTRAMUSCULAR; SOFT TISSUE at 14:18

## 2025-01-27 SDOH — ECONOMIC STABILITY: FOOD INSECURITY: WITHIN THE PAST 12 MONTHS, THE FOOD YOU BOUGHT JUST DIDN'T LAST AND YOU DIDN'T HAVE MONEY TO GET MORE.: NEVER TRUE

## 2025-01-27 SDOH — ECONOMIC STABILITY: FOOD INSECURITY: WITHIN THE PAST 12 MONTHS, YOU WORRIED THAT YOUR FOOD WOULD RUN OUT BEFORE YOU GOT MONEY TO BUY MORE.: NEVER TRUE

## 2025-01-27 ASSESSMENT — PATIENT HEALTH QUESTIONNAIRE - PHQ9
SUM OF ALL RESPONSES TO PHQ9 QUESTIONS 1 & 2: 0
SUM OF ALL RESPONSES TO PHQ QUESTIONS 1-9: 0
SUM OF ALL RESPONSES TO PHQ QUESTIONS 1-9: 0
1. LITTLE INTEREST OR PLEASURE IN DOING THINGS: NOT AT ALL
2. FEELING DOWN, DEPRESSED OR HOPELESS: NOT AT ALL
SUM OF ALL RESPONSES TO PHQ QUESTIONS 1-9: 0
SUM OF ALL RESPONSES TO PHQ QUESTIONS 1-9: 0

## 2025-01-27 ASSESSMENT — ENCOUNTER SYMPTOMS
RESPIRATORY NEGATIVE: 1
GASTROINTESTINAL NEGATIVE: 1
BACK PAIN: 1

## 2025-01-27 NOTE — PROGRESS NOTES
Daisy Dan (:  1934) is a 89 y.o. female,Established patient, here for evaluation of the following chief complaint(s):  Follow-up ([facility] ED 25 UTI) and Medication Refill          Subjective   SUBJECTIVE/OBJECTIVE:  HPI  Chief Complaint   Patient presents with    Follow-up     [facility] ED 25 UTI    Medication Refill     ER follow up.    CHIEF COMPLAINT            Chief Complaint   Patient presents with    Dysuria         HISTORY OF PRESENT ILLNESS    Daisy Dan is a 89 y.o. female with a PMH of TIA, hypothyroidism, hyperlipidemia, sacroiliac joint pain with a recent steroid injection in December who presents to the emergency department with a chief complaint of sudden onset dysuria.  Patient reports symptoms started last night when she felt a burning sensation with urination.  She denied increased urinary frequency, hematuria, discharge, bubbles in urine, foul-smelling urine, fever, chest pain, SOB.  Patient reports she is very healthy, and denies worsening back pain, flank pain, changes in bowel movements, fever, cough, fall/injury, recent travel.  Patient reports she has a history of UTIs reason she presented to the ED. she denies history of kidney stones or weight loss.  Patient is able to ambulate without event to the ED and appears generally healthy.  Review of systems unremarkable except for aforementioned.    Also c/o flare of right shoulder pain and LBP.    Sees Dr. Mondragon in a few weeks.    No recent injury.    Patient Active Problem List   Diagnosis    Hypertension    Hyperlipidemia    GERD (gastroesophageal reflux disease)    Lumbar spondylosis    Hypothyroidism    Vertebro basilar insufficiency    Cervical spondylosis with myelopathy    Light headed    Stenosis, spinal, lumbar    TIA involving vertebral artery    Sacroiliac inflammation (HCC)    Trochanteric bursitis of right hip    Ischial bursitis    Hypoxia    Pneumonia due to COVID-19 virus    Acquired trigger finger

## 2025-01-27 NOTE — PROGRESS NOTES
Per orders of Dr. Duarte, injection of Depo Medrol 2ml 120mg given IM right gluteal by Rosmery Wynn CMA ([facility]). Patient instructed to report any adverse reaction to me immediately. Pt tolerated injection well.     Administrations This Visit       methylPREDNISolone acetate (DEPO-MEDROL) injection 40 mg       Admin Date  01/27/2025  14:18 Action  Given Dose  40 mg Route  IntraMUSCular Site  Dorsogluteal Right Documented By  Rosmery Wynn CMA ([facility])    NDC: [address]-4    Lot#: [id number]    : [id number] [facility]    Patient Supplied?: No    Comments: Per orders of Dr. Duarte, injection of Depo Medrol 2ml 120mg given IM right gluteal by Rosmery Wynn CMA ([facility]). Patient instructed to report any adverse reaction to me immediately. Pt tolerated injection well.              methylPREDNISolone acetate (DEPO-MEDROL) injection 80 mg       Admin Date  01/27/2025  14:19 Action  Given Dose  80 mg Route  IntraMUSCular Site  Dorsogluteal Right Documented By  Rosmery Wynn CMA ([facility])    NDC: [address]-0    Lot#: [id number]    : Freebeepay    Patient Supplied?: No    Comments: Per orders of Dr. Duarte, injection of Depo Medrol 2ml 120mg given IM right gluteal by Rosmery Wynn CMA ([facility]). Patient instructed to report any adverse reaction to me immediately. Pt tolerated injection well.

## 2025-02-03 ENCOUNTER — TELEPHONE (OUTPATIENT)
Dept: FAMILY MEDICINE CLINIC | Age: 89
End: 2025-02-03

## 2025-02-03 NOTE — TELEPHONE ENCOUNTER
Patient called and stated that she was seen for her back pain and right shoulder pain. She stated that she was given an injection and it was possible going to help both areas of pain.   She stated that she is still having pain in her right arm. She stated that it is the upper arm between the shoulder and the elbow. She didn't know when she could get another injection.     Patient was given methylprednisolone 120 mg, 1/27/2025

## 2025-02-05 ENCOUNTER — OFFICE VISIT (OUTPATIENT)
Dept: FAMILY MEDICINE CLINIC | Age: 89
End: 2025-02-05

## 2025-02-05 VITALS
SYSTOLIC BLOOD PRESSURE: 124 MMHG | HEART RATE: 80 BPM | RESPIRATION RATE: 12 BRPM | WEIGHT: 115.6 LBS | BODY MASS INDEX: 23.35 KG/M2 | DIASTOLIC BLOOD PRESSURE: 68 MMHG

## 2025-02-05 DIAGNOSIS — G89.29 CHRONIC RIGHT SHOULDER PAIN: Primary | ICD-10-CM

## 2025-02-05 DIAGNOSIS — M25.511 CHRONIC RIGHT SHOULDER PAIN: Primary | ICD-10-CM

## 2025-02-05 DIAGNOSIS — M19.011 OSTEOARTHRITIS OF RIGHT SHOULDER, UNSPECIFIED OSTEOARTHRITIS TYPE: ICD-10-CM

## 2025-02-05 RX ORDER — METHYLPREDNISOLONE ACETATE 80 MG/ML
80 INJECTION, SUSPENSION INTRA-ARTICULAR; INTRALESIONAL; INTRAMUSCULAR; SOFT TISSUE ONCE
Status: COMPLETED | OUTPATIENT
Start: 2025-02-05 | End: 2025-02-05

## 2025-02-05 RX ADMIN — METHYLPREDNISOLONE ACETATE 80 MG: 80 INJECTION, SUSPENSION INTRA-ARTICULAR; INTRALESIONAL; INTRAMUSCULAR; SOFT TISSUE at 14:15

## 2025-02-05 NOTE — PROGRESS NOTES
Daisy Dan (:  1934) is a 89 y.o. female,Established patient, here for evaluation of the following chief complaint(s):  Shoulder Pain (right)          Subjective   SUBJECTIVE/OBJECTIVE:  HPI  Chief Complaint   Patient presents with    Shoulder Pain     right     Pt presents for chronic right shoulder pain.  No relief with Tylenol.    Has responded to joint injections in the past.    Patient Active Problem List   Diagnosis    Hypertension    Hyperlipidemia    GERD (gastroesophageal reflux disease)    Lumbar spondylosis    Hypothyroidism    Vertebro basilar insufficiency    Cervical spondylosis with myelopathy    Light headed    Stenosis, spinal, lumbar    TIA involving vertebral artery    Sacroiliac inflammation (HCC)    Trochanteric bursitis of right hip    Ischial bursitis    Hypoxia    Pneumonia due to COVID-19 virus    Acquired trigger finger    Osteoarthritis of knee    Pain in right knee    Elevated CK    Weakness    Stage 3 chronic kidney disease, unspecified whether stage 3a or 3b CKD (HCC)       Current Outpatient Medications   Medication Sig Dispense Refill    gabapentin (NEURONTIN) 600 MG tablet Take 1 tablet in the morning, 1 tablet in the afternoon and 2 tablets at bedtime 360 tablet 3    levothyroxine (SYNTHROID) 50 MCG tablet TAKE 1 TABLET BY MOUTH EVERY DAY 90 tablet 3    pantoprazole (PROTONIX) 40 MG tablet TAKE 1 TABLET BY MOUTH EVERY DAY WITH BREAKFAST 90 tablet 3    clopidogrel (PLAVIX) 75 MG tablet TAKE 1 TABLET BY MOUTH EVERY DAY 90 tablet 3    lovastatin (MEVACOR) 20 MG tablet TAKE 1 TABLET BY MOUTH EVERY DAY AT NIGHT 90 tablet 3    lisinopril (PRINIVIL;ZESTRIL) 5 MG tablet TAKE 1 TABLET BY MOUTH EVERY DAY 90 tablet 3    Glucosamine-Chondroitin 250-200 MG TABS Take 1 tablet by mouth daily      Acetaminophen (TYLENOL ARTHRITIS EXT RELIEF PO) Take by mouth      calcium carbonate-vitamin D 600-200 MG-UNIT TABS Take 1 tablet by mouth daily.      Multiple Vitamins-Minerals

## 2025-02-05 NOTE — PATIENT INSTRUCTIONS
You may receive a survey about your visit with us today. The feedback from our patients helps us identify what is working well and where the service to all patients can be enhanced. Thank you!     Tramadol or Journavx

## 2025-02-06 ASSESSMENT — ENCOUNTER SYMPTOMS
RESPIRATORY NEGATIVE: 1
GASTROINTESTINAL NEGATIVE: 1

## 2025-02-10 ENCOUNTER — TELEPHONE (OUTPATIENT)
Dept: FAMILY MEDICINE CLINIC | Age: 89
End: 2025-02-10

## 2025-02-10 RX ORDER — SULFAMETHOXAZOLE AND TRIMETHOPRIM 800; 160 MG/1; MG/1
1 TABLET ORAL 2 TIMES DAILY
Qty: 6 TABLET | Refills: 0 | Status: SHIPPED | OUTPATIENT
Start: 2025-02-10 | End: 2025-02-13

## 2025-02-10 NOTE — TELEPHONE ENCOUNTER
Patient c/o UTI returned.  Patient c/o dysuria, pressure, urinary urgency, and frequency.  Denies hematuria.  S/S began on Saturday.  Pharmacy is [facility].  Will check with pharmacy after 4pm.  Please advise

## 2025-02-12 ENCOUNTER — OFFICE VISIT (OUTPATIENT)
Dept: PHYSICAL MEDICINE AND REHAB | Age: 89
End: 2025-02-12
Payer: MEDICARE

## 2025-02-12 VITALS
DIASTOLIC BLOOD PRESSURE: 56 MMHG | HEIGHT: 59 IN | BODY MASS INDEX: 23.18 KG/M2 | SYSTOLIC BLOOD PRESSURE: 138 MMHG | WEIGHT: 115 LBS

## 2025-02-12 DIAGNOSIS — M79.2 NEUROPATHIC PAIN: ICD-10-CM

## 2025-02-12 DIAGNOSIS — M79.18 CHRONIC GLUTEAL PAIN: Primary | ICD-10-CM

## 2025-02-12 DIAGNOSIS — M70.70 ISCHIAL BURSITIS, UNSPECIFIED LATERALITY: ICD-10-CM

## 2025-02-12 DIAGNOSIS — G89.4 CHRONIC PAIN SYNDROME: ICD-10-CM

## 2025-02-12 DIAGNOSIS — G89.29 CHRONIC GLUTEAL PAIN: Primary | ICD-10-CM

## 2025-02-12 PROCEDURE — G8420 CALC BMI NORM PARAMETERS: HCPCS | Performed by: ANESTHESIOLOGY

## 2025-02-12 PROCEDURE — 1159F MED LIST DOCD IN RCRD: CPT | Performed by: ANESTHESIOLOGY

## 2025-02-12 PROCEDURE — 1125F AMNT PAIN NOTED PAIN PRSNT: CPT | Performed by: ANESTHESIOLOGY

## 2025-02-12 PROCEDURE — 1123F ACP DISCUSS/DSCN MKR DOCD: CPT | Performed by: ANESTHESIOLOGY

## 2025-02-12 PROCEDURE — 1036F TOBACCO NON-USER: CPT | Performed by: ANESTHESIOLOGY

## 2025-02-12 PROCEDURE — 1090F PRES/ABSN URINE INCON ASSESS: CPT | Performed by: ANESTHESIOLOGY

## 2025-02-12 PROCEDURE — 99214 OFFICE O/P EST MOD 30 MIN: CPT | Performed by: ANESTHESIOLOGY

## 2025-02-12 PROCEDURE — G8427 DOCREV CUR MEDS BY ELIG CLIN: HCPCS | Performed by: ANESTHESIOLOGY

## 2025-02-12 NOTE — PROGRESS NOTES
Functionality Assessment/Goals Worksheet     On a scale of 0 (Does not Interfere) to 10 (Completely Interferes)     1.  Which number describes how during the past week pain has interfered with           the following:  A.  General Activity:  4  B.  Mood: 4  C.  Walking Ability:  3  D.  Normal Work (Includes both work outside the home and housework):  3  E.  Relations with Other People:   5  F.  Sleep:   5  G.  Enjoyment of Life:   4    2.  Patient Prefers to Take their Pain Medications:     [x]  On a regular basis   []  Only when necessary    []  Does not take pain medications    3.  What are the Patient's Goals/Expectations for Visiting Pain Management?     [x]  Learn about my pain    []  Receive Medication   []  Physical Therapy     []  Treat Depression   [x]  Receive Injections    []  Treat Sleep   []  Deal with Anxiety and Stress   []  Treat Opoid Dependence/Addiction   []  Other:                                
limbs  Skin: No rashes or lesions visibile in lower limbs  Psychological: Cooperative, no exaggerated pain behaviors       Assessment:    Diagnosis Orders   1. Chronic gluteal pain  PRQ IMPLTJ NEUROSTIMULATOR ELTRD PERIPHERAL NRV    FLUORO NEEDLE/CATH SPINE/PARASPINAL DX/THER      2. Ischial bursitis, unspecified laterality        3. Chronic pain syndrome        4. Neuropathic pain            Daisy Dan is a 89 y.o.female presenting to the pain clinic for evaluation of right-sided low back pain.  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  She has responded significantly to her right sacroiliac joint radiofrequency ablation.    In regard to her right knee, she is a failed total right knee replacement responded significantly to her previous right genicular nerve radiofrequency ablation.  She appears to have some worsening lumbar radiculopathy particular in the L4 and L5 distribution on the right and responded significantly to her right transforaminal epidural steroid injections.  She continues to respond well to injection therapy and making adjustments to her medications.    She responded significantly to her Sprint peripheral nerve stimulator implants have set her up for repeat inferior cluneal nerve implants under fluoroscopy.      Plan:   The following treatment recommendations and plan were discussed in detail with Daisy Dan.    Imaging:   I have reviewed patient’s imaging of lumbar spine x-ray.    Analgesics:   Patient is taking Acetaminophen. Patient informed that the maximum amount of acetaminophen taken on a regular basis should only be 4000 mg per day.     Patient is taking Naproxen. Patient is advised to take as prescribed and not take on an empty stomach.     Patient given 30 mg intramuscular Toradol injection in clinic.    Adjuvants:   In light of the presence of a neuropathic component of pain, the patient can

## 2025-02-17 ENCOUNTER — TELEPHONE (OUTPATIENT)
Dept: PHYSICAL MEDICINE AND REHAB | Age: 89
End: 2025-02-17

## 2025-02-17 NOTE — TELEPHONE ENCOUNTER
Patient called and cancelled her PNS insertion dates.  She states she is not up for all of the upkeep of the PNS with the bandage changes.  She states she has gone through it in the past, and does not wish to go through it again. She moved her follow up with you up because she'd like to discuss a surgical referral.

## 2025-02-26 ENCOUNTER — OFFICE VISIT (OUTPATIENT)
Dept: FAMILY MEDICINE CLINIC | Age: 89
End: 2025-02-26
Payer: MEDICARE

## 2025-02-26 VITALS
HEIGHT: 59 IN | DIASTOLIC BLOOD PRESSURE: 62 MMHG | RESPIRATION RATE: 16 BRPM | SYSTOLIC BLOOD PRESSURE: 138 MMHG | HEART RATE: 80 BPM | WEIGHT: 114 LBS | BODY MASS INDEX: 22.98 KG/M2

## 2025-02-26 DIAGNOSIS — M54.16 LUMBAR RADICULOPATHY: ICD-10-CM

## 2025-02-26 DIAGNOSIS — M19.011 OSTEOARTHRITIS OF RIGHT SHOULDER, UNSPECIFIED OSTEOARTHRITIS TYPE: ICD-10-CM

## 2025-02-26 DIAGNOSIS — M25.511 CHRONIC RIGHT SHOULDER PAIN: Primary | ICD-10-CM

## 2025-02-26 DIAGNOSIS — M47.816 LUMBAR SPONDYLOSIS: ICD-10-CM

## 2025-02-26 DIAGNOSIS — G89.29 CHRONIC RIGHT SHOULDER PAIN: Primary | ICD-10-CM

## 2025-02-26 PROCEDURE — G2211 COMPLEX E/M VISIT ADD ON: HCPCS | Performed by: FAMILY MEDICINE

## 2025-02-26 PROCEDURE — G8420 CALC BMI NORM PARAMETERS: HCPCS | Performed by: FAMILY MEDICINE

## 2025-02-26 PROCEDURE — G8427 DOCREV CUR MEDS BY ELIG CLIN: HCPCS | Performed by: FAMILY MEDICINE

## 2025-02-26 PROCEDURE — 1036F TOBACCO NON-USER: CPT | Performed by: FAMILY MEDICINE

## 2025-02-26 PROCEDURE — 1159F MED LIST DOCD IN RCRD: CPT | Performed by: FAMILY MEDICINE

## 2025-02-26 PROCEDURE — 99214 OFFICE O/P EST MOD 30 MIN: CPT | Performed by: FAMILY MEDICINE

## 2025-02-26 PROCEDURE — 1090F PRES/ABSN URINE INCON ASSESS: CPT | Performed by: FAMILY MEDICINE

## 2025-02-26 PROCEDURE — 1123F ACP DISCUSS/DSCN MKR DOCD: CPT | Performed by: FAMILY MEDICINE

## 2025-02-26 RX ORDER — TRAMADOL HYDROCHLORIDE 50 MG/1
50 TABLET ORAL EVERY 8 HOURS PRN
Qty: 21 TABLET | Refills: 0 | Status: SHIPPED | OUTPATIENT
Start: 2025-02-26 | End: 2025-03-05

## 2025-02-26 ASSESSMENT — ENCOUNTER SYMPTOMS
BACK PAIN: 1
RESPIRATORY NEGATIVE: 1
GASTROINTESTINAL NEGATIVE: 1

## 2025-02-26 NOTE — PROGRESS NOTES
Daisy Dan (:  1934) is a 89 y.o. female,Established patient, here for evaluation of the following chief complaint(s):  Shoulder Pain (Right /) and Health Maintenance (Due for lipid )          Subjective   SUBJECTIVE/OBJECTIVE:  HPI  Chief Complaint   Patient presents with    Shoulder Pain     Right       Health Maintenance     Due for lipid      Pt presents with ongoing right shoulder and low back pain.    Has failed several treatment measures to date.    Interested in next step.      Patient Active Problem List   Diagnosis    Hypertension    Hyperlipidemia    GERD (gastroesophageal reflux disease)    Lumbar spondylosis    Hypothyroidism    Vertebro basilar insufficiency    Cervical spondylosis with myelopathy    Light headed    Stenosis, spinal, lumbar    TIA involving vertebral artery    Sacroiliac inflammation    Trochanteric bursitis of right hip    Ischial bursitis    Hypoxia    Pneumonia due to COVID-19 virus    Acquired trigger finger    Osteoarthritis of knee    Pain in right knee    Elevated CK    Weakness    Stage 3 chronic kidney disease, unspecified whether stage 3a or 3b CKD (HCC)       Current Outpatient Medications   Medication Sig Dispense Refill    traMADol (ULTRAM) 50 MG tablet Take 1 tablet by mouth every 8 hours as needed for Pain for up to 7 days. Intended supply: 7 days. Take lowest dose possible to manage pain Max Daily Amount: 150 mg 21 tablet 0    gabapentin (NEURONTIN) 600 MG tablet Take 1 tablet in the morning, 1 tablet in the afternoon and 2 tablets at bedtime 360 tablet 3    levothyroxine (SYNTHROID) 50 MCG tablet TAKE 1 TABLET BY MOUTH EVERY DAY 90 tablet 3    pantoprazole (PROTONIX) 40 MG tablet TAKE 1 TABLET BY MOUTH EVERY DAY WITH BREAKFAST 90 tablet 3    clopidogrel (PLAVIX) 75 MG tablet TAKE 1 TABLET BY MOUTH EVERY DAY 90 tablet 3    lovastatin (MEVACOR) 20 MG tablet TAKE 1 TABLET BY MOUTH EVERY DAY AT NIGHT 90 tablet 3    lisinopril (PRINIVIL;ZESTRIL) 5 MG tablet

## 2025-03-17 NOTE — PROGRESS NOTES
"Referrals faxed to [facility] [phone]   "

## 2025-03-19 ENCOUNTER — TRANSCRIBE ORDERS (OUTPATIENT)
Dept: ADMINISTRATIVE | Age: 89
End: 2025-03-19

## 2025-03-19 DIAGNOSIS — M48.062 LUMBAR STENOSIS WITH NEUROGENIC CLAUDICATION: ICD-10-CM

## 2025-03-19 DIAGNOSIS — M41.9 SCOLIOSIS, UNSPECIFIED SCOLIOSIS TYPE, UNSPECIFIED SPINAL REGION: Primary | ICD-10-CM

## 2025-03-19 DIAGNOSIS — M53.3 DISORDER OF SACROILIAC JOINT: ICD-10-CM

## 2025-03-19 DIAGNOSIS — M54.16 LUMBAR RADICULOPATHY: ICD-10-CM

## 2025-03-24 ENCOUNTER — HOSPITAL ENCOUNTER (OUTPATIENT)
Dept: MRI IMAGING | Age: 89
Discharge: HOME | End: 2025-03-24
Attending: ORTHOPAEDIC SURGERY
Payer: MEDICARE

## 2025-03-24 DIAGNOSIS — M48.062 LUMBAR STENOSIS WITH NEUROGENIC CLAUDICATION: ICD-10-CM

## 2025-03-24 DIAGNOSIS — M54.16 LUMBAR RADICULOPATHY: ICD-10-CM

## 2025-03-24 DIAGNOSIS — M53.3 DISORDER OF SACROILIAC JOINT: ICD-10-CM

## 2025-03-24 DIAGNOSIS — M41.9 SCOLIOSIS, UNSPECIFIED SCOLIOSIS TYPE, UNSPECIFIED SPINAL REGION: ICD-10-CM

## 2025-03-24 PROCEDURE — 72148 MRI LUMBAR SPINE W/O DYE: CPT

## 2025-03-29 DIAGNOSIS — I10 PRIMARY HYPERTENSION: ICD-10-CM

## 2025-03-31 RX ORDER — LOVASTATIN 20 MG/1
20 TABLET ORAL NIGHTLY
Qty: 90 TABLET | Refills: 3 | Status: SHIPPED | OUTPATIENT
Start: 2025-03-31

## 2025-03-31 RX ORDER — LISINOPRIL 5 MG/1
5 TABLET ORAL DAILY
Qty: 90 TABLET | Refills: 3 | Status: SHIPPED | OUTPATIENT
Start: 2025-03-31

## 2025-05-22 ENCOUNTER — TELEPHONE (OUTPATIENT)
Dept: FAMILY MEDICINE CLINIC | Age: 89
End: 2025-05-22

## 2025-05-22 ENCOUNTER — OFFICE VISIT (OUTPATIENT)
Dept: FAMILY MEDICINE CLINIC | Age: 89
End: 2025-05-22
Payer: MEDICARE

## 2025-05-22 VITALS
DIASTOLIC BLOOD PRESSURE: 68 MMHG | HEART RATE: 68 BPM | WEIGHT: 112 LBS | BODY MASS INDEX: 22.61 KG/M2 | RESPIRATION RATE: 16 BRPM | SYSTOLIC BLOOD PRESSURE: 122 MMHG

## 2025-05-22 DIAGNOSIS — M54.16 LUMBAR RADICULOPATHY: ICD-10-CM

## 2025-05-22 DIAGNOSIS — M47.816 LUMBAR SPONDYLOSIS: Primary | ICD-10-CM

## 2025-05-22 DIAGNOSIS — N18.30 STAGE 3 CHRONIC KIDNEY DISEASE, UNSPECIFIED WHETHER STAGE 3A OR 3B CKD (HCC): ICD-10-CM

## 2025-05-22 DIAGNOSIS — R42 VERTIGO: ICD-10-CM

## 2025-05-22 PROCEDURE — 1036F TOBACCO NON-USER: CPT | Performed by: NURSE PRACTITIONER

## 2025-05-22 PROCEDURE — 1090F PRES/ABSN URINE INCON ASSESS: CPT | Performed by: NURSE PRACTITIONER

## 2025-05-22 PROCEDURE — 1159F MED LIST DOCD IN RCRD: CPT | Performed by: NURSE PRACTITIONER

## 2025-05-22 PROCEDURE — G8427 DOCREV CUR MEDS BY ELIG CLIN: HCPCS | Performed by: NURSE PRACTITIONER

## 2025-05-22 PROCEDURE — G8420 CALC BMI NORM PARAMETERS: HCPCS | Performed by: NURSE PRACTITIONER

## 2025-05-22 PROCEDURE — 1123F ACP DISCUSS/DSCN MKR DOCD: CPT | Performed by: NURSE PRACTITIONER

## 2025-05-22 PROCEDURE — 99213 OFFICE O/P EST LOW 20 MIN: CPT | Performed by: NURSE PRACTITIONER

## 2025-05-22 ASSESSMENT — ENCOUNTER SYMPTOMS
ABDOMINAL PAIN: 0
BACK PAIN: 1
SHORTNESS OF BREATH: 0
COUGH: 0
NAUSEA: 0

## 2025-05-22 NOTE — TELEPHONE ENCOUNTER
Patient called into the office. States she has been receiving injections at [facility]. Today she complains of not being able to hardly walk on left leg and has complaints of dizziness since last night. Had patient check her bp while on the phone. /62. Please advise.

## 2025-05-22 NOTE — PROGRESS NOTES
Daisy Dan (12/21/1934) 89 y.o. female here for evaluation of the following chief complaint(s):      HPI:  Chief Complaint   Patient presents with    Back Pain     Pain that is radiates down left leg.  Had injection at [facility] last week with Nicolette.  Has appt in a few weeks to follow-up with him.    Dizziness     With changing positions.  Began yesterday.  \"Foggy in my mind\"       Following with [facility].  Had injections last week with [facility] with no benefit.  Did not let [facility] known. Pain that radiates down left leg.   Pain is better this morning.  No limitations    Has seen [facility] this past year as well.     On Gabapentin 600 mg AM, 600 mg mid afternoon and 1200 mg.  Forgets her mid afternoon dose often.     MRI Lumbar 3/24/25  IMPRESSION:  1. Levoscoliosis with superimposed degenerative changes most marked at L2-3 and  L3-4,  at which levels there is moderate to severe canal and bilateral foraminal  stenosis.  2. There is mild to moderate canal and moderate to severe bilateral foraminal  stenosis at L1-2 and L4-5.  3. There is mild to moderate canal and moderate bilateral foraminal stenosis at  T12-L1.  4. There is mild canal and moderate bilateral foraminal stenosis at L5-S1    Vitals:    05/22/25 1343   BP: 122/68   Pulse: 68   Resp: 16       Vertigo.  Onset of yesterday.  Feel off with position changes.  Even sitting room feels off.  Foggy brain.  Denies lightheadedness or syncope.     Patient Active Problem List   Diagnosis    Hypertension    Hyperlipidemia    GERD (gastroesophageal reflux disease)    Lumbar spondylosis    Hypothyroidism    Vertebro basilar insufficiency    Cervical spondylosis with myelopathy    Light headed    Stenosis, spinal, lumbar    TIA involving vertebral artery    Sacroiliac inflammation    Trochanteric bursitis of right hip    Ischial bursitis    Hypoxia    Pneumonia due to COVID-19 virus    Acquired trigger finger    Osteoarthritis of knee    Pain in right knee

## 2025-05-22 NOTE — TELEPHONE ENCOUNTER
Have pt notify [facility] regarding the increased pain but also put her in with Bimal tomorrow for evaluation unless [facility] can see her sooner.

## 2025-05-22 NOTE — TELEPHONE ENCOUNTER
Her last injection at [facility] was last Wednesday, she gets injections every 6 weeks. She has not notified [facility] of her increased pain. Please advise.

## 2025-05-22 NOTE — TELEPHONE ENCOUNTER
Patient is scheduled for today at 1:4 pm with Bimal Eldridge CNP, but will still notify [facility] and if they can see her she will call and cancel this appointment.

## 2025-06-19 ENCOUNTER — OFFICE VISIT (OUTPATIENT)
Dept: FAMILY MEDICINE CLINIC | Age: 89
End: 2025-06-19
Payer: MEDICARE

## 2025-06-19 VITALS
DIASTOLIC BLOOD PRESSURE: 64 MMHG | TEMPERATURE: 98 F | HEART RATE: 95 BPM | RESPIRATION RATE: 16 BRPM | BODY MASS INDEX: 23.63 KG/M2 | WEIGHT: 117.2 LBS | OXYGEN SATURATION: 95 % | HEIGHT: 59 IN | SYSTOLIC BLOOD PRESSURE: 124 MMHG

## 2025-06-19 DIAGNOSIS — L30.9 DERMATITIS: Primary | ICD-10-CM

## 2025-06-19 PROCEDURE — 1090F PRES/ABSN URINE INCON ASSESS: CPT | Performed by: NURSE PRACTITIONER

## 2025-06-19 PROCEDURE — 1123F ACP DISCUSS/DSCN MKR DOCD: CPT | Performed by: NURSE PRACTITIONER

## 2025-06-19 PROCEDURE — G8427 DOCREV CUR MEDS BY ELIG CLIN: HCPCS | Performed by: NURSE PRACTITIONER

## 2025-06-19 PROCEDURE — 99213 OFFICE O/P EST LOW 20 MIN: CPT | Performed by: NURSE PRACTITIONER

## 2025-06-19 PROCEDURE — G8420 CALC BMI NORM PARAMETERS: HCPCS | Performed by: NURSE PRACTITIONER

## 2025-06-19 PROCEDURE — 1159F MED LIST DOCD IN RCRD: CPT | Performed by: NURSE PRACTITIONER

## 2025-06-19 PROCEDURE — 1036F TOBACCO NON-USER: CPT | Performed by: NURSE PRACTITIONER

## 2025-06-19 RX ORDER — FLUOCINONIDE 0.5 MG/G
CREAM TOPICAL
Qty: 120 G | Refills: 1 | Status: SHIPPED | OUTPATIENT
Start: 2025-06-19

## 2025-06-19 ASSESSMENT — ENCOUNTER SYMPTOMS
NAUSEA: 0
SHORTNESS OF BREATH: 0
ABDOMINAL PAIN: 0
COUGH: 0

## 2025-06-19 NOTE — PROGRESS NOTES
Daisy Dan (12/21/1934) 89 y.o. female here for evaluation of the following chief complaint(s):      HPI:  Chief Complaint   Patient presents with    Leg Swelling     Patient presents with bilateral leg swelling and red rash x 3 days.        Ankle swelling.  No calf or thigh swelling.   Fine petechial rash on lower legs.   Itching.  Denies pain to lower legs.    No new supplements, medications.        Vitals:    06/19/25 1328   BP: 124/64   Pulse: 95   Resp: 16   Temp: 98 °F (36.7 °C)   SpO2: 95%       Patient Active Problem List   Diagnosis    Hypertension    Hyperlipidemia    GERD (gastroesophageal reflux disease)    Lumbar spondylosis    Hypothyroidism    Vertebro basilar insufficiency    Cervical spondylosis with myelopathy    Light headed    Stenosis, spinal, lumbar    TIA involving vertebral artery    Sacroiliac inflammation    Trochanteric bursitis of right hip    Ischial bursitis    Hypoxia    Pneumonia due to COVID-19 virus    Acquired trigger finger    Osteoarthritis of knee    Pain in right knee    Elevated CK    Weakness    Stage 3 chronic kidney disease, unspecified whether stage 3a or 3b CKD (HCC)       SUBJECTIVE/OBJECTIVE:  Review of Systems   Constitutional:  Negative for chills and fever.   HENT: Negative.     Respiratory:  Negative for cough and shortness of breath.    Cardiovascular:  Positive for leg swelling. Negative for chest pain.   Gastrointestinal:  Negative for abdominal pain and nausea.   Skin:  Negative for rash.   Neurological:  Negative for dizziness, light-headedness and headaches.   Psychiatric/Behavioral: Negative.         Physical Exam  Constitutional:       General: She is not in acute distress.  Eyes:      Pupils: Pupils are equal, round, and reactive to light.   Cardiovascular:      Rate and Rhythm: Normal rate and regular rhythm.      Heart sounds: No murmur heard.  Pulmonary:      Effort: Pulmonary effort is normal.      Breath sounds: Normal breath sounds. No wheezing.

## 2025-07-07 ENCOUNTER — TELEPHONE (OUTPATIENT)
Dept: FAMILY MEDICINE CLINIC | Age: 89
End: 2025-07-07

## 2025-07-07 NOTE — TELEPHONE ENCOUNTER
Patient was seen last week at [facility]. She has drainage coming from her left ear. They advised her to notify PCP. Also per her last visit with Bimal, he prescribed her Lidex cream. Patient does not feel that this has helped her at all. She stated she itches all over. Please advise.

## 2025-07-08 ENCOUNTER — OFFICE VISIT (OUTPATIENT)
Dept: FAMILY MEDICINE CLINIC | Age: 89
End: 2025-07-08

## 2025-07-08 VITALS
RESPIRATION RATE: 16 BRPM | WEIGHT: 113 LBS | SYSTOLIC BLOOD PRESSURE: 124 MMHG | HEART RATE: 84 BPM | BODY MASS INDEX: 22.82 KG/M2 | DIASTOLIC BLOOD PRESSURE: 68 MMHG

## 2025-07-08 DIAGNOSIS — H72.92 PERFORATED EAR DRUM, LEFT: Primary | ICD-10-CM

## 2025-07-08 DIAGNOSIS — L30.9 DERMATITIS: ICD-10-CM

## 2025-07-08 RX ORDER — PREDNISONE 20 MG/1
20 TABLET ORAL 2 TIMES DAILY
Qty: 6 TABLET | Refills: 0 | Status: SHIPPED | OUTPATIENT
Start: 2025-07-08 | End: 2025-07-11

## 2025-07-08 ASSESSMENT — ENCOUNTER SYMPTOMS
SHORTNESS OF BREATH: 0
NAUSEA: 0
COUGH: 0
ABDOMINAL PAIN: 0

## 2025-07-08 NOTE — PROGRESS NOTES
Daisy Dan (12/21/1934) 89 y.o. female here for evaluation of the following chief complaint(s):      HPI:  Chief Complaint   Patient presents with    Ear Drainage     Left ear, clear x 4-5 days.  Denies pain    Rash     Was on legs, but now spread everywhere.  Itches.  NO improvement with Lidex cream       Left ear drainage.  Clear drainage.  No pain or smell.  Wears hearing aids.  No worsening of hearing in left ear.  Denies sinus congestion    Rash on back and arms.  Itching.  Unable to reach rash on back.     Vitals:    07/08/25 1417   BP: 124/68   Pulse: 84   Resp: 16       Patient Active Problem List   Diagnosis    Hypertension    Hyperlipidemia    GERD (gastroesophageal reflux disease)    Lumbar spondylosis    Hypothyroidism    Vertebro basilar insufficiency    Cervical spondylosis with myelopathy    Light headed    Stenosis, spinal, lumbar    TIA involving vertebral artery    Sacroiliac inflammation    Trochanteric bursitis of right hip    Ischial bursitis    Hypoxia    Pneumonia due to COVID-19 virus    Acquired trigger finger    Osteoarthritis of knee    Pain in right knee    Elevated CK    Weakness    Stage 3 chronic kidney disease, unspecified whether stage 3a or 3b CKD (HCC)       SUBJECTIVE/OBJECTIVE:  Review of Systems   Constitutional:  Negative for chills and fever.   HENT:  Positive for ear discharge. Negative for ear pain and hearing loss.    Respiratory:  Negative for cough and shortness of breath.    Cardiovascular:  Negative for chest pain.   Gastrointestinal:  Negative for abdominal pain and nausea.   Skin:  Positive for rash.   Neurological:  Negative for dizziness, light-headedness and headaches.   Psychiatric/Behavioral: Negative.         Physical Exam  Constitutional:       General: She is not in acute distress.  HENT:      Left Ear: Drainage present. No swelling or tenderness. Tympanic membrane is perforated.   Eyes:      Pupils: Pupils are equal, round, and reactive to light.

## 2025-07-14 ENCOUNTER — TELEPHONE (OUTPATIENT)
Dept: FAMILY MEDICINE CLINIC | Age: 89
End: 2025-07-14

## 2025-07-14 DIAGNOSIS — H72.90 PERFORATION OF TYMPANIC MEMBRANE, UNSPECIFIED LATERALITY: Primary | ICD-10-CM

## 2025-07-14 RX ORDER — OFLOXACIN 3 MG/ML
5 SOLUTION AURICULAR (OTIC) 2 TIMES DAILY
Qty: 10 ML | Refills: 0 | Status: SHIPPED | OUTPATIENT
Start: 2025-07-14 | End: 2025-07-21

## 2025-07-14 NOTE — TELEPHONE ENCOUNTER
Patient called and stated that she was seen last week for ear drainage. She denied the drainage having a foul smell, she not having pain. She stated that she is just concerned with the drainage that it will mess up her hearing aid.     She did call [facility] but they couldn't schedule her an appointment for a while. She stated that they recommended to reach out to her PCP.

## 2025-08-28 ENCOUNTER — OFFICE VISIT (OUTPATIENT)
Dept: FAMILY MEDICINE CLINIC | Age: 89
End: 2025-08-28
Payer: MEDICARE

## 2025-08-28 VITALS
WEIGHT: 112 LBS | HEART RATE: 64 BPM | BODY MASS INDEX: 22.62 KG/M2 | SYSTOLIC BLOOD PRESSURE: 112 MMHG | DIASTOLIC BLOOD PRESSURE: 60 MMHG | RESPIRATION RATE: 12 BRPM

## 2025-08-28 DIAGNOSIS — L30.9 DERMATITIS: Primary | ICD-10-CM

## 2025-08-28 PROCEDURE — 1159F MED LIST DOCD IN RCRD: CPT | Performed by: FAMILY MEDICINE

## 2025-08-28 PROCEDURE — 96372 THER/PROPH/DIAG INJ SC/IM: CPT | Performed by: FAMILY MEDICINE

## 2025-08-28 PROCEDURE — 1036F TOBACCO NON-USER: CPT | Performed by: FAMILY MEDICINE

## 2025-08-28 PROCEDURE — G8427 DOCREV CUR MEDS BY ELIG CLIN: HCPCS | Performed by: FAMILY MEDICINE

## 2025-08-28 PROCEDURE — 1090F PRES/ABSN URINE INCON ASSESS: CPT | Performed by: FAMILY MEDICINE

## 2025-08-28 PROCEDURE — 99213 OFFICE O/P EST LOW 20 MIN: CPT | Performed by: FAMILY MEDICINE

## 2025-08-28 PROCEDURE — G8420 CALC BMI NORM PARAMETERS: HCPCS | Performed by: FAMILY MEDICINE

## 2025-08-28 PROCEDURE — 1123F ACP DISCUSS/DSCN MKR DOCD: CPT | Performed by: FAMILY MEDICINE

## 2025-08-28 RX ORDER — HYDROCORTISONE 25 MG/ML
LOTION TOPICAL
Qty: 120 ML | Refills: 0 | Status: SHIPPED | OUTPATIENT
Start: 2025-08-28

## 2025-08-28 RX ORDER — METHYLPREDNISOLONE ACETATE 80 MG/ML
80 INJECTION, SUSPENSION INTRA-ARTICULAR; INTRALESIONAL; INTRAMUSCULAR; SOFT TISSUE ONCE
Status: COMPLETED | OUTPATIENT
Start: 2025-08-28 | End: 2025-08-28

## 2025-08-28 RX ORDER — METHYLPREDNISOLONE ACETATE 40 MG/ML
40 INJECTION, SUSPENSION INTRA-ARTICULAR; INTRALESIONAL; INTRAMUSCULAR; SOFT TISSUE ONCE
Status: COMPLETED | OUTPATIENT
Start: 2025-08-28 | End: 2025-08-28

## 2025-08-28 RX ADMIN — METHYLPREDNISOLONE ACETATE 40 MG: 40 INJECTION, SUSPENSION INTRA-ARTICULAR; INTRALESIONAL; INTRAMUSCULAR; SOFT TISSUE at 14:38

## 2025-08-28 RX ADMIN — METHYLPREDNISOLONE ACETATE 80 MG: 80 INJECTION, SUSPENSION INTRA-ARTICULAR; INTRALESIONAL; INTRAMUSCULAR; SOFT TISSUE at 14:38

## 2025-08-28 SDOH — ECONOMIC STABILITY: FOOD INSECURITY: WITHIN THE PAST 12 MONTHS, THE FOOD YOU BOUGHT JUST DIDN'T LAST AND YOU DIDN'T HAVE MONEY TO GET MORE.: NEVER TRUE

## 2025-08-28 SDOH — ECONOMIC STABILITY: FOOD INSECURITY: WITHIN THE PAST 12 MONTHS, YOU WORRIED THAT YOUR FOOD WOULD RUN OUT BEFORE YOU GOT MONEY TO BUY MORE.: NEVER TRUE

## 2025-08-28 ASSESSMENT — PATIENT HEALTH QUESTIONNAIRE - PHQ9
SUM OF ALL RESPONSES TO PHQ QUESTIONS 1-9: 0
1. LITTLE INTEREST OR PLEASURE IN DOING THINGS: NOT AT ALL
2. FEELING DOWN, DEPRESSED OR HOPELESS: NOT AT ALL
SUM OF ALL RESPONSES TO PHQ QUESTIONS 1-9: 0

## 2025-08-28 ASSESSMENT — ENCOUNTER SYMPTOMS
RESPIRATORY NEGATIVE: 1
GASTROINTESTINAL NEGATIVE: 1

## (undated) DEVICE — GLOVE BIOGEL POWDER FREE SZ 8

## (undated) DEVICE — 3 ML SYRINGE LUER-LOCK TIP: Brand: MONOJECT

## (undated) DEVICE — GAUZE,SPONGE,4"X4",12PLY,STERILE,LF,2'S: Brand: MEDLINE

## (undated) DEVICE — SC PAIN PACK: Brand: MEDLINE INDUSTRIES, INC.

## (undated) DEVICE — TOWEL,OR,DSP,ST,BLUE,STD,4/PK,20PK/CS: Brand: MEDLINE

## (undated) DEVICE — NERVE STIM PERIPH 2 LD NS DISP SPRNT EXTENSA

## (undated) DEVICE — HYPODERMIC SAFETY NEEDLE: Brand: MAGELLAN

## (undated) DEVICE — NEEDLE SPNL 22GA L3.5IN BLK HUB S STL REG WALL FIT STYL W/

## (undated) DEVICE — 6 ML SYRINGE LUER-LOCK TIP: Brand: MONOJECT

## (undated) DEVICE — NEEDLE HYPO 18GA L1.5IN THN WALL PIVOTING SHLD BVL ORIENTED

## (undated) DEVICE — NEEDLE SYR 18GA L1.5IN RED PLAS HUB S STL BLNT FILL W/O

## (undated) DEVICE — NEEDLE SPINAL 22GA L3.5IN SPINOCAN

## (undated) DEVICE — SYRINGE MED 10ML LUERLOCK TIP W/O SFTY DISP

## (undated) DEVICE — GLOVE SURG SZ 65 THK91MIL LTX FREE SYN POLYISOPRENE

## (undated) DEVICE — MARKER,SKIN,WI/RULER AND LABELS: Brand: MEDLINE

## (undated) DEVICE — COUNTER NDL 10 COUNT HLD 20 FOAM BLK SGL MAG

## (undated) DEVICE — 1840 FOAM BLOCK NEEDLE COUNTER: Brand: DEVON

## (undated) DEVICE — BANDAGE ADH W1XL3IN NAT FAB WVN FLX DURABLE N ADH PD SEAL

## (undated) DEVICE — CHLORAPREP 26ML CLEAR

## (undated) DEVICE — NEEDLE SPNL 22GA L3.5IN BLK HUB S STL REG WALL FIT STYL

## (undated) DEVICE — SYRINGE MED 3ML CLR PLAS STD N CTRL LUERLOCK TIP DISP

## (undated) DEVICE — SHEET,DRAPE,3/4,53X77,STERILE: Brand: MEDLINE

## (undated) DEVICE — GLOVE ORANGE PI 7 1/2   MSG9075

## (undated) DEVICE — Z DISCONTINUED USE 2537982 ELECTRODE DISPER W/ CRD DISP

## (undated) DEVICE — GAUZE SPONGES,USP TYPE VII GAUZE, 12 PLY: Brand: CURITY

## (undated) DEVICE — LIQUIBAND RAPID ADHESIVE 36/CS 0.8ML: Brand: MEDLINE

## (undated) DEVICE — APPLICATOR MEDICATED 26 CC SOLUTION HI LT ORNG CHLORAPREP

## (undated) DEVICE — KIT ABLATION RF MULTI-COOLED 17GA 5CM

## (undated) DEVICE — KIT RF 4MM ACT TIP 17GA 75MM MULT PRB PROC COMPONENTS MULT

## (undated) DEVICE — TOWEL,OR,DSP,ST,BLUE,DLX,4/PK,20PK/CS: Brand: MEDLINE

## (undated) DEVICE — Device

## (undated) DEVICE — GLOVE ORANGE PI 8 1/2   MSG9085

## (undated) DEVICE — APPLICATOR MEDICATED 10.5 CC SOLUTION CLR STRL CHLORAPREP

## (undated) DEVICE — NEEDLE SPNL 22 GAX5 IN HUB QNCKE FUNNEL SHP STYL BLK SPINCAN

## (undated) DEVICE — SYRINGE MEDICAL 3ML CLEAR PLASTIC STANDARD NON CONTROL LUERLOCK TIP DISPOSABLE

## (undated) DEVICE — ZINACTIVE USE 2537982 PAD GRND FOR RF PAIN MGMT DISP

## (undated) DEVICE — Z DISCONTINUED NEEDLE HYPO 18GA L1.5IN THN WALL PIVOTING SHLD BVL ORIENTED